# Patient Record
Sex: FEMALE | Race: WHITE | Employment: OTHER | URBAN - METROPOLITAN AREA
[De-identification: names, ages, dates, MRNs, and addresses within clinical notes are randomized per-mention and may not be internally consistent; named-entity substitution may affect disease eponyms.]

---

## 2017-01-03 ENCOUNTER — TELEPHONE (OUTPATIENT)
Dept: INTERNAL MEDICINE CLINIC | Age: 72
End: 2017-01-03

## 2017-01-31 RX ORDER — DIAZEPAM 5 MG/1
5 TABLET ORAL
Qty: 30 TAB | Refills: 0 | OUTPATIENT
Start: 2017-01-31 | End: 2017-05-18 | Stop reason: SDUPTHER

## 2017-02-06 ENCOUNTER — HOSPITAL ENCOUNTER (OUTPATIENT)
Dept: CT IMAGING | Age: 72
Discharge: HOME | End: 2017-02-06
Attending: INTERNAL MEDICINE
Payer: MEDICARE

## 2017-02-06 DIAGNOSIS — C50.812 MALIGNANT NEOPLASM OF OVERLAPPING SITES OF LEFT FEMALE BREAST (HCC): ICD-10-CM

## 2017-02-06 LAB — CREAT UR-MCNC: 0.8 MG/DL (ref 0.6–1.3)

## 2017-02-06 PROCEDURE — 82565 ASSAY OF CREATININE: CPT

## 2017-02-06 PROCEDURE — 74177 CT ABD & PELVIS W/CONTRAST: CPT

## 2017-02-06 PROCEDURE — 74011636320 HC RX REV CODE- 636/320

## 2017-02-06 RX ADMIN — IOPAMIDOL 100 ML: 612 INJECTION, SOLUTION INTRAVENOUS at 10:36

## 2017-02-10 RX ORDER — MELOXICAM 15 MG/1
TABLET ORAL
Qty: 90 TAB | Refills: 0 | Status: SHIPPED | OUTPATIENT
Start: 2017-02-10 | End: 2017-05-17 | Stop reason: SDUPTHER

## 2017-02-10 RX ORDER — LOSARTAN POTASSIUM 25 MG/1
25 TABLET ORAL DAILY
Qty: 90 TAB | Refills: 3 | Status: SHIPPED | OUTPATIENT
Start: 2017-02-10 | End: 2018-01-30 | Stop reason: SDUPTHER

## 2017-04-24 ENCOUNTER — TELEPHONE (OUTPATIENT)
Dept: INTERNAL MEDICINE CLINIC | Age: 72
End: 2017-04-24

## 2017-04-24 RX ORDER — HALOPERIDOL 2 MG/1
2 TABLET ORAL 2 TIMES DAILY
Qty: 180 TAB | Refills: 3 | Status: SHIPPED | OUTPATIENT
Start: 2017-04-24 | End: 2017-08-11 | Stop reason: ALTCHOICE

## 2017-04-24 NOTE — TELEPHONE ENCOUNTER
She gets her labs done every few weeks at her oncologists office. Please request lasts labs from Dr. Nixon Huffman.    Thank you.

## 2017-04-24 NOTE — TELEPHONE ENCOUNTER
Last labs were Oct 2016, and she is coming in Thursday 4/27 for 6 month follow-up. No open orders. Do you need to review?

## 2017-04-26 ENCOUNTER — HOSPITAL ENCOUNTER (OUTPATIENT)
Age: 72
Discharge: HOME | End: 2017-04-26
Attending: PSYCHIATRY & NEUROLOGY
Payer: MEDICARE

## 2017-04-26 DIAGNOSIS — M54.2 CERVICALGIA: ICD-10-CM

## 2017-04-26 LAB — CREAT UR-MCNC: 1.1 MG/DL (ref 0.6–1.3)

## 2017-04-26 PROCEDURE — 82565 ASSAY OF CREATININE: CPT

## 2017-04-26 PROCEDURE — 74011250636 HC RX REV CODE- 250/636: Performed by: PSYCHIATRY & NEUROLOGY

## 2017-04-26 PROCEDURE — 72156 MRI NECK SPINE W/O & W/DYE: CPT

## 2017-04-26 PROCEDURE — A9585 GADOBUTROL INJECTION: HCPCS | Performed by: PSYCHIATRY & NEUROLOGY

## 2017-04-26 RX ADMIN — GADOBUTROL 7.5 ML: 604.72 INJECTION INTRAVENOUS at 16:00

## 2017-04-27 ENCOUNTER — OFFICE VISIT (OUTPATIENT)
Dept: INTERNAL MEDICINE CLINIC | Age: 72
End: 2017-04-27

## 2017-04-27 VITALS
SYSTOLIC BLOOD PRESSURE: 110 MMHG | TEMPERATURE: 98.4 F | BODY MASS INDEX: 26.24 KG/M2 | DIASTOLIC BLOOD PRESSURE: 78 MMHG | HEART RATE: 90 BPM | WEIGHT: 139 LBS | HEIGHT: 61 IN | OXYGEN SATURATION: 99 %

## 2017-04-27 DIAGNOSIS — R53.83 FATIGUE, UNSPECIFIED TYPE: ICD-10-CM

## 2017-04-27 DIAGNOSIS — K57.30 DIVERTICULOSIS OF LARGE INTESTINE WITHOUT HEMORRHAGE: ICD-10-CM

## 2017-04-27 DIAGNOSIS — Z71.89 ACP (ADVANCE CARE PLANNING): ICD-10-CM

## 2017-04-27 DIAGNOSIS — E78.5 HYPERLIPIDEMIA, UNSPECIFIED HYPERLIPIDEMIA TYPE: ICD-10-CM

## 2017-04-27 DIAGNOSIS — I10 ESSENTIAL HYPERTENSION WITH GOAL BLOOD PRESSURE LESS THAN 140/90: ICD-10-CM

## 2017-04-27 DIAGNOSIS — M48.061 LUMBAR SPINAL STENOSIS: ICD-10-CM

## 2017-04-27 DIAGNOSIS — C50.912 MALIGNANT NEOPLASM OF LEFT FEMALE BREAST, UNSPECIFIED SITE OF BREAST: Primary | ICD-10-CM

## 2017-04-27 DIAGNOSIS — E55.9 VITAMIN D INSUFFICIENCY: ICD-10-CM

## 2017-04-27 DIAGNOSIS — G47.00 INSOMNIA, UNSPECIFIED TYPE: ICD-10-CM

## 2017-04-27 DIAGNOSIS — M85.89 OSTEOPENIA OF MULTIPLE SITES: ICD-10-CM

## 2017-04-27 DIAGNOSIS — Z00.00 MEDICARE ANNUAL WELLNESS VISIT, SUBSEQUENT: ICD-10-CM

## 2017-04-27 DIAGNOSIS — I89.0 LYMPHEDEMA OF ARM: ICD-10-CM

## 2017-04-27 DIAGNOSIS — Z12.31 ENCOUNTER FOR SCREENING MAMMOGRAM FOR MALIGNANT NEOPLASM OF BREAST: ICD-10-CM

## 2017-04-27 RX ORDER — CYCLOBENZAPRINE HCL 5 MG
5 TABLET ORAL
COMMUNITY
End: 2017-08-11 | Stop reason: ALTCHOICE

## 2017-04-27 NOTE — PATIENT INSTRUCTIONS

## 2017-04-27 NOTE — MR AVS SNAPSHOT
Visit Information Date & Time Provider Department Dept. Phone Encounter #  
 4/27/2017 11:30 AM Sharri Souza MD Internist of [address] [place] [address] [phone] [id number] Follow-up Instructions Return in about 6 months (around 10/27/2017), or if symptoms worsen or fail to improve. Your Appointments 10/31/2017 11:00 AM  
Office Visit with Sharri Souza MD  
Internist of [address]) Appt Note: 6 months bs  
 [address], [address] [place] [address] [address]  
  
   
 [address], [address] Upcoming Health Maintenance Date Due  
 MEDICARE YEARLY EXAM 10/27/2016 BREAST CANCER SCRN MAMMOGRAM 8/23/2018 GLAUCOMA SCREENING Q2Y 9/15/2018 DTaP/Tdap/Td series (2 - Td) 9/12/2024 COLONOSCOPY 2/22/2026 Allergies as of 4/27/2017  Review Complete On: 4/27/2017 By: Jaylen Keys Severity Noted Reaction Type Reactions Keflex [Cephalexin]  07/27/2010    Rash Metronidazole  05/04/2015    Rash Other Medication   Side Effect Nausea Only Anesthesia Shellfish Containing Products  07/21/2011    Nausea and Vomiting More of just eating the actual shellfish. Sulfa (Sulfonamide Antibiotics)  07/21/2011    Rash Ultram [Tramadol]  07/27/2010    Nausea and Vomiting Patient states she is allergic to most Narcotics Vancomycin  07/21/2011    Rash Current Immunizations  Reviewed on 10/25/2016 Name Date Influenza High Dose Vaccine PF 10/25/2016  1:20 PM, 10/1/2015 10:13 AM  
 Influenza Vaccine 9/1/2014 Influenza Vaccine Split 10/2/2012 12:18 PM, 11/1/2011 Influenza Vaccine Whole 10/8/2010 Pneumococcal Conjugate (PCV-13) 10/27/2015  2:52 PM  
 Pneumococcal Polysaccharide (PPSV-23) 4/15/2013 TD Vaccine 5/5/2008 Tdap 9/12/2014 Zoster 9/20/2011 Not reviewed this visit Vitals BP Pulse Temp Height(growth percentile) Weight(growth percentile) SpO2 110/78 (BP 1 Location: Right arm, BP Patient Position: Sitting) 90 98.4 °F (36.9 °C) (Oral) 5' 1\" (1.549 m) 139 lb (63 kg) 99% BMI OB Status Smoking Status 26.26 kg/m2 Postmenopausal Former Smoker Vitals History BMI and BSA Data Body Mass Index Body Surface Area  
 26.26 kg/m 2 1.65 m 2 Preferred Pharmacy Pharmacy Name Phone [facility] [address] [place] [address] [phone] Your Updated Medication List  
  
   
This list is accurate as of: 4/27/17 12:35 PM.  Always use your most recent med list.  
  
  
  
  
 amoxicillin 500 mg capsule Commonly known as:  AMOXIL  
4 tabs by mouth 1 hour before dental work Biotin 2,500 mcg Cap Take  by mouth. CALCIUM 500+D 500 mg(1,250mg) -200 unit per tablet Generic drug:  calcium-vitamin D Take 1 Tab by mouth two (2) times daily (with meals). CENTRUM SILVER PO Take  by mouth. cyclobenzaprine 5 mg tablet Commonly known as:  FLEXERIL Take 5 mg by mouth. diazePAM 5 mg tablet Commonly known as:  VALIUM Take 1 Tab by mouth every six (6) hours as needed. FEMARA 2.5 mg tablet Generic drug:  letrozole Take 2.5 mg by mouth daily. gabapentin 100 mg capsule Commonly known as:  NEURONTIN Take  by mouth three (3) times daily. haloperidol 2 mg tablet Commonly known as:  HALDOL Take 1 Tab by mouth two (2) times a day. IBRANCE 100 mg Cap Generic drug:  palbociclib Take 75 mg by mouth.  
  
 losartan 25 mg tablet Commonly known as:  COZAAR Take 1 Tab by mouth daily. melatonin 3 mg tablet Take  by mouth nightly. meloxicam 15 mg tablet Commonly known as:  MOBIC  
TAKE 1 TABLET (15 MG) BY ORAL ROUTE ONCE DAILY WITH FOOD  
  
 VITAMIN B-12 PO Take  by mouth. VITAMIN C 500 mg tablet Generic drug:  ascorbic acid (vitamin C) Take  by mouth. VITAMIN D3 1,000 unit tablet Generic drug:  cholecalciferol Take 2,000 Units by mouth daily. Follow-up Instructions Return in about 6 months (around 10/27/2017), or if symptoms worsen or fail to improve. Patient Instructions Back Stretches: Exercises Your Care Instructions Here are some examples of exercises for stretching your back. Start each exercise slowly. Ease off the exercise if you start to have pain. Your doctor or physical therapist will tell you when you can start these exercises and which ones will work best for you. How to do the exercises Overhead stretch 1. Stand comfortably with your feet shoulder-width apart. 2. Looking straight ahead, raise both arms over your head and reach toward the ceiling. Do not allow your head to tilt back. 3. Hold for 15 to 30 seconds, then lower your arms to your sides. 4. Repeat 2 to 4 times. Side stretch 1. Stand comfortably with your feet shoulder-width apart. 2. Raise one arm over your head, and then lean to the other side. 3. Slide your hand down your leg as you let the weight of your arm gently stretch your side muscles. Hold for 15 to 30 seconds. 4. Repeat 2 to 4 times on each side. Press-up 1. Lie on your stomach, supporting your body with your forearms. 2. Press your elbows down into the floor to raise your upper back. As you do this, relax your stomach muscles and allow your back to arch without using your back muscles. As your press up, do not let your hips or pelvis come off the floor. 3. Hold for 15 to 30 seconds, then relax. 4. Repeat 2 to 4 times. Relax and rest 
 
1. Lie on your back with a rolled towel under your neck and a pillow under your knees. Extend your arms comfortably to your sides. 2. Relax and breathe normally. 3. Remain in this position for about 10 minutes. 4. If you can, do this 2 or 3 times each day. Follow-up care is a key part of your treatment and safety.  Be sure to make and go to all appointments, and call your doctor if you are having problems. It's also a good idea to know your test results and keep a list of the medicines you take. Where can you learn more? Go to http://fatou-olive.info/. Enter M367 in the search box to learn more about \"Back Stretches: Exercises. \" Current as of: May 23, 2016 Content Version: 11.2 © 1820-9573 [employer]. Care instructions adapted under license by [employer] (which disclaims liability or warranty for this information). If you have questions about a medical condition or this instruction, always ask your healthcare professional. [address] any warranty or liability for your use of this information. Introducing [facility]! Dear Josh Villafana: 
Thank you for requesting a Disconnect account. Our records indicate that you already have an active Disconnect account. You can access your account anytime at [email or web address]. Standardized [email or web address]/[employer] Did you know that you can access your hospital and ER discharge instructions at any time in Disconnect? You can also review all of your test results from your hospital stay or ER visit. Additional Information If you have questions, please visit the Frequently Asked Questions section of the Disconnect website at [email or web address] Standardized Safety/[employer]/. Remember, Disconnect is NOT to be used for urgent needs. For medical emergencies, dial 911. Now available from your iPhone and Android! Please provide this summary of care documentation to your next provider. Your primary care clinician is listed as Anupam White. If you have any questions after today's visit, please call [phone].

## 2017-04-27 NOTE — PROGRESS NOTES
1. Have you been to the ER, urgent care clinic or hospitalized since your last visit? NO.     2. Have you seen or consulted any other health care providers outside of the [address] [place] [address] since your last visit (Include any pap smears or colon screening)? YES  Dr. Epstein Marrow neurology for pain in back of neck and head April 18, 2017. Do you have an Advanced Directive? YES pt states she will bring a copy and bring it to us. Would you like information on Advanced Directives?  NO

## 2017-04-30 ENCOUNTER — TELEPHONE (OUTPATIENT)
Dept: INTERNAL MEDICINE CLINIC | Age: 72
End: 2017-04-30

## 2017-04-30 NOTE — PROGRESS NOTES
This is a Subsequent Medicare Annual Wellness Visit providing Personalized Prevention Plan Services (PPPS) (Performed 12 months after initial AWV and PPPS )    I have reviewed the patient's medical history in detail and updated the computerized patient record. History     Past Medical History:   Diagnosis Date    Arthritis     Breast cancer metastasized to lung [facility])     Left Breast    Chronic pain     Colon polyps 2/22/16    distal sigmoid, Dr. Wade DDD (degenerative disc disease)     Diverticulitis of colon     Diverticulosis 2/22/16    mild in the ascedning and sigmoid colon, Dr. Belle Olivia (hyperlipidemia)     Hypertension     Osteopenia     Tourette syndrome     Vitamin D deficiency       Past Surgical History:   Procedure Laterality Date    HX APPENDECTOMY      HX BREAST BIOPSY  7-30-10    Left Breast w/Nipple Duct exploration and excisional biopsy-left    HX DILATION AND CURETTAGE      x3    HX MODIFIED RADICAL MASTECTOMY  9-3-10    left    HX ORTHOPAEDIC Right 2012    knee replacement    HX TONSILLECTOMY      HYSTEROSCOPY DIAGNOSTIC       Current Outpatient Prescriptions   Medication Sig Dispense Refill    cyclobenzaprine (FLEXERIL) 5 mg tablet Take 5 mg by mouth.  haloperidol (HALDOL) 2 mg tablet Take 1 Tab by mouth two (2) times a day. 180 Tab 3    meloxicam (MOBIC) 15 mg tablet TAKE 1 TABLET (15 MG) BY ORAL ROUTE ONCE DAILY WITH FOOD 90 Tab 0    losartan (COZAAR) 25 mg tablet Take 1 Tab by mouth daily. 90 Tab 3    diazePAM (VALIUM) 5 mg tablet Take 1 Tab by mouth every six (6) hours as needed. 30 Tab 0    CYANOCOBALAMIN, VITAMIN B-12, (VITAMIN B-12 PO) Take  by mouth.  Biotin 2,500 mcg cap Take  by mouth.  melatonin 3 mg tablet Take  by mouth nightly.       amoxicillin (AMOXIL) 500 mg capsule 4 tabs by mouth 1 hour before dental work 12 Cap 1    calcium-vitamin D (CALCIUM 500+D) 500 mg(1,250mg) -200 unit per tablet Take 1 Tab by mouth two (2) times daily (with meals).  cholecalciferol, vitamin d3, (VITAMIN D) 1,000 unit tablet Take 2,000 Units by mouth daily.  ascorbic acid (VITAMIN C) 500 mg tablet Take  by mouth.  gabapentin (NEURONTIN) 100 mg capsule Take  by mouth three (3) times daily.  palbociclib (IBRANCE) 100 mg cap Take 75 mg by mouth.  letrozole (FEMARA) 2.5 mg tablet Take 2.5 mg by mouth daily.  MULTIVITAMIN W-MINERALS/LUTEIN (CENTRUM SILVER PO) Take  by mouth. Allergies   Allergen Reactions    Keflex [Cephalexin] Rash    Metronidazole Rash    Other Medication Nausea Only     Anesthesia    Shellfish Containing Products Nausea and Vomiting     More of just eating the actual shellfish.     Sulfa (Sulfonamide Antibiotics) Rash    Ultram [Tramadol] Nausea and Vomiting     Patient states she is allergic to most Narcotics    Vancomycin Rash     Family History   Problem Relation Age of Onset    Osteoporosis Sister     Osteoporosis Mother     Stroke Father     Hypertension Father     Cancer Paternal Aunt      breast     Social History   Substance Use Topics    Smoking status: Former Smoker    Smokeless tobacco: Never Used    Alcohol use 3.5 oz/week     7 Glasses of wine per week     Patient Active Problem List   Diagnosis Code    Tourette syndrome F95.2    Osteoarthritis, Status post right total knee replacement M19.90    Lumbar spinal stenosis M48.06    HLD (hyperlipidemia) E78.5    Breast cancer ([facility] [address] metastatic to lungs  C50.919    Essential hypertension with goal blood pressure less than 140/90 I10    Diverticulosis K57.90    Osteopenia M85.80    Lymphedema of arm left I89.0    Insomnia G47.00    Fatigue R53.83    Diverticulitis, recurrent K57.92    T11 Vertebral compression fracture (HCC) s/p kyphoplasty M48.50XA    Personal history of malignant neoplasm of breast Z85.3       Depression Risk Factor Screening:     PHQ 2 / 9, over the last two weeks 4/27/2017   Little interest or pleasure in doing things Not at all   Feeling down, depressed or hopeless Not at all   Total Score PHQ 2 0     Alcohol Risk Factor Screening: On any occasion during the past 3 months, have you had more than 3 drinks containing alcohol? No    Do you average more than 7 drinks per week? No      Functional Ability and Level of Safety:     Hearing Loss   none    Activities of Daily Living   Self-care. Requires assistance with: no ADLs    Fall Risk     Fall Risk Assessment, last 12 mths 4/27/2017   Able to walk? Yes   Fall in past 12 months? Yes   Fall with injury? Yes   Number of falls in past 12 months 1   Fall Risk Score 2     Abuse Screen   Patient is not abused    Review of Systems   A comprehensive review of systems was negative except for that written in the HPI.     Physical Examination     Evaluation of Cognitive Function:  Mood/affect:  neutral, happy  Appearance: age appropriate and within normal Limits  Family member/caregiver input:      Exam: see office note    Patient Care Team:  Mendy Palma MD as PCP - General (Internal Medicine)  Anaya Groves MD (Gastroenterology)  Antwon Rizo MD (Rheumatology)  Shakira Bender MD (Physical Medicine and Rehab)  Mere Calix MD (Pulmonary Disease)  Soha Bass MD (Neurology)  Joanna Evangelista MD (Orthopedic Surgery)  Kamran Trejo MD (Surgical Oncology)  Mary Lou Ayers MD (Hematology and Oncology)  Yaritza Reyes MD (Thoracic Diseases)  Ad Sawant MD (Cardiology)  Lobito Parker MD (Radiology)  Aleshia Leon, RN as Ambulatory Care Navigator (Internal Medicine)  Conchis Carolina MD (Hematology and Oncology)  Zoraida Velez MD (Internal Medicine)  Terry Ware MD (Gynecologic Oncology)  Malcolm Evangelista MD (Dermatology)  Otilia Ponce MD (Ophthalmology)  Yvette Kaur, ALHAJI as Ambulatory Care Navigator (Internal Medicine)  Tamia Ardon DO (Physical Medicine and Rehab)  Kamran Trejo MD as Surgeon (Surgical Oncology)    Advice/Referrals/Counseling   Education and counseling provided:  Are appropriate based on today's review and evaluation  End-of-Life planning (with patient's consent)  Screening Mammography  Bone mass measurement (DEXA)  Screening for glaucoma      Assessment/Plan       ICD-10-CM ICD-9-CM    1. Malignant neoplasm of left female breast, unspecified site of breast ([facility] [address] C50.912 174.9 SHARON MAMMO BI SCREENING INCL CAD   2. Essential hypertension with goal blood pressure less than 140/90 I10 401.9 TSH 3RD GENERATION      URINALYSIS W/MICROSCOPIC   3. Hyperlipidemia, unspecified hyperlipidemia type E78.5 272.4 LIPID PANEL   4. Diverticulosis of large intestine without hemorrhage K57.30 562.10    5. Encounter for screening mammogram for malignant neoplasm of breast Z12.31 V76.12 SHARON MAMMO BI SCREENING INCL CAD   6. Osteopenia of multiple sites M85.89 733.90 VITAMIN D, 25 HYDROXY   7. Vertebral compression fracture, sequela M48.50XS 905.1    8. Lumbar spinal stenosis M48.06 724.02    9. Lymphedema of arm left I89.0 457.1    10. Insomnia, unspecified type G47.00 780.52    11. Fatigue, unspecified type R53.83 780.79    12. Vitamin D insufficiency E55.9 268.9 VITAMIN D, 25 HYDROXY     current treatment plan is effective, no change in therapy  lab results and schedule of future lab studies reviewed with patient  reviewed diet, exercise and weight control  cardiovascular risk and specific lipid/LDL goals reviewed  reviewed medications and side effects in detail  radiology results and schedule of future radiology studies reviewed with patient.

## 2017-04-30 NOTE — PROGRESS NOTES
Efrain Butterfield is a 78y.o. year old female who presents today for evaluation of hypertension, dyslipidemia,  metastatic breast cancer, GERD, diverticulosis with recurrent diverticulitis, osteoarthritis s/p right knee replacement (2012), lumbar degenerative disease, osteopenia, compression fracture, and Tourette's syndrome. She reports that she is doing relatively well. She states that she established care with Dr. Martín Sanchez, but was told that she may have her mammograms and breast exams in our office with referral to her if something arises. She also was released from the care of Dr. Rebeca Haile who was following a right ovarian cyst, which has been concluded to be benign. She reports that she had been given a treatment break from [facility] due to leukopenia, but was just restarted by Dr. Jeferson Briones. She is complaining of persistent fatigue, which makes it difficult to perform daily activities at home. She is otherwise without complaints. She has a history of left breast cancer, which was diagnosed in 7/2010 as invasive ductal adenocarcinoma, s/p left modified radical mastectomy with sentinel node biopsy, performed by Dr. Shanta Garcia. She had 3 positive lymph nodes and was classified as stage 1B, T1c N1 M0, ER and OH positive, Her-2/sarah negative by FISH. She underwent 6 cycles of chemotherapy with Docetaxel and Cytoxan. She was subsequently unable to tolerate anastrozole, exemestane, tamoxifen, and letrozole due to profound fatigue and arthralgias. Her course was complicated by chronic left arm lymphedema, managed with a compression sleeve. A chest CT scan in 3/2014 noted several small pulmonary nodules which were concerning for metastases but were too small to biopsy. They were followed with sequential CT scans , and in 12/2014, repeat chest CT scan showed enlarging bilateral pulmonary nodules compatible with progression of metastatic disease.  A fine needle aspirate was performed on 12/21/2014 which showed benign pulmonary parenchyma with alveolar hemorrhage. Repeat chest CT scan in 3/12/2015 showed mild interval progression of the lung metastases with enlarging nodules and development of new nodules. A CT guided needle biopsy was performed on 3/23/2015, which confirmed metastatic adenocarcinoma consistent with breast primary (stage IV, ER/SD positive, and TTF-1 negative). On 4/13/2015, she was started on therapy with palbociclib and letrozole. Follow-up CT scan (7/15/15) showed partial response with decreasing size of some nodules and resolution of other nodules. Most recent chest, abdomen, and pelvis CT scan was obtained on 10/4/2016, showing stable multiple bilateral small pulmonary nodules (up to 5 mm in size) and no suspicious new pulmonary nodules. She continues on letrozole and intermittent dosing of palbociclib. She is followed by Dr. Vanesa Nuñez.     She has a history of hypertension treated with losartan. She monitors her blood pressure mainly when visiting multiple physicians and reports that it is usually 120-30/80. She has no history of heart disease, and denies any chest pain, shortness of breath at rest or with exertion, palpitations, lightheadedness, or edema. In 4/11/2016, she sustained a fall with subsequent severe pain in her mid to upper lumbar region and wrapping around waist. She was treated with meloxicam, tylenol and flexeril, but because of persistent discomfort, she presented to [facility] on 4/15/2016 and lumbosacral spine films showed marked degenerative changes with disc space obliteration throughout lumbar spine and slight anterior spondylolisthesis of L4. She was evaluated by Dr. Willis Wilson on 4/18/2016 who recommended physical therapy and evaluation by Dr. Olimpia Chavis for her degenerative spine changes. She continued to take meloxicam and tylenol and started physical therapy, but noted worsening of her pain.  She subsequently was evaluated by Dr. Frederic Pickard, who obtained a lumbar MRI on 4/26/2016, which showed a new subacute compression fracture of T11 vertebral body with diffuse marrow edema and mild paravertebral inflammatory stranding, no retropulsion or central stenosis; more severe degenerative disease along the right side at L4-L5, L5-S1, potential mild compression of right exiting L4 and L5 nerve roots. She was referred to Dr. Linden Dobson for kyphoplasty of the T11 compression fracture given failure of pain control with conservative measures. She underwent the procedure on 0/2/9924 without complications, and B04 vertebral body bone core and aspirate biopsies were performed, which showed only reactive bone changes and no evidence of malignancy. She has a history of osteopenia, with a history of fracture of her sacrum. She reports that she was treated with alendronate in 12/2011 but discontinued it in 4/2013 due to intolerence. Her last bone density scan (11/2015) was consistent with osteopenia with femoral neck T-scores: left -1.1/ right -1.2 (lumbar T-score could not be assessed). She continues to take calcium and vitamin D. In 10/2016, she developed left sided low back pain with radiation to her left leg over the last several weeks. She underwent a lumbar MRI (9/2016) showing scoliosis and advanced multilevel degenerative changes with areas of foraminal stenosis at L3-L4 and L5-S1; mild/moderate central canal stenosis at L4-L5 and L5-S1. She was evaluated by Dr. Meg Cortes and treated with pregabalin with some improvement. She subsequently received an epidural steroid injection with improvement. She has a history of GERD and diverticulosis with recurrent diverticulitis,. She was evaluated by Dr. Nel López in 2/23/2016 and underwent an upper endoscopy, which revealed a Schatzki's ring at the gastroesophageal junction (dilated) with mild distal esophagitis and a small hiatal hernia.  A screening colonoscopy was also performed showing moderately severe diverticulosis of the ascending and descending colon and a 2 mm sessile sigmoid polyp (pathology: hyperplastic). Recommendations for follow-up colonoscopy in 10 years. She denies any abdominal pain, nausea, vomiting, melena, hematochezia, or change in bowel movements. She also has a history of Tourette's syndrome controlled with Haldol. She is followed by Dr. Patel Albert. Past Medical History:   Diagnosis Date    Arthritis     Breast cancer metastasized to lung [facility])     Left Breast    Chronic pain     Colon polyps 2/22/16    distal sigmoid, Dr. Muller Speak DDD (degenerative disc disease)     Diverticulitis of colon     Diverticulosis 2/22/16    mild in the ascedning and sigmoid colon, Dr. Nathalie Lyn (hyperlipidemia)     Hypertension     Osteopenia     Tourette syndrome     Vitamin D deficiency      Past Surgical History:   Procedure Laterality Date    HX APPENDECTOMY      HX BREAST BIOPSY  7-30-10    Left Breast w/Nipple Duct exploration and excisional biopsy-left    HX DILATION AND CURETTAGE      x3    HX MODIFIED RADICAL MASTECTOMY  9-3-10    left    HX ORTHOPAEDIC Right 2012    knee replacement    HX TONSILLECTOMY      HYSTEROSCOPY DIAGNOSTIC       Current Outpatient Prescriptions   Medication Sig    cyclobenzaprine (FLEXERIL) 5 mg tablet Take 5 mg by mouth.  haloperidol (HALDOL) 2 mg tablet Take 1 Tab by mouth two (2) times a day.  meloxicam (MOBIC) 15 mg tablet TAKE 1 TABLET (15 MG) BY ORAL ROUTE ONCE DAILY WITH FOOD    losartan (COZAAR) 25 mg tablet Take 1 Tab by mouth daily.  diazePAM (VALIUM) 5 mg tablet Take 1 Tab by mouth every six (6) hours as needed.  CYANOCOBALAMIN, VITAMIN B-12, (VITAMIN B-12 PO) Take  by mouth.  Biotin 2,500 mcg cap Take  by mouth.  melatonin 3 mg tablet Take  by mouth nightly.  amoxicillin (AMOXIL) 500 mg capsule 4 tabs by mouth 1 hour before dental work    calcium-vitamin D (CALCIUM 500+D) 500 mg(1,250mg) -200 unit per tablet Take 1 Tab by mouth two (2) times daily (with meals).     cholecalciferol, vitamin d3, (VITAMIN D) 1,000 unit tablet Take 2,000 Units by mouth daily.  ascorbic acid (VITAMIN C) 500 mg tablet Take  by mouth.  gabapentin (NEURONTIN) 100 mg capsule Take  by mouth three (3) times daily.  palbociclib (IBRANCE) 100 mg cap Take 75 mg by mouth.  letrozole (FEMARA) 2.5 mg tablet Take 2.5 mg by mouth daily.  MULTIVITAMIN W-MINERALS/LUTEIN (CENTRUM SILVER PO) Take  by mouth. No current facility-administered medications for this visit. Allergies and Intolerances: Allergies   Allergen Reactions    Keflex [Cephalexin] Rash    Metronidazole Rash    Other Medication Nausea Only     Anesthesia    Shellfish Containing Products Nausea and Vomiting     More of just eating the actual shellfish.  Sulfa (Sulfonamide Antibiotics) Rash    Ultram [Tramadol] Nausea and Vomiting     Patient states she is allergic to most Narcotics    Vancomycin Rash     Family History:   Family History   Problem Relation Age of Onset    Osteoporosis Sister     Osteoporosis Mother     Stroke Father     Hypertension Father     Cancer Paternal Aunt      breast     Social History:   She  reports that she has quit smoking. She has never used smokeless tobacco. She stopped smoking over 40 years ago. She is  and lives with . They have one daughter and two grandchildren. History   Alcohol Use    3.5 oz/week    7 Glasses of wine per week     Immunization History:  Immunization History   Administered Date(s) Administered    Influenza High Dose Vaccine PF 10/01/2015, 10/25/2016    Influenza Vaccine 09/01/2014    Influenza Vaccine Split 11/01/2011, 10/02/2012    Influenza Vaccine Whole 10/08/2010    Pneumococcal Conjugate (PCV-13) 10/27/2015    Pneumococcal Polysaccharide (PPSV-23) 04/15/2013    TD Vaccine 05/05/2008    Tdap 09/12/2014    Zoster 09/20/2011       Review of Systems:   As above included in HPI.   Otherwise 11 point review of systems negative including constitutional, skin, HENT, eyes, respiratory, cardiovascular, gastrointestinal, genitourinary, musculoskeletal, endo/heme/aller, neurological.    Physical:   Vitals:   BP: 110/78  HR: 90  WT: 139 lb (63 kg)  BMI:  26.26 kg/m2    Exam:   Pt appears well; alert and oriented x 3; appropriate affect. HEENT: PERRLA, anicteric, oropharynx clear, no JVD, adenopathy or thyromegaly. No carotid bruits or radiated murmur. Lungs: clear to auscultation, no wheezes, rhonchi, or rales. Heart: regular rate and rhythm. No murmur, rubs, gallops  Abdomen: soft, nontender, nondistended, normal bowel sounds, no hepatosplenomegaly or masses. Extremities: without edema. Pulses 1-2+ bilaterally. Review of Data:  Labs:  Hospital Outpatient Visit on 04/26/2017   Component Date Value Ref Range Status    Creatinine (POC) 04/26/2017 1.1  0.6 - 1.3 MG/DL Final    GFR-AA (POC) 04/26/2017 60* >60 ml/min/1.73m2 Final    GFR, non-AA (POC) 04/26/2017 49* >60 ml/min/1.73m2 Final       [facility] WBC 5.2       Hb 13.9/ Hct 41.4       Platelets 791       Glucose 87       Na 141/ K 5.1/ CO2 29       Ca 9.8       AST 16/ ALT 15       Alkaline phosphatase 67       Creatinine 0.84/ eGFR >60    Health Maintenance:  Screening:    Mammogram: negative (8/2016)    PAP smear: negative (9/2013) Dr Raghavendra Hardin. Pelvic ultrasound negative (9/2015). Colorectal: colonoscopy (2/2016) hyperplastic polyp; Dr. Daniela De Anda. Follow-up 2026. Depression: none   DM (HbA1c/FPG): FPG 87 (4/2017)   Hepatitis C: declined   Falls: none   DEXA: osteopenia (11/2015)   Smoking:distant past   Glaucoma: regular eye exams with Dr. Ira Myers every 6 months.     Vitamin D: 36.2 (10/2016)   Medicare Wellness: today    Impression:  Patient Active Problem List   Diagnosis Code    Tourette syndrome F95.2    Osteoarthritis, Status post right total knee replacement M19.90    Lumbar spinal stenosis M48.06    HLD (hyperlipidemia) E78.5    Breast cancer [facility]), metastatic to lungs  C50.919    Essential hypertension with goal blood pressure less than 140/90 I10    Diverticulosis K57.90    Osteopenia M85.80    Lymphedema of arm left I89.0    Insomnia G47.00    Fatigue R53.83    Diverticulitis, recurrent K57.92    T11 Vertebral compression fracture (HCC) s/p kyphoplasty M48.50XA    Personal history of malignant neoplasm of breast Z85.3       Plan:  1. Hypertension. Well controlled on losartan. Renal function normal with creatinine 0.81/ eGFR >60. Continue to follow. 2. Breast cancer with pulmonary metastasis. Continue current therapy as per Dr. Rito Julio. Recent CT scans with stable pulmonary metastases on Ibrance and Femara. Continue use of compression sleeve for lymphedema. Follow. 3. Hyperlipidemia. Calculated 10 year ASCVD risk is 9.3 %, which places her in one of the four statin benefit groups (primary prevention with risk > 7.5%). Given metastatic breast cancer, and lack of history of ASCVD, would be understandable if patient wishes to hold off on statin therapy. Will need to discuss at next visit. Continue to emphasized importance of lifestyle modifications, including diet, exercise, and weight loss. 4. T11 compression fracture. S/P kyphoplasty with significant improvement in pain. Follow. 5. Osteopenia. Last bone density scan 11/2015. Using femoral neck T-scores, calculated FRAX score estimates her 10 year risk of a major osteoporetic fracture at 15% and hip fracture at 1.5%, which alone are not an indication for biphosphonate treatment. However, the development of a vertebral compression fracture is an indication for treatment. In addition, treatment with an aromatase inhibitor increases likelihood of progression. Discussed the benefits and risks of therapy with the patient, and preauthorization had been obtained for her to begin receiving denosumab therapy through Dr. Jack Russ office.  However, she has had some difficulty with side effects from treatment with Ibrance, and had decided not to proceed with Prolia therapy. She did not tolerate alendronate in the past. Continue calcium and Vitamin D. Encouraged to continue exercises as instructed in physical therapy. Will recheck bone density scan in 11/2017. Follow. 6. Recurrent diverticulitis. Currently stable without symptoms. Colonoscopy (2/2016) with moderately severe diverticulosis in the ascending and descending colon. Will continue to follow. 7. Low back pain. Improved. Likely secondary to lumbar stenosis and degenerative arthritis. Being managed currently by Dr. Beltran Irvin with pregabalin and epidural injections. Follow. 8. Tourette's syndrome. Followed by neurology. Continue Haldol and prn diazepam for sleep. 8. Health maintenance. Immunizations up to date. Mammogram up to date. Will order for 8/2016 and perform breast exam at next visit. Vitamin D level remains low normal. COntinue maintenance dose supplement. Continue regular eye exams with Dr. Kenia Khan. Will request record. In addition, an annual Medicare wellness visit was done today. Patient understands recommendations and agrees with plan. Follow-up in 6 months.

## 2017-04-30 NOTE — PROGRESS NOTES
Advance Care Planning (ACP) Provider Note - Comprehensive     Date of ACP Conversation: 4/27/2017  Persons included in Conversation:  patient and gio  Length of ACP Conversation in minutes:  16 minutes    Authorized Decision Maker (if patient is incapable of making informed decisions):   This person is:  Healthcare Agent/Medical Power of  under Advance Directive        General ACP for ALL Patients with Decision Making Capacity:   Importance of advance care planning, including choosing a healthcare agent to communicate patient's healthcare decisions if patient lost the ability to make decisions, such as after a sudden illness or accident  Understanding of the healthcare agent role was assessed and information provided  Exploration of values, goals, and preferences if recovery is not expected, even with continued medical treatment in the event of: Imminent death  Severe, permanent brain injury  \"In these circumstances, what matters most to you? \"  Care focused more on comfort or quality of life. Review of Existing Advance Directive:  Reviewed advance directive that is on file. Dated from [facility]. Patient states that this has been replaced by a more current advance directive, which is in her safe deposit box. She states that it she would like to obtain new paperwork to complete as it is difficult to retrieve a copy of it. She states that her  is her medical health care agent.      For Serious or Chronic Illness:  Understanding of medical condition      Interventions Provided:  Recommended completion of Advance Directive form after review of ACP materials and conversation with prospective healthcare agent   Reviewed existing Advance Directive   Recommended communicating the plan and making copies for the healthcare agent, personal physician, and others as appropriate (e.g., health system)  Recommended review of completed ACP document annually or upon change in health status   Recommended to complete new copy of advance directive and return completed form to office to be copied and scanned into chart. Paperwork provided and reviewed.

## 2017-05-16 ENCOUNTER — HOSPITAL ENCOUNTER (OUTPATIENT)
Age: 72
Discharge: HOME | End: 2017-05-16
Attending: PSYCHIATRY & NEUROLOGY
Payer: MEDICARE

## 2017-05-16 DIAGNOSIS — I72.0: ICD-10-CM

## 2017-05-16 DIAGNOSIS — G44.89 HEADACHE SYNDROME: ICD-10-CM

## 2017-05-16 PROCEDURE — 74011250636 HC RX REV CODE- 250/636: Performed by: PSYCHIATRY & NEUROLOGY

## 2017-05-16 PROCEDURE — 70553 MRI BRAIN STEM W/O & W/DYE: CPT

## 2017-05-16 PROCEDURE — A9585 GADOBUTROL INJECTION: HCPCS | Performed by: PSYCHIATRY & NEUROLOGY

## 2017-05-16 RX ADMIN — GADOBUTROL 6 ML: 604.72 INJECTION INTRAVENOUS at 14:00

## 2017-05-17 RX ORDER — MELOXICAM 15 MG/1
TABLET ORAL
Qty: 90 TAB | Refills: 0 | Status: SHIPPED | OUTPATIENT
Start: 2017-05-17 | End: 2017-08-21 | Stop reason: SDUPTHER

## 2017-05-18 RX ORDER — DIAZEPAM 5 MG/1
5 TABLET ORAL
Qty: 30 TAB | Refills: 3 | OUTPATIENT
Start: 2017-05-18 | End: 2018-03-30

## 2017-05-18 NOTE — TELEPHONE ENCOUNTER
There is a valium approval on file from 3/3/17.  I tried to do another prior [facility] and it was returned saying \"available without prior authorization\" Im not sure if they just re sent the same letter from march to her, I told her we can call it in as a refill and see if the pharmacy has an issue filling it and go from there

## 2017-05-22 ENCOUNTER — TELEPHONE (OUTPATIENT)
Dept: INTERNAL MEDICINE CLINIC | Age: 72
End: 2017-05-22

## 2017-05-22 NOTE — TELEPHONE ENCOUNTER
Mri and mra ordered by neurologist- done at [facility]  05/16/17-  She has called them but they have not given her an answer,. Are you able to giver her results? THey are in [address] [place] [address].

## 2017-05-23 NOTE — TELEPHONE ENCOUNTER
Per Dr Andres Bridges, we cannot give patients results from procedures other doctors ordered.  I spoke with the patient and she said the neurologist did call her and give her the results

## 2017-05-23 NOTE — TELEPHONE ENCOUNTER
Pt calling asking if BS reviewed the MRI results? Does BS want to discuss with her? Says she still hasn't gotten return call from neurologist and she is concerned. Says they now ordered an MRV in June but she isn't even sure what that is. Please call her.

## 2017-05-24 ENCOUNTER — TELEPHONE (OUTPATIENT)
Dept: INTERNAL MEDICINE CLINIC | Age: 72
End: 2017-05-24

## 2017-05-24 NOTE — TELEPHONE ENCOUNTER
Spoke with patient, she had more questions about her MRI, I directed her to contact her neurologist to discuss in detail with her.  She verbalized understanding

## 2017-05-24 NOTE — TELEPHONE ENCOUNTER
Patient is asking top speak with you. Stating it is about her back pain and that you are familiar with what is going on with her.

## 2017-05-24 NOTE — TELEPHONE ENCOUNTER
Dr Davida Mcdonald, I have talked to her a few times the past 2 days, she has quite a few questions Im not really able to answer but I did advise her to call her neurologist which she said she was but do you mind speaking with her?  I think it may calm her down some I think =)

## 2017-05-25 ENCOUNTER — OFFICE VISIT (OUTPATIENT)
Dept: INTERNAL MEDICINE CLINIC | Age: 72
End: 2017-05-25

## 2017-05-25 VITALS
BODY MASS INDEX: 26.7 KG/M2 | OXYGEN SATURATION: 97 % | SYSTOLIC BLOOD PRESSURE: 122 MMHG | DIASTOLIC BLOOD PRESSURE: 82 MMHG | RESPIRATION RATE: 14 BRPM | HEART RATE: 85 BPM | HEIGHT: 61 IN | WEIGHT: 141.4 LBS | TEMPERATURE: 98.6 F

## 2017-05-25 DIAGNOSIS — C78.00 MALIGNANT NEOPLASM METASTATIC TO LUNG, UNSPECIFIED LATERALITY (HCC): ICD-10-CM

## 2017-05-25 DIAGNOSIS — M50.30 DDD (DEGENERATIVE DISC DISEASE), CERVICAL: Primary | ICD-10-CM

## 2017-05-25 DIAGNOSIS — C50.919 MALIGNANT NEOPLASM OF FEMALE BREAST, UNSPECIFIED LATERALITY, UNSPECIFIED SITE OF BREAST: ICD-10-CM

## 2017-05-25 DIAGNOSIS — R90.89 MAGNETIC RESONANCE IMAGING OF BRAIN ABNORMAL: ICD-10-CM

## 2017-05-25 DIAGNOSIS — I67.1 CEREBRAL ANEURYSM: ICD-10-CM

## 2017-05-25 DIAGNOSIS — M48.061 LUMBAR SPINAL STENOSIS: ICD-10-CM

## 2017-05-25 DIAGNOSIS — M85.80 OSTEOPENIA, UNSPECIFIED LOCATION: ICD-10-CM

## 2017-05-25 NOTE — PROGRESS NOTES
HPI/History  Shira Elizondo is a 73 y.o.  female accompanied by  who presents mostly for discussion. Pt with hx of osteoarthritis, osteopenia, scoliosis, compression fracture, and degenerative spinal dz including lumbar and cervical regions. She has seen several specialists and most recently followed by Dr. Emily Sol. She was offered epidural injections for these issues but by the time she is to receive them she is doing slightly better and postpones. She was taking gabapentin through Dr. Emily Sol but stopped at some point. She has chronic and acute pains from above issues with cervical and upper back areas being more problematic recently. Her chronic right upper back and right cervical pain is usually treated with mobic and prn flexeril. Although recently a problem, these have not been to excess and are responding to her usual measures. However, she has had some left upper back and neck discomfort recently which is likely multifactorial with the addition of altered mechanics/postural relation acutely. She recently underwent imaging for issues noted below with MRI on 5/16 showing \"stable degenerative changes at the visualized cervical spine including the anterolisthesis of C2 on C3 and disc osteophyte disease at C3-4 causing spinal stenosis and cord deformity. \"  She is seeing neuro who is evaluating her for concurrent issues who has offered epidural injections as well. Neuro also recommended PT with a scheduled eval with them on 5/30. The cervical and upper back pains are better today per report. Pt is followed by neuro, Dr. Lloyd Bowles, and with a hx of cancer with mets followed by Dr. Rebeca Garcia.  She underwent imaging to determine if mets to brain. Aside from the spinal issues, MRI showed \"persistent T2 hyperintensity in the left transverse sinus which is probably due to asymmetric slower flow\" but no signs of metastasis or acute stroke.  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 Dr. Makayla Guerrero has ordered an MRV which is planned for June with f/u at some point thereafter. Patient Active Problem List   Diagnosis Code    Tourette syndrome F95.2    Osteoarthritis, Status post right total knee replacement M19.90    Lumbar spinal stenosis M48.06    HLD (hyperlipidemia) E78.5    Breast cancer ([address] metastatic to lungs  C50.919    Essential hypertension with goal blood pressure less than 140/90 I10    Diverticulosis K57.90    Osteopenia M85.80    Lymphedema of arm left I89.0    Insomnia G47.00    Fatigue R53.83    Diverticulitis, recurrent K57.92    T11 Vertebral compression fracture (HCC) s/p kyphoplasty M48.50XA    Personal history of malignant neoplasm of breast Z85.3     Past Medical History:   Diagnosis Date    Arthritis     Breast cancer metastasized to lung [facility])     Left Breast    Chronic pain     Colon polyps 2/22/16    distal sigmoid, Dr. Berrios Miss DDD (degenerative disc disease)     Diverticulitis of colon     Diverticulosis 2/22/16    mild in the ascedning and sigmoid colon, Dr. Laura Barnes (hyperlipidemia)     Hypertension     Osteopenia     Tourette syndrome     Vitamin D deficiency      Past Surgical History:   Procedure Laterality Date    HX APPENDECTOMY      HX BREAST BIOPSY  7-30-10    Left Breast w/Nipple Duct exploration and excisional biopsy-left    HX DILATION AND CURETTAGE      x3    HX MODIFIED RADICAL MASTECTOMY  9-3-10    left    HX ORTHOPAEDIC Right 2012    knee replacement    HX TONSILLECTOMY      HYSTEROSCOPY DIAGNOSTIC       Social History     Social History    Marital status:      Spouse name: N/A    Number of children: N/A    Years of education: N/A     Occupational History    Not on file.      Social History Main Topics    Smoking status: Former Smoker    Smokeless tobacco: Never Used    Alcohol use 3.5 oz/week     7 Glasses of wine per week    Drug use: No    Sexual activity: Not Currently     Other Topics Concern    Not on file     Social History Narrative     Family History   Problem Relation Age of Onset    Osteoporosis Sister     Osteoporosis Mother     Stroke Father     Hypertension Father     Cancer Paternal Aunt      breast     Current Outpatient Prescriptions   Medication Sig    diazePAM (VALIUM) 5 mg tablet Take 1 Tab by mouth every six (6) hours as needed.  meloxicam (MOBIC) 15 mg tablet TAKE 1 TABLET (15 MG) BY ORAL ROUTE ONCE DAILY WITH FOOD    cyclobenzaprine (FLEXERIL) 5 mg tablet Take 5 mg by mouth.  haloperidol (HALDOL) 2 mg tablet Take 1 Tab by mouth two (2) times a day.  losartan (COZAAR) 25 mg tablet Take 1 Tab by mouth daily.  palbociclib (IBRANCE) 100 mg cap Take 75 mg by mouth.  CYANOCOBALAMIN, VITAMIN B-12, (VITAMIN B-12 PO) Take  by mouth.  Biotin 2,500 mcg cap Take  by mouth.  letrozole (FEMARA) 2.5 mg tablet Take 2.5 mg by mouth daily.  melatonin 3 mg tablet Take 5 mg by mouth nightly.  calcium-vitamin D (CALCIUM 500+D) 500 mg(1,250mg) -200 unit per tablet Take 1 Tab by mouth two (2) times daily (with meals).  cholecalciferol, vitamin d3, (VITAMIN D) 1,000 unit tablet Take 2,000 Units by mouth daily.  ascorbic acid (VITAMIN C) 500 mg tablet Take  by mouth.  amoxicillin (AMOXIL) 500 mg capsule 4 tabs by mouth 1 hour before dental work     No current facility-administered medications for this visit. Allergies   Allergen Reactions    Keflex [Cephalexin] Rash    Metronidazole Rash    Other Medication Nausea Only     Anesthesia    Shellfish Containing Products Nausea and Vomiting     More of just eating the actual shellfish.     Sulfa (Sulfonamide Antibiotics) Rash    Ultram [Tramadol] Nausea and Vomiting     Patient states she is allergic to most Narcotics    Vancomycin Rash       Review of Systems  Aside from those included in HPI, remainder of ROS negative. Physical Examination  Visit Vitals    /82 (BP 1 Location: Right arm, BP Patient Position: Sitting)    Pulse 85    Temp 98.6 °F (37 °C) (Oral)    Resp 14    Ht 5' 1\" (1.549 m)    Wt 141 lb 6.4 oz (64.1 kg)    SpO2 97%    BMI 26.72 kg/m2       General - Alert and in no acute distress. Pt appears well, comfortable, and in good spirits. Pleasant, engaging. Nontoxic. Not anxious, non-diaphoretic. Intermittent Tourette's tics noted. Mental status - Appropriate mood, behavior, speech content, dress, and thought processes. Eyes - Pupils equal and reactive, extraocular movements intact. No erythema or discharge. Pulm - No tachypnea, retractions, or cyanosis. Good respiratory effort. Cardiovascular - Normal rate, regular rhythm. Musculoskeletal - Good neck and shoulder movement without apparent discomfort today. No other gross findings and rest of exam deferred to specialists. Assessment and Plan  1. Acute and chronic neck/back pain with hx of osteoarthritis, osteopenia, scoliosis, compression fracture, and degenerative spinal dz including lumbar and cervical regions. MRI on 5/16 showed \"stable degenerative changes at the visualized cervical spine including the anterolisthesis of C2 on C3 and disc osteophyte disease at C3-4 causing spinal stenosis and cord deformity. \" She sees both Dr. Makayla Guerrero (neuro) and Dr. Kay Roe (spine) and each have offered epidural injections. She is usually managed with mobic and prn flexeril and no longer takes gabapentin. Plans are to have PT eval next week with further planning/tx as warranted. Pt will keep close contact with each specialist. Discussed general considerations of her condition and tx. Further planning as warranted. 2. Pt with hx of cancer with mets. Recent imaging showed no brain mets. She is followed by Dr. Rashaun Franco and Dr. Makayla Guerrero.  Recent MRI showed \"persistent T2 hyperintensity in the left transverse sinus which is probably due to asymmetric slower flow\" but no signs of acute stroke. 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 Dr. Juliana Gomes plans to check MRV in June with f/u thereafter. Further planning at their discretion. More than 40 mins spent during visit with more than 50% discussing above issues, potential causes/contributing factors, eval/tx, results, plan, and questions. PLEASE NOTE:   This document has been produced using voice recognition software. Unrecognized errors in transcription may be present.     [employer] [facility] [address] [place] [address]  [phone]  5/25/2017

## 2017-05-25 NOTE — MR AVS SNAPSHOT
Visit Information Date & Time Provider Department Dept. Phone Encounter #  
 5/25/2017 11:30 AM Lázaro Davis Internist of [facility] [id number] Your Appointments 10/31/2017 11:00 AM  
Office Visit with Albina Villegas MD  
Internist of [facility]) Appt Note: 6 months bs  
 [address], [address] [facility] [address] [place] [address] [place]  
  
   
 [address], [facility] Upcoming Health Maintenance Date Due INFLUENZA AGE 9 TO ADULT 8/1/2017 MEDICARE YEARLY EXAM 4/28/2018 BREAST CANCER SCRN MAMMOGRAM 8/23/2018 GLAUCOMA SCREENING Q2Y 2/8/2019 DTaP/Tdap/Td series (2 - Td) 9/12/2024 COLONOSCOPY 2/22/2026 Allergies as of 5/25/2017  Review Complete On: 4/30/2017 By: Albina Villegas MD  
  
 Severity Noted Reaction Type Reactions Keflex [Cephalexin]  07/27/2010    Rash Metronidazole  05/04/2015    Rash Other Medication   Side Effect Nausea Only Anesthesia Shellfish Containing Products  07/21/2011    Nausea and Vomiting More of just eating the actual shellfish. Sulfa (Sulfonamide Antibiotics)  07/21/2011    Rash Ultram [Tramadol]  07/27/2010    Nausea and Vomiting Patient states she is allergic to most Narcotics Vancomycin  07/21/2011    Rash Current Immunizations  Reviewed on 10/25/2016 Name Date Influenza High Dose Vaccine PF 10/25/2016  1:20 PM, 10/1/2015 10:13 AM  
 Influenza Vaccine 9/1/2014 Influenza Vaccine Split 10/2/2012 12:18 PM, 11/1/2011 Influenza Vaccine Whole 10/8/2010 Pneumococcal Conjugate (PCV-13) 10/27/2015  2:52 PM  
 Pneumococcal Polysaccharide (PPSV-23) 4/15/2013 TD Vaccine 5/5/2008 Tdap 9/12/2014 Zoster 9/20/2011 Not reviewed this visit Vitals BP Pulse Temp Resp Height(growth percentile) Weight(growth percentile)  122/82 (BP 1 Location: Right arm, BP Patient Position: Sitting) 85 98.6 °F (37 °C) (Oral) 14 5' 1\" (1.549 m) 141 lb 6.4 oz (64.1 kg) SpO2 BMI OB Status Smoking Status 97% 26.72 kg/m2 Postmenopausal Former Smoker Vitals History BMI and BSA Data Body Mass Index Body Surface Area  
 26.72 kg/m 2 1.66 m 2 Preferred Pharmacy Pharmacy Name Phone [facility] [address] [place] [address] [facility] [address] [phone] Your Updated Medication List  
  
   
This list is accurate as of: 5/25/17 12:04 PM.  Always use your most recent med list.  
  
  
  
  
 amoxicillin 500 mg capsule Commonly known as:  AMOXIL  
4 tabs by mouth 1 hour before dental work Biotin 2,500 mcg Cap Take  by mouth. CALCIUM 500+D 500 mg(1,250mg) -200 unit per tablet Generic drug:  calcium-vitamin D Take 1 Tab by mouth two (2) times daily (with meals). CENTRUM SILVER PO Take  by mouth. cyclobenzaprine 5 mg tablet Commonly known as:  FLEXERIL Take 5 mg by mouth. diazePAM 5 mg tablet Commonly known as:  VALIUM Take 1 Tab by mouth every six (6) hours as needed. FEMARA 2.5 mg tablet Generic drug:  letrozole Take 2.5 mg by mouth daily. gabapentin 100 mg capsule Commonly known as:  NEURONTIN Take  by mouth three (3) times daily. haloperidol 2 mg tablet Commonly known as:  HALDOL Take 1 Tab by mouth two (2) times a day. IBRANCE 100 mg Cap Generic drug:  palbociclib Take 75 mg by mouth.  
  
 losartan 25 mg tablet Commonly known as:  COZAAR Take 1 Tab by mouth daily. melatonin 3 mg tablet Take 5 mg by mouth nightly. meloxicam 15 mg tablet Commonly known as:  MOBIC  
TAKE 1 TABLET (15 MG) BY ORAL ROUTE ONCE DAILY WITH FOOD  
  
 VITAMIN B-12 PO Take  by mouth. VITAMIN C 500 mg tablet Generic drug:  ascorbic acid (vitamin C) Take  by mouth. VITAMIN D3 1,000 unit tablet Generic drug:  cholecalciferol Take 2,000 Units by mouth daily. To-Do List   
 05/30/2017 10:30 AM  
  Appointment with Aruna Jimenez PT at [address] ([phone])  
  
 08/28/2017 11:15 AM  
  Appointment with LOBO POWER 1 at [facility] ([phone]) OUTSIDE FILMS  - Any outside films related to the study being scheduled should be brought with you on the day of the exam.  If this cannot be done there may be a delay in the reading of the study. MEDICATIONS  - Patient must bring a complete list of all medications currently taking to include prescriptions, over-the-counter meds, herbals, vitamins & any dietary supplements  GENERAL INSTRUCTIONS  - On the day of your exam do not use any bath powder, deodorant or lotions on the armpit area. -Tenderness of breasts may cause an increase of discomfort during procedure. If you are experiencing breast tenderness on the day of your appointment and would like to reschedule, please call [phone]. Introducing \[facility]\"" & [facility] SERVICES! Dear Brown Gamble: 
Thank you for requesting a [employer] account. Our records indicate that you already have an active [employer] account. You can access your account anytime at [email or web address] Did you know that you can access your hospital and ER discharge instructions at any time in [employer]? You can also review all of your test results from your hospital stay or ER visit. Additional Information If you have questions, please visit the Frequently Asked Questions section of the [employer] website at [email or web address] Remember, [employer] is NOT to be used for urgent needs. For medical emergencies, dial 911. Now available from your iPhone and Android! Please provide this summary of care documentation to your next provider. Your primary care clinician is listed as Debbie Owens. If you have any questions after today's visit, please call [phone].

## 2017-05-25 NOTE — PROGRESS NOTES
1. Have you been to the ER, urgent care clinic or hospitalized since your last visit? NO.     2. Have you seen or consulted any other health care providers outside of the [address] [place] [address] since your last visit (Include any pap smears or colon screening)? NO      Do you have an Advanced Directive? YES    Would you like information on Advanced Directives?  NO

## 2017-05-30 ENCOUNTER — HOSPITAL ENCOUNTER (OUTPATIENT)
Dept: PHYSICAL THERAPY | Age: 72
Discharge: HOME | End: 2017-05-30
Payer: MEDICARE

## 2017-05-30 PROCEDURE — 97110 THERAPEUTIC EXERCISES: CPT

## 2017-05-30 PROCEDURE — G8984 CARRY CURRENT STATUS: HCPCS

## 2017-05-30 PROCEDURE — 97162 PT EVAL MOD COMPLEX 30 MIN: CPT

## 2017-05-30 PROCEDURE — G8985 CARRY GOAL STATUS: HCPCS

## 2017-05-30 NOTE — MR AVS SNAPSHOT
Visit Information Date & Time Provider Department Dept. Phone Encounter #  
 5/30/2017 10:30 AM Andres Boggs,  [address] [place] [id number] Your Appointments 10/31/2017 11:00 AM  
Office Visit with Mervat Kulkarni MD  
Internist of [facility]) Appt Note: 6 months bs  
 [address], [address] [address] [address] [place]  
  
   
 [address], [address] Upcoming Health Maintenance Date Due INFLUENZA AGE 9 TO ADULT 8/1/2017 MEDICARE YEARLY EXAM 4/28/2018 BREAST CANCER SCRN MAMMOGRAM 8/23/2018 GLAUCOMA SCREENING Q2Y 2/8/2019 DTaP/Tdap/Td series (2 - Td) 9/12/2024 COLONOSCOPY 2/22/2026 Allergies as of 5/30/2017  Review Complete On: 5/25/2017 By: RAMESH [place] Severity Noted Reaction Type Reactions Keflex [Cephalexin]  07/27/2010    Rash Metronidazole  05/04/2015    Rash Other Medication   Side Effect Nausea Only Anesthesia Shellfish Containing Products  07/21/2011    Nausea and Vomiting More of just eating the actual shellfish. Sulfa (Sulfonamide Antibiotics)  07/21/2011    Rash Ultram [Tramadol]  07/27/2010    Nausea and Vomiting Patient states she is allergic to most Narcotics Vancomycin  07/21/2011    Rash Current Immunizations  Reviewed on 10/25/2016 Name Date Influenza High Dose Vaccine PF 10/25/2016  1:20 PM, 10/1/2015 10:13 AM  
 Influenza Vaccine 9/1/2014 Influenza Vaccine Split 10/2/2012 12:18 PM, 11/1/2011 Influenza Vaccine Whole 10/8/2010 Pneumococcal Conjugate (PCV-13) 10/27/2015  2:52 PM  
 Pneumococcal Polysaccharide (PPSV-23) 4/15/2013 TD Vaccine 5/5/2008 Tdap 9/12/2014 Zoster 9/20/2011 Not reviewed this visit Vitals OB Status Smoking Status Postmenopausal Former Smoker Your Updated Medication List  
  
ASK your doctor about these medications amoxicillin 500 mg capsule Commonly known as:  AMOXIL  
4 tabs by mouth 1 hour before dental work Biotin 2,500 mcg Cap Take  by mouth. CALCIUM 500+D 500 mg(1,250mg) -200 unit per tablet Generic drug:  calcium-vitamin D Take 1 Tab by mouth two (2) times daily (with meals). cyclobenzaprine 5 mg tablet Commonly known as:  FLEXERIL Take 5 mg by mouth. diazePAM 5 mg tablet Commonly known as:  VALIUM Take 1 Tab by mouth every six (6) hours as needed. FEMARA 2.5 mg tablet Generic drug:  letrozole Take 2.5 mg by mouth daily. haloperidol 2 mg tablet Commonly known as:  HALDOL Take 1 Tab by mouth two (2) times a day. IBRANCE 100 mg Cap Generic drug:  palbociclib Take 75 mg by mouth.  
  
 losartan 25 mg tablet Commonly known as:  COZAAR Take 1 Tab by mouth daily. melatonin 3 mg tablet Take 5 mg by mouth nightly. meloxicam 15 mg tablet Commonly known as:  MOBIC  
TAKE 1 TABLET (15 MG) BY ORAL ROUTE ONCE DAILY WITH FOOD  
  
 VITAMIN B-12 PO Take  by mouth. VITAMIN C 500 mg tablet Generic drug:  ascorbic acid (vitamin C) Take  by mouth. VITAMIN D3 1,000 unit tablet Generic drug:  cholecalciferol Take 2,000 Units by mouth daily. To-Do List   
 05/30/2017 10:30 AM  
  Appointment with Andres Boggs PT at [address] [place] [address] ([phone])  
  
 08/28/2017 11:15 AM  
  Appointment with LOBO HERRERA  1 at [address] [place] [address] ([phone]) OUTSIDE FILMS  - Any outside films related to the study being scheduled should be brought with you on the day of the exam.  If this cannot be done there may be a delay in the reading of the study.   MEDICATIONS  - Patient must bring a complete list of all medications currently taking to include prescriptions, over-the-counter meds, herbals, vitamins & any dietary supplements  GENERAL INSTRUCTIONS  - On the day of your exam do not use any bath powder, deodorant or lotions on the armpit area. -Tenderness of breasts may cause an increase of discomfort during procedure. If you are experiencing breast tenderness on the day of your appointment and would like to reschedule, please call [phone]. Introducing [facility] & [facility] SERVICES! Dear Karel Santana: 
Thank you for requesting a [employer] account. Our records indicate that you already have an active [employer] account. You can access your account anytime at [email or web address] [facility]/[employer] Did you know that you can access your hospital and ER discharge instructions at any time in [employer]? You can also review all of your test results from your hospital stay or ER visit. Additional Information If you have questions, please visit the Frequently Asked Questions section of the [employer] website at [email or web address] Remember, [employer] is NOT to be used for urgent needs. For medical emergencies, dial 911. Now available from your iPhone and Android! Please provide this summary of care documentation to your next provider. Your primary care clinician is listed as Delroy Lantigua. If you have any questions after today's visit, please call [phone].

## 2017-05-30 NOTE — PROGRESS NOTES
[facility] [address] [place] [address] [address]  [place], [address]  [phone] fax    Plan of Care/ Statement of Necessity for Physical Therapy Services    Patient name: Divine Ramires Start of Care: 2017   Referral source: Teressa Silver MD : 1945    Medical Diagnosis: Neck pain [M54.2]   Onset Date:Chronic, with most recent episode a few months ago    Treatment Diagnosis: Cervical stenosis   Prior Hospitalization: see medical history Provider#: [id number]   Medications: Verified on Patient summary List    Comorbidities: Breast/lung cancer, osteopenia, arthritis, HTN, Scoliosis, mastectomy, TKR   Prior Level of Function: The patient states she had improved ease of performing shopping and ADLs prior to onset. The Plan of Care and following information is based on the information from the initial evaluation. Assessment/ key information: The patient is a 73year old female with a chief complaint of right sided neck pain with head pain referral beginning a few months back. Since this time, she has noted increased soreness through left shoulder blade beginning about 1.5 weeks ago. She has had recent MRAs, cervical MRIs, and brain MRIs showing some degree of stenosis as well as a small aneurism JAMES terminus at 1-2 mm. She is currently taking oral chemotherapy pertaining to hx breast and lung cx (breast metastasis). She has signs and symptoms consistent with pain, decreased ROM, decreased flexibility, decreased strength, decreased ADL efficiency. The patient will benefit from skilled PT in order to address the above impairments.      Evaluation Complexity History HIGH Complexity :3+ comorbidities / personal factors will impact the outcome/ POC ; Examination MEDIUM Complexity : 3 Standardized tests and measures addressing body structure, function, activity limitation and / or participation in recreation  ;Presentation MEDIUM Complexity : Evolving with changing characteristics  ; Clinical Decision Making MEDIUM Complexity : FOTO score of 26-74  Overall Complexity Rating: MEDIUM  Problem List: pain affecting function, decrease ROM, decrease strength, decrease ADL/ functional abilitiies, decrease activity tolerance and decrease flexibility/ joint mobility   Treatment Plan may include any combination of the following: Therapeutic exercise, Therapeutic activities, Neuromuscular re-education, Physical agent/modality, Manual therapy and Patient education  Patient / Family readiness to learn indicated by: asking questions, trying to perform skills and interest  Persons(s) to be included in education: patient (P)  Barriers to Learning/Limitations: None  Patient Goal (s): pain relief  Patient Self Reported Health Status: fair  Rehabilitation Potential: good    Short Term Goals: To be accomplished in 2 weeks:   1. The patient will be independent and compliant with HEP to maximize therapeutic benefit. 2. The patient will improve cervical rotation to 50 degrees to improve ease of inspecting blind spots. Long Term Goals: To be accomplished in 4 weeks:   1. The patient will improve FOTO score to 58 to maximize quality of life. 2. The patient will improve lateral flexion of cervical spine to 25 degrees to improve ease of dressing. 3. The patient will report no more than 1 cervicogenic headache in 3 days to maximize ease of ADLs. 4. The patient will report 50% improvement in symptoms prior to onset for improved quality of life. Frequency / Duration: Patient to be seen 2 times per week for 4 weeks. Patient/ Caregiver education and instruction: Diagnosis, prognosis, self care, activity modification and exercises   [x]  Plan of care has been reviewed with SOL[facility]    G-Codes (GP)  Carry   Current  CK= 40-59%    Goal  CK= 40-59%    The severity rating is based on clinical judgment and the FOTO score.     Certification Period: 5/30/2017 - 7/30/2017  Adan Kearns Kirk, PT 5/30/2017 11:33 AM    ________________________________________________________________________    I certify that the above Therapy Services are being furnished while the patient is under my care. I agree with the treatment plan and certify that this therapy is necessary.     [de-identified] Signature:____________________  Date:____________Time: _________    Please sign and return to [facility] [address] [place] [address] [address]  [phone] fax

## 2017-05-30 NOTE — PROGRESS NOTES
PT DAILY TREATMENT NOTE - [facility]     Patient Name: Teena Montesinos  Date:2017  : 1945  [x]  Patient  Verified  Payor: [place] MEDICARE / Plan: [place] MEDICARE PART A & B / Product Type: Medicare /    In time:10:37  Out time:11:20  Total Treatment Time (min): 43  Total Timed Codes (min): 15  1:1 Treatment Time ( only): 43   Visit #: 1 of 8    Treatment Area: Neck pain [M54.2]    SUBJECTIVE  Pain Level (0-10 scale): 1/10  Any medication changes, allergies to medications, adverse drug reactions, diagnosis change, or new procedure performed?: [x] No    [] Yes (see summary sheet for update)  Subjective functional status/changes:   [] No changes reported  The patient states that she has a chief complaint of R head aches and left shoulder blade pain, more recently.     OBJECTIVE  Modality rationale:  to improve the patients ability to    Min Type Additional Details    [] Estim:  []Unatt       []IFC  []Premod                        []Other:  []w/ice   []w/heat  Position:  Location:    [] Estim: []Att    []TENS instruct  []NMES                    []Other:  []w/US   []w/ice   []w/heat  Position:  Location:    []  Traction: [] Cervical       []Lumbar                       [] Prone          []Supine                       []Intermittent   []Continuous Lbs:  [] before manual  [] after manual    []  Ultrasound: []Continuous   [] Pulsed                           []1MHz   []3MHz W/cm2:  Location:    []  Iontophoresis with dexamethasone         Location: [] Take home patch   [] In clinic    []  Ice     []  heat  []  Ice massage  []  Laser   []  Anodyne Position:  Location:    []  Laser with stim  []  Other:  Position:  Location:    []  Vasopneumatic Device Pressure:       [] lo [] med [] hi   Temperature: [] lo [] med [] hi   [] Skin assessment post-treatment:  []intact []redness- no adverse reaction    []redness  adverse reaction:     28 min [x]Eval                  []Re-Eval       15 min Therapeutic Exercise:  [] See flow sheet :   Rationale: increase ROM and increase strength to improve the patients ability to improve ADL ease. min Therapeutic Activity:  []  See flow sheet :   Rationale:   to improve the patients ability to       min Neuromuscular Re-education:  []  See flow sheet :   Rationale:   to improve the patients ability to      min Manual Therapy:     Rationale:  to      min Gait Training:  ___ feet with ___ device on level surfaces with ___ level of assist   Rationale: With   [] TE   [] TA   [] neuro   [] other: Patient Education: [x] Review HEP    [] Progressed/Changed HEP based on:   [] positioning   [] body mechanics   [] transfers   [] heat/ice application    [] other:      Other Objective/Functional Measures: See IE     Pain Level (0-10 scale) post treatment: 1/10    ASSESSMENT/Changes in Function: See POC. Patient will continue to benefit from skilled PT services to modify and progress therapeutic interventions, address functional mobility deficits, address ROM deficits, address strength deficits, analyze and address soft tissue restrictions, analyze and cue movement patterns, analyze and modify body mechanics/ergonomics, assess and modify postural abnormalities and instruct in home and community integration to attain remaining goals. [x]  See Plan of Care  []  See progress note/recertification  []  See Discharge Summary         Progress towards goals / Updated goals:  Short Term Goals: To be accomplished in 2 weeks:  1. The patient will be independent and compliant with HEP to maximize therapeutic benefit. 2. The patient will improve cervical rotation to 50 degrees to improve ease of inspecting blind spots. Long Term Goals: To be accomplished in 4 weeks:  1. The patient will improve FOTO score to 58 to maximize quality of life. 2. The patient will improve lateral flexion of cervical spine to 25 degrees to improve ease of dressing.   3. The patient will report no more than 1 cervicogenic headache in 3 days to maximize ease of ADLs. 4. The patient will report 50% improvement in symptoms prior to onset for improved quality of life.     PLAN  []  Upgrade activities as tolerated     [x]  Continue plan of care  []  Update interventions per flow sheet       []  Discharge due to:_  []  Other:_      Prashanth Fus, PT 5/30/2017  1:37 PM    Future Appointments  Date Time Provider Owen Swann   6/6/2017 2:00 PM Prashanth Fus, PT [facility]   6/9/2017 12:00 PM Prashanth Fus, PT [facility]   6/13/2017 11:30 AM Prashanth Fus, PT [facility]   6/16/2017 11:30 AM Prashanth Fus, PT [facility]   6/20/2017 11:30 AM Prashanth Fus, PT [facility]   6/22/2017 11:30 AM Stephany Morrell, PT [facility]   6/27/2017 11:30 AM Prashanth Fus, PT [facility]   8/28/2017 11:15 AM [facility] RM 1 [facility]   10/31/2017 11:00 AM Fadumo Canada MD [facility]

## 2017-06-06 ENCOUNTER — HOSPITAL ENCOUNTER (OUTPATIENT)
Dept: PHYSICAL THERAPY | Age: 72
Discharge: HOME | End: 2017-06-06
Payer: MEDICARE

## 2017-06-06 PROCEDURE — 97140 MANUAL THERAPY 1/> REGIONS: CPT

## 2017-06-06 PROCEDURE — 97110 THERAPEUTIC EXERCISES: CPT

## 2017-06-06 NOTE — PROGRESS NOTES
PT DAILY TREATMENT NOTE - [facility]     Patient Name: Divine Ramires  Date:2017  : 1945  [x]  Patient  Verified  Payor: VA MEDICARE / Plan: VA MEDICARE PART A & B / Product Type: Medicare /    In time:2:00 Out time:2:35  Total Treatment Time (min): 35  Total Timed Codes (min): 35  1:1 Treatment Time ( only): 35   Visit #: 2 of 8    Treatment Area: Neck pain [M54.2]    SUBJECTIVE  Pain Level (0-10 scale): 0/10  Any medication changes, allergies to medications, adverse drug reactions, diagnosis change, or new procedure performed?: [x] No    [] Yes (see summary sheet for update)  Subjective functional status/changes:   [] No changes reported  The patient reports she notices slightly less nerve pain through her head lately. No pain reported today, but does note that    OBJECTIVE  27 min Therapeutic Exercise:  [x] See flow sheet :   Rationale: increase ROM and increase strength to improve the patients ability to improve ADL ease. 8 min Manual Therapy: TPR L UT/LS; SOR, manual traction cervical spine   Rationale: decrease pain, increase ROM and increase tissue extensibility to improve ADL ease. With   [] TE   [] TA   [] neuro   [] other: Patient Education: [x] Review HEP    [] Progressed/Changed HEP based on:   [] positioning   [] body mechanics   [] transfers   [] heat/ice application    [] other:      Other Objective/Functional Measures:   First follow-up. Pain Level (0-10 scale) post treatment: 0/10    ASSESSMENT/Changes in Function: Initially noted increase of pain following sidebending and rotation exercises cervical spine. Good pain control following manual interventions with patient leaving void of pain.     Patient will continue to benefit from skilled PT services to modify and progress therapeutic interventions, address functional mobility deficits, address ROM deficits, address strength deficits, analyze and address soft tissue restrictions, analyze and cue movement patterns, analyze and modify body mechanics/ergonomics, assess and modify postural abnormalities and instruct in home and community integration to attain remaining goals. []  See Plan of Care  []  See progress note/recertification  []  See Discharge Summary          Progress towards goals / Updated goals:  Short Term Goals: To be accomplished in 2 weeks:  1. The patient will be independent and compliant with HEP to maximize therapeutic benefit. Met - pt reports compliance. 6/06/2017  2. The patient will improve cervical rotation to 50 degrees to improve ease of inspecting blind spots. Long Term Goals: To be accomplished in 4 weeks:  1. The patient will improve FOTO score to 58 to maximize quality of life. 2. The patient will improve lateral flexion of cervical spine to 25 degrees to improve ease of dressing. 3. The patient will report no more than 1 cervicogenic headache in 3 days to maximize ease of ADLs. 4. The patient will report 50% improvement in symptoms prior to onset for improved quality of life.     PLAN  []  Upgrade activities as tolerated     [x]  Continue plan of care  []  Update interventions per flow sheet       []  Discharge due to:_  []  Other:_      Liz Bear PT 6/6/2017  2:05 PM    Future Appointments  Date Time Provider Owen Swann   6/9/2017 12:00 PM Liz Bear, PT [facility]   6/13/2017 11:30 AM Liz Bear, PT [facility]   6/16/2017 11:30 AM Liz Bear, PT [facility]   6/20/2017 10:00 AM HBV CT RM 1 [facility]   6/20/2017 11:30 AM Liz Bear, PT [facility]   6/21/2017 11:30 AM Lizdiana Tariqor, PT [facility]   6/27/2017 11:30 AM Liz Razor, PT [facility]   8/28/2017 11:15 AM [facility] RM 1 [facility]   10/31/2017 11:00 AM Shweta Nicolas MD [facility]

## 2017-06-09 ENCOUNTER — HOSPITAL ENCOUNTER (OUTPATIENT)
Dept: PHYSICAL THERAPY | Age: 72
Discharge: HOME | End: 2017-06-09
Payer: MEDICARE

## 2017-06-09 PROCEDURE — 97140 MANUAL THERAPY 1/> REGIONS: CPT

## 2017-06-09 PROCEDURE — 97110 THERAPEUTIC EXERCISES: CPT

## 2017-06-09 NOTE — PROGRESS NOTES
PT DAILY TREATMENT NOTE     Patient Name: Ekaterina Enter  Date:2017  : 1945  [x]  Patient  Verified  Payor: VA MEDICARE / Plan: VA MEDICARE PART A & B / Product Type: Medicare /    In time:12:00  Out time:12:45  Total Treatment Time (min): 45  Total Timed Codes (min): 45  1:1 Treatment Time ([address] only): 35   Visit #: 3 of 8    Treatment Area: Neck pain [M54.2]    SUBJECTIVE  Pain Level (0-10 scale): 0/10  Any medication changes, allergies to medications, adverse drug reactions, diagnosis change, or new procedure performed?: [x] No    [] Yes (see summary sheet for update)  Subjective functional status/changes:   [] No changes reported  The patient states that she felt good benefit following last treatment. Denies experiencing numbness into left side of her head as well. OBJECTIVE   33 min Therapeutic Exercise:  [x] See flow sheet :   Rationale: increase ROM and increase strength to improve the patients ability to improve ADL ease. 12 min Manual Therapy:  L LS/UT TPR, SOR with TPR of R suboccipitals. Gentle cervical tx manually. Rationale: decrease pain, increase ROM, increase tissue extensibility and decrease trigger points to improve ADL ease. With   [] TE   [] TA   [] neuro   [] other: Patient Education: [x] Review HEP    [] Progressed/Changed HEP based on:   [] positioning   [] body mechanics   [] transfers   [] heat/ice application    [] other:      Other Objective/Functional Measures: Added rows with fair tolerance. Pain Level (0-10 scale) post treatment: 0/10    ASSESSMENT/Changes in Function: Good progress with reducing R oriented numbness as well as decreasing pain. Continue progress with additional of scap thoracic strengthening next visit.      Patient will continue to benefit from skilled PT services to modify and progress therapeutic interventions, address functional mobility deficits, address ROM deficits, address strength deficits, analyze and address soft tissue restrictions, analyze and cue movement patterns, analyze and modify body mechanics/ergonomics, assess and modify postural abnormalities and instruct in home and community integration to attain remaining goals. []  See Plan of Care  []  See progress note/recertification  []  See Discharge Summary         Progress towards goals / Updated goals:  Short Term Goals: To be accomplished in 2 weeks:  1. The patient will be independent and compliant with HEP to maximize therapeutic benefit. Met - pt reports compliance. 6/06/2017  2. The patient will improve cervical rotation to 50 degrees to improve ease of inspecting blind spots. Long Term Goals: To be accomplished in 4 weeks:  1. The patient will improve FOTO score to 58 to maximize quality of life. 2. The patient will improve lateral flexion of cervical spine to 25 degrees to improve ease of dressing. 3. The patient will report no more than 1 cervicogenic headache in 3 days to maximize ease of ADLs. None reported 6/09/2017. 4. The patient will report 50% improvement in symptoms prior to onset for improved quality of life.     PLAN  []  Upgrade activities as tolerated     [x]  Continue plan of care  []  Update interventions per flow sheet       []  Discharge due to:_  []  Other:_      Silvina Wu PT 6/9/2017  1:14 PM    Future Appointments  Date Time Provider Owen Swann   6/13/2017 11:30 AM Silvina Wu PT [facility]   6/16/2017 11:30 AM Silvina Wu PT [facility]   6/20/2017 10:00 AM HBV CT RM 1 [facility]   6/20/2017 11:30 AM Silvina Wu PT [facility]   6/21/2017 11:30 AM Silvina Wu PT [facility]   6/27/2017 11:30 AM Silvina Wu PT [facility]   8/28/2017 11:15 AM [facility] RM 1 [facility]   10/31/2017 11:00 AM Dru An MD [facility]

## 2017-06-13 ENCOUNTER — HOSPITAL ENCOUNTER (OUTPATIENT)
Dept: PHYSICAL THERAPY | Age: 72
Discharge: HOME | End: 2017-06-13
Payer: MEDICARE

## 2017-06-13 PROCEDURE — 97140 MANUAL THERAPY 1/> REGIONS: CPT

## 2017-06-13 PROCEDURE — 97110 THERAPEUTIC EXERCISES: CPT

## 2017-06-13 NOTE — PROGRESS NOTES
PT DAILY TREATMENT NOTE - [facility] 3-16    Patient Name: Ekaterina Enter  Date:2017  : 1945  [x]  Patient  Verified  Payor: VA MEDICARE / Plan: VA MEDICARE PART A & B / Product Type: Medicare /    In time:11:30  Out time:12:10  Total Treatment Time (min): 40  Total Timed Codes (min): 40  1:1 Treatment Time ( [address] only): 40   Visit #: 4 of 8     Treatment Area: Neck pain [M54.2]    SUBJECTIVE  Pain Level (0-10 scale): 0/10  Any medication changes, allergies to medications, adverse drug reactions, diagnosis change, or new procedure performed?: [x] No    [] Yes (see summary sheet for update)  Subjective functional status/changes:   [] No changes reported  The patient states that she had a little bit of the right side of her head reoccur last night, but improved it. OBJECTIVE  32 min Therapeutic Exercise:  [x] See flow sheet :   Rationale: increase ROM and increase strength to improve the patients ability to improve ADL ease. 8 min Manual Therapy:  SOR, cervical PROM, gentle cervical tx manually, TPR L UT/LS. Rationale: decrease pain, increase ROM and increase tissue extensibility to improve ADL ease. With   [] TE   [] TA   [] neuro   [] other: Patient Education: [x] Review HEP    [] Progressed/Changed HEP based on:   [] positioning   [] body mechanics   [] transfers   [] heat/ice application    [] other:      Other Objective/Functional Measures:   Cervical rotation: 47 R, 50 L  Cervical sidebendin R, 27 L    Pain Level (0-10 scale) post treatment: 0/10    ASSESSMENT/Changes in Function: Good progress with cervical mobility up to this point. Progressed with strengthening exercises per flow sheet.      Patient will continue to benefit from skilled PT services to modify and progress therapeutic interventions, address functional mobility deficits, address ROM deficits, address strength deficits, analyze and address soft tissue restrictions, analyze and cue movement patterns, analyze and modify body mechanics/ergonomics, assess and modify postural abnormalities and instruct in home and community integration to attain remaining goals. []  See Plan of Care  []  See progress note/recertification  []  See Discharge Summary         Progress towards goals / Updated goals:  Short Term Goals: To be accomplished in 2 weeks:  1. The patient will be independent and compliant with HEP to maximize therapeutic benefit. Met - pt reports compliance. 6/06/2017  2. The patient will improve cervical rotation to 50 degrees to improve ease of inspecting blind spots. Nearly met - 52 R, 50 L 6/13/2017. Long Term Goals: To be accomplished in 4 weeks:  1. The patient will improve FOTO score to 58 to maximize quality of life. 2. The patient will improve lateral flexion of cervical spine to 25 degrees to improve ease of dressing. Met 30 R, 25 L 6/13/2017. 3. The patient will report no more than 1 cervicogenic headache in 3 days to maximize ease of ADLs. None reported 6/09/2017. 4. The patient will report 50% improvement in symptoms prior to onset for improved quality of life.     PLAN  []  Upgrade activities as tolerated     [x]  Continue plan of care  []  Update interventions per flow sheet       []  Discharge due to:_  []  Other:_      Tammy Omalley, PT 6/13/2017  1:29 PM

## 2017-06-16 ENCOUNTER — HOSPITAL ENCOUNTER (OUTPATIENT)
Dept: PHYSICAL THERAPY | Age: 72
Discharge: HOME | End: 2017-06-16
Payer: MEDICARE

## 2017-06-16 PROCEDURE — 97110 THERAPEUTIC EXERCISES: CPT

## 2017-06-16 PROCEDURE — 97140 MANUAL THERAPY 1/> REGIONS: CPT

## 2017-06-16 NOTE — PROGRESS NOTES
PT DAILY TREATMENT NOTE - [facility]     Patient Name: Dallas Xie  Date:2017  : 1945  [x]  Patient  Verified  Payor: Anil Bars / Plan: [place] MEDICARE PART A & B / Product Type: Medicare /    In time:11:30  Out time:12:09  Total Treatment Time (min): 39  Total Timed Codes (min): 39  1:1 Treatment Time ( [address] only): 44   Visit #: 5 of 8    Treatment Area: Neck pain [M54.2]    SUBJECTIVE  Pain Level (0-10 scale): 0/10  Any medication changes, allergies to medications, adverse drug reactions, diagnosis change, or new procedure performed?: [x] No    [] Yes (see summary sheet for update)  Subjective functional status/changes:   [] No changes reported  The patient states she feels her neck is about 80% better since initiating PT. She noted onset of a right sided head ache yesterday, but void of R sided pain through her neck. OBJECTIVE  31 min Therapeutic Exercise:  [x] See flow sheet :   Rationale: increase ROM and increase strength to improve the patients ability to improve ADL ease. 8 min Manual Therapy: cervical SOR, L UT/LS, PROM into rotation and UT/LS stretching   Rationale: decrease pain, increase ROM and increase tissue extensibility to improve ADL ease. With   [] TE   [] TA   [] neuro   [] other: Patient Education: [x] Review HEP    [] Progressed/Changed HEP based on:   [] positioning   [] body mechanics   [] transfers   [] heat/ice application    [] other:      Other Objective/Functional Measures:   FOTO; 55     Pain Level (0-10 scale) post treatment: 0/10    ASSESSMENT/Changes in Function: The patient reports 80% improvement up to this point concerning neck pain and FOTO score indicating improved quality of life.     Patient will continue to benefit from skilled PT services to modify and progress therapeutic interventions, address functional mobility deficits, address ROM deficits, address strength deficits, analyze and address soft tissue restrictions, analyze and cue movement patterns, analyze and modify body mechanics/ergonomics, assess and modify postural abnormalities and instruct in home and community integration to attain remaining goals. []  See Plan of Care  []  See progress note/recertification  []  See Discharge Summary         Progress towards goals / Updated goals:  Short Term Goals: To be accomplished in 2 weeks:  1. The patient will be independent and compliant with HEP to maximize therapeutic benefit. Met - pt reports compliance. 6/06/2017  2. The patient will improve cervical rotation to 50 degrees to improve ease of inspecting blind spots. Nearly met - 52 R, 50 L 6/13/2017. Long Term Goals: To be accomplished in 4 weeks:  1. The patient will improve FOTO score to 58 to maximize quality of life. Progressed to 56 6/16/2017  2. The patient will improve lateral flexion of cervical spine to 25 degrees to improve ease of dressing. Met 30 R, 25 L 6/13/2017. 3. The patient will report no more than 1 cervicogenic headache in 3 days to maximize ease of ADLs. None reported 6/09/2017. Nearly met  4. The patient will report 50% improvement in symptoms prior to onset for improved quality of life.  Met 80% 6/16/2017    PLAN   []  Upgrade activities as tolerated     [x]  Continue plan of care  []  Update interventions per flow sheet       []  Discharge due to:_  []  Other:_      Haley Mathew, PT 6/16/2017  11:42 AM    Future Appointments  Date Time Provider Owen Swann   6/20/2017 10:00 AM HBV CT RM 1 [facility]   6/20/2017 11:30 AM Haley Donate, PT [facility]   6/21/2017 11:30 AM Haley Donate, PT [facility]   6/27/2017 11:30 AM Haley Donate, PT [facility]   8/28/2017 11:15 AM [facility] RM 1 [facility]   10/31/2017 11:00 AM Sarath Lopez MD [facility]

## 2017-06-20 ENCOUNTER — HOSPITAL ENCOUNTER (OUTPATIENT)
Dept: CT IMAGING | Age: 72
Discharge: HOME | End: 2017-06-20
Attending: INTERNAL MEDICINE
Payer: MEDICARE

## 2017-06-20 ENCOUNTER — HOSPITAL ENCOUNTER (OUTPATIENT)
Dept: PHYSICAL THERAPY | Age: 72
Discharge: HOME | End: 2017-06-20
Payer: MEDICARE

## 2017-06-20 DIAGNOSIS — C50.411 MALIGNANT NEOPLASM OF UPPER-OUTER QUADRANT OF RIGHT FEMALE BREAST (HCC): ICD-10-CM

## 2017-06-20 LAB — CREAT UR-MCNC: 0.8 MG/DL (ref 0.6–1.3)

## 2017-06-20 PROCEDURE — 82565 ASSAY OF CREATININE: CPT

## 2017-06-20 PROCEDURE — 74011636320 HC RX REV CODE- 636/320

## 2017-06-20 PROCEDURE — 74178 CT ABD&PLV WO CNTR FLWD CNTR: CPT

## 2017-06-20 PROCEDURE — 97140 MANUAL THERAPY 1/> REGIONS: CPT

## 2017-06-20 PROCEDURE — 97110 THERAPEUTIC EXERCISES: CPT

## 2017-06-20 RX ADMIN — IOPAMIDOL 100 ML: 612 INJECTION, SOLUTION INTRAVENOUS at 10:36

## 2017-06-20 NOTE — PROGRESS NOTES
PT DAILY TREATMENT NOTE - [facility]     Patient Name: Litzy Israel  Date:2017  : 1945  [x]  Patient  Verified  Payor: Tammie Barragan / Plan: [place] & [place] / Product Type: Medicare /    In time:11:30  Out time:12:05  Total Treatment Time (min): 35  Total Timed Codes (min): 35  1:1 Treatment Time ( [address] only): 35   Visit #: 6 of 8    Treatment Area: Neck pain [M54.2]    SUBJECTIVE  Pain Level (0-10 scale): 0/10  Any medication changes, allergies to medications, adverse drug reactions, diagnosis change, or new procedure performed?: [x] No    [] Yes (see summary sheet for update)  Subjective functional status/changes:   [] No changes reported  The patient states that there was an instance when she had slight pain through the upper right aspect of her head, but overall it has improved quite well. She states she had not done much over the weekend while in [facility] head concerning her HEP. OBJECTIVE   25 min Therapeutic Exercise:  [x] See flow sheet :   Rationale: increase ROM and increase strength to improve the patients ability to improve ADL ease. 10 min Manual Therapy:  SOR, gentle tx cervical traction   Rationale: decrease pain, increase ROM and increase tissue extensibility to improve ADL ease. With   [] TE   [] TA   [] neuro   [] other: Patient Education: [x] Review HEP    [] Progressed/Changed HEP based on:   [] positioning   [] body mechanics   [] transfers   [] heat/ice application    [] other:      Other Objective/Functional Measures: The patient arrived to the clinic quite fatigued this morning noting little sleep last night, not eating breakfast this morning and having little energy following some errands. Pain Level (0-10 scale) post treatment: 0/10    ASSESSMENT/Changes in Function: The patient appears to be progressing well noting good pain control as well as progressing functional tolerance to ADLs, though today notes fatigue due to rigors of her morning.      Patient will continue to benefit from skilled PT services to modify and progress therapeutic interventions, address functional mobility deficits, address ROM deficits, address strength deficits, analyze and address soft tissue restrictions, analyze and cue movement patterns, analyze and modify body mechanics/ergonomics, assess and modify postural abnormalities and instruct in home and community integration to attain remaining goals. []  See Plan of Care  []  See progress note/recertification  []  See Discharge Summary         Progress towards goals / Updated goals:  Short Term Goals: To be accomplished in 2 weeks:  1. The patient will be independent and compliant with HEP to maximize therapeutic benefit. Met - pt reports compliance. 6/06/2017  2. The patient will improve cervical rotation to 50 degrees to improve ease of inspecting blind spots. Nearly met - 52 R, 50 L 6/13/2017. Long Term Goals: To be accomplished in 4 weeks:  1. The patient will improve FOTO score to 58 to maximize quality of life. Progressed to 56 6/16/2017  2. The patient will improve lateral flexion of cervical spine to 25 degrees to improve ease of dressing. Met 30 R, 25 L 6/13/2017. 3. The patient will report no more than 1 cervicogenic headache in 3 days to maximize ease of ADLs. None reported 6/09/2017. Nearly met   4. The patient will report 50% improvement in symptoms prior to onset for improved quality of life.  Met 80% 6/16/2017    PLAN  []  Upgrade activities as tolerated     [x]  Continue plan of care  []  Update interventions per flow sheet       []  Discharge due to:_  []  Other:_      Heather Villela PT 6/20/2017  12:18 PM    Future Appointments  Date Time Provider Owen Swann   6/21/2017 11:30 AM Heather Villela PT [facility]   6/27/2017 11:30 AM Heather Villela PT [facility]   8/28/2017 11:15 AM [facility] RM 1 [facility]   10/31/2017 11:00 AM Luke La MD [facility]

## 2017-06-21 ENCOUNTER — HOSPITAL ENCOUNTER (OUTPATIENT)
Dept: PHYSICAL THERAPY | Age: 72
Discharge: HOME | End: 2017-06-21
Payer: MEDICARE

## 2017-06-21 PROCEDURE — 97140 MANUAL THERAPY 1/> REGIONS: CPT

## 2017-06-21 PROCEDURE — 97110 THERAPEUTIC EXERCISES: CPT

## 2017-06-21 NOTE — PROGRESS NOTES
PT DAILY TREATMENT NOTE - [facility]     Patient Name: Anson Cormier  Date:2017  : 1945  [x]  Patient  Verified  Payor: Jimy Delacruz / Plan: [place] MEDICARE PART A & B / Product Type: Medicare /    In time:11:30  Out time:12:09  Total Treatment Time (min): 39  Total Timed Codes (min): 39  1:1 Treatment Time ([facility] only): 35  Visit #: 7 of 8    Treatment Area: Neck pain [M54.2]    SUBJECTIVE  Pain Level (0-10 scale): 1-2/10  Any medication changes, allergies to medications, adverse drug reactions, diagnosis change, or new procedure performed?: [x] No    [] Yes (see summary sheet for update)  Subjective functional status/changes:   [] No changes reported  The patient states that she was sitting for prolonged periods yesterday causing her to have cervical pain. OBJECTIVE  29 min Therapeutic Exercise:  [x] See flow sheet :   Rationale: increase ROM and increase strength to improve the patients ability to improve ADL ease. 10 min Manual Therapy:  SOR, gentle cervical traction, cervical PROM with gentle stretching LS/UT   Rationale: decrease pain, increase ROM and increase tissue extensibility to improve ADL ease. With   [] TE   [] TA   [] neuro   [] other: Patient Education: [x] Review HEP    [] Progressed/Changed HEP based on:   [] positioning   [] body mechanics   [] transfers   [] heat/ice application    [] other:      Other Objective/Functional Measures:      Pain Level (0-10 scale) post treatment: 0/10    ASSESSMENT/Changes in Function: Good pain control attained during session. Anticipated D/C next visit.      Patient will continue to benefit from skilled PT services to modify and progress therapeutic interventions, address functional mobility deficits, address ROM deficits, address strength deficits, analyze and address soft tissue restrictions, analyze and cue movement patterns, analyze and modify body mechanics/ergonomics, assess and modify postural abnormalities and instruct in home and community integration to attain remaining goals. []  See Plan of Care  []  See progress note/recertification  []  See Discharge Summary         Progress towards goals / Updated goals:  Short Term Goals: To be accomplished in 2 weeks:  1. The patient will be independent and compliant with HEP to maximize therapeutic benefit. Met - pt reports compliance. 6/06/2017  2. The patient will improve cervical rotation to 50 degrees to improve ease of inspecting blind spots. Nearly met - 52 R, 50 L 6/13/2017. Long Term Goals: To be accomplished in 4 weeks:  1. The patient will improve FOTO score to 58 to maximize quality of life. Progressed to 56 6/16/2017  2. The patient will improve lateral flexion of cervical spine to 25 degrees to improve ease of dressing. Met 30 R, 25 L 6/13/2017. 3. The patient will report no more than 1 cervicogenic headache in 3 days to maximize ease of ADLs. None reported 6/09/2017. Nearly met   4. The patient will report 50% improvement in symptoms prior to onset for improved quality of life.  Met 80% 6/16/2017    PLAN  []  Upgrade activities as tolerated     [x]  Continue plan of care  []  Update interventions per flow sheet       []  Discharge due to:_  []  Other:_      Glynn West PT 6/21/2017  1:24 PM    Future Appointments  Date Time Provider Owen Swann   6/27/2017 11:30 AM Glynn West PT [facility]   8/28/2017 11:15 AM [facility] 1 [facility]   10/31/2017 11:00 AM Misti Fragoso MD [facility]

## 2017-06-22 ENCOUNTER — APPOINTMENT (OUTPATIENT)
Dept: PHYSICAL THERAPY | Age: 72
End: 2017-06-22
Payer: MEDICARE

## 2017-06-27 ENCOUNTER — HOSPITAL ENCOUNTER (OUTPATIENT)
Dept: PHYSICAL THERAPY | Age: 72
Discharge: HOME | End: 2017-06-27
Payer: MEDICARE

## 2017-06-27 PROCEDURE — G8986 CARRY D/C STATUS: HCPCS

## 2017-06-27 PROCEDURE — G8985 CARRY GOAL STATUS: HCPCS

## 2017-06-27 PROCEDURE — 97140 MANUAL THERAPY 1/> REGIONS: CPT

## 2017-06-27 PROCEDURE — 97110 THERAPEUTIC EXERCISES: CPT

## 2017-06-27 NOTE — PROGRESS NOTES
[facility]  [address]  [place], [address]  [phone] fax    Physical Therapy Discharge Summary    Patient name: Ivan Osorio Start of Care: 2017   Referral source: Blank Sotomayor MD : 1945                         Medical Diagnosis: Neck pain [M54.2] Onset Date:Chronic, with most recent episode a few months ago                         Treatment Diagnosis: Cervical stenosis   Prior Hospitalization: see medical history Provider#: [id number]   Medications: Verified on Patient summary List    Comorbidities: Breast/lung cancer, osteopenia, arthritis, HTN, Scoliosis, mastectomy, TKR   Prior Level of Function: The patient states she had improved ease of performing shopping and ADLs prior to onset. Visits from Start of Care: 8    Missed Visits: 0  Reporting Period : 2017 to 2017    Summary of Care:  Short Term Goals: To be accomplished in 2 weeks:  1. The patient will be independent and compliant with HEP to maximize therapeutic benefit. Met - pt reports compliance. 2017  2. The patient will improve cervical rotation to 50 degrees to improve ease of inspecting blind spots. Nearly met - 52 R, 50 L 2017. Long Term Goals: To be accomplished in 4 weeks:  1. The patient will improve FOTO score to 58 to maximize quality of life. Progressed to 56 2017; Met 58 2017  2. The patient will improve lateral flexion of cervical spine to 25 degrees to improve ease of dressing. Met 30 R, 25 L 2017. 3. The patient will report no more than 1 cervicogenic headache in 3 days to maximize ease of ADLs. None reported 2017. Nearly met   4. The patient will report 50% improvement in symptoms prior to onset for improved quality of life. Met 80% 2017     The patient has attained excellent pain control with PT up to this point. D/C due to good progress attained with updated HEP and questions answered.      G-Codes (GP)  Carry    Goal  CK= 40-59%   D/C  CK= 40-59%    The severity rating is based on clinical judgment and the FOTO score.     ASSESSMENT/RECOMMENDATIONS:  [x]Discontinue therapy: [x]Patient has reached or is progressing toward set goals      []Patient is non-compliant or has abdicated      []Due to lack of appreciable progress towards set [address], PT 6/27/2017 1:29 PM

## 2017-06-27 NOTE — PROGRESS NOTES
PT DAILY TREATMENT NOTE - [facility]     Patient Name: Verna Lara  Date:2017  : 1945  [x]  Patient  Verified  Payor: Abi Hinkle / Plan: [place] MEDICARE PART A & B / Product Type: Medicare /    In time:11:30  Out time:12:12  Total Treatment Time (min): 42  Total Timed Codes (min): 42  1:1 Treatment Time ( [address] only): 42   Visit #: 8 of 8    Treatment Area: Neck pain [M54.2]    SUBJECTIVE  Pain Level (0-10 scale): 0/10  Any medication changes, allergies to medications, adverse drug reactions, diagnosis change, or new procedure performed?: [x] No    [] Yes (see summary sheet for update)  Subjective functional status/changes:   [] No changes reported  The patient states that she has had some left headache symptoms that have been quite intermittent and that she has been able to manage them utilizing elements of her HEP. OBJECTIVE  32 min Therapeutic Exercise:  [x] See flow sheet :   Rationale: increase ROM and increase strength to improve the patients ability to improve ADL ease. 10 min Manual Therapy:  SOR, cervical PROM, manual tx, TPR L and R UT/LS   Rationale: decrease pain, increase ROM and increase tissue extensibility to improve ADL ease. With   [] TE   [] TA   [] neuro   [] other: Patient Education: [x] Review HEP    [] Progressed/Changed HEP based on:   [] positioning   [] body mechanics   [] transfers   [] heat/ice application    [] other:      Other Objective/Functional Measures: FOTO: 58     Pain Level (0-10 scale) post treatment: 0/10    ASSESSMENT/Changes in Function: The patient has attained excellent pain control with PT up to this point. D/C due to good progress attained with updated HEP and questions answered. []  See Plan of Care  []  See progress note/recertificationo attain re  [x]  See Discharge Summary         Progress towards goals / Updated goals:  Short Term Goals: To be accomplished in 2 weeks:  1.  The patient will be independent and compliant with HEP to maximize therapeutic benefit. Met - pt reports compliance. 6/06/2017  2. The patient will improve cervical rotation to 50 degrees to improve ease of inspecting blind spots. Nearly met - 52 R, 50 L 6/13/2017. Long Term Goals: To be accomplished in 4 weeks:  1. The patient will improve FOTO score to 58 to maximize quality of life. Progressed to 56 6/16/2017; Met 58 6/27/2017  2. The patient will improve lateral flexion of cervical spine to 25 degrees to improve ease of dressing. Met 30 R, 25 L 6/13/2017. 3. The patient will report no more than 1 cervicogenic headache in 3 days to maximize ease of ADLs. None reported 6/09/2017. Nearly met   4. The patient will report 50% improvement in symptoms prior to onset for improved quality of life. Met 80% 6/16/2017     PLAN  []  Upgrade activities as tolerated     [x]  Continue plan of care  []  Update interventions per flow sheet       [x]  Discharge due to: good progress towards goals.    []  Other:_      Faheem Floyd, PT 6/27/2017  11:42 AM    Future Appointments  Date Time Provider Owen Swann   8/28/2017 11:15 AM [facility]   10/31/2017 11:00 AM Silver Hu MD [facility]

## 2017-08-10 ENCOUNTER — LAB ONLY (OUTPATIENT)
Dept: INTERNAL MEDICINE CLINIC | Age: 72
End: 2017-08-10

## 2017-08-10 ENCOUNTER — HOSPITAL ENCOUNTER (OUTPATIENT)
Dept: LAB | Age: 72
Discharge: HOME | End: 2017-08-10
Payer: MEDICARE

## 2017-08-10 ENCOUNTER — TELEPHONE (OUTPATIENT)
Dept: INTERNAL MEDICINE CLINIC | Age: 72
End: 2017-08-10

## 2017-08-10 DIAGNOSIS — R10.32 BILATERAL LOWER ABDOMINAL DISCOMFORT: ICD-10-CM

## 2017-08-10 DIAGNOSIS — R10.31 BILATERAL LOWER ABDOMINAL DISCOMFORT: Primary | ICD-10-CM

## 2017-08-10 DIAGNOSIS — R30.0 DYSURIA: ICD-10-CM

## 2017-08-10 DIAGNOSIS — R39.9 UTI SYMPTOMS: Primary | ICD-10-CM

## 2017-08-10 DIAGNOSIS — R10.32 BILATERAL LOWER ABDOMINAL DISCOMFORT: Primary | ICD-10-CM

## 2017-08-10 DIAGNOSIS — R10.31 BILATERAL LOWER ABDOMINAL DISCOMFORT: ICD-10-CM

## 2017-08-10 LAB
BILIRUB UR QL STRIP: NEGATIVE
GLUCOSE UR-MCNC: NEGATIVE MG/DL
KETONES P FAST UR STRIP-MCNC: NEGATIVE MG/DL
PH UR STRIP: 6.5 [PH] (ref 4.6–8)
PROT UR QL STRIP: NEGATIVE MG/DL
SP GR UR STRIP: 1.01 (ref 1–1.03)
UA UROBILINOGEN AMB POC: NORMAL (ref 0.2–1)
URINALYSIS CLARITY POC: CLEAR
URINALYSIS COLOR POC: YELLOW
URINE BLOOD POC: NORMAL
URINE LEUKOCYTES POC: NEGATIVE
URINE NITRITES POC: NEGATIVE

## 2017-08-10 PROCEDURE — 87086 URINE CULTURE/COLONY COUNT: CPT | Performed by: INTERNAL MEDICINE

## 2017-08-10 NOTE — TELEPHONE ENCOUNTER
Called and spoke with patient. She reports that she is having lower abdominal pressure since yesterday. Noted a temperature of 100.8 overnight, which resolved with Tylenol. She states that she has been having normal bowel movements and discomfort did not improve with Gas X. Does not seem similar to prior episodes of diverticulitis. Does report difficulty with urination with normal stream initially, followed by difficulty completing emptying. No burning or hematuria. Otherwise without complaints. Discussed possible evaluation in ED, but patient does not feel symptoms warrant ED visit. Will come to office to give urine specimen for urinalysis and culture. Further recommendations pending result. Orders placed.

## 2017-08-10 NOTE — TELEPHONE ENCOUNTER
She is an oral chemo pt- starts another cycle tomorrow-feels like she has gas trapped in lower abdomen and near rectum- chills and fever-100.8- over night, broke into a sweat over night- please advise what she can take.   She took Gas Ex- no fever now- bowels are moving

## 2017-08-11 ENCOUNTER — OFFICE VISIT (OUTPATIENT)
Dept: INTERNAL MEDICINE CLINIC | Age: 72
End: 2017-08-11

## 2017-08-11 VITALS
OXYGEN SATURATION: 94 % | WEIGHT: 142 LBS | SYSTOLIC BLOOD PRESSURE: 124 MMHG | TEMPERATURE: 98 F | RESPIRATION RATE: 16 BRPM | DIASTOLIC BLOOD PRESSURE: 82 MMHG | BODY MASS INDEX: 26.81 KG/M2 | HEIGHT: 61 IN | HEART RATE: 61 BPM

## 2017-08-11 DIAGNOSIS — R10.32 INTRACTABLE LEFT LOWER QUADRANT ABDOMINAL PAIN: Primary | ICD-10-CM

## 2017-08-11 RX ORDER — CIPROFLOXACIN 500 MG/1
500 TABLET ORAL 2 TIMES DAILY
Qty: 28 TAB | Refills: 0 | Status: SHIPPED | OUTPATIENT
Start: 2017-08-11 | End: 2017-08-25

## 2017-08-11 RX ORDER — CIPROFLOXACIN 500 MG/1
500 TABLET ORAL 2 TIMES DAILY
Qty: 28 TAB | Refills: 0 | Status: SHIPPED | OUTPATIENT
Start: 2017-08-11 | End: 2017-08-11 | Stop reason: SDUPTHER

## 2017-08-11 NOTE — MR AVS SNAPSHOT
Visit Information Date & Time Provider Department Dept. Phone Encounter #  
 8/11/2017 12:00 PM Greg Linares NP [facility] of [address] [id number] Your Appointments 10/31/2017 11:00 AM  
Office Visit with Elvia Delcid MD  
[facility] Appt Note: 6 months bs  
 [address], [address] [place] [address] [address] [place]  
  
   
 [address], [address] Upcoming Health Maintenance Date Due INFLUENZA AGE 9 TO ADULT 8/1/2017 MEDICARE YEARLY EXAM 4/28/2018 BREAST CANCER SCRN MAMMOGRAM 8/23/2018 GLAUCOMA SCREENING Q2Y 2/8/2019 DTaP/Tdap/Td series (2 - Td) 9/12/2024 COLONOSCOPY 2/22/2026 Allergies as of 8/11/2017  Review Complete On: 5/30/2017 By: Melchor Pimentel, PT Severity Noted Reaction Type Reactions Keflex [Cephalexin]  07/27/2010    Rash Metronidazole  05/04/2015    Rash Other Medication   Side Effect Nausea Only Anesthesia Shellfish Containing Products  07/21/2011    Nausea and Vomiting More of just eating the actual shellfish. Sulfa (Sulfonamide Antibiotics)  07/21/2011    Rash Ultram [Tramadol]  07/27/2010    Nausea and Vomiting Patient states she is allergic to most Narcotics Vancomycin  07/21/2011    Rash Current Immunizations  Reviewed on 10/25/2016 Name Date Influenza High Dose Vaccine PF 10/25/2016  1:20 PM, 10/1/2015 10:13 AM  
 Influenza Vaccine 9/1/2014 Influenza Vaccine Split 10/2/2012 12:18 PM, 11/1/2011 Influenza Vaccine Whole 10/8/2010 Pneumococcal Conjugate (PCV-13) 10/27/2015  2:52 PM  
 Pneumococcal Polysaccharide (PPSV-23) 4/15/2013 TD Vaccine 5/5/2008 Tdap 9/12/2014 Zoster 9/20/2011 Not reviewed this visit You Were Diagnosed With   
  
 Codes Comments Intractable left lower quadrant abdominal pain    -  Primary ICD-10-CM: R10.32 
ICD-9-CM: 789.04 Vitals BP Pulse Temp Resp Height(growth percentile) Weight(growth percentile) 124/82 (BP 1 Location: Right arm, BP Patient Position: Sitting) 61 98 °F (36.7 °C) (Oral) 16 5' 1\" (1.549 m) 142 lb (64.4 kg) SpO2 BMI OB Status Smoking Status 94% 26.83 kg/m2 Postmenopausal Former Smoker Vitals History BMI and BSA Data Body Mass Index Body Surface Area  
 26.83 kg/m 2 1.66 m 2 Preferred Pharmacy Pharmacy Name Phone [facility] [address] Your Updated Medication List  
  
   
This list is accurate as of: 8/11/17 12:41 PM.  Always use your most recent med list.  
  
  
  
  
 amoxicillin 500 mg capsule Commonly known as:  AMOXIL  
4 tabs by mouth 1 hour before dental work Biotin 2,500 mcg Cap Take  by mouth. CALCIUM 500+D 500 mg(1,250mg) -200 unit per tablet Generic drug:  calcium-vitamin D Take 1 Tab by mouth two (2) times daily (with meals). ciprofloxacin HCl 500 mg tablet Commonly known as:  CIPRO Take 1 Tab by mouth two (2) times a day for 14 days. Indications: DIVERTICULITIS OF GASTROINTESTINAL TRACT, history of diverticulitis  
  
 cyclobenzaprine 5 mg tablet Commonly known as:  FLEXERIL Take 5 mg by mouth. diazePAM 5 mg tablet Commonly known as:  VALIUM Take 1 Tab by mouth every six (6) hours as needed. FEMARA 2.5 mg tablet Generic drug:  letrozole Take 2.5 mg by mouth daily. haloperidol 2 mg tablet Commonly known as:  HALDOL Take 1 Tab by mouth two (2) times a day. IBRANCE 100 mg Cap Generic drug:  palbociclib Take 75 mg by mouth.  
  
 losartan 25 mg tablet Commonly known as:  COZAAR Take 1 Tab by mouth daily. melatonin 3 mg tablet Take 5 mg by mouth nightly. meloxicam 15 mg tablet Commonly known as:  MOBIC  
TAKE 1 TABLET (15 MG) BY ORAL ROUTE ONCE DAILY WITH FOOD  
  
 VITAMIN B-12 PO Take  by mouth. VITAMIN C 500 mg tablet Generic drug:  ascorbic acid (vitamin C) Take  by mouth. VITAMIN D3 1,000 unit tablet Generic drug:  cholecalciferol Take 2,000 Units by mouth daily. Prescriptions Sent to Pharmacy Refills  
 ciprofloxacin HCl (CIPRO) 500 mg tablet 0 Sig: Take 1 Tab by mouth two (2) times a day for 14 days. Indications: DIVERTICULITIS OF GASTROINTESTINAL TRACT, history of diverticulitis Class: Normal  
 Pharmacy: [address] [place] [address], [address] [place] [address] #: [phone] Route: Oral  
  
To-Do List   
 08/28/2017 11:15 AM  
  Appointment with LOBO HERRERA  1 at [address] [place] [address] ([phone]) OUTSIDE FILMS  - Any outside films related to the study being scheduled should be brought with you on the day of the exam.  If this cannot be done there may be a delay in the reading of the study. MEDICATIONS  - Patient must bring a complete list of all medications currently taking to include prescriptions, over-the-counter meds, herbals, vitamins & any dietary supplements  GENERAL INSTRUCTIONS  - On the day of your exam do not use any bath powder, deodorant or lotions on the armpit area. -Tenderness of breasts may cause an increase of discomfort during procedure. If you are experiencing breast tenderness on the day of your appointment and would like to reschedule, please call [phone]. [facility]! Dear Telma Pino: 
Thank you for requesting a [employer] account. Our records indicate that you already have an active [employer] account. You can access your account anytime at [email or web address]" Did you know that you can access your hospital and ER discharge instructions at any time in [employer]? You can also review all of your test results from your hospital stay or ER visit. Additional Information If you have questions, please visit the Frequently Asked Questions section of the [employer] website at [email or web address] Remember, [employer] is NOT to be used for urgent needs. For medical emergencies, dial 911. Now available from your iPhone and Android! Please provide this summary of care documentation to your next provider. Your primary care clinician is listed as Joan Huerta. If you have any questions after today's visit, please call [phone].

## 2017-08-11 NOTE — PROGRESS NOTES
Pt complains of lower left abdomen pain lasting for 2 days, Patient states she took Meloxicam and it really didn't help at all for the pain. Patient states she thinks it's diverticulitis and that she has had it before.

## 2017-08-11 NOTE — PROGRESS NOTES
Amilcar Shannon is a 73 y.o.  female and presents with    Chief Complaint   Patient presents with    Abdominal Pain     Pt complains of lower left abodmen pain starting last night. Pt states she only took meloxicam and that it really didn't help. Subjective:  HPI   Mrs. Nathalia Zheng presents today for abdominal pain. She contacted Dr. Carson Vázquez yesterday and a UA was performed and a urine culture was sent. UA was unremarkable and the culture is still pending at 13 hours of no growth. She states that 2 days ago she had a fever of 100.8 oral, she took Tylenol that lowered the temperature to 99.0. She was feeling bloated and had some rectal pressure so she took GasEx however it did not provide relief at that time. Today she denies rectal pain and the bloating has subsided. She states she had generalized lower abdominal pain but today she feels like her diverticulitis is flaring as the pain is now specific to the LLQ pain and it feels tender and sore. The pain is worsened with any movement and with manual pressure. She states that she usually has a diverticulitis flare annually and in the past has taken Cipro and Flagyl, however the last time she took Flagyl she got a rash and Dr. Tavo Cochran felt it was related to the Flagyl. Today she denies fever, chills, vomiting, diarrhea, constipation. She states she is always more fatigued but thinks she feels increased fatigue related to the possible diverticulitis flare. Additional Concerns: no     ROS   Review of Systems   Constitutional: Positive for chills, fever and malaise/fatigue. Anorexia   HENT: Negative. Eyes: Negative. Respiratory: Negative. Cardiovascular: Negative. Negative for chest pain and palpitations. Gastrointestinal: Positive for nausea. Genitourinary: Positive for dysuria. Negative for flank pain, frequency, hematuria and urgency. Musculoskeletal: Negative. Neurological: Negative. Endo/Heme/Allergies: Negative. Psychiatric/Behavioral: Negative. Allergies   Allergen Reactions    Keflex [Cephalexin] Rash    Metronidazole Rash    Other Medication Nausea Only     Anesthesia    Shellfish Containing Products Nausea and Vomiting     More of just eating the actual shellfish.  Sulfa (Sulfonamide Antibiotics) Rash    Ultram [Tramadol] Nausea and Vomiting     Patient states she is allergic to most Narcotics    Vancomycin Rash       Current Outpatient Prescriptions   Medication Sig Dispense Refill    ciprofloxacin HCl (CIPRO) 500 mg tablet Take 1 Tab by mouth two (2) times a day for 14 days. Indications: DIVERTICULITIS OF GASTROINTESTINAL TRACT, history of diverticulitis 28 Tab 0    diazePAM (VALIUM) 5 mg tablet Take 1 Tab by mouth every six (6) hours as needed. 30 Tab 3    meloxicam (MOBIC) 15 mg tablet TAKE 1 TABLET (15 MG) BY ORAL ROUTE ONCE DAILY WITH FOOD 90 Tab 0    haloperidol (HALDOL) 2 mg tablet Take 1 Tab by mouth two (2) times a day. 180 Tab 3    losartan (COZAAR) 25 mg tablet Take 1 Tab by mouth daily. 90 Tab 3    palbociclib (IBRANCE) 100 mg cap Take 75 mg by mouth.  CYANOCOBALAMIN, VITAMIN B-12, (VITAMIN B-12 PO) Take  by mouth.  Biotin 2,500 mcg cap Take  by mouth.  letrozole (FEMARA) 2.5 mg tablet Take 2.5 mg by mouth daily.  calcium-vitamin D (CALCIUM 500+D) 500 mg(1,250mg) -200 unit per tablet Take 1 Tab by mouth two (2) times daily (with meals).  cholecalciferol, vitamin d3, (VITAMIN D) 1,000 unit tablet Take 2,000 Units by mouth daily.  ascorbic acid (VITAMIN C) 500 mg tablet Take  by mouth.  cyclobenzaprine (FLEXERIL) 5 mg tablet Take 5 mg by mouth.  melatonin 3 mg tablet Take 5 mg by mouth nightly.       amoxicillin (AMOXIL) 500 mg capsule 4 tabs by mouth 1 hour before dental work 12 Cap 1       Social History     Social History    Marital status:      Spouse name: N/A    Number of children: N/A    Years of education: N/A     Occupational History    Not on file.      Social History Main Topics    Smoking status: Former Smoker    Smokeless tobacco: Never Used    Alcohol use 3.5 oz/week     7 Glasses of wine per week    Drug use: No    Sexual activity: Not Currently     Other Topics Concern    Not on file     Social History Narrative       Past Medical History:   Diagnosis Date    Arthritis     Breast cancer metastasized to lung [facility])     Left Breast    Chronic pain     Colon polyps 2/22/16    distal sigmoid, Dr. Sravan Lieberman DDD (degenerative disc disease)     Diverticulitis of colon     Diverticulosis 2/22/16    mild in the ascedning and sigmoid colon, Dr. Rosaline Rodriguez (hyperlipidemia)     Hypertension     Osteopenia     Tourette syndrome     Vitamin D deficiency        Past Surgical History:   Procedure Laterality Date    HX APPENDECTOMY      HX BREAST BIOPSY  7-30-10    Left Breast w/Nipple Duct exploration and excisional biopsy-left    HX DILATION AND CURETTAGE      x3    HX MODIFIED RADICAL MASTECTOMY  9-3-10    left    HX ORTHOPAEDIC Right 2012    knee replacement    HX TONSILLECTOMY      HYSTEROSCOPY DIAGNOSTIC         Family History   Problem Relation Age of Onset    Osteoporosis Sister     Osteoporosis Mother     Stroke Father     Hypertension Father     Cancer Paternal Aunt      breast       Objective:  Vitals:    08/11/17 1156   BP: 124/82   Pulse: 61   Resp: 16   Temp: 98 °F (36.7 °C)   TempSrc: Oral   SpO2: 94%   Weight: 142 lb (64.4 kg)   Height: 5' 1\" (1.549 m)   PainSc:   6   PainLoc: Abdomen       LABS   Results for orders placed or performed during the hospital encounter of 08/10/17   CULTURE, URINE   Result Value Ref Range    Special Requests: NO SPECIAL REQUESTS      Culture result: NO GROWTH AFTER 13 HOURS         TESTS  CT Results (most recent):    Results from Hospital Encounter encounter on 06/20/17   CT CHEST ABD PELV W WO CONT   Narrative EXAMINATION: CT chest/abdomen/pelvis with IV and oral contrast    INDICATION: Breast cancer    COMPARISON: 2/6/2017    TECHNIQUE: CT of the chest, abdomen, and pelvis performed following 100 cc IV  Isovue-300, and oral contrast, with multiplanar reformations. All CT scans at  this facility are performed using dose optimization technique as appropriate to  a performed exam, to include automated exposure control, adjustment of the mA  and/or kV according to patient size (including appropriate matching first site  specific examinations), or use of iterative reconstruction technique. FINDINGS:    CT CHEST:    Lungs: No consolidation. In the right middle lobe there is a 3 mm nodule along  the minor fissure, similar to prior, and minimal atelectasis or scarring  anteriorly. In the right lower lobe there is an accessory fissure below the  superior segment, dependent atelectatic changes, and a posterior subpleural  nodule measuring 4 mm on image 27, similar to prior. In the left upper lobe,  there is a subpleural nodule measuring 4 mm length on image 18, similar to  prior, a 3 mm nodule on image 27, similar to prior. In the left lower lobe,  there is a 4 mm nodule adjacent to the major fissure on image 25, similar to  prior, and a 3 mm nodule also adjacent to the fissure on image 24, similar to  prior, as well as mild dependent atelectatic changes. Mediastinum: Imaged thyroid unremarkable. No adenopathy by size criteria. Esophagus nondistended. Cardiovascular: Slightly ectatic ascending aorta. Slightly prominent pulmonary  trunk. Heart size within normal limits. Miscellaneous: Status post left mastectomy. Superficial soft tissues  unremarkable. Bones: T11 mild height loss with kyphoplasty cement similar to prior. Multilevel  advanced degenerative disc disease. No suspicious osseous lesions. CT ABDOMEN/PELVIS:    Liver: Unchanged 0.9 cm probable cyst in the medial left hepatic lobe, and  bilobed 1.9 cm cyst in the lateral left hepatic lobe.  Additional severe  scattered subcentimeter probable cysts. Mild low-attenuation of the liver  relative to the spleen. No suspicious hepatic lesions. Spleen: Normal.    Pancreas: Normal.    Biliary tree: Gallbladder unremarkable. No biliary duct dilatation. Adrenal glands: Right and left adrenal glands normal.    Kidneys: Right kidney normal without hydronephrosis. Left kidney normal without  hydronephrosis. Bladder: Unremarkable. Reproductive: Uterus unremarkable. Gastrointestinal: The stomach is unremarkable. Small bowel loops are nondilated. The colon is nondilated. Extensive sigmoid diverticulosis. Possible wall  thickening from the distal transverse colon to the rectum, may be exaggerated by  nondistention. Appendix not clearly visualized. Vessels: Major vessels are normal in course and caliber. Mesentery/nodes: No free air or free fluid. No retroperitoneal or pelvic  adenopathy by size criteria. Miscellaneous: Superficial soft tissues unremarkable. Bones: Multilevel advanced degenerative disc disease and advanced facet  arthropathy. Osteopenia limits evaluation of bony detail. No acute osseous  findings. Probable bone on in the right ischium, unchanged. No suspicious  lesions. Impression IMPRESSION:    CT chest:    1. Multiple small pulmonary nodules as above, similar in size to prior,  decreased in size compared to older CT examinations. Recommend attention on  follow-up imaging.    -Otherwise no evidence of metastases in the chest. Status post left mastectomy. T11 compression deformity with kyphoplasty cement, unchanged. 2. Slightly ectatic ascending aorta and pulmonary trunk. Correlate clinically  for pulmonary hypertension. CT abdomen/pelvis:    1. No evidence of metastatic disease in abdomen or pelvis. 2. Possible wall thickening from transverse colon to rectum, may be exaggerated  by nondistention, may represent a nonspecific colitis. Sigmoid diverticulosis.     3. Hepatic cysts as above. PE  Physical Exam   Constitutional: She is oriented to person, place, and time. She appears well-developed and well-nourished. No distress. Abdominal: Soft. Bowel sounds are normal. She exhibits no distension and no mass. There is tenderness. There is guarding. There is no rebound. LLQ localized pain with history of diverticulitis   Neurological: She is alert and oriented to person, place, and time. Skin: Skin is warm and dry. She is not diaphoretic. No erythema. No pallor. Psychiatric: She has a normal mood and affect. Her behavior is normal. Judgment and thought content normal.   Nursing note and vitals reviewed. Assessment/Plan:    1. LLQ pain with history of diverticulitis- Ciprofloxacin prescribed. I spoke with Dr. Saeid Najera regarding the patient and he recommended to add Clindamycin or Augmentin for increased coverage and refer to Colorectal Surgery. I called the patient on her cell phone and spoke to her about adding an antibiotic and doing a referral. She stated that the pain is not as significant as what she had presented with in the past so she would like to just use the Ciprofloxacin as it has worked in the past and discuss a Colorectal referral when she feels better. She understands to watch for emergent symptoms, increased fever or chills, N/V/D/C, blood in stool, increased pain. She is tolerating foods without increased pain as she was eating chicken soup when I called her. Lab review: no lab studies available for review at time of visit    Today's Visit:   Diagnoses and all orders for this visit:    1. Intractable left lower quadrant abdominal pain  -     ciprofloxacin HCl (CIPRO) 500 mg tablet; Take 1 Tab by mouth two (2) times a day for 14 days.  Indications: DIVERTICULITIS OF GASTROINTESTINAL TRACT, history of diverticulitis      Health Maintenance: not addressed at this    I have discussed the diagnosis with the patient and the intended plan as seen in the above orders. The patient has received an after-visit summary and questions were answered concerning future plans. I have discussed medication side effects and warnings with the patient as well. I have reviewed the plan of care with the patient, accepted their input and they are in agreement with the treatment goals. Follow-up Disposition: Not on File   More than 1/2 of this 30 minute visit was spent in counseling and coordination of care, as described above.     ALAN Peña

## 2017-08-12 LAB
BACTERIA SPEC CULT: ABNORMAL
SERVICE CMNT-IMP: ABNORMAL

## 2017-08-14 ENCOUNTER — TELEPHONE (OUTPATIENT)
Dept: INTERNAL MEDICINE CLINIC | Age: 72
End: 2017-08-14

## 2017-08-14 NOTE — TELEPHONE ENCOUNTER
Per soraida, please send recent labs and urine culture to Dr Kaitlin Worrell ([place]/hematology) pt is due to start chemo soon but he needs to know about her recent UTI.  Thank you

## 2017-08-15 ENCOUNTER — TELEPHONE (OUTPATIENT)
Dept: INTERNAL MEDICINE CLINIC | Age: 72
End: 2017-08-15

## 2017-08-15 NOTE — TELEPHONE ENCOUNTER
Called to check on patient. She reports that she noted significant improvement after taking two doses of Cipro with resolution of pain. Reviewed urine culture results of <10,000 colonies of alpha hemolytic streptococcus. Discussed that this is most likely due to colonization given lack of clinical symptoms of a UTI and negative urinalysis. Explained that given clinical improvement, do not feel it is necessary to alter antibiotic regimen. Discussed with patient that given rapid clinical improvement, would be sufficient to complete 7 days of antibiotics. Will call if develop any worsening symptoms or clinical change.

## 2017-08-15 NOTE — TELEPHONE ENCOUNTER
Pt calling asking Ju Bowden or [facility] to call her to discuss her urine results.

## 2017-08-21 RX ORDER — MELOXICAM 15 MG/1
TABLET ORAL
Qty: 90 TAB | Refills: 1 | Status: SHIPPED | OUTPATIENT
Start: 2017-08-21 | End: 2018-02-16 | Stop reason: SDUPTHER

## 2017-08-21 NOTE — TELEPHONE ENCOUNTER
Last office visit 8/11/2017 with GERALD Hurt                           4/27/2017 wit Dr. Vincenzo Goodell    Last refill - Mob 5/17/2017

## 2017-08-24 ENCOUNTER — TELEPHONE (OUTPATIENT)
Dept: INTERNAL MEDICINE CLINIC | Age: 72
End: 2017-08-24

## 2017-08-24 NOTE — TELEPHONE ENCOUNTER
Called patient and discussed concerns. Reviewed CT scan results from 6/2017. Diverticulitis completely resolved with Cipro. Normal bowel movements. Not constipated. Discussed possible referral to GI. Will readdress at physical. Will call sooner if worsens.

## 2017-08-24 NOTE — TELEPHONE ENCOUNTER
----- Message from Dolores Orozco LPN sent at 0/07/7024  7:39 AM EDT -----  Regarding: FW: Non-Urgent Medical Question  Contact: [phone]      ----- Message -----     From: Prospersunny Chau     Sent: 8/23/2017   9:45 PM       To: [facility] Nurses  Subject: Non-Urgent Medical Question                      It seems to be one thing then the other to bother Dr. Fani Merchant with but I have a very growing concern about my  scoliosis which I believe is what is causing my stomach to protrude and rise up into my diaphragm. I am extremely uncomfortable in the last month with my stomach and by night I must lie flat to relieve the pressure of it. It causes problems with eating as well. I weigh no more than I ever do. I have a physical on Oct. 31 and will wait until then if she thinks this is not urgent. I am to have a DXA bone density in Nov.  I just do not know what is happening to my body! It seems to be worsening daily. I am truly miserable with the enlarged stomach. Please advise. Thank you. .. Florian Randhawa

## 2017-08-28 ENCOUNTER — HOSPITAL ENCOUNTER (OUTPATIENT)
Dept: MAMMOGRAPHY | Age: 72
Discharge: HOME | End: 2017-08-28
Attending: INTERNAL MEDICINE
Payer: MEDICARE

## 2017-08-28 DIAGNOSIS — C50.912 MALIGNANT NEOPLASM OF LEFT FEMALE BREAST, UNSPECIFIED SITE OF BREAST: ICD-10-CM

## 2017-08-28 DIAGNOSIS — Z12.31 ENCOUNTER FOR SCREENING MAMMOGRAM FOR MALIGNANT NEOPLASM OF BREAST: ICD-10-CM

## 2017-08-28 PROCEDURE — 77067 SCR MAMMO BI INCL CAD: CPT

## 2017-08-31 ENCOUNTER — TELEPHONE (OUTPATIENT)
Dept: INTERNAL MEDICINE CLINIC | Age: 72
End: 2017-08-31

## 2017-09-01 NOTE — TELEPHONE ENCOUNTER
----- Message from Jaxon Lopez LPN sent at 6/97/7833  1:50 PM EDT -----  Regarding: FW: Non-Urgent Medical Question  Contact: [phone]      ----- Message -----     From: Garland Gar     Sent: 8/31/2017   8:23 AM       To: [facility] Nurses  Subject: Non-Urgent Medical Question                      Good morning,  Just checking to see if you have gotten the results of the mammogram I had at the [facility]- on Monday morning. After having had breast cancer this is always concerning to me.   Thank you,  Garland Gar

## 2017-10-05 ENCOUNTER — CLINICAL SUPPORT (OUTPATIENT)
Dept: INTERNAL MEDICINE CLINIC | Age: 72
End: 2017-10-05

## 2017-10-05 DIAGNOSIS — Z23 ENCOUNTER FOR IMMUNIZATION: Primary | ICD-10-CM

## 2017-10-09 ENCOUNTER — TELEPHONE (OUTPATIENT)
Dept: INTERNAL MEDICINE CLINIC | Age: 72
End: 2017-10-09

## 2017-10-09 NOTE — TELEPHONE ENCOUNTER
Please let her know that would recommend that she complete a 10 day course. Given that she has had a recurrence so soon after her previous episode (on 8/11/2017), if improvement and resolution of pain does not occur in the next few days, would recommend that she have an abdominal CT scan. Also, if not improving, would consider Augmentin instead of Cipro alone as would provide better coverage since Flagyl can not be used. Please ask her to give us an update later in the week. Thank you.

## 2017-10-09 NOTE — TELEPHONE ENCOUNTER
----- Message from Abel Jacome LPN sent at 49/3/2861 10:06 AM EDT -----  Regarding: FW: Non-Urgent Medical Question  Contact: [phone]      ----- Message -----     From: Camilo Plummer     Sent: 10/9/2017   7:40 AM       To: [facility] Nurses  Subject: Non-Urgent Medical Question                      Good morning. Just to update. .. I started with yet another bout of diverticulitis on Friday just 2 months from the last one. Since it always seems to happen on the weekend, I was greatful for the antibiotics I had on hand (Cipro) from before. [facility] had called the drug in to [facility] as well as my mail service pharmacy so I had plenty. I started them Saturday morning. I have had significant pain but as of this morning there is improvement just still discomfort. My temp only reached low grade during this time. As you recall, we think I am allergic to Flagyl or that probably would have enhanced recovery. Since I am still having some pain, my question is whether to take the med for 7 days as before or the full 2 weeks. Thank you.

## 2017-10-10 RX ORDER — DIAZEPAM 5 MG/1
5 TABLET ORAL
Qty: 30 TAB | Refills: 3 | Status: CANCELLED | OUTPATIENT
Start: 2017-10-10

## 2017-10-10 NOTE — TELEPHONE ENCOUNTER
Patient would like it sent to the [facility] in [facility]

## 2017-10-10 NOTE — TELEPHONE ENCOUNTER
After reviewing the chart and , the patient should have 3 refills remaining. Called and spoke with Livia Medeiros at [address] and advised her to fill the medication for the patient. The medication will be available for  around noon today. Patient contacted.

## 2017-10-10 NOTE — TELEPHONE ENCOUNTER
PT given message- she feels a little better with some twinges- she will do the 10 day course and will send an update through My CHart

## 2017-10-24 ENCOUNTER — HOSPITAL ENCOUNTER (OUTPATIENT)
Dept: LAB | Age: 72
Discharge: HOME | End: 2017-10-24
Payer: MEDICARE

## 2017-10-24 DIAGNOSIS — R30.0 DYSURIA: ICD-10-CM

## 2017-10-24 DIAGNOSIS — R10.31 BILATERAL LOWER ABDOMINAL DISCOMFORT: ICD-10-CM

## 2017-10-24 DIAGNOSIS — M85.89 OSTEOPENIA OF MULTIPLE SITES: ICD-10-CM

## 2017-10-24 DIAGNOSIS — E78.5 HYPERLIPIDEMIA, UNSPECIFIED HYPERLIPIDEMIA TYPE: ICD-10-CM

## 2017-10-24 DIAGNOSIS — I10 ESSENTIAL HYPERTENSION WITH GOAL BLOOD PRESSURE LESS THAN 140/90: ICD-10-CM

## 2017-10-24 DIAGNOSIS — E55.9 VITAMIN D INSUFFICIENCY: ICD-10-CM

## 2017-10-24 DIAGNOSIS — R10.32 BILATERAL LOWER ABDOMINAL DISCOMFORT: ICD-10-CM

## 2017-10-24 LAB
25(OH)D3 SERPL-MCNC: 41 NG/ML (ref 30–100)
APPEARANCE UR: CLEAR
BILIRUB UR QL: NEGATIVE
CHOLEST SERPL-MCNC: 201 MG/DL
COLOR UR: YELLOW
GLUCOSE UR STRIP.AUTO-MCNC: NEGATIVE MG/DL
HDLC SERPL-MCNC: 59 MG/DL (ref 40–60)
HDLC SERPL: 3.4 {RATIO} (ref 0–5)
HGB UR QL STRIP: NEGATIVE
KETONES UR QL STRIP.AUTO: NEGATIVE MG/DL
LDLC SERPL CALC-MCNC: 122.8 MG/DL (ref 0–100)
LEUKOCYTE ESTERASE UR QL STRIP.AUTO: NEGATIVE
LIPID PROFILE,FLP: ABNORMAL
NITRITE UR QL STRIP.AUTO: NEGATIVE
PH UR STRIP: 6.5 [PH] (ref 5–8)
PROT UR STRIP-MCNC: NEGATIVE MG/DL
SP GR UR REFRACTOMETRY: 1.02 (ref 1–1.03)
TRIGL SERPL-MCNC: 96 MG/DL (ref ?–150)
TSH SERPL DL<=0.05 MIU/L-ACNC: 0.86 UIU/ML (ref 0.36–3.74)
UROBILINOGEN UR QL STRIP.AUTO: 0.2 EU/DL (ref 0.2–1)
VLDLC SERPL CALC-MCNC: 19.2 MG/DL

## 2017-10-24 PROCEDURE — 36415 COLL VENOUS BLD VENIPUNCTURE: CPT | Performed by: INTERNAL MEDICINE

## 2017-10-24 PROCEDURE — 82306 VITAMIN D 25 HYDROXY: CPT | Performed by: INTERNAL MEDICINE

## 2017-10-24 PROCEDURE — 84443 ASSAY THYROID STIM HORMONE: CPT | Performed by: INTERNAL MEDICINE

## 2017-10-24 PROCEDURE — 80061 LIPID PANEL: CPT | Performed by: INTERNAL MEDICINE

## 2017-10-24 PROCEDURE — 81003 URINALYSIS AUTO W/O SCOPE: CPT | Performed by: INTERNAL MEDICINE

## 2017-10-31 ENCOUNTER — OFFICE VISIT (OUTPATIENT)
Dept: INTERNAL MEDICINE CLINIC | Age: 72
End: 2017-10-31

## 2017-10-31 VITALS
TEMPERATURE: 98.2 F | OXYGEN SATURATION: 91 % | RESPIRATION RATE: 14 BRPM | SYSTOLIC BLOOD PRESSURE: 112 MMHG | WEIGHT: 138 LBS | HEART RATE: 86 BPM | BODY MASS INDEX: 26.06 KG/M2 | HEIGHT: 61 IN | DIASTOLIC BLOOD PRESSURE: 72 MMHG

## 2017-10-31 DIAGNOSIS — M47.892 OTHER OSTEOARTHRITIS OF SPINE, CERVICAL REGION: ICD-10-CM

## 2017-10-31 DIAGNOSIS — E78.5 HYPERLIPIDEMIA, UNSPECIFIED HYPERLIPIDEMIA TYPE: ICD-10-CM

## 2017-10-31 DIAGNOSIS — F95.2 TOURETTE SYNDROME: ICD-10-CM

## 2017-10-31 DIAGNOSIS — M85.89 OSTEOPENIA OF MULTIPLE SITES: ICD-10-CM

## 2017-10-31 DIAGNOSIS — C50.912 MALIGNANT NEOPLASM OF LEFT BREAST IN FEMALE, ESTROGEN RECEPTOR POSITIVE, UNSPECIFIED SITE OF BREAST (HCC): ICD-10-CM

## 2017-10-31 DIAGNOSIS — Z17.0 MALIGNANT NEOPLASM OF LEFT BREAST IN FEMALE, ESTROGEN RECEPTOR POSITIVE, UNSPECIFIED SITE OF BREAST (HCC): ICD-10-CM

## 2017-10-31 DIAGNOSIS — M48.061 SPINAL STENOSIS OF LUMBAR REGION WITHOUT NEUROGENIC CLAUDICATION: ICD-10-CM

## 2017-10-31 DIAGNOSIS — I10 ESSENTIAL HYPERTENSION WITH GOAL BLOOD PRESSURE LESS THAN 140/90: Primary | ICD-10-CM

## 2017-10-31 DIAGNOSIS — K57.30 DIVERTICULOSIS OF LARGE INTESTINE WITHOUT HEMORRHAGE: ICD-10-CM

## 2017-10-31 DIAGNOSIS — I89.0 LYMPHEDEMA OF ARM: ICD-10-CM

## 2017-10-31 DIAGNOSIS — R53.83 FATIGUE, UNSPECIFIED TYPE: ICD-10-CM

## 2017-10-31 DIAGNOSIS — M25.511 RIGHT SHOULDER PAIN, UNSPECIFIED CHRONICITY: ICD-10-CM

## 2017-10-31 RX ORDER — GABAPENTIN 100 MG/1
CAPSULE ORAL 3 TIMES DAILY
COMMUNITY
End: 2018-11-06 | Stop reason: SDUPTHER

## 2017-10-31 RX ORDER — HALOPERIDOL 2 MG/1
TABLET ORAL 2 TIMES DAILY
COMMUNITY
End: 2018-05-23 | Stop reason: SDUPTHER

## 2017-10-31 RX ORDER — CYCLOBENZAPRINE HCL 5 MG
5 TABLET ORAL
COMMUNITY
End: 2020-05-21 | Stop reason: SDUPTHER

## 2017-10-31 NOTE — ACP (ADVANCE CARE PLANNING)
Patient brought a completed advance directive to her office visit today. Will be scanned into chart.

## 2017-10-31 NOTE — PATIENT INSTRUCTIONS
Insomnia: Care Instructions  Your Care Instructions    Insomnia is the inability to sleep well. It is a common problem for most people at some time. Insomnia may make it hard for you to get to sleep, stay asleep, or sleep as long as you need to. This can make you tired and grouchy during the day. It can also make you forgetful, less effective at work, and unhappy. Insomnia can be caused by conditions such as depression or anxiety. Pain can also affect your ability to sleep. When these problems are solved, the insomnia usually clears up. But sometimes bad sleep habits can cause insomnia. If insomnia is affecting your work or your enjoyment of life, you can take steps to improve your sleep. Follow-up care is a key part of your treatment and safety. Be sure to make and go to all appointments, and call your doctor if you are having problems. It's also a good idea to know your test results and keep a list of the medicines you take. How can you care for yourself at home? What to avoid  · Do not have drinks with caffeine, such as coffee or black tea, for 8 hours before bed. · Do not smoke or use other types of tobacco near bedtime. Nicotine is a stimulant and can keep you awake. · Avoid drinking alcohol late in the evening, because it can cause you to wake in the middle of the night. · Do not eat a big meal close to bedtime. If you are hungry, eat a light snack. · Do not drink a lot of water close to bedtime, because the need to urinate may wake you up during the night. · Do not read or watch TV in bed. Use the bed only for sleeping and sexual activity. What to try  · Go to bed at the same time every night, and wake up at the same time every morning. Do not take naps during the day. · Keep your bedroom quiet, dark, and cool. · Sleep on a comfortable pillow and mattress. · If watching the clock makes you anxious, turn it facing away from you so you cannot see the time.   · If you worry when you lie down, start a worry book. Well before bedtime, write down your worries, and then set the book and your concerns aside. · Try meditation or other relaxation techniques before you go to bed. · If you cannot fall asleep, get up and go to another room until you feel sleepy. Do something relaxing. Repeat your bedtime routine before you go to bed again. · Make your house quiet and calm about an hour before bedtime. Turn down the lights, turn off the TV, log off the computer, and turn down the volume on music. This can help you relax after a busy day. When should you call for help? Watch closely for changes in your health, and be sure to contact your doctor if:  ? · Your efforts to improve your sleep do not work. ? · Your insomnia gets worse. ? · You have been feeling down, depressed, or hopeless or have lost interest in things that you usually enjoy. Where can you learn more? Go to http://fatouChaordixolive.info/. Enter P513 in the search box to learn more about \"Insomnia: Care Instructions. \"  Current as of: July 26, 2016  Content Version: 11.4  © 0896-1246 [employer]. Care instructions adapted under license by [employer] (which disclaims liability or warranty for this information). If you have questions about a medical condition or this instruction, always ask your healthcare professional. [address] any warranty or liability for your use of this information. Learning About Sleeping Well  What does sleeping well mean? Sleeping well means getting enough sleep. How much sleep is enough varies among people. The number of hours you sleep is not as important as how you feel when you wake up. If you do not feel refreshed, you probably need more sleep. Another sign of not getting enough sleep is feeling tired during the day. The average total nightly sleep time is 7½ to 8 hours. Healthy adults may need a little more or a little less than this.   Why is getting enough sleep important? Getting enough quality sleep is a basic part of good health. When your sleep suffers, your mood and your thoughts can suffer too. You may find yourself feeling more grumpy or stressed. Not getting enough sleep also can lead to serious problems, including injury, accidents, anxiety, and depression. What might cause poor sleeping? Many things can cause sleep problems, including:  · Stress. Stress can be caused by fear about a single event, such as giving a speech. Or you may have ongoing stress, such as worry about work or school. · Depression, anxiety, and other mental or emotional conditions. · Changes in your sleep habits or surroundings. This includes changes that happen where you sleep, such as noise, light, or sleeping in a different bed. It also includes changes in your sleep pattern, such as having jet lag or working a late shift. · Health problems, such as pain, breathing problems, and restless legs syndrome. · Lack of regular exercise. How can you help yourself? Here are some tips that may help you sleep more soundly and wake up feeling more refreshed. Your sleeping area  · Use your bedroom only for sleeping and sex. A bit of light reading may help you fall asleep. But if it doesn't, do your reading elsewhere in the house. Don't watch TV in bed. · Be sure your bed is big enough to stretch out comfortably, especially if you have a sleep partner. · Keep your bedroom quiet, dark, and cool. Use curtains, blinds, or a sleep mask to block out light. To block out noise, use earplugs, soothing music, or a \"white noise\" machine. Your evening and bedtime routine  · Create a relaxing bedtime routine. You might want to take a warm shower or bath, listen to soothing music, or drink a cup of noncaffeinated tea. · Go to bed at the same time every night. And get up at the same time every morning, even if you feel tired.   What to avoid  · Limit caffeine (coffee, tea, caffeinated sodas) during the day, and don't have any for at least 4 to 6 hours before bedtime. · Don't drink alcohol before bedtime. Alcohol can cause you to wake up more often during the night. · Don't smoke or use tobacco, especially in the evening. Nicotine can keep you awake. · Don't take naps during the day, especially close to bedtime. · Don't lie in bed awake for too long. If you can't fall asleep, or if you wake up in the middle of the night and can't get back to sleep within 15 minutes or so, get out of bed and go to another room until you feel sleepy. · Don't take medicine right before bed that may keep you awake or make you feel hyper or energized. Your doctor can tell you if your medicine may do this and if you can take it earlier in the day. If you can't sleep  · Imagine yourself in a peaceful, pleasant scene. Focus on the details and feelings of being in a place that is relaxing. · Get up and do a quiet or boring activity until you feel sleepy. · Don't drink any liquids after 6 p.m. if you wake up often because you have to go to the bathroom. Where can you learn more? Go to http://fatou-olive.info/. Enter M238 in the search box to learn more about \"Learning About Sleeping Well. \"  Current as of: May 12, 2017  Content Version: 11.4  © 4596-0295 [employer]. Care instructions adapted under license by [employer] (which disclaims liability or warranty for this information). If you have questions about a medical condition or this instruction, always ask your healthcare professional. [place] [address] any warranty or liability for your use of this information.

## 2017-10-31 NOTE — PROGRESS NOTES
Chief Complaint   Patient presents with    Hypertension     6 month follow up with labs. Patients states she doesn't take her blood pressure readings, but takes her medications regularly. 1. Have you been to the ER, urgent care clinic or hospitalized since your last visit? NO.     2. Have you seen or consulted any other health care providers outside of the [address] [place] [address] since your last visit (Include any pap smears or colon screening)? YES, [facility] with Dr. Gilberto Marte October 27, 2017. Patient states she got a mammogram in August 2017 and her last GYN exam was earlier this year.

## 2017-11-01 ENCOUNTER — HOSPITAL ENCOUNTER (OUTPATIENT)
Dept: CT IMAGING | Age: 72
Discharge: HOME | End: 2017-11-01
Attending: NURSE PRACTITIONER
Payer: MEDICARE

## 2017-11-01 ENCOUNTER — HOSPITAL ENCOUNTER (OUTPATIENT)
Dept: GENERAL RADIOLOGY | Age: 72
Discharge: HOME | End: 2017-11-01
Attending: NURSE PRACTITIONER
Payer: MEDICARE

## 2017-11-01 DIAGNOSIS — Z17.0 MALIGNANT NEOPLASM OF LEFT BREAST IN FEMALE, ESTROGEN RECEPTOR POSITIVE, UNSPECIFIED SITE OF BREAST (HCC): ICD-10-CM

## 2017-11-01 DIAGNOSIS — C50.912 MALIGNANT NEOPLASM OF LEFT BREAST IN FEMALE, ESTROGEN RECEPTOR POSITIVE, UNSPECIFIED SITE OF BREAST (HCC): ICD-10-CM

## 2017-11-01 DIAGNOSIS — C50.411 MALIGNANT NEOPLASM OF UPPER-OUTER QUADRANT OF RIGHT FEMALE BREAST (HCC): ICD-10-CM

## 2017-11-01 DIAGNOSIS — M25.511 RIGHT SHOULDER PAIN, UNSPECIFIED CHRONICITY: ICD-10-CM

## 2017-11-01 PROCEDURE — 82565 ASSAY OF CREATININE: CPT

## 2017-11-01 PROCEDURE — 74011636320 HC RX REV CODE- 636/320: Performed by: NURSE PRACTITIONER

## 2017-11-01 PROCEDURE — 74177 CT ABD & PELVIS W/CONTRAST: CPT

## 2017-11-01 PROCEDURE — 73030 X-RAY EXAM OF SHOULDER: CPT

## 2017-11-01 RX ADMIN — IOPAMIDOL 100 ML: 612 INJECTION, SOLUTION INTRAVENOUS at 15:00

## 2017-11-02 ENCOUNTER — TELEPHONE (OUTPATIENT)
Dept: INTERNAL MEDICINE CLINIC | Age: 72
End: 2017-11-02

## 2017-11-02 LAB — CREAT UR-MCNC: 0.9 MG/DL (ref 0.6–1.3)

## 2017-11-02 NOTE — TELEPHONE ENCOUNTER
Called and notified patient about normal right shoulder x-ray. Wishing to hold off on physical therapy for now unless pain worsens.

## 2017-11-04 PROBLEM — M48.02 SPINAL STENOSIS OF CERVICAL REGION: Status: ACTIVE | Noted: 2017-11-04

## 2017-11-04 PROBLEM — M25.511 RIGHT SHOULDER PAIN: Status: ACTIVE | Noted: 2017-11-04

## 2017-11-04 PROBLEM — M47.812 DEGENERATIVE JOINT DISEASE OF CERVICAL SPINE: Status: ACTIVE | Noted: 2017-11-04

## 2017-11-04 NOTE — PROGRESS NOTES
Ebony Franklin is a 78y.o. year old female who presents today for evaluation of hypertension, dyslipidemia,  metastatic breast cancer, GERD, diverticulosis with recurrent diverticulitis, osteoarthritis s/p right knee replacement (2012), lumbar degenerative disease, osteopenia, compression fracture, and Tourette's syndrome. She reports that she is doing relatively well. She reports that she had been given a treatment break from Ibrance due to leukopenia, but was restarted on 10/27/2017 by Dr. Gilberto Marte. She has an upcoming staging chest, abdomen, and pelvis CT scan on 11/1/2017. She is complaining of persistent fatigue, which makes it difficult to perform her daily activities at home. She states that it did not improve when she was off Ibrance, so she feels it may be related to the letrozole. She denies shortness of breath or chest pain. She reports that she finds she must rest frequently during the day due to overwhelming fatigue with activities. She states that she does have some difficulty falling asleep at night, and will often take a valium to help. She states that she does not feel rested when awakening. She also is complaining of right shoulder pain, particularly with certain movements. She denies known trauma, but experiences some discomfort when raising her right arm. She is otherwise without complaints. She has a history of left breast cancer, which was diagnosed in 7/2010 as invasive ductal adenocarcinoma, s/p left modified radical mastectomy with sentinel node biopsy, performed by Dr. Claudio Ramirez. She had 3 positive lymph nodes and was classified as stage 1B, T1c N1 M0, ER and IA positive, Her-2/sarah negative by FISH. She underwent 6 cycles of chemotherapy with Docetaxel and Cytoxan. She was subsequently unable to tolerate anastrozole, exemestane, tamoxifen, and letrozole due to profound fatigue and arthralgias. Her course was complicated by chronic left arm lymphedema, managed with a compression sleeve. A chest CT scan in 3/2014 noted several small pulmonary nodules which were concerning for metastases but were too small to biopsy. They were followed with sequential CT scans , and in 12/2014, repeat chest CT scan showed enlarging bilateral pulmonary nodules compatible with progression of metastatic disease. A fine needle aspirate was performed on 12/21/2014 which showed benign pulmonary parenchyma with alveolar hemorrhage. Repeat chest CT scan in 3/12/2015 showed mild interval progression of the lung metastases with enlarging nodules and development of new nodules. A CT guided needle biopsy was performed on 3/23/2015, which confirmed metastatic adenocarcinoma consistent with breast primary (stage IV, ER/HI positive, and TTF-1 negative). On 4/13/2015, she was started on therapy with Ibrance and letrozole. Follow-up CT scan (7/15/15) showed partial response with decreasing size of some nodules and resolution of other nodules. Most recent chest, abdomen, and pelvis CT scan was obtained in 6/2017, showing stable or decreased multiple bilateral  pulmonary nodules and no suspicious new pulmonary nodules. She continues on letrozole and intermittent dosing of palbociclib. She is followed by Dr. Geannie Gitelman. She states that she established care with Dr. Grace Mott, but was told that she may have her mammograms and breast exams in our office with referral to her if something arises. She also has a history of a right ovarian cyst, but was released from the care of Dr. Jovany Anthony since it was concluded to be benign. She has a history of hypertension treated with losartan. She monitors her blood pressure mainly when visiting multiple physicians and reports that it is usually 120-30/80. She has no history of heart disease, and denies any chest pain, shortness of breath at rest or with exertion, palpitations, lightheadedness, or edema.     In 4/11/2016, she sustained a fall with subsequent severe pain in her mid to upper lumbar region and wrapping around waist. She was treated with meloxicam, tylenol and flexeril, but because of persistent discomfort, she presented to [facility] on 4/15/2016 and lumbosacral spine films showed marked degenerative changes with disc space obliteration throughout lumbar spine and slight anterior spondylolisthesis of L4. She was evaluated by Dr. Verónica Moore on 4/18/2016 who recommended physical therapy and evaluation by Dr. Lise Agarwal for her degenerative spine changes. She continued to take meloxicam and tylenol and started physical therapy, but noted worsening of her pain. She subsequently was evaluated by Dr. Veena Simons, who obtained a lumbar MRI on 4/26/2016, which showed a new subacute compression fracture of T11 vertebral body with diffuse marrow edema and mild paravertebral inflammatory stranding, no retropulsion or central stenosis; more severe degenerative disease along the right side at L4-L5, L5-S1, potential mild compression of right exiting L4 and L5 nerve roots. She was referred to Dr. Beatriz Meredith for kyphoplasty of the T11 compression fracture given failure of pain control with conservative measures. She underwent the procedure on 6/9/8999 without complications, and H90 vertebral body bone core and aspirate biopsies were performed, which showed only reactive bone changes and no evidence of malignancy. She has a history of osteopenia, with a history of fracture of her sacrum. She reports that she was treated with alendronate in 12/2011 but discontinued it in 4/2013 due to intolerence. Her last bone density scan (11/2015) was consistent with osteopenia with femoral neck T-scores: left -1.1/ right -1.2 (lumbar T-score could not be assessed). She continues to take calcium and vitamin D. In 10/2016, she developed left sided low back pain with radiation to her left leg over the last several weeks.  She underwent a lumbar MRI (9/2016) showing scoliosis and advanced multilevel degenerative changes with areas of foraminal stenosis at L3-L4 and L5-S1; mild/moderate central canal stenosis at L4-L5 and L5-S1. She was evaluated by Dr. Keerthi Cerrato and treated with pregabalin with some improvement. She subsequently received an epidural steroid injection with improvement and discontinued pregabalin. In 4/2017, she noted increasing cervical and upper back discomfort. She has had multiple cervical MRI's,and underwent repeat in 4/2017 which showed multilevel degenerative disc disease and facet arthropathy without change from prior studies with mild central canal stenosis C3-C4 and C5-C6, and moderate central canal stenosis C6-C7. She was treated with Mobic and Flexeril, and takes gabapentin at bedtime. She has a history of GERD and diverticulosis with recurrent diverticulitis,. She was evaluated by Dr. Lauro Germain in 2/23/2016 and underwent an upper endoscopy, which revealed a Schatzki's ring at the gastroesophageal junction (dilated) with mild distal esophagitis and a small hiatal hernia. A screening colonoscopy was also performed showing moderately severe diverticulosis of the ascending and descending colon and a 2 mm sessile sigmoid polyp (pathology: hyperplastic). Recommendations for follow-up colonoscopy in 10 years. She denies any abdominal pain, nausea, vomiting, melena, hematochezia, or change in bowel movements. She was evaluated in 8/2017 by GERALD Michael with complaints of abdominal pain and was treated with Cipro for presumed diverticulitis. She contacted the office again in 10/2017 with a recurrence of symptoms, and was treated for a 10 day course with Cipro. She reports complete resolution of her symptoms today. She also has a history of Tourette's syndrome controlled with Haldol. She is followed by Dr. Deisy Durand.      Past Medical History:   Diagnosis Date    Arthritis     Breast cancer metastasized to lung [facility])     Left Breast    Chronic pain     Colon polyps 2/22/16    distal sigmoid, Dr. Mahendra Johnston DDD (degenerative disc disease)     Diverticulitis of colon     Diverticulosis 2/22/16    mild in the ascedning and sigmoid colon, Dr. Shanelle Yun (hyperlipidemia)     Hypertension     Osteopenia     Tourette syndrome     Vitamin D deficiency      Past Surgical History:   Procedure Laterality Date    HX APPENDECTOMY      HX BREAST BIOPSY  7-30-10    Left Breast w/Nipple Duct exploration and excisional biopsy-left    HX DILATION AND CURETTAGE      x3    HX MODIFIED RADICAL MASTECTOMY  9-3-10    left    HX ORTHOPAEDIC Right 2012    knee replacement    HX TONSILLECTOMY      HYSTEROSCOPY DIAGNOSTIC       Current Outpatient Prescriptions   Medication Sig    haloperidol (HALDOL) 2 mg tablet Take  by mouth two (2) times a day.  cyclobenzaprine (FLEXERIL) 5 mg tablet Take 5 mg by mouth.  gabapentin (NEURONTIN) 100 mg capsule Take  by mouth three (3) times daily.  meloxicam (MOBIC) 15 mg tablet TAKE 1 TABLET (15 MG) BY ORAL ROUTE ONCE DAILY WITH FOOD    diazePAM (VALIUM) 5 mg tablet Take 1 Tab by mouth every six (6) hours as needed.  losartan (COZAAR) 25 mg tablet Take 1 Tab by mouth daily.  palbociclib (IBRANCE) 100 mg cap Take 75 mg by mouth.  CYANOCOBALAMIN, VITAMIN B-12, (VITAMIN B-12 PO) Take  by mouth.  Biotin 2,500 mcg cap Take  by mouth.  letrozole (FEMARA) 2.5 mg tablet Take 2.5 mg by mouth daily.  calcium-vitamin D (CALCIUM 500+D) 500 mg(1,250mg) -200 unit per tablet Take 1 Tab by mouth two (2) times daily (with meals).  cholecalciferol, vitamin d3, (VITAMIN D) 1,000 unit tablet Take 2,000 Units by mouth daily.  ascorbic acid (VITAMIN C) 500 mg tablet Take  by mouth. No current facility-administered medications for this visit. Allergies and Intolerances:    Allergies   Allergen Reactions    Keflex [Cephalexin] Rash    Metronidazole Rash    Other Medication Nausea Only     Anesthesia    Shellfish Containing Products Nausea and Vomiting     More of just eating the actual shellfish.  Sulfa (Sulfonamide Antibiotics) Rash    Ultram [Tramadol] Nausea and Vomiting     Patient states she is allergic to most Narcotics    Vancomycin Rash     Family History:   Family History   Problem Relation Age of Onset    Osteoporosis Sister     Osteoporosis Mother     Stroke Father     Hypertension Father     Cancer Paternal Aunt      breast     Social History:   She  reports that she has quit smoking. She has never used smokeless tobacco. She stopped smoking over 40 years ago. She is  and lives with . They have one daughter and two grandchildren. History   Alcohol Use    3.5 oz/week    7 Glasses of wine per week     Immunization History:  Immunization History   Administered Date(s) Administered    Influenza High Dose Vaccine PF 10/01/2015, 10/25/2016, 10/05/2017    Influenza Vaccine 09/01/2014    Influenza Vaccine Split 11/01/2011, 10/02/2012    Influenza Vaccine Whole 10/08/2010    Pneumococcal Conjugate (PCV-13) 10/27/2015    Pneumococcal Polysaccharide (PPSV-23) 04/15/2013    TD Vaccine 05/05/2008    Tdap 09/12/2014    Zoster 09/20/2011       Review of Systems:   As above included in HPI. Otherwise 11 point review of systems negative including constitutional, skin, HENT, eyes, respiratory, cardiovascular, gastrointestinal, genitourinary, musculoskeletal, endo/heme/aller, neurological.    Physical:   Vitals:   BP: 112/72  HR: 86  WT: 138 lb (62.6 kg)  BMI:  26.07 kg/m2    Exam:   Patient appears in no apparent distress. Affect is appropriate. HEENT --Anicteric sclerae, tympanic membranes normal,  ear canals normal.  PERRL, EOMI, conjunctiva and lids normal.   Sinuses were nontender, turbinates normal, hearing normal.  Oropharynx without  erythema, normal tongue, oral mucosa and tonsils. No cervical lymphadenopathy. No thyromegaly, JVD, or bruits. Carotid pulses 2+ with normal upstroke. Lungs --Clear to auscultation.  No wheezing or rales.  Heart --Regular rate and rhythm, no murmurs, rubs, gallops, or clicks. Breasts -- s/p left mastectomy; no masses, skin dimpling, asymmetry, nipple discharge, nodules on right; no axillary lymphadenopathy bilaterally. Chest wall --Nontender to palpation. PMI normal.  Abdomen -- Soft and nontender, no hepatosplenomegaly or masses. Extremities -- Without cyanosis, clubbing, edema. 2+ pulses equally and bilaterally. Left arm lymphedema. Normal looking digits, ROM intact. No pain on palpation of right shoulder. Range of motion intact.   Derm  no obvious abnormalities noted, no rash    Review of Data:  Labs:  Hospital Outpatient Visit on 10/24/2017   Component Date Value Ref Range Status    LIPID PROFILE 10/24/2017        Final    Cholesterol, total 10/24/2017 201* <200 MG/DL Final    Triglyceride 10/24/2017 96  <150 MG/DL Final    HDL Cholesterol 10/24/2017 59  40 - 60 MG/DL Final    LDL, calculated 10/24/2017 122.8* 0 - 100 MG/DL Final    VLDL, calculated 10/24/2017 19.2  MG/DL Final    CHOL/HDL Ratio 10/24/2017 3.4  0 - 5.0   Final    Vitamin D 25-Hydroxy 10/24/2017 41.0  30 - 100 ng/mL Final    TSH 10/24/2017 0.86  0.36 - 3.74 uIU/mL Final    Color 10/24/2017 YELLOW    Final    Appearance 10/24/2017 CLEAR    Final    Specific gravity 10/24/2017 1.018  1.005 - 1.030   Final    pH (UA) 10/24/2017 6.5  5.0 - 8.0   Final    Protein 10/24/2017 NEGATIVE   NEG mg/dL Final    Glucose 10/24/2017 NEGATIVE   NEG mg/dL Final    Ketone 10/24/2017 NEGATIVE   NEG mg/dL Final    Bilirubin 10/24/2017 NEGATIVE   NEG   Final    Blood 10/24/2017 NEGATIVE   NEG   Final    Urobilinogen 10/24/2017 0.2  0.2 - 1.0 EU/dL Final    Nitrites 10/24/2017 NEGATIVE   NEG   Final    Leukocyte Esterase 10/24/2017 NEGATIVE   NEG   Final     10/27/2017 Dr. Lozoya Late office WBC 5.1       Hb 13.7/ Hct 41.1       Platelets 711       23/48/7333 Dr. Lozoya Late office WBC 5.2       Hb 13.9/ Hct 41.4       Platelets 200       Glucose 87       Na 141/ K 5.1/ CO2 29       Ca 9.8       AST 16/ ALT 15       Alkaline phosphatase 67       Creatinine 0.84/ eGFR >60    Health Maintenance:  Screening:    Mammogram: negative (8/2017)    PAP smear: negative (9/2013) Dr Jovany Gonzalez Pelvic ultrasound negative (9/2015). No further screening needed. Colorectal: colonoscopy (2/2016) hyperplastic polyp; Dr. Annette Smith. Follow-up 2026. Depression: none   DM (HbA1c/FPG): FPG 87 (4/2017)   Hepatitis C: unknown   Falls: none   DEXA: osteopenia (11/2015) s/p T11 compression fracture   Smoking:distant past   Glaucoma: regular eye exams with Dr. Sadi Neri (last 2/2017)   Vitamin D: 41.0 (10/2017)   Medicare Wellness: 4/27/2017    Impression:  Patient Active Problem List   Diagnosis Code    Tourette syndrome F95.2    Osteoarthritis, Status post right total knee replacement M19.90    Lumbar spinal stenosis M48.061    HLD (hyperlipidemia) E78.5    Breast cancer ([facility] [address] metastatic to lungs  C50.919    Essential hypertension with goal blood pressure less than 140/90 I10    Diverticulosis K57.90    Osteopenia M85.80    Lymphedema of arm left I89.0    Insomnia G47.00    Fatigue R53.83    Diverticulitis, recurrent K57.92    T11 Vertebral compression fracture (HCC) s/p kyphoplasty M48.50XA    Personal history of malignant neoplasm of breast Z85.3    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.812    Spinal stenosis of cervical region M48.02    Right shoulder pain M25.511       Plan:  1. Hypertension. Well controlled on losartan. Renal function has been normal with creatinine 0.9/ eGFR >60. Continue to follow. 2. Breast cancer with pulmonary metastasis. Continue current therapy as per Dr. Geannie Gitelman. Recent CT scans with stable pulmonary metastases on Ibrance and Femara. Continue use of compression sleeve for lymphedema. Restaging CT scans on 11/1/2017. Follow. 3. Hyperlipidemia.  Calculated 10 year ASCVD risk is 9.7 %, which places her in one of the four statin benefit groups (primary prevention with risk > 7.5%). Given lack of history of ASCVD, no evidence of atherosclerotic disease on chest and abdominal CT scan, and history of metastatic breast cancer, would be reasonable to hold off on statin therapy for now and attempt lifestyle changes. Will discuss further at next visit. Continue to emphasized importance of lifestyle modifications, including diet, exercise, and weight loss. 4. T11 compression fracture. S/P kyphoplasty with no further difficulties. Follow. 5. Osteopenia. Last bone density scan 11/2015. Using femoral neck T-scores, calculated FRAX score estimates her 10 year risk of a major osteoporetic fracture at 15% and hip fracture at 1.5%, which alone are not an indication for biphosphonate treatment. However, the development of a vertebral compression fracture is an indication for treatment. In addition, treatment with an aromatase inhibitor increases likelihood of progression. Discussed the benefits and risks of therapy with the patient, and preauthorization had been obtained for her to begin receiving denosumab therapy through Dr. Cate Abreu office. However, she has had some difficulty with side effects from treatment with Ibrance, and had decided not to proceed with Prolia therapy. She did not tolerate alendronate in the past. Continue calcium and Vitamin D. Encouraged to continue exercises as instructed in physical therapy. Will reassess bone density scan. If evidence of progression of osteopenia, will readdress treatment options. 6. Recurrent diverticulitis. Colonoscopy (2/2016) with moderately severe diverticulosis in the ascending and descending colon. Two recent episodes which responded to Cipro. Repeat staging CT scan tomorrow. Will allow for assessment of colon and look for signs of lingering colonic inflammation or infection. Will continue to follow. 7. Fatigue.  May be secondary to letrozole as had difficulty in past with aromatase inhibitors. Discussed improved sleep hygiene to see if will help with controlling fatigue. Follow. 8. Right shoulder pain. Will obtain x-ray to evaluate. Discussed physical therapy, but not wishing currently. Will consider if worsens. Follow. 9. Low back pain/ neck pain. Improved. Being managed currently by Dr. Lizzy Klein with Meloxicam, Flexeril, and gabapentin. Not wishing further epidural injections currently. Follow. 10. Tourette's syndrome. Followed by Dr. Marjan Buchanan. Continue Haldol and prn diazepam for sleep. 11. Right ovarian cyst. No further monitoring needed as per Dr. Ulloa Necessary. Patient expressed concern about not following cyst, but surveillance CT scans obtained every 6 months for breast cancer do include imaging of pelvis. Has been stable. Patient reassured. Follow. 12. Health maintenance. Already received influenza vaccine. Other immunizations up to date. Mammogram up to date. Needs breast exam every visit. Vitamin D level normal. Continue maintenance dose supplement. Continue regular eye exams with Dr. Umu Mendoza. Medicare wellness visit up to date. Total time: 40 minutes spent with the patient in face-to-face consultation of which greater than 50% was spent on counseling, answering questions and/or coordination of care. Complex medical review and management performed. Patient understands recommendations and agrees with plan. Follow-up in 6 months.

## 2017-11-13 ENCOUNTER — HOSPITAL ENCOUNTER (OUTPATIENT)
Dept: BONE DENSITY | Age: 72
Discharge: HOME | End: 2017-11-13
Attending: INTERNAL MEDICINE
Payer: MEDICARE

## 2017-11-13 DIAGNOSIS — M85.89 OSTEOPENIA OF MULTIPLE SITES: ICD-10-CM

## 2017-11-13 PROCEDURE — 77080 DXA BONE DENSITY AXIAL: CPT

## 2017-11-15 ENCOUNTER — TELEPHONE (OUTPATIENT)
Dept: INTERNAL MEDICINE CLINIC | Age: 72
End: 2017-11-15

## 2017-11-15 NOTE — TELEPHONE ENCOUNTER
Reviewed bone density study from 11/13/2017 showing T-scores:  femoral neck left -1.4  /right -1.4 and distal radius -2.2. Lumbar scores invalid due to degenerative changes. Calculated FRAX score estimates her 10 year risk of a major osteoporetic fracture at 15 % and hip fracture at 2.0 %, which are not an indication for biphosphonate treatment. Patient with metastatic breast cancer on treatment with Femara, an aromatase inhibitor. Comparison with bone density study from 11/2015 showed some progression, particularly of distal radius. Patient had started Femara 6 months prior to study in 2015. Patient with known T11 compression fracture in 4/2016. However, followed relatively significant trauma with mattress knocking her to the floor so unclear if true fragility fracture. Called and discussed results of bone density study with patient. Discussed that based on FRAX scores, does not meet criteria for treatment of osteopenia. However, discussed that prior compression fracture would suggest need for treatment. However, patient stressed that she was thrown to the floor by the mattress resulting in injury so suggesting that not true fragility fracture. Discussed treatment options and patient expressed that would not be able to take oral biphosphonate due to GI intolerance. Thus, Prolia would be choice for treatment. However, after much discussion, decision made to hold off on treatment for now. Patient expressed understanding of increased risk of further progression of bone density loss given treatment with Femara. Plan to  repeat bone density study in two years to reassess. Emphasized importance of continuing to take calcium and Vitamin D. Encourage to exercise, particularly weight bearing activities.

## 2018-01-16 ENCOUNTER — TELEPHONE (OUTPATIENT)
Dept: INTERNAL MEDICINE CLINIC | Age: 73
End: 2018-01-16

## 2018-01-16 RX ORDER — CIPROFLOXACIN 500 MG/1
500 TABLET ORAL 2 TIMES DAILY
Qty: 20 TAB | Refills: 0 | Status: SHIPPED | OUTPATIENT
Start: 2018-01-16 | End: 2018-01-26

## 2018-01-16 NOTE — TELEPHONE ENCOUNTER
Called and spoke with patient. Reports having some mild left lower abdominal discomfort with some loose stools after eating. Reports decreased appetite and low grade fever 99.1. Similar to usual diverticulitis episodes. Will treat with Cipro 500 mg BID for 10 days. Discussed if she develops any worsening symptoms or fever, she should call or go to ED. Script sent to [facility].

## 2018-01-16 NOTE — TELEPHONE ENCOUNTER
Pt called and stated tht she has been having lower left side discomfort, she said she suffers with diverticulitis from time to time and feels she make be having a breakout, she has been having a few loose stools,  she is asking if she can get a rx for cipro sent to [facility] on file, pls call her to adv, [employer]

## 2018-01-16 NOTE — TELEPHONE ENCOUNTER
Pt calling again, wants Dr. Mikhail Hammond to know she is at the end of her chemo cycle so her levels are down. Not sure if this would matter to Dr. Mikhail Hammond.

## 2018-01-24 ENCOUNTER — HOSPITAL ENCOUNTER (OUTPATIENT)
Dept: LAB | Age: 73
Discharge: HOME | End: 2018-01-24
Payer: MEDICARE

## 2018-01-24 ENCOUNTER — OFFICE VISIT (OUTPATIENT)
Dept: INTERNAL MEDICINE CLINIC | Age: 73
End: 2018-01-24

## 2018-01-24 VITALS
DIASTOLIC BLOOD PRESSURE: 74 MMHG | WEIGHT: 140 LBS | TEMPERATURE: 98 F | RESPIRATION RATE: 14 BRPM | HEIGHT: 61 IN | HEART RATE: 73 BPM | SYSTOLIC BLOOD PRESSURE: 118 MMHG | BODY MASS INDEX: 26.43 KG/M2 | OXYGEN SATURATION: 98 %

## 2018-01-24 DIAGNOSIS — R53.83 FATIGUE, UNSPECIFIED TYPE: ICD-10-CM

## 2018-01-24 DIAGNOSIS — K57.90 DIVERTICULOSIS OF INTESTINE WITHOUT BLEEDING, UNSPECIFIED INTESTINAL TRACT LOCATION: ICD-10-CM

## 2018-01-24 DIAGNOSIS — R53.83 FATIGUE, UNSPECIFIED TYPE: Primary | ICD-10-CM

## 2018-01-24 DIAGNOSIS — Z85.3 PERSONAL HISTORY OF MALIGNANT NEOPLASM OF BREAST: ICD-10-CM

## 2018-01-24 LAB
ALBUMIN SERPL-MCNC: 3.8 G/DL (ref 3.4–5)
ALBUMIN/GLOB SERPL: 1.2 {RATIO} (ref 0.8–1.7)
ALP SERPL-CCNC: 84 U/L (ref 45–117)
ALT SERPL-CCNC: 19 U/L (ref 13–56)
ANION GAP SERPL CALC-SCNC: 7 MMOL/L (ref 3–18)
AST SERPL-CCNC: 12 U/L (ref 15–37)
BASOPHILS # BLD: 0 K/UL (ref 0–0.06)
BASOPHILS NFR BLD: 1 % (ref 0–2)
BILIRUB SERPL-MCNC: 0.3 MG/DL (ref 0.2–1)
BUN SERPL-MCNC: 16 MG/DL (ref 7–18)
BUN/CREAT SERPL: 18 (ref 12–20)
CALCIUM SERPL-MCNC: 9.3 MG/DL (ref 8.5–10.1)
CHLORIDE SERPL-SCNC: 106 MMOL/L (ref 100–108)
CO2 SERPL-SCNC: 30 MMOL/L (ref 21–32)
CREAT SERPL-MCNC: 0.88 MG/DL (ref 0.6–1.3)
DIFFERENTIAL METHOD BLD: ABNORMAL
EOSINOPHIL # BLD: 0 K/UL (ref 0–0.4)
EOSINOPHIL NFR BLD: 1 % (ref 0–5)
ERYTHROCYTE [DISTWIDTH] IN BLOOD BY AUTOMATED COUNT: 13.6 % (ref 11.6–14.5)
GLOBULIN SER CALC-MCNC: 3.1 G/DL (ref 2–4)
GLUCOSE SERPL-MCNC: 85 MG/DL (ref 74–99)
HCT VFR BLD AUTO: 39.3 % (ref 35–45)
HGB BLD-MCNC: 13.1 G/DL (ref 12–16)
LYMPHOCYTES # BLD: 2.2 K/UL (ref 0.9–3.6)
LYMPHOCYTES NFR BLD: 48 % (ref 21–52)
MCH RBC QN AUTO: 33.9 PG (ref 24–34)
MCHC RBC AUTO-ENTMCNC: 33.3 G/DL (ref 31–37)
MCV RBC AUTO: 101.6 FL (ref 74–97)
MONOCYTES # BLD: 0.7 K/UL (ref 0.05–1.2)
MONOCYTES NFR BLD: 17 % (ref 3–10)
NEUTS SEG # BLD: 1.5 K/UL (ref 1.8–8)
NEUTS SEG NFR BLD: 33 % (ref 40–73)
PLATELET # BLD AUTO: 156 K/UL (ref 135–420)
PMV BLD AUTO: 9.1 FL (ref 9.2–11.8)
POTASSIUM SERPL-SCNC: 4.4 MMOL/L (ref 3.5–5.5)
PROT SERPL-MCNC: 6.9 G/DL (ref 6.4–8.2)
RBC # BLD AUTO: 3.87 M/UL (ref 4.2–5.3)
SODIUM SERPL-SCNC: 143 MMOL/L (ref 136–145)
WBC # BLD AUTO: 4.5 K/UL (ref 4.6–13.2)

## 2018-01-24 PROCEDURE — 80053 COMPREHEN METABOLIC PANEL: CPT | Performed by: PHYSICIAN ASSISTANT

## 2018-01-24 PROCEDURE — 85025 COMPLETE CBC W/AUTO DIFF WBC: CPT | Performed by: PHYSICIAN ASSISTANT

## 2018-01-24 PROCEDURE — 36415 COLL VENOUS BLD VENIPUNCTURE: CPT | Performed by: PHYSICIAN ASSISTANT

## 2018-01-24 NOTE — MR AVS SNAPSHOT
[address] [facility] 
 
 
 [address], [address] 
[phone] Patient: Ramesh Azul MRN: [id number] DBD:[id number] Visit Information Date & Time Provider Department Dept. Phone Encounter #  
 1/24/2018  2:30 PM Lázaro Chen [facility] [id number] Your Appointments 5/1/2018 10:30 AM  
Office Visit with Derek Rushing MD  
[facility] [address]) Appt Note: 6 mon. f/u  
 [address], [address] [id number] [address] [place]  
  
   
 [address], [address] Upcoming Health Maintenance Date Due  
 MEDICARE YEARLY EXAM 4/28/2018 GLAUCOMA SCREENING Q2Y 2/8/2019 BREAST CANCER SCRN MAMMOGRAM 8/28/2019 DTaP/Tdap/Td series (2 - Td) 9/12/2024 COLONOSCOPY 2/22/2026 Allergies as of 1/24/2018  Review Complete On: 1/24/2018 By: Marla Pfeiffer LPN Severity Noted Reaction Type Reactions Keflex [Cephalexin]  07/27/2010    Rash Metronidazole  05/04/2015    Rash Other Medication   Side Effect Nausea Only Anesthesia Shellfish Containing Products  07/21/2011    Nausea and Vomiting More of just eating the actual shellfish. Sulfa (Sulfonamide Antibiotics)  07/21/2011    Rash Ultram [Tramadol]  07/27/2010    Nausea and Vomiting Patient states she is allergic to most Narcotics Vancomycin  07/21/2011    Rash Current Immunizations  Reviewed on 10/25/2016 Name Date Influenza High Dose Vaccine PF 10/5/2017 10:51 AM, 10/25/2016  1:20 PM, 10/1/2015 10:13 AM  
 Influenza Vaccine 9/1/2014 Influenza Vaccine Split 10/2/2012 12:18 PM, 11/1/2011 Influenza Vaccine Whole 10/8/2010 Pneumococcal Conjugate (PCV-13) 10/27/2015  2:52 PM  
 Pneumococcal Polysaccharide (PPSV-23) 4/15/2013 TD Vaccine 5/5/2008 Tdap 9/12/2014 Zoster 9/20/2011 Not reviewed this visit Vitals BP Pulse Temp Resp Height(growth percentile) Weight(growth percentile) 118/74 (BP 1 Location: Right arm, BP Patient Position: Sitting) 73 98 °F (36.7 °C) (Oral) 14 5' 1\" (1.549 m) 140 lb (63.5 kg) SpO2 BMI OB Status Smoking Status 98% 26.45 kg/m2 Postmenopausal Former Smoker Vitals History BMI and BSA Data Body Mass Index Body Surface Area  
 26.45 kg/m 2 1.65 m 2 Preferred Pharmacy Pharmacy Name Phone [address] [place], [address] [facility] [phone] Your Updated Medication List  
  
   
This list is accurate as of: 1/24/18  2:53 PM.  Always use your most recent med list.  
  
  
  
  
 Biotin 2,500 mcg Cap Take  by mouth. CALCIUM 500+D 500 mg(1,250mg) -200 unit per tablet Generic drug:  calcium-vitamin D Take 1 Tab by mouth two (2) times daily (with meals). ciprofloxacin HCl 500 mg tablet Commonly known as:  CIPRO Take 1 Tab by mouth two (2) times a day for 10 days. cyclobenzaprine 5 mg tablet Commonly known as:  FLEXERIL Take 5 mg by mouth. diazePAM 5 mg tablet Commonly known as:  VALIUM Take 1 Tab by mouth every six (6) hours as needed. FEMARA 2.5 mg tablet Generic drug:  letrozole Take 2.5 mg by mouth daily. gabapentin 100 mg capsule Commonly known as:  NEURONTIN Take  by mouth three (3) times daily. haloperidol 2 mg tablet Commonly known as:  HALDOL Take  by mouth two (2) times a day. IBRANCE 100 mg Cap Generic drug:  palbociclib Take 75 mg by mouth.  
  
 losartan 25 mg tablet Commonly known as:  COZAAR Take 1 Tab by mouth daily. meloxicam 15 mg tablet Commonly known as:  MOBIC  
TAKE 1 TABLET (15 MG) BY ORAL ROUTE ONCE DAILY WITH FOOD  
  
 VITAMIN B-12 PO Take  by mouth. VITAMIN C 500 mg tablet Generic drug:  ascorbic acid (vitamin C) Take  by mouth. VITAMIN D3 1,000 unit tablet Generic drug:  cholecalciferol Take 2,000 Units by mouth daily. Introducing [facility]! Dear Michael Dillon: 
Thank you for requesting a [employer] account. Our records indicate that you already have an active [employer] account. You can access your account anytime at [email or web address] Did you know that you can access your hospital and ER discharge instructions at any time in [employer]? You can also review all of your test results from your hospital stay or ER visit. Additional Information If you have questions, please visit the Frequently Asked Questions section of the [employer] website at [email or web address] Remember, [employer] is NOT to be used for urgent needs. For medical emergencies, dial 911. Now available from your iPhone and Android! Please provide this summary of care documentation to your next provider. Your primary care clinician is listed as [facility]. If you have any questions after today's visit, please call [phone].

## 2018-01-24 NOTE — PROGRESS NOTES
HPI/History  Mushtaq Huerta is a 81 y.o.  female who presents for evaluation. Pt with hx of breast cancer with mets and undergoing chemotherapy; has been about a week since last round. She is usually fatigued but has felt more fatigued than usual x 2 days but mostly yesterday. She has diverticulosis and previous bouts of diverticulitis. She was presenting with her typical symptoms last week and was prescribed cipro by Dr. Sofia Langston on 1/16 and has about 1.5-2 days left. States her left abdominal discomfort resolved within a couple of days of abx. Her stools are usually on the loose and frequent side but had increased at onset of symptoms last week but have since returned to her baseline with abx. Today, she has no other complaints aside from the fatigue. She contacted [facility] and they referred her here. She has a f/u with them with labs in about a week or so. Denies any evidence of GI bleed. No cardiopulmonary or other sxs/complaints.      Patient Active Problem List   Diagnosis Code    Tourette syndrome F95.2    Osteoarthritis, Status post right total knee replacement M19.90    Lumbar spinal stenosis M48.061    HLD (hyperlipidemia) E78.5    Breast cancer ([facility] [address] metastatic to lungs  C50.919    Essential hypertension with goal blood pressure less than 140/90 I10    Diverticulosis K57.90    Osteopenia M85.80    Lymphedema of arm left I89.0    Insomnia G47.00    Fatigue R53.83    Diverticulitis, recurrent K57.92    T11 Vertebral compression fracture (HCC) s/p kyphoplasty M48.50XA    Personal history of malignant neoplasm of breast Z85.3    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 Oh HERNANDEZ (degenerative disc disease)  Diverticulitis of colon     Diverticulosis 2/22/16    mild in the ascedning and sigmoid colon, Dr. Jennifer Krause (hyperlipidemia)     Hypertension     Osteopenia     Tourette syndrome     Vitamin D deficiency      Past Surgical History:   Procedure Laterality Date    HX APPENDECTOMY      HX BREAST BIOPSY  7-30-10    Left Breast w/Nipple Duct exploration and excisional biopsy-left    HX DILATION AND CURETTAGE      x3    HX MODIFIED RADICAL MASTECTOMY  9-3-10    left    HX ORTHOPAEDIC Right 2012    knee replacement    HX TONSILLECTOMY      HYSTEROSCOPY DIAGNOSTIC       Social History     Social History    Marital status:      Spouse name: N/A    Number of children: N/A    Years of education: N/A     Occupational History    Not on file. Social History Main Topics    Smoking status: Former Smoker    Smokeless tobacco: Never Used    Alcohol use 3.5 oz/week     7 Glasses of wine per week    Drug use: No    Sexual activity: Not Currently     Other Topics Concern    Not on file     Social History Narrative     Family History   Problem Relation Age of Onset    Osteoporosis Sister     Osteoporosis Mother     Stroke Father     Hypertension Father     Cancer Paternal Aunt      breast     Current Outpatient Prescriptions   Medication Sig    ciprofloxacin HCl (CIPRO) 500 mg tablet Take 1 Tab by mouth two (2) times a day for 10 days.  haloperidol (HALDOL) 2 mg tablet Take  by mouth two (2) times a day.  cyclobenzaprine (FLEXERIL) 5 mg tablet Take 5 mg by mouth.  gabapentin (NEURONTIN) 100 mg capsule Take  by mouth three (3) times daily.  meloxicam (MOBIC) 15 mg tablet TAKE 1 TABLET (15 MG) BY ORAL ROUTE ONCE DAILY WITH FOOD    diazePAM (VALIUM) 5 mg tablet Take 1 Tab by mouth every six (6) hours as needed.  losartan (COZAAR) 25 mg tablet Take 1 Tab by mouth daily.  palbociclib (IBRANCE) 100 mg cap Take 75 mg by mouth.     CYANOCOBALAMIN, VITAMIN B-12, (VITAMIN B-12 PO) Take  by mouth.  Biotin 2,500 mcg cap Take  by mouth.  letrozole (FEMARA) 2.5 mg tablet Take 2.5 mg by mouth daily.  calcium-vitamin D (CALCIUM 500+D) 500 mg(1,250mg) -200 unit per tablet Take 1 Tab by mouth two (2) times daily (with meals).  cholecalciferol, vitamin d3, (VITAMIN D) 1,000 unit tablet Take 2,000 Units by mouth daily.  ascorbic acid (VITAMIN C) 500 mg tablet Take  by mouth. No current facility-administered medications for this visit. Allergies   Allergen Reactions    Keflex [Cephalexin] Rash    Metronidazole Rash    Other Medication Nausea Only     Anesthesia    Shellfish Containing Products Nausea and Vomiting     More of just eating the actual shellfish.  Sulfa (Sulfonamide Antibiotics) Rash    Ultram [Tramadol] Nausea and Vomiting     Patient states she is allergic to most Narcotics    Vancomycin Rash       Review of Systems  Aside from those included in HPI, remainder of complete ROS negative. Physical Examination  Visit Vitals    /74 (BP 1 Location: Right arm, BP Patient Position: Sitting)    Pulse 73    Temp 98 °F (36.7 °C) (Oral)    Resp 14    Ht 5' 1\" (1.549 m)    Wt 140 lb (63.5 kg)    SpO2 98%    BMI 26.45 kg/m2       General - Alert and in no acute distress. Pt appears well, comfortable, and in good spirits. Pleasant, engaging. Nontoxic. Not anxious, non-diaphoretic. Mental status - Appropriate mood, behavior, speech content, dress, and thought processes. Pulm - No tachypnea, retractions, or cyanosis. Good respiratory effort. Clear to auscultation bilat. No appreciable wheezes, rales, or rhonchi. Cardiovascular - Normal rate, regular rhythm. No appreciable murmurs or gallops. Abdomen - Active bowel sounds. Soft, nontender. No guarding, rigidity, or rebound. No appreciable masses. Assessment and Plan  1. Fatigued more than usual x 2 days but mostly yesterday.  Appears multifactorial. Pt undergoes chemo with last round about a week ago. Also developed signs of diverticulitis last week and started cipro with a day or so remaining. Left abdominal discomfort resolved and loose frequent stools now back to her baseline. Hydration status likely a component as well. However, vitals and disposition are encouraging and with no other complaints. Will check CBC and CMP. She will increase fluids and monitor closely, including temps. Return/call if needed but agrees to promptly visit ED if ominous developments as discussed. Further planning as warranted. Pt happily agrees with plan. More than 25 mins spent during visit with more than 50% discussing above issues, potential causes/contributing factors, eval/tx, plan, and questions. PLEASE NOTE:   This document has been produced using voice recognition software. Unrecognized errors in transcription may be present.     Irma Son [employer] of [address] [place] [address]  [phone]  1/24/2018

## 2018-01-24 NOTE — PROGRESS NOTES
1. Have you been to the ER, urgent care clinic or hospitalized since your last visit? NO.     2. Have you seen or consulted any other health care providers outside of the [address] [place] [address] since your last visit (Include any pap smears or colon screening)?  NO

## 2018-01-25 ENCOUNTER — TELEPHONE (OUTPATIENT)
Dept: INTERNAL MEDICINE CLINIC | Age: 73
End: 2018-01-25

## 2018-01-30 RX ORDER — LOSARTAN POTASSIUM 25 MG/1
25 TABLET ORAL DAILY
Qty: 90 TAB | Refills: 3 | Status: SHIPPED | OUTPATIENT
Start: 2018-01-30 | End: 2019-01-30 | Stop reason: SDUPTHER

## 2018-01-30 NOTE — TELEPHONE ENCOUNTER
Last Visit: 01/24/2018 with RAMESH Macias    Next Appointment: 05/01/2018 with MD Edin Mcgarry   Previous Refill Encounters: 02/10/2017 per RAMESH Gonzales #[phone] with 3 refills     Requested Prescriptions     Pending Prescriptions Disp Refills    losartan (COZAAR) 25 mg tablet 90 Tab 3     Sig: Take 1 Tab by mouth daily.

## 2018-02-06 ENCOUNTER — HOSPITAL ENCOUNTER (OUTPATIENT)
Dept: CT IMAGING | Age: 73
Discharge: HOME | End: 2018-02-06
Attending: NURSE PRACTITIONER
Payer: MEDICARE

## 2018-02-06 DIAGNOSIS — C50.912 MALIGNANT NEOPLASM OF LEFT BREAST (HCC): ICD-10-CM

## 2018-02-06 PROCEDURE — 74177 CT ABD & PELVIS W/CONTRAST: CPT

## 2018-02-06 PROCEDURE — 74011636320 HC RX REV CODE- 636/320: Performed by: NURSE PRACTITIONER

## 2018-02-06 RX ADMIN — IOPAMIDOL 100 ML: 612 INJECTION, SOLUTION INTRAVENOUS at 19:00

## 2018-02-16 RX ORDER — MELOXICAM 15 MG/1
TABLET ORAL
Qty: 90 TAB | Refills: 1 | Status: SHIPPED | OUTPATIENT
Start: 2018-02-16 | End: 2018-08-20 | Stop reason: SDUPTHER

## 2018-02-27 ENCOUNTER — TELEPHONE (OUTPATIENT)
Dept: INTERNAL MEDICINE CLINIC | Age: 73
End: 2018-02-27

## 2018-02-27 NOTE — TELEPHONE ENCOUNTER
Patient calling wants to know what  pneumatic pump for lymphedema. Do dr feel it is safe. Patient is considering getting one through a company.  Patient has a couple questions concerning this     Pls Advise

## 2018-02-28 NOTE — TELEPHONE ENCOUNTER
Called and spoke with patient. Discussed that she should speak with the lymphedema clinic to determine if the pump would be an appropriate option for her. Gave her the name of the occupational therapist, Zulay Cortez, at [place] [facility] (# [phone]). Patient will contact to discuss.

## 2018-03-30 ENCOUNTER — TELEPHONE (OUTPATIENT)
Dept: INTERNAL MEDICINE CLINIC | Age: 73
End: 2018-03-30

## 2018-03-30 ENCOUNTER — OFFICE VISIT (OUTPATIENT)
Dept: INTERNAL MEDICINE CLINIC | Age: 73
End: 2018-03-30

## 2018-03-30 VITALS
BODY MASS INDEX: 26.43 KG/M2 | TEMPERATURE: 97.9 F | WEIGHT: 140 LBS | HEART RATE: 63 BPM | HEIGHT: 61 IN | SYSTOLIC BLOOD PRESSURE: 110 MMHG | OXYGEN SATURATION: 96 % | RESPIRATION RATE: 14 BRPM | DIASTOLIC BLOOD PRESSURE: 70 MMHG

## 2018-03-30 DIAGNOSIS — G47.00 INSOMNIA, UNSPECIFIED TYPE: ICD-10-CM

## 2018-03-30 DIAGNOSIS — K57.92 DIVERTICULITIS: Primary | ICD-10-CM

## 2018-03-30 RX ORDER — CIPROFLOXACIN 500 MG/1
500 TABLET ORAL 2 TIMES DAILY
Qty: 20 TAB | Refills: 0 | Status: SHIPPED | OUTPATIENT
Start: 2018-03-30 | End: 2018-04-09

## 2018-03-30 RX ORDER — DIAZEPAM 5 MG/1
5 TABLET ORAL
Qty: 30 TAB | Refills: 0 | OUTPATIENT
Start: 2018-03-30 | End: 2018-09-24 | Stop reason: SDUPTHER

## 2018-03-30 NOTE — PROGRESS NOTES
Lorraine Gates is a 81 y.o.  female and presents with    Chief Complaint   Patient presents with    Diverticulitis     Patient here for diverticulitis symptoms have alot of pain in the abdominal area. Patient reports pain from 3-6am this morning.  Medication Refill     Patient would like a script for cepro for flair ups       Subjective:  HPI     Mrs. Wes Walker presents today with complaints that she is having a flare of diverticulitis. The pain started over the suprapubic area and then progresses to the LLQ. She was awakened by the pain and was awake from 3-7 am with sharp pain LLQ. Reports a queeziness to the stomach but able to tolerate soup for lunch. She has now had possible flare ups 8/2017, 10/2017, 1/2018 with response to Cipro x 10 days. She took Tylenol 500 mg at 0530 and reports complete relief of pain. Denies fever, chills, vomiting, diarrhea, hematochezia, melena. Last colonoscopy done 2 years ago. Additional Concerns: none     ROS   ROS    Allergies   Allergen Reactions    Keflex [Cephalexin] Rash    Metronidazole Rash    Other Medication Nausea Only     Anesthesia    Shellfish Containing Products Nausea and Vomiting     More of just eating the actual shellfish.  Sulfa (Sulfonamide Antibiotics) Rash    Ultram [Tramadol] Nausea and Vomiting     Patient states she is allergic to most Narcotics    Vancomycin Rash       Current Outpatient Prescriptions   Medication Sig Dispense Refill    ciprofloxacin HCl (CIPRO) 500 mg tablet Take 1 Tab by mouth two (2) times a day for 10 days. 20 Tab 0    diazePAM (VALIUM) 5 mg tablet Take 1 Tab by mouth every six (6) hours as needed for Anxiety. Max Daily Amount: 20 mg. 30 Tab 0    meloxicam (MOBIC) 15 mg tablet TAKE 1 TABLET EVERY DAY WITH FOOD 90 Tab 1    losartan (COZAAR) 25 mg tablet Take 1 Tab by mouth daily. 90 Tab 3    haloperidol (HALDOL) 2 mg tablet Take  by mouth two (2) times a day.       cyclobenzaprine (FLEXERIL) 5 mg tablet Take 5 mg by mouth.  gabapentin (NEURONTIN) 100 mg capsule Take  by mouth three (3) times daily.  palbociclib (IBRANCE) 100 mg cap Take 75 mg by mouth.  CYANOCOBALAMIN, VITAMIN B-12, (VITAMIN B-12 PO) Take  by mouth.  Biotin 2,500 mcg cap Take  by mouth.  calcium-vitamin D (CALCIUM 500+D) 500 mg(1,250mg) -200 unit per tablet Take 1 Tab by mouth two (2) times daily (with meals).  cholecalciferol, vitamin d3, (VITAMIN D) 1,000 unit tablet Take 2,000 Units by mouth daily.  ascorbic acid (VITAMIN C) 500 mg tablet Take  by mouth.  letrozole (FEMARA) 2.5 mg tablet Take 2.5 mg by mouth daily. Social History     Social History    Marital status:      Spouse name: N/A    Number of children: N/A    Years of education: N/A     Occupational History    Not on file.      Social History Main Topics    Smoking status: Former Smoker    Smokeless tobacco: Never Used    Alcohol use 3.5 oz/week     7 Glasses of wine per week    Drug use: No    Sexual activity: Not Currently     Other Topics Concern    Not on file     Social History Narrative       Past Medical History:   Diagnosis Date    Arthritis     Breast cancer metastasized to lung [facility])     Left Breast    Chronic pain     Colon polyps 2/22/16    distal sigmoid, Dr. Kelli Ulloa DDD (degenerative disc disease)     Diverticulitis of colon     Diverticulosis 2/22/16    mild in the ascedning and sigmoid colon, Dr. Edward Ross (hyperlipidemia)     Hypertension     Osteopenia     Tourette syndrome     Vitamin D deficiency        Past Surgical History:   Procedure Laterality Date    HX APPENDECTOMY      HX BREAST BIOPSY  7-30-10    Left Breast w/Nipple Duct exploration and excisional biopsy-left    HX DILATION AND CURETTAGE      x3    HX MODIFIED RADICAL MASTECTOMY  9-3-10    left    HX ORTHOPAEDIC Right 2012    knee replacement    HX TONSILLECTOMY      HYSTEROSCOPY DIAGNOSTIC         Family History Problem Relation Age of Onset    Osteoporosis Sister     Osteoporosis Mother     Stroke Father     Hypertension Father     Cancer Paternal Aunt      breast       Objective:  Vitals:    03/30/18 1414   BP: 110/70   Pulse: 63   Resp: 14   Temp: 97.9 °F (36.6 °C)   TempSrc: Oral   SpO2: 96%   Weight: 140 lb (63.5 kg)   Height: 5' 1\" (1.549 m)   PainSc:   0 - No pain       LABS   Results for orders placed or performed during the hospital encounter of 01/24/18   CBC WITH AUTOMATED DIFF   Result Value Ref Range    WBC 4.5 (L) 4.6 - 13.2 K/uL    RBC 3.87 (L) 4.20 - 5.30 M/uL    HGB 13.1 12.0 - 16.0 g/dL    HCT 39.3 35.0 - 45.0 %    .6 (H) 74.0 - 97.0 FL    MCH 33.9 24.0 - 34.0 PG    MCHC 33.3 31.0 - 37.0 g/dL    RDW 13.6 11.6 - 14.5 %    PLATELET 236 341 - 317 K/uL    MPV 9.1 (L) 9.2 - 11.8 FL    NEUTROPHILS 33 (L) 40 - 73 %    LYMPHOCYTES 48 21 - 52 %    MONOCYTES 17 (H) 3 - 10 %    EOSINOPHILS 1 0 - 5 %    BASOPHILS 1 0 - 2 %    ABS. NEUTROPHILS 1.5 (L) 1.8 - 8.0 K/UL    ABS. LYMPHOCYTES 2.2 0.9 - 3.6 K/UL    ABS. MONOCYTES 0.7 0.05 - 1.2 K/UL    ABS. EOSINOPHILS 0.0 0.0 - 0.4 K/UL    ABS. BASOPHILS 0.0 0.0 - 0.06 K/UL    DF AUTOMATED     METABOLIC PANEL, COMPREHENSIVE   Result Value Ref Range    Sodium 143 136 - 145 mmol/L    Potassium 4.4 3.5 - 5.5 mmol/L    Chloride 106 100 - 108 mmol/L    CO2 30 21 - 32 mmol/L    Anion gap 7 3.0 - 18 mmol/L    Glucose 85 74 - 99 mg/dL    BUN 16 7.0 - 18 MG/DL    Creatinine 0.88 0.6 - 1.3 MG/DL    BUN/Creatinine ratio 18 12 - 20      GFR est AA >60 >60 ml/min/1.73m2    GFR est non-AA >60 >60 ml/min/1.73m2    Calcium 9.3 8.5 - 10.1 MG/DL    Bilirubin, total 0.3 0.2 - 1.0 MG/DL    ALT (SGPT) 19 13 - 56 U/L    AST (SGOT) 12 (L) 15 - 37 U/L    Alk. phosphatase 84 45 - 117 U/L    Protein, total 6.9 6.4 - 8.2 g/dL    Albumin 3.8 3.4 - 5.0 g/dL    Globulin 3.1 2.0 - 4.0 g/dL    A-G Ratio 1.2 0.8 - 1.7         TESTS  none    PE  Physical Exam          Assessment/Plan:    1. Possible diverticulitis flare- Will prescribe Ciprofloxacin X10 days as has responded well in the past with similar presentation. Referral to Dr. De La Torre Orn ordered as she has had complaints with similar presentation 8/2017, 10/2017, and 1/2018. She is immunosuppressed. 2. Insomnia- Refilled Valium 5 mg PO every 6 hours PRN. Lab review: no lab studies available for review at time of visit    Today's Visit:   Diagnoses and all orders for this visit:    1. Diverticulitis, recurrent  -     ciprofloxacin HCl (CIPRO) 500 mg tablet; Take 1 Tab by mouth two (2) times a day for 10 days.  -     REFERRAL TO GASTROENTEROLOGY    2. Insomnia, unspecified type  -     diazePAM (VALIUM) 5 mg tablet; Take 1 Tab by mouth every six (6) hours as needed for Anxiety. Max Daily Amount: 20 mg. Health Maintenance: Deferred to PCP. I have discussed the diagnosis with the patient and the intended plan as seen in the above orders. The patient has received an after-visit summary and questions were answered concerning future plans. I have discussed medication side effects and warnings with the patient as well. I have reviewed the plan of care with the patient, accepted their input and they are in agreement with the treatment goals. Follow-up Disposition: Not on File   More than 1/2 of this 25 minute visit was spent in counseling and coordination of care, as described above.     ALAN Hook  Internist of [address], [address] [place] [address] Str.  Phone: [phone]  Fax: [phone]

## 2018-03-30 NOTE — PROGRESS NOTES
1. Have you been to the ER, urgent care clinic or hospitalized since your last visit? NO.     2. Have you seen or consulted any other health care providers outside of the [address] [place] [address] since your last visit (Include any pap smears or colon screening)? NO      Do you have an Advanced Directive?  YES

## 2018-03-30 NOTE — MR AVS SNAPSHOT
[address] [facility] 
 
 
 [address], [address] [place] [address] [facility] 
[phone] Patient: Mauro Burch MRN: [id number] Visit Information Date & Time Provider Department Dept. Phone Encounter #  
 3/30/2018  2:15 PM Kym Mcdonald NP [facility] [phone] [id number] Your Appointments 2018 10:30 AM  
Office Visit with Claire Oh MD  
[facility] Appt Note: 6 mon. f/u  
 [address], [address] [place] [address] [address] [place]  
  
   
 [address], [address] Upcoming Health Maintenance Date Due  
 MEDICARE YEARLY EXAM 2018 GLAUCOMA SCREENING Q2Y 2019 BREAST CANCER SCRN MAMMOGRAM 2019 DTaP/Tdap/Td series (2 - Td) 2024 COLONOSCOPY 2026 Allergies as of 3/30/2018  Review Complete On: 3/30/2018 By: Felix Toledo Severity Noted Reaction Type Reactions Keflex [Cephalexin]  2010    Rash Metronidazole  2015    Rash Other Medication   Side Effect Nausea Only Anesthesia Shellfish Containing Products  2011    Nausea and Vomiting More of just eating the actual shellfish. Sulfa (Sulfonamide Antibiotics)  2011    Rash Ultram [Tramadol]  2010    Nausea and Vomiting Patient states she is allergic to most Narcotics Vancomycin  2011    Rash Current Immunizations  Reviewed on 10/25/2016 Name Date Influenza High Dose Vaccine PF 10/5/2017 10:51 AM, 10/25/2016  1:20 PM, 10/1/2015 10:13 AM  
 Influenza Vaccine 2014 Influenza Vaccine Split 10/2/2012 12:18 PM, 2011 Influenza Vaccine Whole 10/8/2010 Pneumococcal Conjugate (PCV-13) 10/27/2015  2:52 PM  
 Pneumococcal Polysaccharide (PPSV-23) 4/15/2013 TD Vaccine 2008 Tdap 2014 Zoster 2011 Not reviewed this visit You Were Diagnosed With   
  
 Codes Comments Diverticulitis    -  Primary ICD-10-CM: K57.92 
ICD-9-CM: 562.11 Vitals BP Pulse Temp Resp Height(growth percentile) Weight(growth percentile) 110/70 (BP 1 Location: Right arm, BP Patient Position: Sitting) 63 97.9 °F (36.6 °C) (Oral) 14 5' 1\" (1.549 m) 140 lb (63.5 kg) SpO2 BMI OB Status Smoking Status 96% 26.45 kg/m2 Postmenopausal Former Smoker Vitals History BMI and BSA Data Body Mass Index Body Surface Area  
 26.45 kg/m 2 1.65 m 2 Preferred Pharmacy Pharmacy Name Phone [facility] [place] [address] [place] [address], [facility] [phone] Your Updated Medication List  
  
   
This list is accurate as of 3/30/18  3:01 PM.  Always use your most recent med list.  
  
  
  
  
 Biotin 2,500 mcg Cap Take  by mouth. CALCIUM 500+D 500 mg(1,250mg) -200 unit per tablet Generic drug:  calcium-vitamin D Take 1 Tab by mouth two (2) times daily (with meals). ciprofloxacin HCl 500 mg tablet Commonly known as:  CIPRO Take 1 Tab by mouth two (2) times a day for 10 days. cyclobenzaprine 5 mg tablet Commonly known as:  FLEXERIL Take 5 mg by mouth. diazePAM 5 mg tablet Commonly known as:  VALIUM Take 1 Tab by mouth every six (6) hours as needed. FASLODEX IM  
by IntraMUSCular route every thirty (30) days. FEMARA 2.5 mg tablet Generic drug:  letrozole Take 2.5 mg by mouth daily. gabapentin 100 mg capsule Commonly known as:  NEURONTIN Take  by mouth three (3) times daily. haloperidol 2 mg tablet Commonly known as:  HALDOL Take  by mouth two (2) times a day. IBRANCE 100 mg Cap Generic drug:  palbociclib Take 75 mg by mouth.  
  
 losartan 25 mg tablet Commonly known as:  COZAAR Take 1 Tab by mouth daily. meloxicam 15 mg tablet Commonly known as:  MOBIC  
TAKE 1 TABLET EVERY DAY WITH FOOD  
  
 VITAMIN B-12 PO  
 Take  by mouth. VITAMIN C 500 mg tablet Generic drug:  ascorbic acid (vitamin C) Take  by mouth. VITAMIN D3 1,000 unit tablet Generic drug:  cholecalciferol Take 2,000 Units by mouth daily. Prescriptions Sent to Pharmacy Refills  
 ciprofloxacin HCl (CIPRO) 500 mg tablet 0 Sig: Take 1 Tab by mouth two (2) times a day for 10 days. Class: Normal  
 Pharmacy: [facility] DR JEET HUTCHISON [address] [place] [address] #: [phone] Route: Oral  
  
We Performed the Following REFERRAL TO GASTROENTEROLOGY [KMV31 Custom] Referral Information Referral ID Referred By Referred To  
  
 [id number] Irena Kelly MD   
   [address] [place] [address] [address] [place], [address] Phone: [phone] Fax: [phone] Visits Status Start Date End Date 1 New Request 3/30/18 3/30/19 If your referral has a status of pending review or denied, additional information will be sent to support the outcome of this decision. Introducing [facility]! Dear Leticia Captain: 
Thank you for requesting a [employer] account. Our records indicate that you already have an active [employer] account. You can access your account anytime at [email or web address]. [employer] Did you know that you can access your hospital and ER discharge instructions at any time in [employer]? You can also review all of your test results from your hospital stay or ER visit. Additional Information If you have questions, please visit the Frequently Asked Questions section of the [employer] website at [email or web address]. [employer] Remember, [employer] is NOT to be used for urgent needs. For medical emergencies, dial 911. Now available from your iPhone and Android! Please provide this summary of care documentation to your next provider. Your primary care clinician is listed as Andria Barksdale  If you have any questions after today's visit, please call [phone].

## 2018-05-01 ENCOUNTER — OFFICE VISIT (OUTPATIENT)
Dept: INTERNAL MEDICINE CLINIC | Age: 73
End: 2018-05-01

## 2018-05-01 VITALS
TEMPERATURE: 98.4 F | HEIGHT: 61 IN | DIASTOLIC BLOOD PRESSURE: 80 MMHG | OXYGEN SATURATION: 98 % | RESPIRATION RATE: 16 BRPM | SYSTOLIC BLOOD PRESSURE: 120 MMHG | HEART RATE: 77 BPM | BODY MASS INDEX: 27 KG/M2 | WEIGHT: 143 LBS

## 2018-05-01 DIAGNOSIS — Z00.00 MEDICARE ANNUAL WELLNESS VISIT, SUBSEQUENT: ICD-10-CM

## 2018-05-01 DIAGNOSIS — I89.0 LYMPHEDEMA OF ARM: ICD-10-CM

## 2018-05-01 DIAGNOSIS — C50.912 MALIGNANT NEOPLASM OF LEFT BREAST IN FEMALE, ESTROGEN RECEPTOR POSITIVE, UNSPECIFIED SITE OF BREAST (HCC): ICD-10-CM

## 2018-05-01 DIAGNOSIS — F95.2 TOURETTE SYNDROME: ICD-10-CM

## 2018-05-01 DIAGNOSIS — M48.061 SPINAL STENOSIS OF LUMBAR REGION WITHOUT NEUROGENIC CLAUDICATION: ICD-10-CM

## 2018-05-01 DIAGNOSIS — K57.30 DIVERTICULOSIS OF LARGE INTESTINE WITHOUT HEMORRHAGE: ICD-10-CM

## 2018-05-01 DIAGNOSIS — I10 ESSENTIAL HYPERTENSION: Primary | ICD-10-CM

## 2018-05-01 DIAGNOSIS — M85.89 OSTEOPENIA OF MULTIPLE SITES: ICD-10-CM

## 2018-05-01 DIAGNOSIS — Z71.89 ACP (ADVANCE CARE PLANNING): ICD-10-CM

## 2018-05-01 DIAGNOSIS — E78.5 HYPERLIPIDEMIA, UNSPECIFIED HYPERLIPIDEMIA TYPE: ICD-10-CM

## 2018-05-01 DIAGNOSIS — Z17.0 MALIGNANT NEOPLASM OF LEFT BREAST IN FEMALE, ESTROGEN RECEPTOR POSITIVE, UNSPECIFIED SITE OF BREAST (HCC): ICD-10-CM

## 2018-05-01 DIAGNOSIS — M48.02 SPINAL STENOSIS OF CERVICAL REGION: ICD-10-CM

## 2018-05-01 DIAGNOSIS — M85.9 DISORDER OF BONE DENSITY AND STRUCTURE, UNSPECIFIED: ICD-10-CM

## 2018-05-01 DIAGNOSIS — M25.511 ACUTE PAIN OF RIGHT SHOULDER: ICD-10-CM

## 2018-05-01 DIAGNOSIS — R53.83 FATIGUE, UNSPECIFIED TYPE: ICD-10-CM

## 2018-05-01 DIAGNOSIS — K57.92 DIVERTICULITIS: ICD-10-CM

## 2018-05-01 NOTE — PROGRESS NOTES
This is the Subsequent Medicare Annual Wellness Exam, performed 12 months or more after the Initial AWV or the last Subsequent AWV    I have reviewed the patient's medical history in detail and updated the computerized patient record. History     Past Medical History:   Diagnosis Date    Arthritis     Breast cancer metastasized to lung [facility])     Left Breast    Chronic pain     Colon polyps 2/22/16    distal sigmoid, Dr. Karli HERNANDEZ (degenerative disc disease)     Diverticulitis of colon     Diverticulosis 2/22/16    mild in the ascedning and sigmoid colon, Dr. Darryl Cervantes (hyperlipidemia)     Hypertension     Osteopenia     Tourette syndrome     Vitamin D deficiency       Past Surgical History:   Procedure Laterality Date    HX APPENDECTOMY      HX BREAST BIOPSY  7-30-10    Left Breast w/Nipple Duct exploration and excisional biopsy-left    HX DILATION AND CURETTAGE      x3    HX MODIFIED RADICAL MASTECTOMY  9-3-10    left    HX ORTHOPAEDIC Right 2012    knee replacement    HX TONSILLECTOMY      HYSTEROSCOPY DIAGNOSTIC       Current Outpatient Prescriptions   Medication Sig Dispense Refill    fulvestrant (FASLODEX IM) by IntraMUSCular route.  diazePAM (VALIUM) 5 mg tablet Take 1 Tab by mouth every six (6) hours as needed for Anxiety. Max Daily Amount: 20 mg. 30 Tab 0    meloxicam (MOBIC) 15 mg tablet TAKE 1 TABLET EVERY DAY WITH FOOD 90 Tab 1    losartan (COZAAR) 25 mg tablet Take 1 Tab by mouth daily. 90 Tab 3    haloperidol (HALDOL) 2 mg tablet Take  by mouth two (2) times a day.  gabapentin (NEURONTIN) 100 mg capsule Take  by mouth three (3) times daily.  CYANOCOBALAMIN, VITAMIN B-12, (VITAMIN B-12 PO) Take  by mouth.  Biotin 2,500 mcg cap Take  by mouth.  calcium-vitamin D (CALCIUM 500+D) 500 mg(1,250mg) -200 unit per tablet Take 1 Tab by mouth two (2) times daily (with meals).       cholecalciferol, vitamin d3, (VITAMIN D) 1,000 unit tablet Take 2,000 Units by mouth daily.  ascorbic acid (VITAMIN C) 500 mg tablet Take  by mouth.  palbociclib 75 mg cap 75 mg.  cyclobenzaprine (FLEXERIL) 5 mg tablet Take 5 mg by mouth. Allergies   Allergen Reactions    Keflex [Cephalexin] Rash    Metronidazole Rash    Other Medication Nausea Only     Anesthesia    Shellfish Containing Products Nausea and Vomiting     More of just eating the actual shellfish.  Sulfa (Sulfonamide Antibiotics) Rash    Ultram [Tramadol] Nausea and Vomiting     Patient states she is allergic to most Narcotics    Vancomycin Rash     Family History   Problem Relation Age of Onset    Osteoporosis Sister     Osteoporosis Mother     Stroke Father     Hypertension Father     Cancer Paternal Aunt      breast     Social History   Substance Use Topics    Smoking status: Former Smoker    Smokeless tobacco: Never Used    Alcohol use 3.5 oz/week     7 Glasses of wine per week     Patient Active Problem List   Diagnosis Code    Tourette syndrome F95.2    Osteoarthritis, Status post right total knee replacement M19.90    Lumbar spinal stenosis M48.061    HLD (hyperlipidemia) E78.5    Breast cancer ([address] metastatic to lungs  C50.919    Essential hypertension I10    Diverticulosis K57.90    Osteopenia M85.80    Lymphedema of arm left I89.0    Insomnia G47.00    Fatigue R53.83    Diverticulitis, recurrent K57.92    T11 Vertebral compression fracture (HCC) s/p kyphoplasty M48.50XA    Personal history of malignant neoplasm of breast Z85.3    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.812    Spinal stenosis of cervical region M48.02    Right shoulder pain M25.511       Depression Risk Factor Screening:     PHQ over the last two weeks 5/1/2018   Little interest or pleasure in doing things Not at all   Feeling down, depressed or hopeless Not at all   Total Score PHQ 2 0     Alcohol Risk Factor Screening:    You do not drink alcohol or very rarely. Functional Ability and Level of Safety:   Hearing Loss  Hearing is good. Activities of Daily Living  The home contains: no safety equipment. Patient does total self care    Fall Risk  Fall Risk Assessment, last 12 mths 5/1/2018   Able to walk? Yes   Fall in past 12 months? No   Fall with injury? -   Number of falls in past 12 months -   Fall Risk Score -       Abuse Screen  Patient is not abused    Cognitive Screening   Evaluation of Cognitive Function:  Has your family/caregiver stated any concerns about your memory: no  Normal    Patient Care Team   Patient Care Team:  Elvia Delcid MD as PCP - General (Internal Medicine)  Guillermo Barrera MD (Rheumatology)  Ebony Cr MD (Physical Medicine and Rehab)  Williams Conley MD (Neurology)  Carlos Rojas MD (Orthopedic Surgery)  Stevie Guallpa RN as Ambulatory Care Navigator (Internal Medicine)  Viraj Andrea MD (Hematology and Oncology)  Cliff Mercer MD (Endocrinology)  Carmen Dorsey MD (Gynecologic Oncology)  Dimitris Cortes MD (Dermatology)  Jesica Manley MD (Ophthalmology)  Larita Dakins, RN as Ambulatory Care Navigator (Internal Medicine)  Amparo Lomeli DO (Physical Medicine and Rehab)  Anita Paulino MD (General Surgery)  Carolyn Cool MD (Gastroenterology)    Assessment/Plan   Education and counseling provided:  Are appropriate based on today's review and evaluation  End-of-Life planning (with patient's consent)  Influenza Vaccine  Colorectal cancer screening tests  Cardiovascular screening blood test  Bone mass measurement (DEXA)  Screening for glaucoma  Diabetes screening test    Diagnoses and all orders for this visit:    1. Essential hypertension  -     URINALYSIS W/MICROSCOPIC; Future  -     TSH 3RD GENERATION; Future    2. Hyperlipidemia, unspecified hyperlipidemia type  -     LIPID PANEL; Future    3.  Malignant neoplasm of left breast in female, estrogen receptor positive, unspecified site of breast ([facility] [address]    4. Compression fracture of vertebra, sequela  -     VITAMIN D, 25 HYDROXY; Future    5. Osteopenia of multiple sites  -     VITAMIN D, 25 HYDROXY; Future    6. Tourette syndrome    7. Acute pain of right shoulder  -     MRI SHOULDER RT WO CONT; Future    8. Lymphedema of arm left    9. Diverticulosis of large intestine without hemorrhage    10. Spinal stenosis of lumbar region without neurogenic claudication    11. Fatigue, unspecified type    12. Spinal stenosis of cervical region    13. Diverticulitis, recurrent    14. Disorder of bone density and structure, unspecified   -     VITAMIN D, 25 HYDROXY; Future    15. Medicare annual wellness visit, subsequent    16. ACP (advance care planning)        There are no preventive care reminders to display for this patient.

## 2018-05-01 NOTE — PROGRESS NOTES
Chief Complaint   Patient presents with    Hypertension     6 month follow up. Health Maintenance Due   Topic Date Due    MEDICARE YEARLY EXAM  04/28/2018     1. Have you been to the ER, urgent care clinic or hospitalized since your last visit? NO.     2. Have you seen or consulted any other health care providers outside of the [address] [place] [address] since your last visit (Include any pap smears or colon screening)? YES, [facility], last seen 1 month ago.

## 2018-05-01 NOTE — ACP (ADVANCE CARE PLANNING)
Advance Care Planning (ACP) Provider Note - Comprehensive     Date of ACP Conversation: 05/01/18  Persons included in Conversation:  patient  Length of ACP Conversation in minutes:  16 minutes    Authorized Decision Maker (if patient is incapable of making informed decisions):  and daughter Ravi Abrams)  This person is:  Healthcare Agent/Medical Power of  under Advance Directive          General ACP for ALL Patients with Decision Making Capacity:   Importance of advance care planning, including choosing a healthcare agent to communicate patient's healthcare decisions if patient lost the ability to make decisions, such as after a sudden illness or accident  Understanding of the healthcare agent role was assessed and information provided  Exploration of values, goals, and preferences if recovery is not expected, even with continued medical treatment in the event of: Imminent death  Severe, permanent brain injury  \"In these circumstances, what matters most to you? \"  Care focused more on comfort or quality of life. Review of Existing Advance Directive:  Patient has an existing advance directive on file, signed 10/13/2017 . It designates her  and daughter  as her healthcare agents and expresses that she does not wish life prolonging procedures for end of life care. It was reveiwed with her and still reflects her wishes.        For Serious or Chronic Illness:  Understanding of medical condition      Interventions Provided:  Reviewed existing Advance Directive   Recommended review of completed ACP document annually or upon change in health status

## 2018-05-01 NOTE — PATIENT INSTRUCTIONS
Medicare Wellness Visit, Female    The best way to improve and maintain good health is to have a healthy lifestyle by eating a well-balanced diet, exercising regularly, limiting alcohol and stopping smoking. Regular visits with your physician or non-physician health care provider also support your good health. Preventive screening tests can find health problems before they become diseases or illnesses. Preventive services such as immunizations prevent serious infections. All people over age 72 should have a Pneumovax and a Prevnar-13 shot to prevent potentially life threatening infections with the pneumococcus bacteria, a common cause of pneumonia. These are once in a lifetime unless you and your provider decide differently. All people over 65 should have a yearly influenza vaccine or \"flu\" shot. This does not prevent infection with cold viruses but has been proven to prevent hospitalization and death from influenza. Although Medicare part B \"regular Medicare\" currently only covers tetanus vaccination in the context of an injury, a tetanus vaccine (Tdap or Td) is recommended every 10 years. A shingles vaccine is recommended once in a lifetime after age 61. The Shingles vaccine is also not covered by Medicare part B. Note, however, that both the Shingles vaccine and Tdap/Td are generally covered by secondary carriers. Please check your coverage and out of pocket expenses. Consider contacting your local health department because it may stock these vaccines for a reasonable charge.     We currently have documentation of the following immunization history for you:  Immunization History   Administered Date(s) Administered    Influenza High Dose Vaccine PF 10/01/2015, 10/25/2016, 10/05/2017    Influenza Vaccine 09/01/2014    Influenza Vaccine Split 11/01/2011, 10/02/2012    Influenza Vaccine Whole 10/08/2010    Pneumococcal Conjugate (PCV-13) 10/27/2015    Pneumococcal Polysaccharide (PPSV-23) 04/15/2013    TD Vaccine 05/05/2008    Tdap 09/12/2014    Zoster 09/20/2011       Screening for infection with Hepatitis C is recommended for anyone born between [birth year] through 1963. The table at the bottom of this document indicates the status of this and other preventive services. A bone mass density test (DEXA) to screen for osteoporosis or thinning of the bones should be done at least once after age 72 and may be done up to every 2 years as determined by you and your health care provider. The most recent DEXA we have on file for you is:  DEXA Results (most recent):    Results from Hospital Encounter encounter on 11/13/17   DEXA BONE DENSITY STUDY AXIAL   Narrative DEXA BONE DENSITOMETRY, CENTRAL    CPT CODE: 79201    INDICATION: Postmenopausal. History of osteopenia. Metastatic breast cancer with  chemotherapy. T12 compression fracture. Taking calcium and vitamin D.    TECHNIQUE: Using GE LUNAR Prodigy densitometer, bone density measurement was  performed in the lumbar spine the proximal left and right femora and the left  forearm. T Score refers to standard deviations above or below average compared  to a young adult of the same sex. Z Score refers to standard deviations above or  below average compared to a patient of the same sex, age, race and weight. COMPARISON: April 18, 2005. April 15, 2010. October 19, 2011. October 22, 2013. November 10, 2015. FINDINGS:     On PA image of the lumbar spine obtained for localization of vertebral levels  (not a diagnostic quality radiograph), there is apparent increased density at  multiple levels. The density is not well characterized on the basis of this  image, but likely degenerative. These changes render the lumbar spine invalid as  a site for densitometry in this patient.      Distal1/3 Radius BMD:  0.689 g/cm2   T Score: -2.2       Z Score: -0.4   BMD decreased 10.6%, which is statistically significant within a 95 percent  confidence interval compared to preceding study. BMD decreased 17.0%, which is statistically significant compared to baseline  study which at the forearm is the study of 2010. Left Total Proximal Femur BMD: 0.835 g/cm2    T Score:  -1.4       Z Score:  0.1       BMD decreased 6.7%, which is statistically significant within a 95 percent  confidence interval compared to preceding study. BMD decreased 7.7%, which is statistically significant compared to baseline  study. Right Total Proximal Femur BMD: 0.833 g/cm2  T Score:  -1.4      Z Score:  0.1       BMD decreased 5.3%, which is statistically significant within a 95 percent  confidence interval compared to preceding study. BMD decreased 6.3%, which is statistically significant compared to baseline  study. Left Femoral Neck BMD:  0.837 g/cm2   T Score:  -1.4  Z Score:  0.2    Right Femoral Neck BMD:  0.849 g/cm2   T Score:  -1.4  Z Score:  0.3           Impression IMPRESSION:    1. BMD measures consistent with osteopenia. 2.  Compared to the preceding study, BMD has decreased. 3.  Compared to the baseline study, BMD has decreased. Based upon current ISCD guidelines, the patient's overall diagnostic category,  selected using WHO criteria in postmenopausal women and males aged 48 and above,  is selected based upon the lowest T Score from among the lumbar spine, total  femur, femoral neck, (or distal third radius if measured). WHO Definition of Osteoporosis and Osteopenia on DXA (specified for  post-menopausal  females):     Normal:                     T Score at or above -1 SD   Osteopenia:              T Score between -1 and -2.5 SD   Osteoporosis:           T Score at or below -2.5 SD    The risk of fracture approximately doubles for each 1 SD decrease in T Score.    It is important to consider other factors in assessing a patient's risk of  fracture, including age, risk of falling/injury, history of fragility fracture,  family history of osteoporosis, smoking, low weight. Various fracture risk tools have been developed for adult patients and are  available online. For example, the FRAX tool developed by [facility] is widely used. Reference www.iscd.org. It is also important to note that DXA measures bone density but does not  distinguish among causes of decreased bone density, which include primary versus  secondary osteoporosis (such as metabolic bone disorders or possible effects of  medications) and also other conditions (such as osteomalacia). Clinical  considerations should determine what additional evaluation may be warranted to  exclude secondary conditions in a patient with low bone density. Please note that reliable, valid comparisons can not be made between studies  which have been performed on different densitometers. If clinically warranted,  follow up study performed at this site would best permit assessment of trend for  possible change in bone mineral density over time in comparison to this study. Thank you for this referral.        Screening for diabetes mellitus with a blood sugar test (glucose) should be done at least every 3 years until age 79. You and your health care provider may decide whether to continue screening after age 79. The most recent blood glucose we have on file for you is:   Lab Results   Component Value Date/Time    Glucose 85 01/24/2018 02:53 PM         Glaucoma is a disease of the eye due to increased ocular pressure that can lead to blindness. People with risk factors for glaucoma ( race, diabetes, family history) should be screened at least every 2 years by an eye professional.     Cardiovascular screening tests that check for elevated lipids or cholesterol (fatty part of blood) which can lead to heart disease and strokes should be done every 4-6 years through age 79. You and your health care provider may decide whether to continue screening after age 79.  The most recent lipid panel we have on file for you is:   Lab Results   Component Value Date/Time    Cholesterol, total 201 (H) 10/24/2017 09:50 AM    HDL Cholesterol 59 10/24/2017 09:50 AM    LDL, calculated 122.8 (H) 10/24/2017 09:50 AM    VLDL, calculated 19.2 10/24/2017 09:50 AM    Triglyceride 96 10/24/2017 09:50 AM    CHOL/HDL Ratio 3.4 10/24/2017 09:50 AM       Colorectal cancer screening that evaluates for blood or polyps in your colon for people with average risk should be done yearly as a stool test, every five years as a flexible sigmoidoscope or every 10 years as a colonoscopy up to age 76. You and your health care provider may decide whether to continue screening after age 76. Breast cancer screening with a mammogram is recommended at least once every 2 years  for women age 54-69. You and your health care provider may decide whether to continue screening after age 76. The most recent mammogram we have on file for you is:   [facility] Results (most recent):    Results from [facility] encounter on 08/28/17   SHARON 3D PEACE W MAMMO RT SCREENING INCL CAD   Narrative RIGHT SCREENING DIGITAL MAMMOGRAM WITH CAD AND WITH DIGITAL BREAST TOMOSYNTHESIS  IMAGING/COMBINATION 2-D 3-D EXAM    History:  asymptomatic , previous left breast carcinoma at age 63 in 2010  treated with mastectomy and chemotherapy    Location: [facility] at [address] [place] [address]: mammogram 16, 15, 14    Findings: 2-D digital mammograms were performed with CC and MLO views obtained  of the right breast. Digital tomosynthesis 3-D images were also obtained of the  right breast in the CC and MLO projections. CAD analysis was performed with the  computer-aided detection system. Any areas marked correlated with the mammogram.      There are no masses, regions of distortion, nor suspicious microcalcifications. The digital breast tomosynthesis images demonstrate no suspicious findings. Impression Impression:    No suspicious finding for malignancy.   Recommend clinical followup and annual high risk mammography as per the Society  of Breast Imaging and the [facility] of Radiology guidelines. BI RADS : BI-RADS one-negative mammogram    Breast Composition: The breasts are heterogenously dense, which may obscure  small masses. Screening for cervical cancer with a pap smear is recommended for all women with a cervix until age 72. The frequency of this test is based on the details of her prior pap smear testing. You and your health care provider may decide whether to continue screening after age 72. People who have smoked the equivalent of 1 pack per day for 30 years or more may benefit from screening for lung cancer with a yearly low dose CT scan until they have been non smokers for 15 years or competing health conditions render this unlikely to be beneficial. Our records show: N/A    Your Medicare Wellness Exam is recommended annually.     Here is a list of your current Health Maintenance items with a due date:  Health Maintenance   Topic Date Due    Influenza Age 5 to Adult  08/01/2018    GLAUCOMA SCREENING Q2Y  02/08/2019    MEDICARE YEARLY EXAM  05/02/2019    BREAST CANCER SCRN MAMMOGRAM  08/28/2019    DTaP/Tdap/Td series (2 - Td) 09/12/2024    COLONOSCOPY  02/22/2026    Hepatitis C Screening  Addressed    Bone Densitometry (Dexa) Screening  Completed    ZOSTER VACCINE AGE 60>  Completed    Pneumococcal 65+ High/Highest Risk  Completed

## 2018-05-01 NOTE — MR AVS SNAPSHOT
Ernesto Eugene 
 
 
 [address], [address] [facility] 
[phone] Patient: Rito Stein MRN: [id number] Visit Information Date & Time Provider Department Dept. Phone Encounter #  
 5/1/2018 10:30 AM Jayshree Stone MD [facility] [phone] [id number] Follow-up Instructions Return in about 6 months (around 11/1/2018), or if symptoms worsen or fail to improve. Your Appointments 11/6/2018 10:30 AM  
Office Visit with Jayshree Stone MD  
[facility] Appt Note: 6 month f/u  
 [address] [id number] [address] [place]  
  
   
 [address], [address] Upcoming Health Maintenance Date Due Influenza Age 5 to Adult 8/1/2018 GLAUCOMA SCREENING Q2Y 2/8/2019 MEDICARE YEARLY EXAM 5/2/2019 BREAST CANCER SCRN MAMMOGRAM 8/28/2019 DTaP/Tdap/Td series (2 - Td) 9/12/2024 COLONOSCOPY 2/22/2026 Allergies as of 5/1/2018  Review Complete On: 5/1/2018 By: Ra Owusu Severity Noted Reaction Type Reactions Keflex [Cephalexin]  07/27/2010    Rash Metronidazole  05/04/2015    Rash Other Medication   Side Effect Nausea Only Anesthesia Shellfish Containing Products  07/21/2011    Nausea and Vomiting More of just eating the actual shellfish. Sulfa (Sulfonamide Antibiotics)  07/21/2011    Rash Ultram [Tramadol]  07/27/2010    Nausea and Vomiting Patient states she is allergic to most Narcotics Vancomycin  07/21/2011    Rash Current Immunizations  Reviewed on 10/25/2016 Name Date Influenza High Dose Vaccine PF 10/5/2017 10:51 AM, 10/25/2016  1:20 PM, 10/1/2015 10:13 AM  
 Influenza Vaccine 9/1/2014 Influenza Vaccine Split 10/2/2012 12:18 PM, 11/1/2011 Influenza Vaccine Whole 10/8/2010  Pneumococcal Conjugate (PCV-13) 10/27/2015  2:52 PM  
 Pneumococcal Polysaccharide (PPSV-23) 4/15/2013 TD Vaccine 5/5/2008 Tdap 9/12/2014 Zoster 9/20/2011 Not reviewed this visit Vitals BP Pulse Temp Resp Height(growth percentile) Weight(growth percentile) 120/80 (BP 1 Location: Right arm, BP Patient Position: Sitting) 77 98.4 °F (36.9 °C) (Oral) 16 5' 1\" (1.549 m) 143 lb (64.9 kg) SpO2 BMI OB Status Smoking Status 98% 27.02 kg/m2 Postmenopausal Former Smoker Vitals History BMI and BSA Data Body Mass Index Body Surface Area  
 27.02 kg/m 2 1.67 m 2 Preferred Pharmacy Pharmacy Name Phone [facility] [address] [facility], [address] [place] [address] [phone] Your Updated Medication List  
  
   
This list is accurate as of 5/1/18 11:35 AM.  Always use your most recent med list.  
  
  
  
  
 Biotin 2,500 mcg Cap Take  by mouth. CALCIUM 500+D 500 mg(1,250mg) -200 unit per tablet Generic drug:  calcium-vitamin D Take 1 Tab by mouth two (2) times daily (with meals). cyclobenzaprine 5 mg tablet Commonly known as:  FLEXERIL Take 5 mg by mouth. diazePAM 5 mg tablet Commonly known as:  VALIUM Take 1 Tab by mouth every six (6) hours as needed for Anxiety. Max Daily Amount: 20 mg. FASLODEX IM  
by IntraMUSCular route.  
  
 gabapentin 100 mg capsule Commonly known as:  NEURONTIN Take  by mouth three (3) times daily. haloperidol 2 mg tablet Commonly known as:  HALDOL Take  by mouth two (2) times a day. IBRANCE 100 mg Cap Generic drug:  palbociclib Take 75 mg by mouth.  
  
 losartan 25 mg tablet Commonly known as:  COZAAR Take 1 Tab by mouth daily. meloxicam 15 mg tablet Commonly known as:  MOBIC  
TAKE 1 TABLET EVERY DAY WITH FOOD  
  
 VITAMIN B-12 PO Take  by mouth. VITAMIN C 500 mg tablet Generic drug:  ascorbic acid (vitamin C) Take  by mouth. VITAMIN D3 1,000 unit tablet Generic drug:  cholecalciferol Take 2,000 Units by mouth daily. Follow-up Instructions Return in about 6 months (around 11/1/2018), or if symptoms worsen or fail to improve. Patient Instructions Medicare Wellness Visit, Female The best way to improve and maintain good health is to have a healthy lifestyle by eating a well-balanced diet, exercising regularly, limiting alcohol and stopping smoking. Regular visits with your physician or non-physician health care provider also support your good health. Preventive screening tests can find health problems before they become diseases or illnesses. Preventive services such as immunizations prevent serious infections. All people over age 72 should have a Pneumovax and a Prevnar-13 shot to prevent potentially life threatening infections with the pneumococcus bacteria, a common cause of pneumonia. These are once in a lifetime unless you and your provider decide differently. All people over 65 should have a yearly influenza vaccine or \"flu\" shot. This does not prevent infection with cold viruses but has been proven to prevent hospitalization and death from influenza. Although Medicare part B \"regular Medicare\" currently only covers tetanus vaccination in the context of an injury, a tetanus vaccine (Tdap or Td) is recommended every 10 years. A shingles vaccine is recommended once in a lifetime after age 61. The Shingles vaccine is also not covered by Medicare part B. Note, however, that both the Shingles vaccine and Tdap/Td are generally covered by secondary carriers. Please check your coverage and out of pocket expenses. Consider contacting your local health department because it may stock these vaccines for a reasonable charge. We currently have documentation of the following immunization history for you: 
Immunization History Administered Date(s) Administered  Influenza High Dose Vaccine PF 10/01/2015, 10/25/2016, 10/05/2017  Influenza Vaccine 09/01/2014  Influenza Vaccine Split 11/01/2011, 10/02/2012  Influenza Vaccine Whole 10/08/2010  Pneumococcal Conjugate (PCV-13) 10/27/2015  Pneumococcal Polysaccharide (PPSV-23) 04/15/2013  TD Vaccine 05/05/2008  Tdap 09/12/2014  Zoster 09/20/2011 Screening for infection with Hepatitis C is recommended for anyone born between [birth year] through [address]. The table at the bottom of this document indicates the status of this and other preventive services. A bone mass density test (DEXA) to screen for osteoporosis or thinning of the bones should be done at least once after age 72 and may be done up to every 2 years as determined by you and your health care provider. The most recent DEXA we have on file for you is: DEXA Results (most recent): 
 
Results from Hospital Encounter encounter on 11/13/17 DEXA BONE DENSITY STUDY AXIAL Narrative DEXA BONE DENSITOMETRY, CENTRAL 
 
CPT CODE: 70117 INDICATION: Postmenopausal. History of osteopenia. Metastatic breast cancer with 
chemotherapy. T12 compression fracture. Taking calcium and vitamin D. TECHNIQUE: Using GE LUNAR Prodigy densitometer, bone density measurement was 
performed in the lumbar spine the proximal left and right femora and the left 
forearm. T Score refers to standard deviations above or below average compared 
to a young adult of the same sex. Z Score refers to standard deviations above or 
below average compared to a patient of the same sex, age, race and weight. COMPARISON: April 18, 2005. April 15, 2010. October 19, 2011. October 22, 2013. November 10, 2015. FINDINGS:  
 
On PA image of the lumbar spine obtained for localization of vertebral levels (not a diagnostic quality radiograph), there is apparent increased density at 
multiple levels. The density is not well characterized on the basis of this image, but likely degenerative. These changes render the lumbar spine invalid as 
a site for densitometry in this patient. Distal1/3 Radius BMD:  0.689 g/cm2 T Score: -2.2 Z Score: -0.4 BMD decreased 10.6%, which is statistically significant within a 95 percent 
confidence interval compared to preceding study. BMD decreased 17.0%, which is statistically significant compared to baseline 
study which at the forearm is the study of 2010. Left Total Proximal Femur BMD: 0.835 g/cm2 T Score:  -1.4 Z Score:  0.1 BMD decreased 6.7%, which is statistically significant within a 95 percent 
confidence interval compared to preceding study. BMD decreased 7.7%, which is statistically significant compared to baseline 
study. Right Total Proximal Femur BMD: 0.833 g/cm2 T Score:  -1.4 Z Score:  0.1 BMD decreased 5.3%, which is statistically significant within a 95 percent 
confidence interval compared to preceding study. BMD decreased 6.3%, which is statistically significant compared to baseline 
study. Left Femoral Neck BMD:  0.837 g/cm2 T Score:  -1.4 Z Score:  0.2 Right Femoral Neck BMD:  0.849 g/cm2 T Score:  -1.4 Z Score:  0.3 Impression IMPRESSION: 
 
1. BMD measures consistent with osteopenia. 2.  Compared to the preceding study, BMD has decreased. 3.  Compared to the baseline study, BMD has decreased. Based upon current ISCD guidelines, the patient's overall diagnostic category, 
selected using WHO criteria in postmenopausal women and males aged 48 and above, 
is selected based upon the lowest T Score from among the lumbar spine, total 
femur, femoral neck, (or distal third radius if measured). WHO Definition of Osteoporosis and Osteopenia on DXA (specified for 
post-menopausal  females): 
 
 Normal:                     T Score at or above -1 SD  Osteopenia:              T Score between -1 and -2.5 SD 
 Osteoporosis:           T Score at or below -2.5 SD The risk of fracture approximately doubles for each 1 SD decrease in T Score. It is important to consider other factors in assessing a patient's risk of 
fracture, including age, risk of falling/injury, history of fragility fracture, 
family history of osteoporosis, smoking, low weight. Various fracture risk tools have been developed for adult patients and are 
available online. For example, the FRAX tool developed by [facility] is widely used. Reference www.iscd.org. It is also important to note that DXA measures bone density but does not 
distinguish among causes of decreased bone density, which include primary versus 
secondary osteoporosis (such as metabolic bone disorders or possible effects of 
medications) and also other conditions (such as osteomalacia). Clinical 
considerations should determine what additional evaluation may be warranted to 
exclude secondary conditions in a patient with low bone density. Please note that reliable, valid comparisons can not be made between studies 
which have been performed on different densitometers. If clinically warranted, 
follow up study performed at this site would best permit assessment of trend for 
possible change in bone mineral density over time in comparison to this study. Thank you for this referral.   
 
 
Screening for diabetes mellitus with a blood sugar test (glucose) should be done at least every 3 years until age 79. You and your health care provider may decide whether to continue screening after age 79. The most recent blood glucose we have on file for you is: Lab Results Component Value Date/Time Glucose 85 01/24/2018 02:53 PM  
 
 
 
Glaucoma is a disease of the eye due to increased ocular pressure that can lead to blindness.  People with risk factors for glaucoma ( race, diabetes, family history) should be screened at least every 2 years by an eye professional.  
 Cardiovascular screening tests that check for elevated lipids or cholesterol (fatty part of blood) which can lead to heart disease and strokes should be done every 4-6 years through age 79. You and your health care provider may decide whether to continue screening after age 79. The most recent lipid panel we have on file for you is:  
Lab Results Component Value Date/Time Cholesterol, total 201 (H) 10/24/2017 09:50 AM  
 HDL Cholesterol 59 10/24/2017 09:50 AM  
 LDL, calculated 122.8 (H) 10/24/2017 09:50 AM  
 VLDL, calculated 19.2 10/24/2017 09:50 AM  
 Triglyceride 96 10/24/2017 09:50 AM  
 CHOL/HDL Ratio 3.4 10/24/2017 09:50 AM  
 
 
Colorectal cancer screening that evaluates for blood or polyps in your colon for people with average risk should be done yearly as a stool test, every five years as a flexible sigmoidoscope or every 10 years as a colonoscopy up to age 76. You and your health care provider may decide whether to continue screening after age 76. Breast cancer screening with a mammogram is recommended at least once every 2 years  for women age 54-69. You and your health care provider may decide whether to continue screening after age 76. The most recent mammogram we have on file for you is: [facility] Results (most recent): 
 
Results from Hospital Encounter encounter on 08/28/17 SHARON 3D PEACE W MAMMO RT SCREENING INCL CAD Narrative RIGHT SCREENING DIGITAL MAMMOGRAM WITH CAD AND WITH DIGITAL BREAST TOMOSYNTHESIS IMAGING/COMBINATION 2-D 3-D EXAM 
 
History:  asymptomatic , previous left breast carcinoma at age 63 in 2010 
treated with mastectomy and chemotherapy Location: [facility] Comparison: mammogram 16, 15, 14 Findings: 2-D digital mammograms were performed with CC and MLO views obtained 
of the right breast. Digital tomosynthesis 3-D images were also obtained of the 
right breast in the CC and MLO projections.  CAD analysis was performed with the 
 computer-aided detection system. Any areas marked correlated with the mammogram. 
 
 
There are no masses, regions of distortion, nor suspicious microcalcifications. The digital breast tomosynthesis images demonstrate no suspicious findings. Impression Impression: No suspicious finding for malignancy. Recommend clinical followup and annual high risk mammography as per the Society 
of Breast Imaging and the [employer] of Radiology guidelines. BI RADS : BI-RADS one-negative mammogram 
 
Breast Composition: The breasts are heterogenously dense, which may obscure 
small masses. Screening for cervical cancer with a pap smear is recommended for all women with a cervix until age 72. The frequency of this test is based on the details of her prior pap smear testing. You and your health care provider may decide whether to continue screening after age 72. People who have smoked the equivalent of 1 pack per day for 30 years or more may benefit from screening for lung cancer with a yearly low dose CT scan until they have been non smokers for 15 years or competing health conditions render this unlikely to be beneficial. Our records show: N/A Your Medicare Wellness Exam is recommended annually. Here is a list of your current Health Maintenance items with a due date: 
Health Maintenance Topic Date Due  Influenza Age 5 to Adult  08/01/2018  GLAUCOMA SCREENING Q2Y  02/08/2019  MEDICARE YEARLY EXAM  05/02/2019  BREAST CANCER SCRN MAMMOGRAM  08/28/2019  
 DTaP/Tdap/Td series (2 - Td) 09/12/2024  COLONOSCOPY  02/22/2026  Hepatitis C Screening  Addressed  Bone Densitometry (Dexa) Screening  Completed  ZOSTER VACCINE AGE 60>  Completed  Pneumococcal 65+ High/Highest Risk  Completed Introducing [facility]! Dear Tuyet Every: 
Thank you for requesting a [employer] account.   Our records indicate that you already have an active [employer] account. You can access your account anytime at [email or web address] Did you know that you can access your hospital and ER discharge instructions at any time in [employer]? You can also review all of your test results from your hospital stay or ER visit. Additional Information If you have questions, please visit the Frequently Asked Questions section of the [employer] website at [email or web address] Remember, [employer] is NOT to be used for urgent needs. For medical emergencies, dial 911. Now available from your iPhone and Android! Please provide this summary of care documentation to your next provider. Your primary care clinician is listed as Kevin Schilling. If you have any questions after today's visit, please call [phone].

## 2018-05-05 NOTE — PROGRESS NOTES
Jacinda Oleary is a 78y.o. year old female who presents today for evaluation of hypertension, dyslipidemia,  metastatic breast cancer, GERD, diverticulosis with recurrent diverticulitis, osteoarthritis s/p right knee replacement (2012), lumbar degenerative disease, osteopenia, compression fracture, and Tourette's syndrome. She reports that she is doing relatively well. She continues on palbociclib Amado Otoniel) for her metastatic breast cancer, and underwent restaging chest, abdomen, and pelvis CT scan on 11/1/2017 and 2/6/2018 which were unchanged. Due to her persistent fatigue, she reports that she was changed from letrozole to monthly fulvestrant (Faslodex). She reports some mild improvement in her fatigue since doing so, but continues to have difficulty with daytime activities such as cooking dinner. She also continues to have some difficulty falling asleep at night and maintaining sleep, and finds valium and melatonin helpful. She also is complaining of persistent right shoulder pain, particularly with certain movements, and reports that it has worsened so that she has difficulty raising her arm. She had a right shoulder x-ray (11/1/2017) which was normal. She denies known trauma. She is otherwise without complaints. She has a history of left breast cancer, which was diagnosed in 7/2010 as invasive ductal adenocarcinoma, s/p left modified radical mastectomy with sentinel node biopsy, performed by Dr. Mansi Sauceda. She had 3 positive lymph nodes and was classified as stage 1B, T1c N1 M0, ER and CO positive, Her-2/sarah negative by FISH. She underwent 6 cycles of chemotherapy with Docetaxel and Cytoxan. She was subsequently unable to tolerate anastrozole, exemestane, tamoxifen, and letrozole due to profound fatigue and arthralgias. Her course was complicated by chronic left arm lymphedema, managed with a compression sleeve.  A chest CT scan in 3/2014 noted several small pulmonary nodules which were concerning for metastases but were too small to biopsy. They were followed with sequential CT scans , and in 12/2014, repeat chest CT scan showed enlarging bilateral pulmonary nodules compatible with progression of metastatic disease. A fine needle aspirate was performed on 12/21/2014 which showed benign pulmonary parenchyma with alveolar hemorrhage. Repeat chest CT scan in 3/12/2015 showed mild interval progression of the lung metastases with enlarging nodules and development of new nodules. A CT guided needle biopsy was performed on 3/23/2015, which confirmed metastatic adenocarcinoma consistent with breast primary (stage IV, ER/PA positive, and TTF-1 negative). On 4/13/2015, she was started on therapy with Ibrance and letrozole. Follow-up CT scan (7/15/15) showed partial response with decreasing size of some nodules and resolution of other nodules. Most recent chest, abdomen, and pelvis CT scan was obtained in 6/2017, showing stable or decreased multiple bilateral  pulmonary nodules and no suspicious new pulmonary nodules. She continues on palbociclib, but was changed from letrozole to fulvestrant. She is followed by Dr. Elizabeth Morrell. She states that she established care with Dr. Sophia Linares, but was told that she may have her mammograms and breast exams in our office with referral to her if something arises. She also has a history of a right ovarian cyst, but was released from the care of Dr. Sherril Bosworth since it was concluded to be benign. She has a history of hypertension treated with losartan. She monitors her blood pressure mainly when visiting multiple physicians and reports that it is usually 120-30/80. She has no history of heart disease, and denies any chest pain, shortness of breath at rest or with exertion, palpitations, lightheadedness, or edema.     In 4/11/2016, she sustained a fall with subsequent severe pain in her mid to upper lumbar region and wrapping around waist. She was treated with meloxicam, tylenol and flexeril, but because of persistent discomfort, she presented to [facility] on 4/15/2016 and lumbosacral spine films showed marked degenerative changes with disc space obliteration throughout lumbar spine and slight anterior spondylolisthesis of L4. She was evaluated by Dr. Coco Welsh on 4/18/2016 who recommended physical therapy and evaluation by Dr. Lauro Mclaughlin for her degenerative spine changes. She continued to take meloxicam and tylenol and started physical therapy, but noted worsening of her pain. She subsequently was evaluated by Dr. Nichole Alvarado, who obtained a lumbar MRI on 4/26/2016, which showed a new subacute compression fracture of T11 vertebral body with diffuse marrow edema and mild paravertebral inflammatory stranding, no retropulsion or central stenosis; more severe degenerative disease along the right side at L4-L5, L5-S1, potential mild compression of right exiting L4 and L5 nerve roots. She was referred to Dr. Janet Curtis for kyphoplasty of the T11 compression fracture given failure of pain control with conservative measures. She underwent the procedure on 8/8/7981 without complications, and R53 vertebral body bone core and aspirate biopsies were performed, which showed only reactive bone changes and no evidence of malignancy. She has a history of osteopenia, with a history of fracture of her sacrum. She reports that she was treated with alendronate in 12/2011 but discontinued it in 4/2013 due to intolerence. Her last bone density scan (11/2017) was consistent with osteopenia with femoral neck T-scores: left -1.4/ right -1.4 and distal radius -2.2 (lumbar T-score could not be assessed). She continues to take calcium and vitamin D. In 10/2016, she developed left sided low back pain with radiation to her left leg over the last several weeks.  She underwent a lumbar MRI (9/2016) showing scoliosis and advanced multilevel degenerative changes with areas of foraminal stenosis at L3-L4 and L5-S1; mild/moderate central canal stenosis at L4-L5 and L5-S1. She was evaluated by Dr. Anjel Hayes and treated with pregabalin with some improvement. She subsequently received an epidural steroid injection with improvement and discontinued pregabalin. In 4/2017, she noted increasing cervical and upper back discomfort. She has had multiple cervical MRI's,and underwent repeat in 4/2017 which showed multilevel degenerative disc disease and facet arthropathy without change from prior studies with mild central canal stenosis C3-C4 and C5-C6, and moderate central canal stenosis C6-C7. She was treated with Mobic and Flexeril, and takes gabapentin at bedtime. She has a history of GERD and diverticulosis with recurrent diverticulitis,. She was evaluated by Dr. Niya Farias in 2/23/2016 and underwent an upper endoscopy, which revealed a Schatzki's ring at the gastroesophageal junction (dilated) with mild distal esophagitis and a small hiatal hernia. A screening colonoscopy was also performed showing moderately severe diverticulosis of the ascending and descending colon and a 2 mm sessile sigmoid polyp (pathology: hyperplastic). Recommendations for follow-up colonoscopy in 10 years. She denies any abdominal pain, nausea, vomiting, melena, hematochezia, or change in bowel movements. She was evaluated in 8/2017 by GERALD Padron with complaints of abdominal pain and was treated with Cipro for presumed diverticulitis. She contacted the office again in 10/2017 and 1/2018 with a recurrence of symptoms, and was treated for a 10 day course with Cipro with improvement. Abdominal CT scan in 2/6/2018 for staging for her breast cancer did show evidence of moderate to severe diverticulosis, but no evidence of diverticulitis. She did present with similar symptoms in 3/2018 and was evaluated by GERALD Vigil and prescribed Cipro. However, her symptoms resolved prior to taking the antibiotics. She reports complete resolution of her symptoms today.       She also has a history of Tourette's syndrome controlled with Haldol. She is followed by Dr. Linda Bowden. Past Medical History:   Diagnosis Date    Arthritis     Breast cancer metastasized to lung [facility])     Left Breast    Chronic pain     Colon polyps 2/22/16    distal sigmoid, Dr. Yris Snow DDD (degenerative disc disease)     Diverticulitis of colon     Diverticulosis 2/22/16    mild in the ascedning and sigmoid colon, Dr. Silva Arms (hyperlipidemia)     Hypertension     Osteopenia     Tourette syndrome     Vitamin D deficiency      Past Surgical History:   Procedure Laterality Date    HX APPENDECTOMY      HX BREAST BIOPSY  7-30-10    Left Breast w/Nipple Duct exploration and excisional biopsy-left    HX DILATION AND CURETTAGE      x3    HX MODIFIED RADICAL MASTECTOMY  9-3-10    left    HX ORTHOPAEDIC Right 2012    knee replacement    HX TONSILLECTOMY      HYSTEROSCOPY DIAGNOSTIC       Current Outpatient Prescriptions   Medication Sig    fulvestrant (FASLODEX IM) by IntraMUSCular route.  diazePAM (VALIUM) 5 mg tablet Take 1 Tab by mouth every six (6) hours as needed for Anxiety. Max Daily Amount: 20 mg.    meloxicam (MOBIC) 15 mg tablet TAKE 1 TABLET EVERY DAY WITH FOOD    losartan (COZAAR) 25 mg tablet Take 1 Tab by mouth daily.  haloperidol (HALDOL) 2 mg tablet Take  by mouth two (2) times a day.  gabapentin (NEURONTIN) 100 mg capsule Take  by mouth three (3) times daily.  CYANOCOBALAMIN, VITAMIN B-12, (VITAMIN B-12 PO) Take  by mouth.  Biotin 2,500 mcg cap Take  by mouth.  calcium-vitamin D (CALCIUM 500+D) 500 mg(1,250mg) -200 unit per tablet Take 1 Tab by mouth two (2) times daily (with meals).  cholecalciferol, vitamin d3, (VITAMIN D) 1,000 unit tablet Take 2,000 Units by mouth daily.  ascorbic acid (VITAMIN C) 500 mg tablet Take  by mouth.  palbociclib 75 mg cap 75 mg.  cyclobenzaprine (FLEXERIL) 5 mg tablet Take 5 mg by mouth.      No current facility-administered medications for this visit. Allergies and Intolerances: Allergies   Allergen Reactions    Keflex [Cephalexin] Rash    Metronidazole Rash    Other Medication Nausea Only     Anesthesia    Shellfish Containing Products Nausea and Vomiting     More of just eating the actual shellfish.  Sulfa (Sulfonamide Antibiotics) Rash    Ultram [Tramadol] Nausea and Vomiting     Patient states she is allergic to most Narcotics    Vancomycin Rash     Family History:   Family History   Problem Relation Age of Onset    Osteoporosis Sister     Osteoporosis Mother     Stroke Father     Hypertension Father     Cancer Paternal Aunt      breast     Social History:   She  reports that she has quit smoking. She has never used smokeless tobacco. She stopped smoking over 40 years ago. She is  and lives with . They have one daughter and two grandchildren. History   Alcohol Use    3.5 oz/week    7 Glasses of wine per week     Immunization History:  Immunization History   Administered Date(s) Administered    Influenza High Dose Vaccine PF 10/01/2015, 10/25/2016, 10/05/2017    Influenza Vaccine 09/01/2014    Influenza Vaccine Split 11/01/2011, 10/02/2012    Influenza Vaccine Whole 10/08/2010    Pneumococcal Conjugate (PCV-13) 10/27/2015    Pneumococcal Polysaccharide (PPSV-23) 04/15/2013    TD Vaccine 05/05/2008    Tdap 09/12/2014    Zoster 09/20/2011       Review of Systems:   As above included in HPI. Otherwise 11 point review of systems negative including constitutional, skin, HENT, eyes, respiratory, cardiovascular, gastrointestinal, genitourinary, musculoskeletal, endo/heme/aller, neurological.    Physical:   Vitals:   BP: 120/80  HR: 77  WT: 143 lb (64.9 kg)  BMI:  27.02 kg/m2    Exam:   Pt appears well; alert and oriented x 3; appropriate affect. HEENT: PERRLA, anicteric, oropharynx clear, no JVD, adenopathy or thyromegaly. No carotid bruits or radiated murmur.   Lungs: clear to auscultation, no wheezes, rhonchi, or rales. Heart: regular rate and rhythm. No murmur, rubs, gallops  Abdomen: soft, nontender, nondistended, normal bowel sounds, no hepatosplenomegaly or masses. Extremities: without edema. Pulses 1-2+ bilaterally. Right shoulder with active ROM above 90 degrees limited by pain; no joint swelling or inflammation    Review of Data:  Labs:  No visits with results within 1 Month(s) from this visit. Latest known visit with results is:    Hospital Outpatient Visit on 01/24/2018   Component Date Value Ref Range Status    WBC 01/24/2018 4.5* 4.6 - 13.2 K/uL Final    RBC 01/24/2018 3.87* 4.20 - 5.30 M/uL Final    HGB 01/24/2018 13.1  12.0 - 16.0 g/dL Final    HCT 01/24/2018 39.3  35.0 - 45.0 % Final    MCV 01/24/2018 101.6* 74.0 - 97.0 FL Final    MCH 01/24/2018 33.9  24.0 - 34.0 PG Final    MCHC 01/24/2018 33.3  31.0 - 37.0 g/dL Final    RDW 01/24/2018 13.6  11.6 - 14.5 % Final    PLATELET 62/08/9642 506  135 - 420 K/uL Final    MPV 01/24/2018 9.1* 9.2 - 11.8 FL Final    NEUTROPHILS 01/24/2018 33* 40 - 73 % Final    LYMPHOCYTES 01/24/2018 48  21 - 52 % Final    MONOCYTES 01/24/2018 17* 3 - 10 % Final    EOSINOPHILS 01/24/2018 1  0 - 5 % Final    BASOPHILS 01/24/2018 1  0 - 2 % Final    ABS. NEUTROPHILS 01/24/2018 1.5* 1.8 - 8.0 K/UL Final    ABS. LYMPHOCYTES 01/24/2018 2.2  0.9 - 3.6 K/UL Final    ABS. MONOCYTES 01/24/2018 0.7  0.05 - 1.2 K/UL Final    ABS. EOSINOPHILS 01/24/2018 0.0  0.0 - 0.4 K/UL Final    ABS.  BASOPHILS 01/24/2018 0.0  0.0 - 0.06 K/UL Final    DF 01/24/2018 AUTOMATED    Final    Sodium 01/24/2018 143  136 - 145 mmol/L Final    Potassium 01/24/2018 4.4  3.5 - 5.5 mmol/L Final    Chloride 01/24/2018 106  100 - 108 mmol/L Final    CO2 01/24/2018 30  21 - 32 mmol/L Final    Anion gap 01/24/2018 7  3.0 - 18 mmol/L Final    Glucose 01/24/2018 85  74 - 99 mg/dL Final    BUN 01/24/2018 16  7.0 - 18 MG/DL Final    Creatinine 01/24/2018 0.88 0.6 - 1.3 MG/DL Final    BUN/Creatinine ratio 01/24/2018 18  12 - 20   Final    GFR est AA 01/24/2018 >60  >60 ml/min/1.73m2 Final    GFR est non-AA 01/24/2018 >60  >60 ml/min/1.73m2 Final    Calcium 01/24/2018 9.3  8.5 - 10.1 MG/DL Final    Bilirubin, total 01/24/2018 0.3  0.2 - 1.0 MG/DL Final    ALT (SGPT) 01/24/2018 19  13 - 56 U/L Final    AST (SGOT) 01/24/2018 12* 15 - 37 U/L Final    Alk. phosphatase 01/24/2018 84  45 - 117 U/L Final    Protein, total 01/24/2018 6.9  6.4 - 8.2 g/dL Final    Albumin 01/24/2018 3.8  3.4 - 5.0 g/dL Final    Globulin 01/24/2018 3.1  2.0 - 4.0 g/dL Final    A-G Ratio 01/24/2018 1.2  0.8 - 1.7   Final     10/27/2017 Dr. James Backbone office WBC 5.1       Hb 13.7/ Hct 41.1       Platelets 270       47/51/5877 Dr. James Backbone office WBC 5.2       Hb 13.9/ Hct 41.4       Platelets 178       Glucose 87       Na 141/ K 5.1/ CO2 29       Ca 9.8       AST 16/ ALT 15       Alkaline phosphatase 67       Creatinine 0.84/ eGFR >60    Health Maintenance:  Screening:    Mammogram: negative (8/2017)    PAP smear: negative (9/2013) Dr Domingo Beyer. Pelvic ultrasound negative (9/2015). No further screening needed. Colorectal: colonoscopy (2/2016) hyperplastic polyp; Dr. Beverley López. Follow-up 2026. Depression: none   DM (HbA1c/FPG): FPG 87 (4/2017)   Hepatitis C: unknown   Falls: none   DEXA: osteopenia (11/2017) s/p T11 compression fracture in 4/2016.    Smoking:distant past   Glaucoma: regular eye exams with Dr. Shala Zhang (last 2/2018)   Vitamin D: 41.0 (10/2017)   Medicare Wellness: today    Impression:  Patient Active Problem List   Diagnosis Code    Tourette syndrome F95.2    Osteoarthritis, Status post right total knee replacement M19.90    Lumbar spinal stenosis M48.061    HLD (hyperlipidemia) E78.5    Breast cancer ([facility] [address] metastatic to lungs  C50.919    Essential hypertension I10    Diverticulosis K57.90    Osteopenia M85.80    Lymphedema of arm left I89.0    Insomnia G47.00    Fatigue R53.83    Diverticulitis, recurrent K57.92    T11 Vertebral compression fracture (HCC) s/p kyphoplasty M48.50XA    Personal history of malignant neoplasm of breast Z85.3    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.812    Spinal stenosis of cervical region M48.02    Right shoulder pain M25.511       Plan:  1. Hypertension. Well controlled on losartan. Renal function has been normal with creatinine 0.88/ eGFR >60. Continue to follow. 2. Breast cancer with pulmonary metastasis. Continue current therapy as per Dr. Bruna Ruiz. Recent CT scans (last 2/2018) with stable pulmonary nodules. On Ibrance and Faslodex. Continue use of compression sleeve for lymphedema. Follow. 3. Hyperlipidemia. Calculated 10 year ASCVD risk is 9.7 %, which places her in one of the four statin benefit groups (primary prevention with risk > 7.5%). Given lack of history of ASCVD, no evidence of atherosclerotic disease on chest and abdominal CT scan, and history of metastatic breast cancer, would be reasonable to hold off on statin therapy for now and attempt lifestyle changes. Will reassess and discuss further at next visit. Continue to emphasized importance of lifestyle modifications, including diet, exercise, and weight loss. 4. T11 compression fracture. S/P kyphoplasty with no further difficulties. Follow. 5. Osteopenia. Last bone density scan 11/2017. Using femoral neck T-scores, calculated FRAX score estimates her 10 year risk of a major osteoporetic fracture at 15% and hip fracture at 2.0%, which alone are not an indication for biphosphonate treatment. However, the development of a vertebral compression fracture in 4/2016 would be an indication for treatment. In addition, treatment with an estrogen receptor blocker increases likelihood of progression.  She did not tolerate alendronate in the past. Had discussed the benefits and risks of therapy with the patient previously, and preauthorization had been obtained for her to begin receiving denosumab therapy through Dr. Geiger Said office. However, she has had some difficulty with side effects from treatment with Ibrance, and had decided not to proceed with Prolia therapy. It was readdressed after her repeat bone density in 11/2017 and she decided to defer treatment for now as not wishing to proceed. Continue calcium and Vitamin D. Encouraged to continue exercises as instructed in physical therapy. Will reassess bone density scan in 2019. If evidence of progression of osteopenia, will readdress treatment options. 6. Recurrent diverticulitis. Colonoscopy (2/2016) with moderately severe diverticulosis in the ascending and descending colon. Abdominal CT scan in 2/2018 confirmed this finding. Multiple recent episodes of abdominal pain which were treated empirically with Cipro. However, CT scan was not obtained to confirm diagnosis of diverticulitis. Last episode of symptoms resolved prior to initiating antibiotics, bringing into question whether her abdominal symptoms are due to diverticulitis or irritable bowel syndrome. Repeat staging CT scan in 2/2018 did not show evidence of chronic inflammation or changes. Discussed that next time abdominal complaints develop, would recommend that she undergo abdominal CT scan to confirm the diagnosis prior to initiating treatment. If true diverticulitis, discussed that due to allergy to Flagyl, optimal coverage would be Augmentin and not Cipro alone. Patient agreeable. Will continue to follow. 7. Fatigue. Improved slightly after changed from letrozole to fulvestrant. Discussed improved sleep hygiene to see if will help with controlling fatigue. Follow. 8. Right shoulder pain. X-ray normal. Wishing to proceed with a right shoulder MRI to evaluate. Follow. 9. Low back pain/ neck pain. Improved. Being managed currently by Dr. Nichole Alvarado with Meloxicam, Flexeril, and gabapentin. Not wishing further epidural injections currently. Follow. 10. Tourette's syndrome. Followed by Dr. Jeyson Gonzalez. Continue Haldol and prn diazepam for sleep. 11. Right ovarian cyst. No further monitoring needed as per Dr. Lisbet Armenta. Patient expressed concern about not following cyst, but surveillance CT scans obtained every 6 months for breast cancer do include imaging of pelvis. Has been stable. Patient reassured. Follow. 12. Health maintenance. Already received influenza vaccine. Discussed possibility of Shingrix vaccine, but given immunosuppressed will wait for recommendations to be finalized. Other immunizations up to date. Mammogram up to date. Will perform breast exam every visit. Vitamin D level normal. Continue maintenance dose supplement. Continue regular eye exams with Dr. Niharika Slaughter. In addition, an annual Medicare wellness visit was done today. Total time: 40 minutes spent with the patient in face-to-face consultation of which greater than 50% was spent on counseling, answering questions and/or coordination of care. Complex medical review and management performed. Patient understands recommendations and agrees with plan. Follow-up in 6 months.

## 2018-05-14 ENCOUNTER — HOSPITAL ENCOUNTER (OUTPATIENT)
Age: 73
Discharge: HOME | End: 2018-05-14
Attending: INTERNAL MEDICINE
Payer: MEDICARE

## 2018-05-14 DIAGNOSIS — M25.511 ACUTE PAIN OF RIGHT SHOULDER: ICD-10-CM

## 2018-05-14 PROCEDURE — 73221 MRI JOINT UPR EXTREM W/O DYE: CPT

## 2018-05-15 ENCOUNTER — TELEPHONE (OUTPATIENT)
Dept: INTERNAL MEDICINE CLINIC | Age: 73
End: 2018-05-15

## 2018-05-15 DIAGNOSIS — M75.111 INCOMPLETE ROTATOR CUFF TEAR OR RUPTURE OF RIGHT SHOULDER, NOT SPECIFIED AS TRAUMATIC: ICD-10-CM

## 2018-05-15 DIAGNOSIS — G89.29 CHRONIC RIGHT SHOULDER PAIN: Primary | ICD-10-CM

## 2018-05-15 DIAGNOSIS — R93.6 ABNORMAL MRI, SHOULDER: ICD-10-CM

## 2018-05-15 DIAGNOSIS — M25.511 CHRONIC RIGHT SHOULDER PAIN: Primary | ICD-10-CM

## 2018-05-23 RX ORDER — HALOPERIDOL 2 MG/1
2 TABLET ORAL 2 TIMES DAILY
Qty: 180 TAB | Refills: 2 | Status: SHIPPED | OUTPATIENT
Start: 2018-05-23 | End: 2019-03-21 | Stop reason: SDUPTHER

## 2018-06-06 ENCOUNTER — TELEPHONE (OUTPATIENT)
Dept: INTERNAL MEDICINE CLINIC | Age: 73
End: 2018-06-06

## 2018-06-06 DIAGNOSIS — M54.5 CHRONIC BILATERAL LOW BACK PAIN, WITH SCIATICA PRESENCE UNSPECIFIED: ICD-10-CM

## 2018-06-06 DIAGNOSIS — M25.562 CHRONIC PAIN OF LEFT KNEE: ICD-10-CM

## 2018-06-06 DIAGNOSIS — G89.29 CHRONIC PAIN OF LEFT KNEE: ICD-10-CM

## 2018-06-06 DIAGNOSIS — R29.898 WEAKNESS OF BOTH LEGS: ICD-10-CM

## 2018-06-06 DIAGNOSIS — G89.29 CHRONIC BILATERAL LOW BACK PAIN, WITH SCIATICA PRESENCE UNSPECIFIED: ICD-10-CM

## 2018-06-06 DIAGNOSIS — M48.061 SPINAL STENOSIS OF LUMBAR REGION WITHOUT NEUROGENIC CLAUDICATION: Primary | ICD-10-CM

## 2018-06-06 NOTE — TELEPHONE ENCOUNTER
PT would like to speak with you.  Pt has questions about her back- does she need physical therapy and some other back and knee issues

## 2018-06-08 NOTE — TELEPHONE ENCOUNTER
Spoke with patient earlier this evening. Discussed that noticing some increasing lower back pain, particularly with ambulation. Feels that lower legs are weak and would benefit from physical therapy. States that pain is not new, just worse and feels that physical therapy will help. Will place referral.    Also, discussed pain in left knee particularly when walking up stairs. Will order left knee xray.

## 2018-06-11 ENCOUNTER — HOSPITAL ENCOUNTER (OUTPATIENT)
Dept: GENERAL RADIOLOGY | Age: 73
Discharge: HOME | End: 2018-06-11
Payer: MEDICARE

## 2018-06-11 DIAGNOSIS — G89.29 CHRONIC PAIN OF LEFT KNEE: ICD-10-CM

## 2018-06-11 DIAGNOSIS — M25.562 CHRONIC PAIN OF LEFT KNEE: ICD-10-CM

## 2018-06-11 PROCEDURE — 73564 X-RAY EXAM KNEE 4 OR MORE: CPT

## 2018-06-12 ENCOUNTER — TELEPHONE (OUTPATIENT)
Dept: INTERNAL MEDICINE CLINIC | Age: 73
End: 2018-06-12

## 2018-06-19 ENCOUNTER — HOSPITAL ENCOUNTER (OUTPATIENT)
Dept: PHYSICAL THERAPY | Age: 73
Discharge: HOME | End: 2018-06-19
Payer: MEDICARE

## 2018-06-19 PROCEDURE — G8982 BODY POS GOAL STATUS: HCPCS

## 2018-06-19 PROCEDURE — 97162 PT EVAL MOD COMPLEX 30 MIN: CPT

## 2018-06-19 PROCEDURE — G8981 BODY POS CURRENT STATUS: HCPCS

## 2018-06-19 PROCEDURE — 97110 THERAPEUTIC EXERCISES: CPT

## 2018-06-19 NOTE — PROGRESS NOTES
[facility]  [address]  [place], [address]  [phone] fax    Plan of Care/ Statement of Necessity for Physical Therapy Services    Patient name: Malissa Mendez Start of Care: 2018   Referral source: Kerry Simpson MD : 1945    Medical Diagnosis: Other symptoms and signs involving the musculoskeletal system [R29.898]  Pain in thoracic spine [M54.6]   Onset Date:Chronic (LBP), 3-4 weeks concerning left quad pain    Treatment Diagnosis: LBP with LE weakness   Prior Hospitalization: see medical history Provider#: [id number]   Medications: Verified on Patient summary List    Comorbidities: Tourette's, right TKR, Vertebroplasy of T11, Osteopenia, Breast cx with lung metastasis, Scoliosis, HTN, known right rotator cuff tear. Prior Level of Function: Able to perform laundry, ambulate > than 1 block, and perform dishes with greater ease without having to take breaks. The Plan of Care and following information is based on the information from the initial evaluation. Assessment/ key information: The patient is a 81year old female with a chief complaint of back pain and left LE weakness and overall fatigue during chores. She states that she has had chronic low back pain, but development of left quad pain initiating about 3-4 weeks ago which is bothersome when going up and down stairs as well as performing transfers. She states that she is continuing to take her chemo treatment as well as Faslodex injections. The patient presents with signs and symptoms that do appear consistent with mechanical back pain. She has impairments consisting of pain, decreased ROM, decreased flexibility, decreased strength especially associated with her left hip. The patient will benefit from skilled PT in order to address the aforementioned impairments.     Evaluation Complexity History HIGH Complexity :3+ comorbidities / personal factors will impact the outcome/ POC ; Examination MEDIUM Complexity : 3 Standardized tests and measures addressing body structure, function, activity limitation and / or participation in recreation  ;Presentation MEDIUM Complexity : Evolving with changing characteristics  ; Clinical Decision Making MEDIUM Complexity : FOTO score of 26-74  Overall Complexity Rating: MEDIUM  Problem List: pain affecting function, decrease ROM, decrease strength, decrease ADL/ functional abilitiies, decrease activity tolerance and decrease flexibility/ joint mobility   Treatment Plan may include any combination of the following: Therapeutic exercise, Therapeutic activities, Neuromuscular re-education, Physical agent/modality, Manual therapy, Patient education and Functional mobility training  Patient / Family readiness to learn indicated by: asking questions, trying to perform skills and interest  Persons(s) to be included in education: patient (P)  Barriers to Learning/Limitations: None  Patient Goal (s): back + leg strengthening  Patient Self Reported Health Status: fair  Rehabilitation Potential: fair    Short Term Goals: To be accomplished in 2 weeks:   1. The patient will be independent and compliant with HEP to maximize therapeutic benefit. 2. The patient will attain 5 sit to stands void of UEs without provocation of left quad pain in order to improve ADL ease. Long Term Goals: To be accomplished in 4 weeks:   1. The patient will improve FOTO score to predicted value in order to improve ease of ADLs. 2. The patient will improve hip ABD of left hip to 4/5 MMT to maximize stability during stance. 3. The patient will improve quad strength of left hip flexor to 4/5 MMT to maximize ease of stair negotiation. 4. The patient will complete 30\" sit to stand test in order to improve ease of sit to stands. Frequency / Duration: Patient to be seen 2 times per week for 4 weeks.     Patient/ Caregiver education and instruction: Diagnosis, prognosis, self care, activity modification and exercises   [x]  Plan of care has been reviewed with [facility]    G-Codes (GP)  Position   Current  CL= 60-79%   Goal  CK= 40-59%    The severity rating is based on clinical judgment and the FOTO score. Certification Period: 6/19/2018 - 8/19/2018  Shelby Worrell, PT 6/19/2018 1:48 PM    ________________________________________________________________________    I certify that the above Therapy Services are being furnished while the patient is under my care. I agree with the treatment plan and certify that this therapy is necessary.     [de-identified] Signature:____________________  Date:____________Time: _________    Please sign and return to [facility] [address] [address] Mehran Keith Ramos [address] [place] [address], [facility] [address]  [phone] fax

## 2018-06-19 NOTE — PROGRESS NOTES
PT DAILY TREATMENT NOTE - [facility]     Patient Name: Verna Lara  Date:2018  : 1945  [x]  Patient  Verified  Payor: Abi Hinkle / Plan: [place] / Product Type: Medicare /    In time:10:40  Out time:11:25  Total Treatment Time (min): 45  Total Timed Codes (min): 45  1:1 Treatment Time ( [address] only): 45   Visit #: 1 of 8    Treatment Area: Other symptoms and signs involving the musculoskeletal system [R29.898]  Pain in thoracic spine [M54.6]    SUBJECTIVE  Pain Level (0-10 scale): 0/10  Any medication changes, allergies to medications, adverse drug reactions, diagnosis change, or new procedure performed?: [x] No    [] Yes (see summary sheet for update)  Subjective functional status/changes:   [] No changes reported  See IE    OBJECTIVE  35 min [x]Eval                  []Re-Eval       10 min Therapeutic Exercise:  [x] See flow sheet :   Rationale: increase ROM and increase strength to improve the patients ability to improve ADL ease. With   [] TE   [] TA   [] neuro   [] other: Patient Education: [x] Review HEP    [] Progressed/Changed HEP based on:   [] positioning   [] body mechanics   [] transfers   [] heat/ice application    [] other:      Other Objective/Functional Measures: See IE     Pain Level (0-10 scale) post treatment: 0/10    ASSESSMENT/Changes in Function: See POC. Patient will continue to benefit from skilled PT services to modify and progress therapeutic interventions, address functional mobility deficits, address ROM deficits, address strength deficits, analyze and address soft tissue restrictions, analyze and cue movement patterns, analyze and modify body mechanics/ergonomics, assess and modify postural abnormalities and instruct in home and community integration to attain remaining goals. [x]  See Plan of Care  []  See progress note/recertification  []  See Discharge Summary         Progress towards goals / Updated goals:  Short Term Goals:  To be accomplished in 2 weeks:                        1. The patient will be independent and compliant with HEP to maximize therapeutic benefit. 2. The patient will attain 5 sit to stands void of UEs without provocation of left quad pain in order to improve ADL ease. Long Term Goals: To be accomplished in 4 weeks:                        1. The patient will improve FOTO score to predicted value in order to improve ease of ADLs. 2. The patient will improve hip ABD of left hip to 4/5 MMT to maximize stability during stance. 3. The patient will improve quad strength of left hip flexor to 4/5 MMT to maximize ease of stair negotiation. 4. The patient will complete 30\" sit to stand test in order to improve ease of sit to stands.     PLAN  []  Upgrade activities as tolerated     [x]  Continue plan of care  []  Update interventions per flow sheet       []  Discharge due to:_  []  Other:_      Sleepy Eye [place], PT 6/19/2018  5:54 PM    Future Appointments  Date Time Provider [facility]   7/2/2018 3:00 PM Sleepy Eye [place], PT [facility]   7/5/2018 4:30 PM Sleepy Eye [place], PT [facility]   7/10/2018 11:30 AM Sleepy Eye [place], PT [facility]   7/12/2018 3:00 PM Sleepy Eye [place], PT [facility]   7/17/2018 11:30 AM Sleepy Eye [place], PT [facility]   7/19/2018 3:00 PM Sleepy Eye [place], PT [facility]   7/24/2018 11:30 AM Sleepy Eye [place], PT [facility]   7/26/2018 3:00 PM Sleepy Eye [place], PT [facility]   11/6/2018 10:30 AM Jenel Mohs, MD [address]

## 2018-07-02 ENCOUNTER — HOSPITAL ENCOUNTER (OUTPATIENT)
Dept: PHYSICAL THERAPY | Age: 73
Discharge: HOME | End: 2018-07-02
Payer: MEDICARE

## 2018-07-02 PROCEDURE — 97112 NEUROMUSCULAR REEDUCATION: CPT

## 2018-07-02 PROCEDURE — 97110 THERAPEUTIC EXERCISES: CPT

## 2018-07-02 NOTE — PROGRESS NOTES
PT DAILY TREATMENT NOTE - [facility]     Patient Name: Randall Spears  Date:2018  : 1945  [x]  Patient  Verified  Payor: [place] MEDICARE / Plan: [place] MEDICARE PART A & B / Product Type: Medicare /    In time:3:00  Out time:3:41  Total Treatment Time (min): 41  Total Timed Codes (min): 41  1:1 Treatment Time ( [address] only): 41   Visit #: 2 of 8      Treatment Area: Muscle weakness (generalized) [M62.81]  Low back pain [M54.5]    SUBJECTIVE  Pain Level (0-10 scale): 0/10  Any medication changes, allergies to medications, adverse drug reactions, diagnosis change, or new procedure performed?: [x] No    [] Yes (see summary sheet for update)  Subjective functional status/changes:   [] No changes reported  The patient indicates that she had to take the first week off due to having a lot of activity. She did report that while doing a [address] she has had pain increase in her right shoulder. She did get in to see Ortho but they denied giving an injection due to having one recently with no change. She states that she has had more pain through her left anterior thigh into her knee. She did see her Oncologist and he indicated that he does believe it is coming from her back and that PT can be helpful for this. OBJECTIVE  31 min Therapeutic Exercise:  [x] See flow sheet :   Rationale: increase ROM and increase strength to improve the patients ability to improve ADL ease. 10 min Neuromuscular Re-education:  [x]  See flow sheet :   Rationale: increase ROM and increase strength  to improve the patients ability to improve ADL ease. With   [] TE   [] TA   [] neuro   [] other: Patient Education: [x] Review HEP    [] Progressed/Changed HEP based on:   [] positioning   [] body mechanics   [] transfers   [] heat/ice application    [] other:      Other Objective/Functional Measures:   Initiated exercises to include a diaphragmatic emphasis.    No shoulder pain experienced during Alternating UE core stability exercises (performed in pain free range ~125 degrees of flexion). Pain Level (0-10 scale) post treatment: 0/10    ASSESSMENT/Changes in Function: The patient has been updated with hinging forward HS stretch seated. Instructed to continue HEP, and though she was issued a script for her shoulder, the patient does not wish to pursue this at this time. Performed exercises with fairly good tolerance, no reports of increased pain with exception of slight provocation during sit to stand last repetition into left quad. Continue to progress as tolerated. Patient will continue to benefit from skilled PT services to modify and progress therapeutic interventions, address functional mobility deficits, address ROM deficits, address strength deficits, analyze and address soft tissue restrictions, analyze and cue movement patterns, analyze and modify body mechanics/ergonomics, assess and modify postural abnormalities and instruct in home and community integration to attain remaining goals. []  See Plan of Care  []  See progress note/recertification  []  See Discharge Summary         Progress towards goals / Updated goals:  Short Term Goals: To be accomplished in 2 weeks:                        1. The patient will be independent and compliant with HEP to maximize therapeutic benefit. Reports partial compliance 7/02/2018                        2. The patient will attain 5 sit to stands void of UEs without provocation of left quad pain in order to improve ADL ease. Long Term Goals: To be accomplished in 4 weeks:                        1. The patient will improve FOTO score to predicted value in order to improve ease of ADLs. 2. The patient will improve hip ABD of left hip to 4/5 MMT to maximize stability during stance. 3. The patient will improve quad strength of left hip flexor to 4/5 MMT to maximize ease of stair negotiation.                         4. The patient will complete 30\" sit to stand test in order to improve ease of sit to stands.     PLAN  []  Upgrade activities as tolerated     [x]  Continue plan of care  []  Update interventions per flow sheet       []  Discharge due to:_  []  Other:_      Karol Hancock, PT 7/2/2018  3:06 PM    Future Appointments  Date Time Provider Owen Swann   7/5/2018 4:30 PM Karol Hancock, PT [facility]   7/10/2018 11:30 AM Karol Hancock, PT [facility]   7/12/2018 3:00 PM Karol Romeros, PT [facility]   7/17/2018 11:30 AM Karol Bulls, PT [facility]   7/19/2018 3:00 PM Karol Bulls, PT [facility]   7/24/2018 11:30 AM Karol Bulls, PT [facility]   7/26/2018 3:00 PM David Rojas, PTA [facility]   11/6/2018 10:30 AM Oziel Deshpande MD [address]

## 2018-07-05 ENCOUNTER — HOSPITAL ENCOUNTER (OUTPATIENT)
Dept: PHYSICAL THERAPY | Age: 73
Discharge: HOME | End: 2018-07-05
Payer: MEDICARE

## 2018-07-05 PROCEDURE — 97110 THERAPEUTIC EXERCISES: CPT

## 2018-07-05 PROCEDURE — 97112 NEUROMUSCULAR REEDUCATION: CPT

## 2018-07-05 NOTE — PROGRESS NOTES
PT DAILY TREATMENT NOTE - [facility]     Patient Name: Betsy Hancock  Date:2018  : 1945  [x]  Patient  Verified  Payor: [place] MEDICARE / Plan: [place] MEDICARE PART A & B / Product Type: Medicare /    In time:4:30  Out time: 5:03  Total Treatment Time (min): 33  Total Timed Codes (min): 33  1:1 Treatment Time ( only): 33   Visit #: 3 of 8    Treatment Area: Muscle weakness (generalized) [M62.81]  Low back pain [M54.5]    SUBJECTIVE  Pain Level (0-10 scale): 0/10  Any medication changes, allergies to medications, adverse drug reactions, diagnosis change, or new procedure performed?: [x] No    [] Yes (see summary sheet for update)  Subjective functional status/changes:   [] No changes reported  The patient states that she took her 1year old grandson to a museum for about 4 hours this morning and finds herself fairly fatigued upon arrival. She does indicate that she did not sleep great last night either. OBJECTIVE  23 min Therapeutic Exercise:  [x] See flow sheet :   Rationale: increase ROM and increase strength to improve the patients ability to improve ADL ease. 10 min Neuromuscular Re-education:  [x]  See flow sheet :   Rationale: increase ROM and increase strength  to improve the patients ability to improve ADL ease    With   [] TE   [] TA   [] neuro   [] other: Patient Education: [x] Review HEP    [] Progressed/Changed HEP based on:   [] positioning   [] body mechanics   [] transfers   [] heat/ice application    [] other:      Other Objective/Functional Measures:      Pain Level (0-10 scale) post treatment: 0/10    ASSESSMENT/Changes in Function:  The patient performed 6 sit to stand reps without discomfort, but did begin to get discomfort into the second set. Progressing fairly well with regards to activity tolerance.     Patient will continue to benefit from skilled PT services to modify and progress therapeutic interventions, address functional mobility deficits, address ROM deficits, address strength deficits, analyze and address soft tissue restrictions, analyze and cue movement patterns, analyze and modify body mechanics/ergonomics, assess and modify postural abnormalities and instruct in home and community integration to attain remaining goals. []  See Plan of Care  []  See progress note/recertification  []  See Discharge Summary         Progress towards goals / Updated goals:  Short Term Goals: To be accomplished in 2 weeks:                        1. The patient will be independent and compliant with HEP to maximize therapeutic benefit. Met 7/05/2018                        2. The patient will attain 5 sit to stands void of UEs without provocation of left quad pain in order to improve ADL ease. Long Term Goals: To be accomplished in 4 weeks:                        1. The patient will improve FOTO score to predicted value in order to improve ease of ADLs. 2. The patient will improve hip ABD of left hip to 4/5 MMT to maximize stability during stance. 3. The patient will improve quad strength of left hip flexor to 4/5 MMT to maximize ease of stair negotiation. 4. The patient will complete 30\" sit to stand test in order to improve ease of sit to stands.     PLAN  []  Upgrade activities as tolerated     [x]  Continue plan of care  []  Update interventions per flow sheet       []  Discharge due to:_  []  Other:_      Oziel Kramer, PT 7/5/2018  4:34 PM    Future Appointments  Date Time Provider [facility] Sw[facility]   7/10/2018 11:30 AM Oziel Kramer, PT [facility]   7/12/2018 3:00 PM Oziel Kramer, PT [facility]   7/17/2018 11:30 AM Oziel Kramer, PT [facility]   7/19/2018 3:00 PM Oziel Kramer, PT [facility]   7/24/2018 11:30 AM Oziel Kramer, PT [facility]   7/26/2018 3:00 PM Za Trejo, PTA [facility]   11/6/2018 10:30 AM Keila Andujar MD [facility]

## 2018-07-10 ENCOUNTER — TELEPHONE (OUTPATIENT)
Dept: INTERNAL MEDICINE CLINIC | Age: 73
End: 2018-07-10

## 2018-07-10 ENCOUNTER — HOSPITAL ENCOUNTER (OUTPATIENT)
Dept: PHYSICAL THERAPY | Age: 73
Discharge: HOME | End: 2018-07-10
Payer: MEDICARE

## 2018-07-10 PROCEDURE — 97112 NEUROMUSCULAR REEDUCATION: CPT

## 2018-07-10 PROCEDURE — 97110 THERAPEUTIC EXERCISES: CPT

## 2018-07-10 NOTE — TELEPHONE ENCOUNTER
Pt called again- she needs to speak with you before you order CT- she needs to coordinate with oncologist

## 2018-07-10 NOTE — TELEPHONE ENCOUNTER
Pt stated tht she has been having issues with diverticulitis over the weekend, she is oncology pt and due to have a cat scan in late August 20 of the abdomen, pelvis and the chest, she has tenderness on her left side of her abdomen, do you want her to have cat scan sooner or just wait until August, she wants  to discuss with you. Pt is taking chemo pills ibrance.

## 2018-07-10 NOTE — TELEPHONE ENCOUNTER
Spoke with patient. Reports onset of left lower quadrant pain on Sunday (7/8) morning. States that it is exacerbated by walking or leaning over. Feeling twinges of pain with movement. Reports that pain is intermittent and feels it may be improving. Denies any fever, chills, dysuria, hematuria, diarrhea, or constipation. Eating normally. Discussed plan to obtain a CT scan with next episode of LLQ pain. However, has CT staging with abdom CT scan next month with Dr. Rosanna Prajapati. Decision made to hold off for now unless pain or symptoms worsen. Discussed that pain may not be due to diverticulitis given past episodes resolving without treatment. Does have low back pain and prior kyphoplasty. States that pain does seem to radiate around to her back in the area of kyphoplasty. Plan to continue to observe, and will move up CT scan if pain worsens. Ihsan Means

## 2018-07-10 NOTE — PROGRESS NOTES
PT DAILY TREATMENT NOTE - [facility]     Patient Name: Berto Zacarias  Date:7/10/2018  : 1945  [x]  Patient  Verified  Payor: Zuly Hu / Plan: [place] MEDICARE PART A & B / Product Type: Medicare /    In time:11:30  Out time:12:08  Total Treatment Time (min): 38  Total Timed Codes (min): 38  1:1 Treatment Time ( [address] only): 38   Visit #: 4 of 8    Treatment Area: Muscle weakness (generalized) [M62.81]  Low back pain [M54.5]    SUBJECTIVE  Pain Level (0-10 scale): 0/10  Any medication changes, allergies to medications, adverse drug reactions, diagnosis change, or new procedure performed?: [x] No    [] Yes (see summary sheet for update)  Subjective functional status/changes:   [] No changes reported  The patient indicates that she has been having some pain associated with diverticulosis which she has had before and she has already been contacting her Oncologist. She indicates she has scans set up for August, but they are trying to determine if scans should be set up sooner. The patient indicates she is being very proactive in getting to the bottom of this. OBJECTIVE  28 min Therapeutic Exercise:  [x] See flow sheet :   Rationale: increase ROM and increase strength to improve the patients ability to improve ADL ease. 10 min Neuromuscular Re-education:  [x]  See flow sheet :   Rationale: increase ROM and increase strength  to improve the patients ability to improve ADL ease. With   [] TE   [] TA   [] neuro   [] other: Patient Education: [x] Review HEP    [] Progressed/Changed HEP based on:   [] positioning   [] body mechanics   [] transfers   [] heat/ice application    [] other:      Other Objective/Functional Measures:   5x sit to stand chair rise test performed in 30\" void of quad or knee pain. Added 1.5# to LAQs with good progression    Able to perform partial bridge today with good ROM appreciated.      Pain Level (0-10 scale) post treatment: 0/10    ASSESSMENT/Changes in Function: The patient is progressing well with notable 2 sets of 6 sit to stands prior to onset of quad pain. She indicates per subjective that she is able to negotiate stairs with reciprical pattern with pain, but was unable to do so at all prior. Patient will continue to benefit from skilled PT services to modify and progress therapeutic interventions, address functional mobility deficits, address ROM deficits, address strength deficits, analyze and address soft tissue restrictions, analyze and cue movement patterns, analyze and modify body mechanics/ergonomics, assess and modify postural abnormalities and instruct in home and community integration to attain remaining goals. []  See Plan of Care  []  See progress note/recertification  []  See Discharge Summary         Progress towards goals / Updated goals:  Short Term Goals: To be accomplished in 2 weeks:                        0. The patient will be independent and compliant with HEP to maximize therapeutic benefit. Met 7/05/2018                        2. The patient will attain 5 sit to stands void of UEs without provocation of left quad pain in order to improve ADL ease. Met - able to perform 6 reps 7/10/2018  Long Term Goals: To be accomplished in 4 weeks:                        1. The patient will improve FOTO score to predicted value in order to improve ease of ADLs.                       3. The patient will improve hip ABD of left hip to 4/5 MMT to maximize stability during stance.                        3. The patient will improve quad strength of left hip flexor to 4/5 MMT to maximize ease of stair negotiation.                        4. The patient will complete 30\" sit to stand test in order to improve ease of sit to stands.     PLAN  []  Upgrade activities as tolerated     [x]  Continue plan of care  []  Update interventions per flow sheet       []  Discharge due to:_  []  Other:_      Yaya Quispe, PT 7/10/2018  11:52 AM    Future Appointments  Date Time Provider [facility]   7/12/2018 3:00 PM Mitul Gil, PT [facility]   7/17/2018 11:30 AM Mitul Gil, PT [facility]   7/19/2018 3:00 PM Mitul Gil, PT [facility]   7/24/2018 11:30 AM Mitul Gil, PT [facility]   7/26/2018 3:00 PM Geoffrey Espinal, PTA [facility]   11/6/2018 10:30 AM Lorrie Barfield MD [facility]

## 2018-07-12 ENCOUNTER — HOSPITAL ENCOUNTER (OUTPATIENT)
Dept: PHYSICAL THERAPY | Age: 73
Discharge: HOME | End: 2018-07-12
Payer: MEDICARE

## 2018-07-12 PROCEDURE — 97110 THERAPEUTIC EXERCISES: CPT

## 2018-07-12 PROCEDURE — 97112 NEUROMUSCULAR REEDUCATION: CPT

## 2018-07-12 NOTE — TELEPHONE ENCOUNTER
Patient stating she just left her therapist and he advised her to get in touch with you. She is asking for a call back.

## 2018-07-12 NOTE — TELEPHONE ENCOUNTER
Spoke with patient. Continuing to have left lower quadrant pain. Normal bowel movements. No fever or chills and eating normally. Concern for recurrent diverticulitis, but would not want to treat empirically with antibiotics given clinical stability and lack of definitive diagnosis. Patient scheduled for staging chest, abdomen, and pelvis CT scan for 8/20/2018 ordered by Dr. Sharyle Griffins. Please contact his office and ask if it would be ok to obtain scans now given her abdominal complaints. Would not wish to obtain two CT scans in such a short time frame. If ok, please contact scheduling and move scanning date up for early next week. Thank you! Ivana Ortiz

## 2018-07-12 NOTE — PROGRESS NOTES
PT DAILY TREATMENT NOTE - [facility]     Patient Name: Erick Slade  Date:2018  : 1945  [x]  Patient  Verified  Payor: Matt Jones / Plan: [place] / Product Type: Medicare /    In time:2:53  Out time:3:32  Total Treatment Time (min): 39  Total Timed Codes (min): 39  1:1 Treatment Time ( [address] only): 39   Visit #: 5 of 8    Treatment Area: Muscle weakness (generalized) [M62.81]  Low back pain [M54.5]    SUBJECTIVE  Pain Level (0-10 scale): 0/10  Any medication changes, allergies to medications, adverse drug reactions, diagnosis change, or new procedure performed?: [x] No    [] Yes (see summary sheet for update)  Subjective functional status/changes:   [] No changes reported  The patient reports that her diverticulosis pain in her the left side of her abdomen is still evident. She indicates she had talked to her Physician (PCP) over the phone and, again, she is due for scans in August, but they are trying to determine if they should move them up. She does have known vertebroplasty of T11 and per PCP is wondering if she is having referral pain from this per patient. The patient did indicate that she had a quite active day yesterday and was exhausted, awoke this morning with back pain, but doing fairly well at the moment. OBJECTIVE  29 min Therapeutic Exercise:  [x] See flow sheet :   Rationale: increase ROM and increase strength to improve the patients ability to improve ADL ease. 10 min Neuromuscular Re-education:  [x]  See flow sheet :    Rationale: increase ROM and increase strength  to improve the patients ability to improve ADL ease. With   [] TE   [] TA   [] neuro   [] other: Patient Education: [x] Review HEP    [] Progressed/Changed HEP based on:   [] positioning   [] body mechanics   [] transfers   [] heat/ice application    [] other:      Other Objective/Functional Measures:    The patient does have increased discomfort upon palpation through left lower quadrant of abdomen. Performed 10 sit to stands with ER isometric hold of right shoulder for cuff strengthening and core stability. Opted not to fill out FOTO secondary to patient wanting to call MD prior to close. Pain Level (0-10 scale) post treatment: 0/10    ASSESSMENT/Changes in Function: Recommended the patient contact her PCP with known tenderness of this region. PT recommended to patient that she make Oncologist aware of new symptoms. Point tenderness through lower left quadrant. Patient will continue to benefit from skilled PT services to modify and progress therapeutic interventions, address functional mobility deficits, address ROM deficits, address strength deficits, analyze and address soft tissue restrictions, analyze and cue movement patterns, analyze and modify body mechanics/ergonomics, assess and modify postural abnormalities and instruct in home and community integration to attain remaining goals. []  See Plan of Care  []  See progress note/recertification  []  See Discharge Summary         Progress towards goals / Updated goals:  Short Term Goals: To be accomplished in 2 weeks:                        9. The patient will be independent and compliant with HEP to maximize therapeutic benefit. Met 7/05/2018                        2. The patient will attain 5 sit to stands void of UEs without provocation of left quad pain in order to improve ADL ease. Met - able to perform 6 reps 7/10/2018  Long Term Goals: To be accomplished in 4 weeks:                        2. The patient will improve FOTO score to predicted value in order to improve ease of ADLs.                       3. The patient will improve hip ABD of left hip to 4/5 MMT to maximize stability during stance.                        3. The patient will improve quad strength of left hip flexor to 4/5 MMT to maximize ease of stair negotiation.                        4.  The patient will complete 30\" sit to stand test in order to improve ease of sit to stands.       PLAN  []  Upgrade activities as tolerated     [x]  Continue plan of care  []  Update interventions per flow sheet       []  Discharge due to:_  []  Other:_      Samm Richter PT 7/12/2018  2:47 PM    Future Appointments  Date Time Provider Owen Swann   7/12/2018 3:00 PM Samm Richter, PT [facility]   7/17/2018 11:30 AM Samm Richter, PT [facility]   7/19/2018 3:00 PM Samm Richter PT [facility]   7/24/2018 11:30 AM Samm Richter PT [facility]   7/26/2018 3:00 PM Margarita Hopper, PTA [facility]   8/20/2018 1:30 PM [facility] CT RM 1 [facility]   11/6/2018 10:30 AM Mainor Pires MD [facility]

## 2018-07-13 ENCOUNTER — HOSPITAL ENCOUNTER (EMERGENCY)
Age: 73
Discharge: HOME | End: 2018-07-13
Attending: EMERGENCY MEDICINE | Admitting: EMERGENCY MEDICINE
Payer: MEDICARE

## 2018-07-13 ENCOUNTER — TELEPHONE (OUTPATIENT)
Dept: INTERNAL MEDICINE CLINIC | Age: 73
End: 2018-07-13

## 2018-07-13 ENCOUNTER — APPOINTMENT (OUTPATIENT)
Dept: CT IMAGING | Age: 73
End: 2018-07-13
Attending: PHYSICIAN ASSISTANT
Payer: MEDICARE

## 2018-07-13 VITALS
HEART RATE: 96 BPM | DIASTOLIC BLOOD PRESSURE: 85 MMHG | WEIGHT: 140 LBS | RESPIRATION RATE: 18 BRPM | OXYGEN SATURATION: 100 % | TEMPERATURE: 99.5 F | BODY MASS INDEX: 26.45 KG/M2 | SYSTOLIC BLOOD PRESSURE: 131 MMHG

## 2018-07-13 DIAGNOSIS — K57.92 DIVERTICULITIS: Primary | ICD-10-CM

## 2018-07-13 LAB
ALBUMIN SERPL-MCNC: 3.7 G/DL (ref 3.4–5)
ALBUMIN/GLOB SERPL: 0.9 {RATIO} (ref 0.8–1.7)
ALP SERPL-CCNC: 84 U/L (ref 45–117)
ALT SERPL-CCNC: 21 U/L (ref 13–56)
ANION GAP SERPL CALC-SCNC: 8 MMOL/L (ref 3–18)
APPEARANCE UR: CLEAR
AST SERPL-CCNC: 13 U/L (ref 15–37)
BASOPHILS # BLD: 0 K/UL (ref 0–0.1)
BASOPHILS NFR BLD: 1 % (ref 0–2)
BILIRUB SERPL-MCNC: 0.6 MG/DL (ref 0.2–1)
BILIRUB UR QL: NEGATIVE
BUN SERPL-MCNC: 16 MG/DL (ref 7–18)
BUN/CREAT SERPL: 17 (ref 12–20)
CALCIUM SERPL-MCNC: 8.9 MG/DL (ref 8.5–10.1)
CHLORIDE SERPL-SCNC: 102 MMOL/L (ref 100–108)
CO2 SERPL-SCNC: 28 MMOL/L (ref 21–32)
COLOR UR: YELLOW
CREAT SERPL-MCNC: 0.93 MG/DL (ref 0.6–1.3)
DIFFERENTIAL METHOD BLD: ABNORMAL
EOSINOPHIL # BLD: 0.2 K/UL (ref 0–0.4)
EOSINOPHIL NFR BLD: 2 % (ref 0–5)
ERYTHROCYTE [DISTWIDTH] IN BLOOD BY AUTOMATED COUNT: 13.1 % (ref 11.6–14.5)
GLOBULIN SER CALC-MCNC: 4 G/DL (ref 2–4)
GLUCOSE SERPL-MCNC: 115 MG/DL (ref 74–99)
GLUCOSE UR STRIP.AUTO-MCNC: NEGATIVE MG/DL
HCT VFR BLD AUTO: 38.8 % (ref 35–45)
HGB BLD-MCNC: 13.4 G/DL (ref 12–16)
HGB UR QL STRIP: NEGATIVE
KETONES UR QL STRIP.AUTO: NEGATIVE MG/DL
LEUKOCYTE ESTERASE UR QL STRIP.AUTO: NEGATIVE
LIPASE SERPL-CCNC: 215 U/L (ref 73–393)
LYMPHOCYTES # BLD: 1.5 K/UL (ref 0.9–3.6)
LYMPHOCYTES NFR BLD: 24 % (ref 21–52)
MCH RBC QN AUTO: 35.3 PG (ref 24–34)
MCHC RBC AUTO-ENTMCNC: 34.5 G/DL (ref 31–37)
MCV RBC AUTO: 102.1 FL (ref 74–97)
MONOCYTES # BLD: 0.4 K/UL (ref 0.05–1.2)
MONOCYTES NFR BLD: 7 % (ref 3–10)
NEUTS SEG # BLD: 4.2 K/UL (ref 1.8–8)
NEUTS SEG NFR BLD: 66 % (ref 40–73)
NITRITE UR QL STRIP.AUTO: NEGATIVE
PH UR STRIP: 7 [PH] (ref 5–8)
PLATELET # BLD AUTO: 158 K/UL (ref 135–420)
PMV BLD AUTO: 9.2 FL (ref 9.2–11.8)
POTASSIUM SERPL-SCNC: 4.1 MMOL/L (ref 3.5–5.5)
PROT SERPL-MCNC: 7.7 G/DL (ref 6.4–8.2)
PROT UR STRIP-MCNC: NEGATIVE MG/DL
RBC # BLD AUTO: 3.8 M/UL (ref 4.2–5.3)
SODIUM SERPL-SCNC: 138 MMOL/L (ref 136–145)
SP GR UR REFRACTOMETRY: 1.02 (ref 1–1.03)
UROBILINOGEN UR QL STRIP.AUTO: 0.2 EU/DL (ref 0.2–1)
WBC # BLD AUTO: 6.3 K/UL (ref 4.6–13.2)

## 2018-07-13 PROCEDURE — 83690 ASSAY OF LIPASE: CPT

## 2018-07-13 PROCEDURE — 74011636320 HC RX REV CODE- 636/320: Performed by: EMERGENCY MEDICINE

## 2018-07-13 PROCEDURE — 80053 COMPREHEN METABOLIC PANEL: CPT

## 2018-07-13 PROCEDURE — 71260 CT THORAX DX C+: CPT

## 2018-07-13 PROCEDURE — 85025 COMPLETE CBC W/AUTO DIFF WBC: CPT

## 2018-07-13 PROCEDURE — 81003 URINALYSIS AUTO W/O SCOPE: CPT

## 2018-07-13 PROCEDURE — 99283 EMERGENCY DEPT VISIT LOW MDM: CPT

## 2018-07-13 RX ORDER — AMOXICILLIN AND CLAVULANATE POTASSIUM 875; 125 MG/1; MG/1
1 TABLET, FILM COATED ORAL 2 TIMES DAILY
Qty: 30 TAB | Refills: 0 | Status: SHIPPED | OUTPATIENT
Start: 2018-07-13 | End: 2018-07-13 | Stop reason: ALTCHOICE

## 2018-07-13 RX ORDER — CIPROFLOXACIN 500 MG/1
500 TABLET ORAL 2 TIMES DAILY
Qty: 20 TAB | Refills: 0 | Status: SHIPPED | OUTPATIENT
Start: 2018-07-13 | End: 2018-07-14 | Stop reason: ALTCHOICE

## 2018-07-13 RX ORDER — METRONIDAZOLE 500 MG/1
500 TABLET ORAL 3 TIMES DAILY
Qty: 30 TAB | Refills: 0 | Status: SHIPPED | OUTPATIENT
Start: 2018-07-13 | End: 2018-07-14 | Stop reason: SINTOL

## 2018-07-13 RX ADMIN — IOPAMIDOL 100 ML: 612 INJECTION, SOLUTION INTRAVENOUS at 16:12

## 2018-07-13 NOTE — TELEPHONE ENCOUNTER
Spoke with patient. She states that she contacted Dr. Price Been office and they instructed her to go to [facility] ED for evaluation. Dr. Grace Jiménez is rounding in the hospital and will meet her in the ED to assess. She states that her pain is somewhat better this AM but has not resolved.

## 2018-07-13 NOTE — ED PROVIDER NOTES
EMERGENCY DEPARTMENT HISTORY AND PHYSICAL EXAM    3:13 PM      Date: 7/13/2018  Patient Name: Pat Hopper    History of Presenting Illness     Chief Complaint   Patient presents with    Abdominal Pain       History Provided By: Patient    Chief Complaint: Abdominal Plain, LLQ  Duration:  Days  Timing:  Gradual and Worsening  Location: Left lower quadrant  Quality: Aching and Dull  Severity: Moderate  Modifying Factors: Movement, pressure, bowel movements  Associated Symptoms: denies any other associated signs or symptoms      Additional History (Context): Pat Hopper is a 81 y.o. female with hypertension, malignancy and tourettes who presents with left lower quadrant pain that \"feels like my diverticulitis flairs\" in the setting of recently starting chemotherapy for her metastatic breast cancer. Per the patient she has completed two weeks of her newest chemo regimen and about one week ago began to experience left sided abdominal pain that feels like the last time she got diverticulitis. Denies fevers, chills, nausea, vomiting, or other systemic symptoms at this time. PCP: Juan Centeno MD    Current Outpatient Prescriptions   Medication Sig Dispense Refill    amoxicillin-clavulanate (AUGMENTIN) 875-125 mg per tablet Take 1 Tab by mouth two (2) times a day. Indications: Diverticulitis of Gastrointestinal Tract 30 Tab 0    haloperidol (HALDOL) 2 mg tablet Take 1 Tab by mouth two (2) times a day. 180 Tab 2    palbociclib 75 mg cap 75 mg.      fulvestrant (FASLODEX IM) by IntraMUSCular route.  diazePAM (VALIUM) 5 mg tablet Take 1 Tab by mouth every six (6) hours as needed for Anxiety. Max Daily Amount: 20 mg. 30 Tab 0    meloxicam (MOBIC) 15 mg tablet TAKE 1 TABLET EVERY DAY WITH FOOD 90 Tab 1    losartan (COZAAR) 25 mg tablet Take 1 Tab by mouth daily. 90 Tab 3    cyclobenzaprine (FLEXERIL) 5 mg tablet Take 5 mg by mouth.       gabapentin (NEURONTIN) 100 mg capsule Take  by mouth three (3) times daily.  CYANOCOBALAMIN, VITAMIN B-12, (VITAMIN B-12 PO) Take  by mouth.  Biotin 2,500 mcg cap Take  by mouth.  calcium-vitamin D (CALCIUM 500+D) 500 mg(1,250mg) -200 unit per tablet Take 1 Tab by mouth two (2) times daily (with meals).  cholecalciferol, vitamin d3, (VITAMIN D) 1,000 unit tablet Take 2,000 Units by mouth daily.  ascorbic acid (VITAMIN C) 500 mg tablet Take  by mouth. Past History     Past Medical History:  Past Medical History:   Diagnosis Date    Arthritis     Breast cancer metastasized to lung [facility])     Left Breast    Chronic pain     Colon polyps 2/22/16    distal sigmoid, Dr. Ovi HERNANDEZ (degenerative disc disease)     Diverticulitis of colon     Diverticulosis 2/22/16    mild in the ascedning and sigmoid colon, Dr. Damian Burk (hyperlipidemia)     Hypertension     Osteopenia     Tourette syndrome     Vitamin D deficiency        Past Surgical History:  Past Surgical History:   Procedure Laterality Date    HX APPENDECTOMY      HX BREAST BIOPSY  7-30-10    Left Breast w/Nipple Duct exploration and excisional biopsy-left    HX DILATION AND CURETTAGE      x3    HX MODIFIED RADICAL MASTECTOMY  9-3-10    left    HX ORTHOPAEDIC Right 2012    knee replacement    HX TONSILLECTOMY      HYSTEROSCOPY DIAGNOSTIC         Family History:  Family History   Problem Relation Age of Onset    Osteoporosis Sister     Osteoporosis Mother     Stroke Father     Hypertension Father     Cancer Paternal Aunt      breast       Social History:  Social History   Substance Use Topics    Smoking status: Former Smoker    Smokeless tobacco: Never Used    Alcohol use 3.5 oz/week     7 Glasses of wine per week       Allergies:   Allergies   Allergen Reactions    Keflex [Cephalexin] Rash    Metronidazole Rash    Other Medication Nausea Only     Anesthesia    Shellfish Containing Products Nausea and Vomiting     More of just eating the actual shellfish.  Sulfa (Sulfonamide Antibiotics) Rash    Ultram [Tramadol] Nausea and Vomiting     Patient states she is allergic to most Narcotics    Vancomycin Rash         Review of Systems       Review of Systems   Constitutional: Negative for diaphoresis and fever. HENT: Negative for dental problem, sore throat, tinnitus, trouble swallowing and voice change. Eyes: Negative for photophobia. Respiratory: Negative for cough and chest tightness. Cardiovascular: Negative for chest pain, palpitations and leg swelling. Gastrointestinal: Positive for abdominal pain. Negative for abdominal distention, constipation, diarrhea, nausea, rectal pain and vomiting. Endocrine: Positive for cold intolerance. Negative for heat intolerance. Genitourinary: Negative for difficulty urinating, dysuria, flank pain, frequency and pelvic pain. Musculoskeletal: Negative for back pain and gait problem. Skin: Negative for color change and rash. Allergic/Immunologic: Negative for immunocompromised state. Neurological: Negative for dizziness, seizures, light-headedness, numbness and headaches. Hematological: Negative for adenopathy. Psychiatric/Behavioral: Negative for agitation. Physical Exam     Visit Vitals    /85    Pulse 96    Temp 99.5 °F (37.5 °C)    Resp 18    Wt 63.5 kg (140 lb)    SpO2 100%    BMI 26.45 kg/m2         Physical Exam   Constitutional: She is oriented to person, place, and time. She appears well-developed and well-nourished. No distress. HENT:   Head: Normocephalic and atraumatic. Mouth/Throat: Oropharynx is clear and moist. No oropharyngeal exudate. Eyes: Pupils are equal, round, and reactive to light. Right eye exhibits no discharge. Left eye exhibits no discharge. Neck: Normal range of motion. No JVD present. No tracheal deviation present. Cardiovascular: Regular rhythm, normal heart sounds and intact distal pulses. Tachycardia present.   Exam reveals no gallop. No murmur heard. Pulmonary/Chest: Effort normal and breath sounds normal. No stridor. No respiratory distress. She has no wheezes. She has no rales. She exhibits no tenderness. Abdominal: Soft. Bowel sounds are normal. She exhibits no distension. There is tenderness. There is no guarding. Tenderness to LLQ   Musculoskeletal: Normal range of motion. L arm with compression stocking in place   Neurological: She is alert and oriented to person, place, and time. Skin: Skin is warm and dry. She is not diaphoretic. Psychiatric: She has a normal mood and affect. Nursing note and vitals reviewed. Diagnostic Study Results     Labs -  Recent Results (from the past 12 hour(s))   CBC WITH AUTOMATED DIFF    Collection Time: 07/13/18  2:30 PM   Result Value Ref Range    WBC 6.3 4.6 - 13.2 K/uL    RBC 3.80 (L) 4.20 - 5.30 M/uL    HGB 13.4 12.0 - 16.0 g/dL    HCT 38.8 35.0 - 45.0 %    .1 (H) 74.0 - 97.0 FL    MCH 35.3 (H) 24.0 - 34.0 PG    MCHC 34.5 31.0 - 37.0 g/dL    RDW 13.1 11.6 - 14.5 %    PLATELET 391 652 - 776 K/uL    MPV 9.2 9.2 - 11.8 FL    NEUTROPHILS 66 40 - 73 %    LYMPHOCYTES 24 21 - 52 %    MONOCYTES 7 3 - 10 %    EOSINOPHILS 2 0 - 5 %    BASOPHILS 1 0 - 2 %    ABS. NEUTROPHILS 4.2 1.8 - 8.0 K/UL    ABS. LYMPHOCYTES 1.5 0.9 - 3.6 K/UL    ABS. MONOCYTES 0.4 0.05 - 1.2 K/UL    ABS. EOSINOPHILS 0.2 0.0 - 0.4 K/UL    ABS.  BASOPHILS 0.0 0.0 - 0.1 K/UL    DF AUTOMATED     METABOLIC PANEL, COMPREHENSIVE    Collection Time: 07/13/18  2:30 PM   Result Value Ref Range    Sodium 138 136 - 145 mmol/L    Potassium 4.1 3.5 - 5.5 mmol/L    Chloride 102 100 - 108 mmol/L    CO2 28 21 - 32 mmol/L    Anion gap 8 3.0 - 18 mmol/L    Glucose 115 (H) 74 - 99 mg/dL    BUN 16 7.0 - 18 MG/DL    Creatinine 0.93 0.6 - 1.3 MG/DL    BUN/Creatinine ratio 17 12 - 20      GFR est AA >60 >60 ml/min/1.73m2    GFR est non-AA 60 (L) >60 ml/min/1.73m2    Calcium 8.9 8.5 - 10.1 MG/DL    Bilirubin, total 0.6 0.2 - 1.0 MG/DL    ALT (SGPT) 21 13 - 56 U/L    AST (SGOT) 13 (L) 15 - 37 U/L    Alk. phosphatase 84 45 - 117 U/L    Protein, total 7.7 6.4 - 8.2 g/dL    Albumin 3.7 3.4 - 5.0 g/dL    Globulin 4.0 2.0 - 4.0 g/dL    A-G Ratio 0.9 0.8 - 1.7     LIPASE    Collection Time: 07/13/18  2:30 PM   Result Value Ref Range    Lipase 215 73 - 393 U/L   URINALYSIS W/ RFLX MICROSCOPIC    Collection Time: 07/13/18  4:36 PM   Result Value Ref Range    Color YELLOW      Appearance CLEAR      Specific gravity 1.021 1.005 - 1.030      pH (UA) 7.0 5.0 - 8.0      Protein NEGATIVE  NEG mg/dL    Glucose NEGATIVE  NEG mg/dL    Ketone NEGATIVE  NEG mg/dL    Bilirubin NEGATIVE  NEG      Blood NEGATIVE  NEG      Urobilinogen 0.2 0.2 - 1.0 EU/dL    Nitrites NEGATIVE  NEG      Leukocyte Esterase NEGATIVE  NEG         Radiologic Studies -   CT CHEST ABD PELV W CONT    (Results Pending)         Medical Decision Making   I am the first provider for this patient. I reviewed the vital signs, available nursing notes, past medical history, past surgical history, family history and social history. Vital Signs-Reviewed the patient's vital signs. Pulse Oximetry Analysis -  100 on room air (Interpretation) Normal     Cardiac Monitor:  Rate: 90  Rhythm:  Normal Sinus Rhythm     Records Reviewed: Nursing Notes, Old Medical Records, Previous Radiology Studies and Previous Laboratory Studies (Time of Review: 3:13 PM)    ED Course: Progress Notes, Reevaluation, and Consults:    Provider Notes (Medical Decision Making): 71yo F with metastatic breast cancer presenting with concern for diverticular flair in the setting of starting a new chemotherapeautic regimen. Patient stable on exam with mild L sided abdominal pain. Concern for spread of metastasis per patient. Considering broad differential though do suspect diverticular flair is most likely. Also considering UTI, as well as other etiologies. Will CT and eval with labs, UA. U/A unremarkable.  Labs unremarkable though patient currently undergoing chemo. CT pending. Patient resting comfortably in the room, mild pain on exam.     7:15 PM  Patient asking to be discharged. Obvious abnormality seen by ED physician on CT, appears to be consistent with inflammation however no radiology read despite phone call to request for read. Patient no longer willing to wait for results. Patient counseled on importance of staying and she says she understands the risks and instead will leave now and follow up with her PCP. Patient has capacity. Given presumptive diagnosis of diverticulitis and existing ABX allergies, Augmentin chosen as treatment course. Patient states she call text or call her PCP in the AM to discuss the results of her scan. Made aware she can return to ED for results at any time. Patient stable for discharge. Diagnosis     Clinical Impression:   1. Diverticulitis        Disposition: Discharge Home     Follow-up Information     Follow up With Details Comments Tomasz Romero MD Call today To discuss this ED visit  [address] [place]      Janelle Fountain MD Call today To discuss this ED visit  [facility]  [phone]             Patient's Medications   Start Taking    AMOXICILLIN-CLAVULANATE (AUGMENTIN) 875-125 MG PER TABLET    Take 1 Tab by mouth two (2) times a day. Indications: Diverticulitis of Gastrointestinal Tract   Continue Taking    ASCORBIC ACID (VITAMIN C) 500 MG TABLET    Take  by mouth. BIOTIN 2,500 MCG CAP    Take  by mouth. CALCIUM-VITAMIN D (CALCIUM 500+D) 500 MG(1,250MG) -200 UNIT PER TABLET    Take 1 Tab by mouth two (2) times daily (with meals). CHOLECALCIFEROL, VITAMIN D3, (VITAMIN D) 1,000 UNIT TABLET    Take 2,000 Units by mouth daily. CYANOCOBALAMIN, VITAMIN B-12, (VITAMIN B-12 PO)    Take  by mouth.     CYCLOBENZAPRINE (FLEXERIL) 5 MG TABLET    Take 5 mg by mouth.    DIAZEPAM (VALIUM) 5 MG TABLET    Take 1 Tab by mouth every six (6) hours as needed for Anxiety. Max Daily Amount: 20 mg.    FULVESTRANT (FASLODEX IM)    by IntraMUSCular route. GABAPENTIN (NEURONTIN) 100 MG CAPSULE    Take  by mouth three (3) times daily. HALOPERIDOL (HALDOL) 2 MG TABLET    Take 1 Tab by mouth two (2) times a day. LOSARTAN (COZAAR) 25 MG TABLET    Take 1 Tab by mouth daily. MELOXICAM (MOBIC) 15 MG TABLET    TAKE 1 TABLET EVERY DAY WITH FOOD    PALBOCICLIB 75 MG CAP    75 mg. These Medications have changed    No medications on file   Stop Taking    No medications on file     _______________________________    I personally saw and examined the patient. I have reviewed and agree with the residents findings, including all diagnostic interpretations, and plans as written. I was present during the key portions of separately billed procedures.   Sergei Patricio MD

## 2018-07-13 NOTE — DISCHARGE INSTRUCTIONS
Learning About Diverticulosis and Diverticulitis  What are diverticulosis and diverticulitis? In diverticulosis and diverticulitis, pouches called diverticula form in the wall of the large intestine, or colon. · In diverticulosis, the pouches do not cause any pain or other symptoms. · In diverticulitis, the pouches get inflamed or infected and cause symptoms. Doctors aren't sure what causes these pouches in the colon. But they think that a low-fiber diet may play a role. Without fiber to add bulk to the stool, the colon has to work harder than normal to push the stool forward. The pressure from this may cause pouches to form in weak spots along the colon. Some people with diverticulosis get diverticulitis. But experts don't know why this happens. What are the symptoms? · In diverticulosis, most people don't have symptoms. But pouches sometimes bleed. · In diverticulitis, symptoms may last from a few hours to a week or more. They include:  ¨ Belly pain. This is usually in the lower left side. It is sometimes worse when you move. This is the most common symptom. ¨ Fever and chills. ¨ Bloating and gas. ¨ Diarrhea or constipation. ¨ Nausea and sometimes vomiting. ¨ Not feeling like eating. How can you prevent these problems? You may be able to lower your chance of getting diverticulitis. You can do this by taking steps to prevent constipation. · Eat fruits, vegetables, beans, and whole grains every day. These foods are high in fiber. · Drink plenty of fluids (enough so that your urine is light yellow or clear like water). If you have kidney, heart, or liver disease and have to limit fluids, talk with your doctor before you increase the amount of fluids you drink. · Get at least 30 minutes of exercise on most days of the week. Walking is a good choice. You also may want to do other activities, such as running, swimming, cycling, or playing tennis or team sports.   · Take a fiber supplement, such as Citrucel or Metamucil, every day if needed. Read and follow all instructions on the label. · Schedule time each day for a bowel movement. Having a daily routine may help. Take your time and do not strain when having a bowel movement. Some people avoid nuts, seeds, berries, and popcorn. They believe that these foods might get trapped in the diverticula and cause pain. But there is no proof that these foods cause diverticulitis or make it worse. How are these problems treated? · The best way to treat diverticulosis is to avoid constipation. (See the tips above.)  · Treatment for diverticulitis includes antibiotics and often a change in your diet. You may need only liquids at first. Your doctor may suggest pain medicines for pain or belly cramps. In some cases, surgery may be needed. Follow-up care is a key part of your treatment and safety. Be sure to make and go to all appointments, and call your doctor if you are having problems. It's also a good idea to know your test results and keep a list of the medicines you take. Where can you learn more? Go to http://fatou-olive.info/. Enter K467 in the search box to learn more about \"Learning About Diverticulosis and Diverticulitis. \"  Current as of: May 12, 2017  Content Version: 11.7  © 3270-2834 [employer]. Care instructions adapted under license by [employer] (which disclaims liability or warranty for this information). If you have questions about a medical condition or this instruction, always ask your healthcare professional. [place] [address] any warranty or liability for your use of this information.

## 2018-07-13 NOTE — ED NOTES
I performed a brief evaluation, including history and physical, of the patient here in triage and I have determined that pt will need further treatment and evaluation from the main side ER physician. I have placed initial orders to help in expediting patients care.      July 13, 2018 at 1:46 PM - Brooke Farah        Visit Vitals    /85    Pulse 96    Temp 99.5 °F (37.5 °C)    Resp 18    Wt 63.5 kg (140 lb)    SpO2 100%    BMI 26.45 kg/m2

## 2018-07-13 NOTE — TELEPHONE ENCOUNTER
Spoke with Tiffany Ahumada, she stated it was ok to move CT scan up. I tried to call the  but something is wrong with their phone? ? It is a loud \"buzz\" noise in the phone, almost like white noise. I sent a message via [employer].  I can also try to fax but I doubt it will go through if their phone lines are down   [facility] number [phone]

## 2018-07-13 NOTE — ED TRIAGE NOTES
Pt sent to ER by PCP for LLQ abdominal pain. Pt is on oral chemo for metastasized breast cancer in the lungs.

## 2018-07-13 NOTE — TELEPHONE ENCOUNTER
Tried to call Dr Meghan Miller office, [phone], they sent me to the [place] to his medical assistant, [place].  Left message to call us back

## 2018-07-13 NOTE — ED NOTES
MARCIA notified that patient continues to have a long wait for her results.  Staff states they will flag the chart for the radiologist

## 2018-07-13 NOTE — TELEPHONE ENCOUNTER
Pt called this morning- the pain is much worse and she was up all night- not sure what to do- please advise- she said you would read messages today if she called

## 2018-07-14 ENCOUNTER — TELEPHONE (OUTPATIENT)
Dept: INTERNAL MEDICINE CLINIC | Age: 73
End: 2018-07-14

## 2018-07-14 RX ORDER — AMOXICILLIN AND CLAVULANATE POTASSIUM 875; 125 MG/1; MG/1
1 TABLET, FILM COATED ORAL EVERY 12 HOURS
Qty: 20 TAB | Refills: 0 | Status: SHIPPED | OUTPATIENT
Start: 2018-07-14 | End: 2018-07-24

## 2018-07-14 NOTE — TELEPHONE ENCOUNTER
Received call from patient. Reports not feeling well. No fever, but has been taking Tylenol every six hours since helps with abdominal pain. Eating little, but drinking fluids. She has taken three doses of Flagyl and noted a mild rash on her leg and abdomen after taking a shower. Reports a quarter sized red spot on her leg and fine red rash on her abdomen. Concerning for allergic reaction. Discussed need to stop Flagyl and change antibiotic regimen. Instructed to take benadryl to help prevent progression of rash. Instructed to discontinue Cipro as well and begin Augmentin with first dose this evening. Instructed to call for any worsening symptoms. Script sent to [facility].

## 2018-07-14 NOTE — TELEPHONE ENCOUNTER
Received call from patient. Evaluated in the ED and had abdom/pelvic CT scan which showed evidence of uncomplicated diverticulitis involving the lower left colon and upper sigmoid. Prescribed Augmentin, but did not yet  prescription. Not wishing to take given concern regarding possible diarrhea as side effect. Allergy listed to keflex (severe rash) but patient states that she takes amoxicillin prior to dental procedures without difficulty. Allergy listed to metronidazole, but patient states that she does not believe this is a true allergy. She states that she developed a mild rash while on Cipro and Flagyl in the past and it was assumed to be from the Flagyl. She is requesting to take Cipro and Flagyl rather than Augmentin. She states that she is aware of risk and will call with any difficulty. She reports onset of low grade fever tonight. Urged to  antibiotics and take first dose this evening. Holding [employer] falls given current active infection. Scripts for Cipro and Flagyl sent to [facility].

## 2018-07-16 ENCOUNTER — PATIENT OUTREACH (OUTPATIENT)
Dept: INTERNAL MEDICINE CLINIC | Age: 73
End: 2018-07-16

## 2018-07-16 NOTE — PROGRESS NOTES
ED Discharge Follow-Up    Date/Time:  2018 10:56 AM    Patient was [facility] on 2018 and discharged on 2018 for diverticulitis. Presenting symptoms: LLQ pain    Nurse Navigator(NN) contacted the patient  by telephone to perform post ED discharge assessment. Verified name and  with patient as identifiers. Provided introduction to self, and explanation of the Nurse Navigator role. Patient contacted within 1 business day(s) of discharge. Patient denied the following:  Fever  SOB  Numbness  Tingling  Dizziness  Lightheadedness  Chills   Bleeding  Itching     Patient reported the following:  Been in communication with PCP, Oncology, and GI  Left a message for GI  Was prescribed Cipro and Flagyl by PCP on 2018  Developed rash to chest and quarter size pink area to right leg above the knee  Informed PCP and was instructed to discontinue ABX and start Augmentin and Benadryl   Augmentin started on 2018  Rash to chest has improved and area to right leg is the same   Instructed to monitor and report and changes to PCP, patient verbalized understanding  Starting to have loose stools, discussion about possibly taking a probiotic with ABX   Improvement to LLQ pain, reporting it as tenderness 2/10 on a scale of 0 to 10 as tolerable     Medication:   New medications at discharge include:  Augmentin  Taking medication(s) prescribed at discharge: Yes    Reviewed discharge instructions and red flags with  patient who provided teach back. Patient given an opportunity to ask questions and does not have any further questions or concerns at this time. The patient agrees to contact the PCP office for questions related to their healthcare. Patient reminded that there are physicians on call 24 hours a day / 7 days a week (M-F 5 pm to 8 am and from Friday 5 pm until Monday 8 am for the weekend) should the patient have questions or concerns.  NN provided contact information for future reference. Future Appointments  Date Time Provider Owen Swann   7/17/2018 11:30 AM Martin Nguyễn, PT [facility]   7/19/2018 3:00 PM Martin Nguyễn, PT [facility]   7/24/2018 11:30 AM Martin Nguyễn, PT [facility]   7/26/2018 3:00 PM Allison Maradiaga, PTA [facility]   8/20/2018 1:30 PM HBV CT RM 1 [facility]   11/6/2018 10:30 AM Sameer Heck MD [address] [place] [address] ED     Knowledge and adherence to medication plan. Complete therapy  7/16/2018: Augmentin picked up and started on 7/14/2018       Reduce ED Utilization            No admissions within 30 days post discharge, 7/13/2018         Post Hospitalization     Attends follow-up appointments as directed.             Follow up with Dr. Shivam Fisher  7/16/2018: patient left a message for the provider

## 2018-07-17 ENCOUNTER — APPOINTMENT (OUTPATIENT)
Dept: PHYSICAL THERAPY | Age: 73
End: 2018-07-17
Payer: MEDICARE

## 2018-07-19 ENCOUNTER — APPOINTMENT (OUTPATIENT)
Dept: PHYSICAL THERAPY | Age: 73
End: 2018-07-19
Payer: MEDICARE

## 2018-07-24 ENCOUNTER — HOSPITAL ENCOUNTER (OUTPATIENT)
Dept: PHYSICAL THERAPY | Age: 73
Discharge: HOME | End: 2018-07-24
Payer: MEDICARE

## 2018-07-24 PROCEDURE — G8982 BODY POS GOAL STATUS: HCPCS

## 2018-07-24 PROCEDURE — G8981 BODY POS CURRENT STATUS: HCPCS

## 2018-07-24 PROCEDURE — 97112 NEUROMUSCULAR REEDUCATION: CPT

## 2018-07-24 PROCEDURE — 97110 THERAPEUTIC EXERCISES: CPT

## 2018-07-24 NOTE — PROGRESS NOTES
[facility]  [address]  [place], [address]  [phone] fax    Medicare Progress Report    Patient name: Keara Crockett Start of Care: 2018   Referral source: Kris Cordoba MD : 1945                         Medical Diagnosis: Other symptoms and signs involving the musculoskeletal system [R29.898]  Pain in thoracic spine [M54.6] Onset Date:Chronic (LBP), 3-4 weeks concerning left quad pain                         Treatment Diagnosis: LBP with LE weakness   Prior Hospitalization: see medical history Provider#: [id number]   Medications: Verified on Patient summary List    Comorbidities: Tourette's, right TKR, Vertebroplasy of T11, Osteopenia, Breast cx with lung metastasis, Scoliosis, HTN, known right rotator cuff tear. Prior Level of Function: Able to perform laundry, ambulate > than 1 block, and perform dishes with greater ease without having to take breaks. Visits from Start of Care: 6    Missed Visits: 0  Reporting Period: 2018 to 2018    Subjective Reports: The patient states that she had a confirmed diagnosis of diverticulitis and had to take antibiotics which has messed her stomach up. She indicates that this has caused her to feel week and she missed last week of therapy because of this as well.        Current Status/ treatment goals Objective measures   Short Term Goals: To be accomplished in 2 weeks:                        9. The patient will be independent and compliant with HEP to maximize therapeutic benefit. Met 2018                        2. The patient will attain 5 sit to stands void of UEs without provocation of left quad pain in order to improve ADL ease. Met - able to perform 6 reps 7/10/2018  Long Term Goals: To be accomplished in 4 weeks:                        7. The patient will improve FOTO score to predicted value in order to improve ease of ADLs. Progressed to                         3. The patient will improve hip ABD of left hip to 4/5 MMT to maximize stability during stance. Nearly met - 4-5 MMT right 4/5 MMT left                        3. The patient will improve quad strength of left to 4/5 MMT to maximize ease of stair negotiation. Met - 4/5 MMT 7/24/2018                        4. The patient will complete 30\" sit to stand test in order to improve ease of sit to stands. Completed at 6.5 7/24/2018      Problems/ barriers to goal attainment: Limited follow up last week due to diverticulitis     Assessment / Recommendations: The patient has progressed with regards to FOTO score, sit to stand 30\" test, and improvement hip ABD as well as quad strength improvements. She will continue to benefit from PT, which was interrupted due to the patient suffering from diverticulitis which she is currently taking antibiotics. Problem List: pain affecting function, decrease ROM, decrease strength, impaired gait/ balance, decrease ADL/ functional abilitiies, decrease activity tolerance, decrease flexibility/ joint mobility and decrease transfer abilities   Treatment Plan: Therapeutic exercise, Therapeutic activities, Neuromuscular re-education, Physical agent/modality, Manual therapy, Patient education and Functional mobility training    Patient Goal (s) has been updated and includes: Be able to do stairs without pain. Updated Goals to be accomplished in 4 weeks:  Long Term Goals: To be accomplished in 4 weeks:                        5. The patient will improve FOTO score to predicted value in order to improve ease of ADLs. Progressed to                         4. The patient will improve hip ABD of left hip to 4/5 MMT to maximize stability during stance. Nearly met - 4-5 MMT right 4/5 MMT left                        3. The patient will improve quad strength of left to 4/5 MMT to maximize ease of stair negotiation. Met - 4/5 MMT 7/24/2018                        4.  The patient will complete 8 sit to stands in 30 seconds in order to improve ease of sit to stands. Completed at 6.5 7/24/2018      Frequency / Duration: Patient to be seen 2 times per week for 4 weeks:    TOM-Ousmane (GP)  Position  [id number] Current  CK= 60-79%   Goal  CK= 40-59%    The severity rating is based on clinical judgment and the FOTO score.       August Anderson, PT 7/24/2018 12:33 PM

## 2018-07-24 NOTE — PROGRESS NOTES
PT DAILY TREATMENT NOTE - [facility]     Patient Name: Camilo Plummer  Date:2018  : 1945  [x]  Patient  Verified  Payor: Linder Cheadle / Plan: [place] / Product Type: Medicare /    In time:11:30  Out time: 12:10  Total Treatment Time (min): 40  Total Timed Codes (min): 40  1:1 Treatment Time ( only): 40   Visit #: 6 of 8    Treatment Area: Muscle weakness (generalized) [M62.81]  Low back pain [M54.5]    SUBJECTIVE  Pain Level (0-10 scale): 0/10  Any medication changes, allergies to medications, adverse drug reactions, diagnosis change, or new procedure performed?: [x] No    [] Yes (see summary sheet for update)  Subjective functional status/changes:   [] No changes reported  The patient states that she had a confirmed diagnosis of diverticulitis and had to take antibiotics which has messed her stomach up. She indicates that this has caused her to feel week and she missed last week of therapy because of this as well. OBJECTIVE  30 min Therapeutic Exercise:  [x] See flow sheet :   Rationale: increase ROM and increase strength to improve the patients ability to improve ADL ease. 10 min Neuromuscular Re-education:  [x]  See flow sheet :   Rationale: increase ROM and increase strength  to improve the patients ability to improve ADL ease. With   [] TE   [] TA   [] neuro   [] other: Patient Education: [x] Review HEP    [] Progressed/Changed HEP based on:   [] positioning   [] body mechanics   [] transfers   [] heat/ice application    [] other:      Other Objective/Functional Measures: FOTO: 47  Sit to stand test 30\": 6.5 times  Hip ABD:  4-/5 MMT right; 4/5 MMT left  Quad strength: 4+/5 MMT B    Pain Level (0-10 scale) post treatment: 0/10    ASSESSMENT/Changes in Function: The patient has progressed with regards to FOTO score, sit to stand 30\" test, and improvement hip ABD as well as quad strength improvements.  She will continue to benefit from PT, which was interrupted due to the patient suffering from diverticulitis which she is currently taking antibiotics. Patient will continue to benefit from skilled PT services to modify and progress therapeutic interventions, address functional mobility deficits, address ROM deficits, address strength deficits, analyze and address soft tissue restrictions, analyze and cue movement patterns, analyze and modify body mechanics/ergonomics, assess and modify postural abnormalities and instruct in home and community integration to attain remaining goals. []  See Plan of Care  []  See progress note/recertification  []  See Discharge Summary         Progress towards goals / Updated goals:  Short Term Goals: To be accomplished in 2 weeks:                        1. The patient will be independent and compliant with HEP to maximize therapeutic benefit. Met 7/05/2018                        2. The patient will attain 5 sit to stands void of UEs without provocation of left quad pain in order to improve ADL ease. Met - able to perform 6 reps 7/10/2018  Long Term Goals: To be accomplished in 4 weeks:                        7. The patient will improve FOTO score to predicted value in order to improve ease of ADLs. Progressed to                         4. The patient will improve hip ABD of left hip to 4/5 MMT to maximize stability during stance. Nearly met - 4-5 MMT right 4/5 MMT left                        3. The patient will improve quad strength of left to 4/5 MMT to maximize ease of stair negotiation. Met - 4/5 MMT 7/24/2018                        4. The patient will complete 30\" sit to stand test in order to improve ease of sit to stands.  Completed at 6.5 7/24/2018       PLAN  []  Upgrade activities as tolerated     [x]  Continue plan of care  []  Update interventions per flow sheet       []  Discharge due to:_  []  Other:_      Solitario Cabrera PT 7/24/2018  11:43 AM    Future Appointments  Date Time Provider Owen Swann   7/26/2018 3:00 PM Bruce Overton, PTA [facility]   11/6/2018 10:30 AM Cristian Nails MD [facility]

## 2018-07-26 ENCOUNTER — HOSPITAL ENCOUNTER (OUTPATIENT)
Dept: PHYSICAL THERAPY | Age: 73
Discharge: HOME | End: 2018-07-26
Payer: MEDICARE

## 2018-07-26 PROCEDURE — 97110 THERAPEUTIC EXERCISES: CPT

## 2018-07-26 PROCEDURE — 97112 NEUROMUSCULAR REEDUCATION: CPT

## 2018-07-26 NOTE — PROGRESS NOTES
PT DAILY TREATMENT NOTE - [facility]     Patient Name: Khadar Pena  Date:2018  : 1945  [x]  Patient  Verified  Payor: Kirstie Valenzuela / Plan: [place] MED[place]ARE PART A & B / Product Type: Medicare /    In time:3:00  Out time:3:33  Total Treatment Time (min): 33  Total Timed Codes (min): 33  1:1 Treatment Time ( [address] only): 33   Visit #: 1 of 8    Treatment Area: Muscle weakness (generalized) [M62.81]  Low back pain [M54.5]    SUBJECTIVE  Pain Level (0-10 scale): 0/10  Any medication changes, allergies to medications, adverse drug reactions, diagnosis change, or new procedure performed?: [x] No    [] Yes (see summary sheet for update)  Subjective functional status/changes:   [] No changes reported  Pt reports increased back pain yesterday after standing for 3 hours. Pt reports current c/o pain. OBJECTIVE    23 min Therapeutic Exercise:  [x] See flow sheet :   Rationale: increase ROM and increase strength to improve the patients ability to tolerate ADLs. 10 min Neuromuscular Re-education:  [x]  See flow sheet :   Rationale: increase strength, improve coordination and increase proprioception  to improve the patients ability to perform functional activities. With   [] TE   [] TA   [] neuro   [] other: Patient Education: [x] Review HEP    [] Progressed/Changed HEP based on:   [] positioning   [] body mechanics   [] transfers   [] heat/ice application    [] other:      Other Objective/Functional Measures: Progressed step ups and 1/2 prone exercises. Pain Level (0-10 scale) post treatment: 0/10    ASSESSMENT/Changes in Function: Pt demonstrates good control with advanced step ups and 1/2 prone hip extensions.        Patient will continue to benefit from skilled PT services to modify and progress therapeutic interventions, address functional mobility deficits, address ROM deficits, address strength deficits, analyze and address soft tissue restrictions, analyze and cue movement patterns and analyze and modify body mechanics/ergonomics to attain remaining goals. []  See Plan of Care  []  See progress note/recertification  []  See Discharge Summary         Progress towards goals / Updated goals:  Updated Goals to be accomplished in 4 weeks:  Long Term Goals: To be accomplished in 4 weeks:                        0. The patient will improve FOTO score to predicted value in order to improve ease of ADLs. Progressed to                         3. The patient will improve hip ABD of left hip to 4/5 MMT to maximize stability during stance. Nearly met - 4-5 MMT right 4/5 MMT left                        3. The patient will improve quad strength of left to 4/5 MMT to maximize ease of stair negotiation. Met - 4/5 MMT 7/24/2018                        4. The patient will complete 8 sit to stands in 30 seconds in order to improve ease of sit to stands.  Completed at 6.5 7/24/2018         PLAN  []  Upgrade activities as tolerated     [x]  Continue plan of care  []  Update interventions per flow sheet       []  Discharge due to:_  []  Other:_      Gavin Garces, PTA 7/26/2018  3:06 PM    Future Appointments  Date Time Provider Owen Swann   8/2/2018 4:00 PM Talat Alonzo, PT [facility]   8/6/2018 3:00 PM Talat Alonzo, PT [facility]   8/10/2018 11:30 AM Talat Alonzo, PT [facility]   8/14/2018 11:30 AM Talat Alonzo, PT [facility]   8/16/2018 3:30 PM Talat Alonzo, PT [facility]   11/6/2018 10:30 AM Gio Randall MD [facility]

## 2018-08-02 ENCOUNTER — HOSPITAL ENCOUNTER (OUTPATIENT)
Dept: PHYSICAL THERAPY | Age: 73
Discharge: HOME | End: 2018-08-02
Payer: MEDICARE

## 2018-08-02 PROCEDURE — 97112 NEUROMUSCULAR REEDUCATION: CPT

## 2018-08-02 PROCEDURE — 97110 THERAPEUTIC EXERCISES: CPT

## 2018-08-02 NOTE — PROGRESS NOTES
PT DAILY TREATMENT NOTE - [facility]  Patient Name: Klaus Salinas 
Date:2018 : 1945 [x]  Patient  Verified Payor: [place] MEDICARE / Plan: Acacia Licona / Product Type: Medicare / In time:4:00  Out time:4:40 Total Treatment Time (min): 40 Total Timed Codes (min): 40 
1:1 Treatment Time ([facility] only): 40 Visit #: 2 of 8 Treatment Area: Muscle weakness (generalized) [M62.81] Low back pain [M54.5] SUBJECTIVE Pain Level (0-10 scale): 0/10 Any medication changes, allergies to medications, adverse drug reactions, diagnosis change, or new procedure performed?: [x] No    [] Yes (see summary sheet for update) Subjective functional status/changes:   [] No changes reported The patient indicates that she had pain doing the stairs at [address], but was doing quite a few of them and used her right leg a lot which caused her right knee to get sore. OBJECTIVE 30 min Therapeutic Exercise:  [x] See flow sheet :  
Rationale: increase ROM and increase strength to improve the patients ability to improve ADL ease. 10 min Neuromuscular Re-education:  []  See flow sheet :  
Rationale: increase ROM and increase strength  to improve the patients ability to improve ADL ease. With 
 [] TE 
 [] TA 
 [] neuro 
 [] other: Patient Education: [x] Review HEP [] Progressed/Changed HEP based on:  
[] positioning   [] body mechanics   [] transfers   [] heat/ice application   
[] other:   
 
Other Objective/Functional Measures:  
Quad strength: 5/5 MMT Able to perform 6\" step ups with decreased pain with subsequent repetitions. Palpation: point tender through left VMO and vastus lateralis. Pain Level (0-10 scale) post treatment: 0/10 ASSESSMENT/Changes in Function: Pt reports no increase in pain since start of care, just slight regression over past week since development of diverticulitis as well as OBX with significant stair negotiation.  Recommend continue PT with ascertainment of symptoms following a bout of 4 weeks of strengthening with probable progression to HEP at that time. Patient will continue to benefit from skilled PT services to modify and progress therapeutic interventions, address functional mobility deficits, address ROM deficits, address strength deficits, analyze and address soft tissue restrictions, analyze and cue movement patterns, analyze and modify body mechanics/ergonomics, assess and modify postural abnormalities and instruct in home and community integration to attain remaining goals. []  See Plan of Care 
[]  See progress note/recertification 
[]  See Discharge Summary Progress towards goals / Updated goals: 
Updated Goals to be accomplished in 4 weeks: 
Long Term Goals: To be accomplished in 4 weeks: 
                      0. The patient will improve FOTO score to predicted value in order to improve ease of ADLs. Progressed to                       9. The patient will improve hip ABD of left hip to 4/5 MMT to maximize stability during stance. Nearly met - 4-5 MMT right 4/5 MMT left                       0. The patient will improve quad strength of left to 4/5 MMT to maximize ease of stair negotiation. Met - 4/5 MMT 7/24/2018 
                      4. The patient will complete 8 sit to stands in 30 seconds in order to improve ease of sit to stands. Completed at 6.5 7/24/2018   
 
PLAN 
[]  Upgrade activities as tolerated     [x]  Continue plan of care 
[]  Update interventions per flow sheet      
[]  Discharge due to:_ 
[]  Other:_   
 
Yesika Landa PT 8/2/2018  4:44 PM 
 
Future Appointments Date Time Provider Owen Swann 8/6/2018 3:00 PM Yesika Landa PT [facility]  
8/10/2018 11:30 AM [facility]  
8/14/2018 11:30 AM [facility]anda [facility]  
8/16/2018 3:30 PM Yesika Landa PT [facility]  
11/6/2018 10:30 AM Darwin Linn MD [address]

## 2018-08-06 ENCOUNTER — HOSPITAL ENCOUNTER (OUTPATIENT)
Dept: PHYSICAL THERAPY | Age: 73
Discharge: HOME | End: 2018-08-06
Payer: MEDICARE

## 2018-08-06 PROCEDURE — 97110 THERAPEUTIC EXERCISES: CPT

## 2018-08-06 PROCEDURE — 97112 NEUROMUSCULAR REEDUCATION: CPT

## 2018-08-06 NOTE — PROGRESS NOTES
PT DAILY TREATMENT NOTE - [facility]     Patient Name: Melissa Brown  Date:2018  : 1945  [x]  Patient  Verified  Payor: [place] MEDICARE / Plan: [place] MEDICARE PART A & B / Product Type: Medicare /    In time:3:00  Out time:3:46  Total Treatment Time (min): 46  Total Timed Codes (min): 46  1:1 Treatment Time ( only): 23   Visit #: 3 of 8    Treatment Area: Muscle weakness (generalized) [M62.81]  Low back pain [M54.5]    SUBJECTIVE  Pain Level (0-10 scale): 0/10  Any medication changes, allergies to medications, adverse drug reactions, diagnosis change, or new procedure performed?: [x] No    [] Yes (see summary sheet for update)  Subjective functional status/changes:   [] No changes reported  Pt denies pain today but states she walked 1/4 mile on Saturday and was in agony due to pain that day and when she went to bed. OBJECTIVE    36 min Therapeutic Exercise:  [x] See flow sheet :   Rationale: increase ROM and increase strength to improve the patients ability to tolerate ADLs. 10 min Neuromuscular Re-education:  [x]  See flow sheet :   Rationale: increase strength, improve coordination and increase proprioception  to improve the patients ability to tolerate ADLs. With   [] TE   [] TA   [] neuro   [] other: Patient Education: [x] Review HEP    [] Progressed/Changed HEP based on:   [] positioning   [] body mechanics   [] transfers   [] heat/ice application    [] other:      Other Objective/Functional Measures: performed 10 step ups with 8 inch step on left without UE support. Pain Level (0-10 scale) post treatment: 0/10    ASSESSMENT/Changes in Function: Pt demonstrates good form with progressed exercises but continues to c/o back pain and left leg weakness.      Patient will continue to benefit from skilled PT services to modify and progress therapeutic interventions, address functional mobility deficits, address ROM deficits, address strength deficits, analyze and address soft tissue restrictions and analyze and cue movement patterns to attain remaining goals. []  See Plan of Care  []  See progress note/recertification  []  See Discharge Summary         Progress towards goals / Updated goals:  Updated Goals to be accomplished in 4 weeks:  Long Term Goals: To be accomplished in 4 weeks:                        0. The patient will improve FOTO score to predicted value in order to improve ease of ADLs. Progressed to                         1. The patient will improve hip ABD of left hip to 4/5 MMT to maximize stability during stance. Nearly met - 4-5 MMT right 4/5 MMT left                        3. The patient will improve quad strength of left to 4/5 MMT to maximize ease of stair negotiation. Met - 4/5 MMT 7/24/2018                        4. The patient will complete 8 sit to stands in 30 seconds in order to improve ease of sit to stands.  Completed at 6.5 7/24/2018     PLAN  []  Upgrade activities as tolerated     [x]  Continue plan of care  []  Update interventions per flow sheet       []  Discharge due to:_  []  Other:_      David Rojas, PTA 8/6/2018  3:04 PM    Future Appointments  Date Time Provider Owen Swann   8/10/2018 11:30 AM Karol Hancock, PT [facility]   8/14/2018 11:30 AM Karol Hancock PT [facility]   8/16/2018 3:30 PM Karol Hancock PT [facility]   9/4/2018 11:45 AM [facility]   11/6/2018 10:30 AM Oziel Deshpande MD [facility]

## 2018-08-10 ENCOUNTER — HOSPITAL ENCOUNTER (OUTPATIENT)
Dept: PHYSICAL THERAPY | Age: 73
Discharge: HOME | End: 2018-08-10
Payer: MEDICARE

## 2018-08-10 PROCEDURE — 97112 NEUROMUSCULAR REEDUCATION: CPT

## 2018-08-10 PROCEDURE — 97110 THERAPEUTIC EXERCISES: CPT

## 2018-08-10 NOTE — PROGRESS NOTES
PT DAILY TREATMENT NOTE - [facility]     Patient Name: Ebony Franklin  Date:8/10/2018  : 1945  [x]  Patient  Verified  Payor: [place] MEDICARE / Plan: [place] MEDICARE PART A & B / Product Type: Medicare /    In time:11:30  Out time:12:12  Total Treatment Time (min): 42  Total Timed Codes (min): 42  1:1 Treatment Time ( [address] only): 42   Visit #: 4 of 8    Treatment Area: Muscle weakness (generalized) [M62.81]  Low back pain [M54.5]    SUBJECTIVE  Pain Level (0-10 scale): 0/10  Any medication changes, allergies to medications, adverse drug reactions, diagnosis change, or new procedure performed?: [x] No    [] Yes (see summary sheet for update)  Subjective functional status/changes:   [] No changes reported  The patient states that she had a fairly good few days with some pain in the inside of her leg with stairs, and continues to get intermittent pain with stair negotiation. She states her back has not been as bothersome over the past few days, but also had decreased activity levels associated with them. OBJECTIVE  32 min Therapeutic Exercise:  [x] See flow sheet :   Rationale: increase ROM and increase strength to improve the patients ability to improve ADL ease. 10 min Neuromuscular Re-education:  [x]  See flow sheet :   Rationale: increase ROM and increase strength  to improve the patients ability to improve ADL ease. With   [] TE   [] TA   [] neuro   [] other: Patient Education: [x] Review HEP    [] Progressed/Changed HEP based on:   [] positioning   [] body mechanics   [] transfers   [] heat/ice application    [] other:      Other Objective/Functional Measures:   Stair negotiation 5x with reciprical pattern. Progressed hip extension to 2# weights as well as LAQ. Notable improvement in sit to stands, progressing to 12x.     Pain Level (0-10 scale) post treatment: 0/10    ASSESSMENT/Changes in Function: The patient has continued to make fairly good progress regarding stair negotiation and apparent quad and hip strength notable during session. Patient will continue to benefit from skilled PT services to modify and progress therapeutic interventions, address functional mobility deficits, address ROM deficits, address strength deficits, analyze and address soft tissue restrictions, analyze and cue movement patterns, analyze and modify body mechanics/ergonomics, assess and modify postural abnormalities and instruct in home and community integration to attain remaining goals. []  See Plan of Care  []  See progress note/recertification  []  See Discharge Summary         Progress towards goals / Updated goals:  Long Term Goals: To be accomplished in 4 weeks:                        7. The patient will improve FOTO score to predicted value in order to improve ease of ADLs. Progressed to                         7. The patient will improve hip ABD of left hip to 4/5 MMT to maximize stability during stance. Nearly met - 4-5 MMT right 4/5 MMT left                        3. The patient will improve quad strength of left to 4/5 MMT to maximize ease of stair negotiation. Met - 4/5 MMT 7/24/2018                        4. The patient will complete 8 sit to stands in 30 seconds in order to improve ease of sit to stands.  Completed at 6.5 7/24/2018     PLAN  []  Upgrade activities as tolerated     [x]  Continue plan of care  []  Update interventions per flow sheet       []  Discharge due to:_  []  Other:_      Gregoria Whitley PT 8/10/2018  1:41 PM    Future Appointments  Date Time Provider Owen Swann   8/14/2018 11:30 [facility]   8/16/2018 3:30 [facility]   9/4/2018 11:45 AM [facility] SHARON HAND [facility]   11/6/2018 10:30 AM Claude Sark, MD [facility]

## 2018-08-14 ENCOUNTER — HOSPITAL ENCOUNTER (OUTPATIENT)
Dept: PHYSICAL THERAPY | Age: 73
Discharge: HOME | End: 2018-08-14
Payer: MEDICARE

## 2018-08-14 PROCEDURE — 97112 NEUROMUSCULAR REEDUCATION: CPT

## 2018-08-14 PROCEDURE — 97110 THERAPEUTIC EXERCISES: CPT

## 2018-08-14 NOTE — PROGRESS NOTES
PT DAILY TREATMENT NOTE - [facility]     Patient Name: Morgan Mejía  Date:2018  : 1945  [x]  Patient  Verified  Payor: [place] MEDICARE / Plan: [place] MEDICARE PART A & B / Product Type: Medicare /    In time: 11:30  Out time:12:20  Total Treatment Time (min): 50  Total Timed Codes (min): 40  1:1 Treatment Time ( [address] only): 40   Visit #: 5 of 8    Treatment Area: Muscle weakness (generalized) [M62.81]  Low back pain [M54.5]    SUBJECTIVE  Pain Level (0-10 scale): 0/10  Any medication changes, allergies to medications, adverse drug reactions, diagnosis change, or new procedure performed?: [x] No    [] Yes (see summary sheet for update)  Subjective functional status/changes:   [] No changes reported  The patient indicates that she feels about 50% improvement since initiating PT. She does report that her stairs are 8 inch steps that she must go up and down quite a few times a day despite there only being a few. She reports that her back and right shoulder have continued to be painful. OBJECTIVE  Modality rationale: decrease pain and increase tissue extensibility to improve the patients ability to improve ADL ease. Min Type Additional Details   10 []  Ice     [x]  heat  []  Ice massage  []  Laser   []  Anodyne Position: Seated  Location: lumbar spine   [] Skin assessment post-treatment:  []intact []redness- no adverse reaction    []redness  adverse reaction:     28 min Therapeutic Exercise:  [x] See flow sheet :   Rationale: increase ROM and increase strength to improve the patients ability to improve ADL ease. 12 min Neuromuscular Re-education:  [x]  See flow sheet : diaphragmatic breathing, step taps   Rationale: improve coordination, improve balance and increase proprioception  to improve the patients ability to improve ADL ease.            With   [] TE   [] TA   [] neuro   [] other: Patient Education: [x] Review HEP    [] Progressed/Changed HEP based on:   [] positioning   [] body mechanics [] transfers   [] heat/ice application    [] other:      Other Objective/Functional Measures:   Attained 15 sit to stands void of UE use and void of break. FOTO: 47     Pain Level (0-10 scale) post treatment: 0/10    ASSESSMENT/Changes in Function: Considerable improvement regarding sit to stands, attaining over double what patient could perform on initial evaluation. Continues to be limited regarding pain progression regarding stair negotiation secondary to largely pain. Her FOTO score has not changed since last assessment. Recommend D/C to HEP next visit. Patient will continue to benefit from skilled PT services to modify and progress therapeutic interventions, address functional mobility deficits, address ROM deficits, address strength deficits, analyze and address soft tissue restrictions, analyze and cue movement patterns, analyze and modify body mechanics/ergonomics, assess and modify postural abnormalities and instruct in home and community integration to attain remaining goals. []  See Plan of Care  []  See progress note/recertification  []  See Discharge Summary         Progress towards goals / Updated goals:  Long Term Goals: To be accomplished in 4 weeks:                        9. The patient will improve FOTO score to predicted value in order to improve ease of ADLs. Progressed to 49 8.14.2018                        2. The patient will improve hip ABD of left hip to 4/5 MMT to maximize stability during stance. Nearly met - 4-5 MMT right 4/5 MMT left                        3. The patient will improve quad strength of left to 4/5 MMT to maximize ease of stair negotiation. Met - 4/5 MMT 7/24/2018                         4. The patient will complete 8 sit to stands in 30 seconds in order to improve ease of sit to stands.  Completed at 6.5 7/24/2018     PLAN  []  Upgrade activities as tolerated     [x]  Continue plan of care  []  Update interventions per flow sheet       []  Discharge due to:_  [] Other:_      Germain Cerna, PT 8/14/2018  11:36 AM    Future Appointments  Date Time Provider Owen Hue   8/16/2018 3:30 PM Germain Cerna, PT [facility]   9/4/2018 11:45 AM [facility] SHARON PEACE  [facility]   11/6/2018 10:30 AM Yanira Javed MD [facility]

## 2018-08-15 ENCOUNTER — PATIENT OUTREACH (OUTPATIENT)
Dept: INTERNAL MEDICINE CLINIC | Age: 73
End: 2018-08-15

## 2018-08-16 ENCOUNTER — HOSPITAL ENCOUNTER (OUTPATIENT)
Dept: PHYSICAL THERAPY | Age: 73
Discharge: HOME | End: 2018-08-16
Payer: MEDICARE

## 2018-08-16 PROCEDURE — G8979 MOBILITY GOAL STATUS: HCPCS

## 2018-08-16 PROCEDURE — 97110 THERAPEUTIC EXERCISES: CPT

## 2018-08-16 PROCEDURE — 97112 NEUROMUSCULAR REEDUCATION: CPT

## 2018-08-16 PROCEDURE — G8980 MOBILITY D/C STATUS: HCPCS

## 2018-08-16 NOTE — PROGRESS NOTES
[facility]  [address]  [place], [address]  [phone] fax    Physical Therapy Discharge Summary    Patient name: Surinder Knutson Start of Care: 2018   Referral source: Olga Otero MD : 1945                         Medical Diagnosis: Other symptoms and signs involving the musculoskeletal system [R29.898]  Pain in thoracic spine [M54.6] Onset Date:Chronic (LBP), 3-4 weeks concerning left quad pain                         Treatment Diagnosis: LBP with LE weakness   Prior Hospitalization: see medical history Provider#: [id number]   Medications: Verified on Patient summary List    Comorbidities: Tourette's, right TKR, Vertebroplasy of T11, Osteopenia, Breast cx with lung metastasis, Scoliosis, HTN, known right rotator cuff tear.    Prior Level of Function: Able to perform laundry, ambulate > than 1 block, and perform dishes with greater ease without having to take breaks. Visits from Start of Care: 12    Missed Visits: 0  Reporting Period : 2018 to 2018    Summary of Care:  [address] [facility] be accomplished in 4 weeks:                        2. The patient will improve FOTO score to predicted value in order to improve ease of ADLs. Progressed to 47 8.14.                        2. The patient will improve hip ABD of left hip to 4/5 MMT to maximize stability during stance. Nearly met - 4-5 MMT right 4/5 MMT left; M                        2. The patient will improve quad strength of left to 4/5 MMT to maximize ease of stair negotiation. Met - 4/5 MMT 2018; Met 5/5 MMT 2018                        4. The patient will complete 8 sit to stands in 30 seconds in order to improve ease of sit to stands. Completed at 6.5 2018;  Met 2018       G-Codes (GP)  Mobility    Goal  CK= 40-59%  D/C  CK= 40-59%    The severity rating is based on clinical judgment and the FOTO score.    ASSESSMENT/RECOMMENDATIONS: The patient has received an HEP update and has been D/C'd making fairly good progress towards goals though not abolishment of symptomatology. She has been instructed to follow-up with ortho if her symptoms persist or regress.   [x]Discontinue therapy: [x]Patient has reached or is progressing toward set goals      []Patient is non-compliant or has abdicated      []Due to lack of appreciable progress towards set [address], PT 8/16/2018 6:52 PM

## 2018-08-16 NOTE — PROGRESS NOTES
PT DAILY TREATMENT NOTE - [facility]     Patient Name: Magali Pittman  Date:2018  : 1945  [x]  Patient  Verified  Payor: [place] MEDICARE / Plan: [place] MEDICARE PART A & B / Product Type: Medicare /    In time: 3:30  Out time:4:07  Total Treatment Time (min): 37  Total Timed Codes (min): 37  1:1 Treatment Time ( [address] only): 37   Visit #: 6 of 8    Treatment Area: Muscle weakness (generalized) [M62.81]  Low back pain [M54.5]    SUBJECTIVE  Pain Level (0-10 scale): 0/10  Any medication changes, allergies to medications, adverse drug reactions, diagnosis change, or new procedure performed?: [x] No    [] Yes (see summary sheet for update)  Subjective functional status/changes:   [] No changes reported  The patient states she continues to have back pain and left quad discomfort when going up the stairs. No significant change over the past week, but does note improvement since IE. She states her right shoulder and her low back pain increase and decrease in severity depending on the day. OBJECTIVE  27 min Therapeutic Exercise:  [x] See flow sheet :   Rationale: increase ROM and increase strength to improve the patients ability to improve ADL ease. 10 min Neuromuscular Re-education:  [x]  See flow sheet :    Rationale: increase ROM and increase strength  to improve the patients ability to improve ADL ease. With   [] TE   [] TA   [] neuro   [] other: Patient Education: [x] Review HEP    [] Progressed/Changed HEP based on:   [] positioning   [] body mechanics   [] transfers   [] heat/ice application    [] other:      Other Objective/Functional Measures:   Hip ABD: 4/5 MMT left; 4+/5 MMT right  Quad strength: 5/5 MMT    Sit to  30\": 8x    Pain Level (0-10 scale) post treatment: 0/10    ASSESSMENT/Changes in Function: The patient has received an HEP update and has been D/C'd making fairly good progress towards goals though not abolishment of symptomatology.  She has been instructed to follow-up with ortho if her symptoms persist or regress. []  See Plan of Care  []  See progress note/recertification  []  See Discharge Summary         Progress towards goals / Updated goals:  Long Term Goals: To be accomplished in 4 weeks:                        2. The patient will improve FOTO score to predicted value in order to improve ease of ADLs. Progressed to 49 8.14.2018                        2. The patient will improve hip ABD of left hip to 4/5 MMT to maximize stability during stance. Nearly met - 4-5 MMT right 4/5 MMT left; M                        8. The patient will improve quad strength of left to 4/5 MMT to maximize ease of stair negotiation. Met - 4/5 MMT 7/24/2018; Met 5/5 MMT 8/16/2018                        4. The patient will complete 8 sit to stands in 30 seconds in order to improve ease of sit to stands. Completed at 6.5 7/24/2018;  Met 8/16/2018    PLAN  []  Upgrade activities as tolerated     [x]  Continue plan of care  []  Update interventions per flow sheet       []  Discharge due to:_  []  Other:_      Sean Mora, PT 8/16/2018  6:42 PM    Future Appointments  Date Time Provider Owen Swann   9/4/2018 11:45 AM [facility] SHARON HAND  [facility]   11/6/2018 10:30 AM Vince Goldberg, MD [facility]

## 2018-08-20 RX ORDER — MELOXICAM 15 MG/1
TABLET ORAL
Qty: 90 TAB | Refills: 1 | Status: SHIPPED | OUTPATIENT
Start: 2018-08-20 | End: 2019-01-30 | Stop reason: SDUPTHER

## 2018-09-04 ENCOUNTER — HOSPITAL ENCOUNTER (OUTPATIENT)
Dept: MAMMOGRAPHY | Age: 73
Discharge: HOME | End: 2018-09-04
Attending: INTERNAL MEDICINE
Payer: MEDICARE

## 2018-09-04 DIAGNOSIS — Z12.31 VISIT FOR SCREENING MAMMOGRAM: ICD-10-CM

## 2018-09-04 PROCEDURE — 77063 BREAST TOMOSYNTHESIS BI: CPT

## 2018-09-24 ENCOUNTER — TELEPHONE (OUTPATIENT)
Dept: INTERNAL MEDICINE CLINIC | Age: 73
End: 2018-09-24

## 2018-09-24 DIAGNOSIS — G47.00 INSOMNIA, UNSPECIFIED TYPE: ICD-10-CM

## 2018-09-24 RX ORDER — DIAZEPAM 5 MG/1
5 TABLET ORAL
Qty: 30 TAB | Refills: 0 | OUTPATIENT
Start: 2018-09-24 | End: 2019-03-11 | Stop reason: SDUPTHER

## 2018-09-24 NOTE — TELEPHONE ENCOUNTER
PHONE IN [facility] reports the last fill date for Valium as 03/30/2018. There appears to be no inconsistencies in regards to the prescribing of this medication. Last Visit: 05/01/2018 with MD Ninoska Marc    Next Appointment: 11/06/2018 with MD Ninoska Marc   Previous Refill Encounters: 03/30/2018 per NP Hitesh Rosado #[id number]    Requested Prescriptions     Pending Prescriptions Disp Refills    diazePAM (VALIUM) 5 mg tablet 30 Tab 0     Sig: Take 1 Tab by mouth every six (6) hours as needed for Anxiety. Max Daily Amount: 20 mg.

## 2018-09-24 NOTE — TELEPHONE ENCOUNTER
Patient is calling stating she is on an oral chemo type of drug and injections. She wants to know if it is ok for her to get a flu shot.

## 2018-09-24 NOTE — TELEPHONE ENCOUNTER
Yes, she should definitely get the influenza vaccine. It is safe in patients who are immunosuppressed. She should get the high dose which is for patients over 72years old. Thanks.

## 2018-09-25 NOTE — TELEPHONE ENCOUNTER
Called patient and verified full name and date of birth. Informed her of Dr. Leigh Wright message. Patient verbalized understanding. Patient stated that she has already set up an appointment with Ms. Samy Coleman for her flu vaccine the beginning of October 2018.

## 2018-10-05 ENCOUNTER — CLINICAL SUPPORT (OUTPATIENT)
Dept: INTERNAL MEDICINE CLINIC | Age: 73
End: 2018-10-05

## 2018-10-05 DIAGNOSIS — Z23 ENCOUNTER FOR IMMUNIZATION: Primary | ICD-10-CM

## 2018-10-05 NOTE — PROGRESS NOTES
Latosha Garcia is a 72 y.o. female who presents for routine immunizations- High Dose Influenza- Right Deltoid  She denies any symptoms , reactions or allergies that would exclude them from being immunized today. Risks and adverse reactions were discussed and the VIS was given to them. All questions were addressed. She was observed for 5 min post injection. There were no reactions observed.     Avila Munoz LPN

## 2018-10-30 ENCOUNTER — HOSPITAL ENCOUNTER (OUTPATIENT)
Dept: LAB | Age: 73
Discharge: HOME | End: 2018-10-30
Payer: MEDICARE

## 2018-10-30 DIAGNOSIS — M85.89 OSTEOPENIA OF MULTIPLE SITES: ICD-10-CM

## 2018-10-30 DIAGNOSIS — E78.5 HYPERLIPIDEMIA, UNSPECIFIED HYPERLIPIDEMIA TYPE: ICD-10-CM

## 2018-10-30 DIAGNOSIS — M85.9 DISORDER OF BONE DENSITY AND STRUCTURE, UNSPECIFIED: ICD-10-CM

## 2018-10-30 DIAGNOSIS — I10 ESSENTIAL HYPERTENSION: ICD-10-CM

## 2018-10-30 LAB
25(OH)D3 SERPL-MCNC: 36.8 NG/ML (ref 30–100)
APPEARANCE UR: CLEAR
BACTERIA URNS QL MICRO: ABNORMAL /HPF
BILIRUB UR QL: NEGATIVE
CHOLEST SERPL-MCNC: 185 MG/DL
COLOR UR: YELLOW
EPITH CASTS URNS QL MICRO: ABNORMAL /LPF (ref 0–5)
GLUCOSE UR STRIP.AUTO-MCNC: NEGATIVE MG/DL
HDLC SERPL-MCNC: 63 MG/DL (ref 40–60)
HDLC SERPL: 2.9 {RATIO} (ref 0–5)
HGB UR QL STRIP: NEGATIVE
KETONES UR QL STRIP.AUTO: NEGATIVE MG/DL
LDLC SERPL CALC-MCNC: 109.6 MG/DL (ref 0–100)
LEUKOCYTE ESTERASE UR QL STRIP.AUTO: NEGATIVE
LIPID PROFILE,FLP: ABNORMAL
NITRITE UR QL STRIP.AUTO: NEGATIVE
PH UR STRIP: 7.5 [PH] (ref 5–8)
PROT UR STRIP-MCNC: NEGATIVE MG/DL
SP GR UR REFRACTOMETRY: 1.02 (ref 1–1.03)
TRIGL SERPL-MCNC: 62 MG/DL (ref ?–150)
TSH SERPL DL<=0.05 MIU/L-ACNC: 0.65 UIU/ML (ref 0.36–3.74)
UROBILINOGEN UR QL STRIP.AUTO: 0.2 EU/DL (ref 0.2–1)
VLDLC SERPL CALC-MCNC: 12.4 MG/DL
WBC URNS QL MICRO: ABNORMAL /HPF (ref 0–4)

## 2018-10-30 PROCEDURE — 84443 ASSAY THYROID STIM HORMONE: CPT | Performed by: INTERNAL MEDICINE

## 2018-10-30 PROCEDURE — 36415 COLL VENOUS BLD VENIPUNCTURE: CPT | Performed by: INTERNAL MEDICINE

## 2018-10-30 PROCEDURE — 81001 URINALYSIS AUTO W/SCOPE: CPT | Performed by: INTERNAL MEDICINE

## 2018-10-30 PROCEDURE — 82306 VITAMIN D 25 HYDROXY: CPT | Performed by: INTERNAL MEDICINE

## 2018-10-30 PROCEDURE — 80061 LIPID PANEL: CPT | Performed by: INTERNAL MEDICINE

## 2018-11-06 ENCOUNTER — OFFICE VISIT (OUTPATIENT)
Dept: INTERNAL MEDICINE CLINIC | Age: 73
End: 2018-11-06

## 2018-11-06 VITALS
DIASTOLIC BLOOD PRESSURE: 62 MMHG | RESPIRATION RATE: 12 BRPM | WEIGHT: 142 LBS | HEART RATE: 76 BPM | SYSTOLIC BLOOD PRESSURE: 112 MMHG | TEMPERATURE: 97.9 F | HEIGHT: 61 IN | OXYGEN SATURATION: 97 % | BODY MASS INDEX: 26.81 KG/M2

## 2018-11-06 DIAGNOSIS — K57.30 DIVERTICULOSIS OF LARGE INTESTINE WITHOUT HEMORRHAGE: ICD-10-CM

## 2018-11-06 DIAGNOSIS — M25.511 CHRONIC RIGHT SHOULDER PAIN: ICD-10-CM

## 2018-11-06 DIAGNOSIS — M85.89 OSTEOPENIA OF MULTIPLE SITES: ICD-10-CM

## 2018-11-06 DIAGNOSIS — G47.00 INSOMNIA, UNSPECIFIED TYPE: ICD-10-CM

## 2018-11-06 DIAGNOSIS — K57.92 DIVERTICULITIS: ICD-10-CM

## 2018-11-06 DIAGNOSIS — M75.111 NONTRAUMATIC INCOMPLETE TEAR OF RIGHT ROTATOR CUFF: ICD-10-CM

## 2018-11-06 DIAGNOSIS — E78.5 HYPERLIPIDEMIA, UNSPECIFIED HYPERLIPIDEMIA TYPE: ICD-10-CM

## 2018-11-06 DIAGNOSIS — C78.00 MALIGNANT NEOPLASM METASTATIC TO LUNG, UNSPECIFIED LATERALITY (HCC): ICD-10-CM

## 2018-11-06 DIAGNOSIS — C50.912 MALIGNANT NEOPLASM OF LEFT BREAST IN FEMALE, ESTROGEN RECEPTOR POSITIVE, UNSPECIFIED SITE OF BREAST (HCC): ICD-10-CM

## 2018-11-06 DIAGNOSIS — M48.50XA VERTEBRAL COMPRESSION FRACTURE (HCC): ICD-10-CM

## 2018-11-06 DIAGNOSIS — R53.82 CHRONIC FATIGUE: ICD-10-CM

## 2018-11-06 DIAGNOSIS — M15.9 PRIMARY OSTEOARTHRITIS INVOLVING MULTIPLE JOINTS: ICD-10-CM

## 2018-11-06 DIAGNOSIS — I89.0 LYMPHEDEMA OF ARM: ICD-10-CM

## 2018-11-06 DIAGNOSIS — M48.061 SPINAL STENOSIS OF LUMBAR REGION WITHOUT NEUROGENIC CLAUDICATION: ICD-10-CM

## 2018-11-06 DIAGNOSIS — G89.29 CHRONIC RIGHT SHOULDER PAIN: ICD-10-CM

## 2018-11-06 DIAGNOSIS — I10 ESSENTIAL HYPERTENSION: Primary | ICD-10-CM

## 2018-11-06 DIAGNOSIS — Z17.0 MALIGNANT NEOPLASM OF LEFT BREAST IN FEMALE, ESTROGEN RECEPTOR POSITIVE, UNSPECIFIED SITE OF BREAST (HCC): ICD-10-CM

## 2018-11-06 RX ORDER — GABAPENTIN 100 MG/1
100 CAPSULE ORAL
Qty: 90 CAP | Refills: 0 | Status: SHIPPED | OUTPATIENT
Start: 2018-11-06 | End: 2019-01-30

## 2018-11-06 NOTE — PROGRESS NOTES
Chief Complaint Patient presents with  Hypertension 6 month follow up with lab results. Health Maintenance Due Topic Date Due  Shingrix Vaccine Age 50> (1 of 2) 09/28/1997 1. Have you been to the ER, urgent care clinic or hospitalized since your last visit? NO.  
 
2. Have you seen or consulted any other health care providers outside of the [address] [place] [address] since your last visit (Include any pap smears or colon screening)? YES, [facility], Patient states last seen by them in October 2018.

## 2018-11-06 NOTE — PATIENT INSTRUCTIONS
Preventing Outdoor Falls: Care Instructions Your Care Instructions Worries about falls don't need to keep you indoors. Outdoor activities like walking have big benefits for your health. You will need to watch your step and learn a few safety measures. If you are worried about having a fall outdoors, ask your doctor about exercises, classes, or physical therapy that may help. You can learn ways to gain strength, flexibility, and balance. Ask if it might help to use a cane or walker. You can make your time outdoors safer with a few simple measures. Follow-up care is a key part of your treatment and safety. Be sure to make and go to all appointments, and call your doctor if you are having problems. It's also a good idea to know your test results and keep a list of the medicines you take. How can you prevent falls outdoors? · Wear shoes with firm soles and low heels. If you have to walk on an icy surface, use grippers that can be worn over your shoes in bad weather. · Be extra careful if weather is bad. Walk on the grass when the sidewalks are slick. If you live in a place that gets snow and ice in the winter, sprinkle salt on slippery stairs and sidewalks. · Be careful getting on or off buses and trains or getting in and out of cars. If handrails are available, use them. · Be careful when you cross the street. Look for crosswalks or places where curb cuts or ramps are present. · Try not to hurry, especially if you are carrying something. · Be cautious in parking lots or garages. There may be curbs or changes in pavement, or the height of the pavement may vary. · Make sure to wear the correct eyeglasses, if you need them. Reading glasses or bifocals can make it harder to see hazards that might be in your way. · If you are walking outdoors for exercise, try to: 
? Walk in well-lighted, well-maintained areas. These include high school or college tracks, shopping malls, and public spaces. ? Walk with a partner. ? Watch out for cracked sidewalks, curbs, changes in the height of the pavement, exposed tree roots, and debris such as fallen leaves or branches. Where can you learn more? Go to http://fatou-olive.info/. Enter M581 in the search box to learn more about \"Preventing Outdoor Falls: Care Instructions. \" Current as of: March 16, 2018 Content Version: 11.8 © 3641-8983 [employer]. Care instructions adapted under license by [employer] (which disclaims liability or warranty for this information). If you have questions about a medical condition or this instruction, always ask your healthcare professional. [address] any warranty or liability for your use of this information.

## 2018-11-10 PROBLEM — M75.111 NONTRAUMATIC INCOMPLETE TEAR OF RIGHT ROTATOR CUFF: Status: ACTIVE | Noted: 2018-11-10

## 2018-11-10 PROBLEM — C78.00 MALIGNANT NEOPLASM METASTATIC TO LUNG (HCC): Status: ACTIVE | Noted: 2018-11-10

## 2018-11-10 NOTE — PROGRESS NOTES
Preethi Adam is a 78y.o. year old female who presents today for evaluation of hypertension, dyslipidemia,  metastatic breast cancer, GERD, diverticulosis with recurrent diverticulitis, osteoarthritis s/p right knee replacement (2012), lumbar degenerative disease, osteopenia, compression fracture, and Tourette's syndrome. She reports that she is doing relatively well, but describes persistent severe fatigue which she attributes to her breast cancer therapy. At her last visit, she reported that her aromatase inhibitor therapy had been changed from Femara to monthly fulvestrant (Faslodex) due to her worsening fatigue, and although initially noting some mild improvement, she reports that her fatigue is severe and is making it difficult to perform her daytime activities such as cooking dinner. She relates that she and her  took her two small grandchildren to the zoo last week, and spent much of the day with them. She states that she had trouble enjoying herself due to her fatigue, and she was forced to remain in bed the following day since she felt so tired. She states that she feels it is impacting her quality of life, but also states that she realizes that she needs to continue treatment. She also continues on palbociclib Wendi Dori) for her metastatic breast cancer. She underwent restaging chest, abdomen, and pelvis CT scan on 7/13/2018 which showed that her pulmonary nodules were unchanged. She also continues to have right shoulder pain. X-ray of her shoulder (11/2017) was negative.  She had a right shoulder MRI (5/14/2018) which showed deep partial thickness undersurface tear of the distal supraspinatus, likely with full thickness slitlike perforations; no gross tendon retraction, but there is moderate muscle atrophy; partial thickness tearing with longitudinal components involving the biceps tendon at its sulcal turn, some associated tenosynovitis; accompanying short length longitudinal split tear in the distal subscapularis; moderate infraspinatus tendinosis, and mild to moderate subacromial subdeltoid bursitis. She underwent evaluation by Dr. Brenda Sandoval on 5/18/2018, and received a glenohumeral cortisone injection. She states that it helped initially for about two weeks, but relates that her pain has persisted and is inhibiting her ability to raise her right arm. She was seen again by Dr. Brenda Sandoval in 6/2018 and it was recommended that she proceed with physical therapy. However, she has not yet scheduled this. She completed physical therapy for her low back pain, and has noted some improvement. She is otherwise without complaints. She has a history of left breast cancer, which was diagnosed in 7/2010 as invasive ductal adenocarcinoma, s/p left modified radical mastectomy with sentinel node biopsy, performed by Dr. Marina Jones. She had 3 positive lymph nodes and was classified as stage 1B, T1c N1 M0, ER and W[place] positive, Her-2/sarah negative by FISH. She underwent 6 cycles of chemotherapy with Docetaxel and Cytoxan. She was subsequently unable to tolerate anastrozole, exemestane, tamoxifen, and letrozole due to profound fatigue and arthralgias. Her course was complicated by chronic left arm lymphedema, managed with a compression sleeve. A chest CT scan in 3/2014 noted several small pulmonary nodules which were concerning for metastases but were too small to biopsy. They were followed with sequential CT scans , and in 12/2014, repeat chest CT scan showed enlarging bilateral pulmonary nodules compatible with progression of metastatic disease. A fine needle aspirate was performed on 12/21/2014 which showed benign pulmonary parenchyma with alveolar hemorrhage. Repeat chest CT scan in 3/12/2015 showed mild interval progression of the lung metastases with enlarging nodules and development of new nodules.  A CT guided needle biopsy was performed on 3/23/2015, which confirmed metastatic adenocarcinoma consistent with breast primary (stage IV, ER/MD positive, and TTF-1 negative). On 4/13/2015, she was started on therapy with Ibrance and letrozole. Follow-up CT scan (7/15/15) showed partial response with decreasing size of some nodules and resolution of other nodules. Most recent chest, abdomen, and pelvis CT scan was obtained in 6/2017, showing stable or decreased multiple bilateral  pulmonary nodules and no suspicious new pulmonary nodules. She continues on palbociclib, but was changed from letrozole to fulvestrant. She is followed by Dr. Mike Box. She states that she established care with Dr. Leonarda Pham, but was told that she may have her mammograms and breast exams in our office with referral to her if something arises. She also has a history of a right ovarian cyst, but was released from the care of Dr. Mago Cochran since it was concluded to be benign. She has a history of hypertension treated with losartan. She monitors her blood pressure mainly when visiting multiple physicians and reports that it is well controlled. She has no history of heart disease, and denies any chest pain, shortness of breath at rest or with exertion, palpitations, lightheadedness, or edema. In 4/11/2016, she sustained a fall with subsequent severe pain in her mid to upper lumbar region and wrapping around waist. She was treated with meloxicam, tylenol and flexeril, but because of persistent discomfort, she presented to [facility] on 4/15/2016 and lumbosacral spine films showed marked degenerative changes with disc space obliteration throughout lumbar spine and slight anterior spondylolisthesis of L4. She was evaluated by Dr. Rajat Colorado on 4/18/2016 who recommended physical therapy and evaluation by Dr. Yuly Vieira for her degenerative spine changes. She continued to take meloxicam and tylenol and started physical therapy, but noted worsening of her pain.  She subsequently was evaluated by Dr. Fabricio Wright, who obtained a lumbar MRI on 4/26/2016, which showed a new subacute compression fracture of T11 vertebral body with diffuse marrow edema and mild paravertebral inflammatory stranding, no retropulsion or central stenosis; more severe degenerative disease along the right side at L4-L5, L5-S1, potential mild compression of right exiting L4 and L5 nerve roots. She was referred to Dr. Cassi Phillips for kyphoplasty of the T11 compression fracture given failure of pain control with conservative measures. She underwent the procedure on 5/8/8816 without complications, and D54 vertebral body bone core and aspirate biopsies were performed, which showed only reactive bone changes and no evidence of malignancy. She has a history of osteopenia, with a history of fracture of her sacrum. She reports that she was treated with alendronate in 12/2011 but discontinued it in 4/2013 due to intolerence. Her last bone density scan (11/2017) was consistent with osteopenia with femoral neck T-scores: left -1.4/ right -1.4 and distal radius -2.2 (lumbar T-score could not be assessed). She continues to take calcium and vitamin D. In 10/2016, she developed left sided low back pain with radiation to her left leg over the last several weeks. She underwent a lumbar MRI (9/2016) showing scoliosis and advanced multilevel degenerative changes with areas of foraminal stenosis at L3-L4 and L5-S1; mild/moderate central canal stenosis at L4-L5 and L5-S1. She was evaluated by Dr. Pardeep Gandara and treated with pregabalin with some improvement. She subsequently received an epidural steroid injection with improvement and discontinued pregabalin. In 4/2017, she noted increasing cervical and upper back discomfort.  She has had multiple cervical MRI's,and underwent repeat in 4/2017 which showed multilevel degenerative disc disease and facet arthropathy without change from prior studies with mild central canal stenosis C3-C4 and C5-C6, and moderate central canal stenosis C6-C7. She was treated with Mobic and Flexeril, and takes gabapentin at bedtime. She has a history of GERD and diverticulosis with recurrent diverticulitis,. She was evaluated by Dr. Dajuan Mendez in 2/23/2016 and underwent an upper endoscopy, which revealed a Schatzki's ring at the gastroesophageal junction (dilated) with mild distal esophagitis and a small hiatal hernia. A screening colonoscopy was also performed showing moderately severe diverticulosis of the ascending and descending colon and a 2 mm sessile sigmoid polyp (pathology: hyperplastic). Recommendations for follow-up colonoscopy in 10 years. She denies any abdominal pain, nausea, vomiting, melena, hematochezia, or change in bowel movements. She was evaluated in 8/2017 by GERALD Caro with complaints of abdominal pain and was treated with Cipro for presumed diverticulitis. She contacted the office again in 10/2017 and 1/2018 with a recurrence of symptoms, and was treated for a 10 day course with Cipro with improvement. Abdominal CT scan in 2/6/2018 for staging for her breast cancer did show evidence of moderate to severe diverticulosis, but no evidence of diverticulitis. She did present with similar symptoms in 3/2018 and was evaluated by GERALD Vigil and prescribed Cipro. However, her symptoms resolved prior to taking the antibiotics. In 7/2018, she again presented with left lower quadrant pain, and an abdominal CT scan (7/13/2018) which showed evidence of uncomplicated diverticulitis involving the lower left colon and upper sigmoid. She was treated with Cipro and Flagyl initially, but developed a rash due to Flagyl, and was subsequently changed to Augmentin. She had a follow-up visit with Dr. Desmond Collier and it was his opinion that she also had IBS which mimicked her episodes of diverticulitis. He recommended continuing on a probiotic and prescribed hyoscyamine to use as needed.  She reports complete resolution of her symptoms today. She also has a history of Tourette's syndrome controlled with Haldol. She is followed by Dr. Justin Sifuentes. Past Medical History:  
Diagnosis Date  Arthritis  Breast cancer metastasized to lung ([address] Left Breast  
 Chronic pain  Colon polyps 2/22/16  
 distal sigmoid, Dr. Millan Late  DDD (degenerative disc disease)  Diverticulitis of colon  Diverticulosis 2/22/16  
 mild in the ascedning and sigmoid colon, Dr. Juan Mcdonald  HLD (hyperlipidemia)  Hypertension  Osteopenia  Tourette syndrome  Vitamin D deficiency Past Surgical History:  
Procedure Laterality Date  HX APPENDECTOMY  HX BREAST BIOPSY  7-30-10 Left Breast w/Nipple Duct exploration and excisional biopsy-left  HX DILATION AND CURETTAGE    
 x3  
 HX MODIFIED RADICAL MASTECTOMY  9-3-10  
 left  HX ORTHOPAEDIC Right 2012  
 knee replacement  HX TONSILLECTOMY  HYSTEROSCOPY DIAGNOSTIC Current Outpatient Medications Medication Sig  
 gabapentin (NEURONTIN) 100 mg capsule Take 1 Cap by mouth three (3) times daily as needed.  diazePAM (VALIUM) 5 mg tablet Take 1 Tab by mouth every six (6) hours as needed for Anxiety. Max Daily Amount: 20 mg.  
 meloxicam (MOBIC) 15 mg tablet TAKE 1 TABLET EVERY DAY WITH FOOD  
 haloperidol (HALDOL) 2 mg tablet Take 1 Tab by mouth two (2) times a day.  palbociclib 75 mg cap 75 mg.  
 fulvestrant (FASLODEX IM) by IntraMUSCular route.  losartan (COZAAR) 25 mg tablet Take 1 Tab by mouth daily.  CYANOCOBALAMIN, VITAMIN B-12, (VITAMIN B-12 PO) Take  by mouth.  calcium-vitamin D (CALCIUM 500+D) 500 mg(1,250mg) -200 unit per tablet Take 1 Tab by mouth two (2) times daily (with meals).  cholecalciferol, vitamin d3, (VITAMIN D) 1,000 unit tablet Take 2,000 Units by mouth daily.  ascorbic acid (VITAMIN C) 500 mg tablet Take  by mouth.  cyclobenzaprine (FLEXERIL) 5 mg tablet Take 5 mg by mouth.  Biotin 2,500 mcg cap Take  by mouth. No current facility-administered medications for this visit. Allergies and Intolerances: Allergies Allergen Reactions  Keflex [Cephalexin] Rash  Metronidazole Rash  Other Medication Nausea Only Anesthesia  Shellfish Containing Products Nausea and Vomiting More of just eating the actual shellfish.  Sulfa (Sulfonamide Antibiotics) Rash  Ultram [Tramadol] Nausea and Vomiting Patient states she is allergic to most Narcotics  Vancomycin Rash Family History:  
Family History Problem Relation Age of Onset  Osteoporosis Sister  Osteoporosis Mother  Stroke Father  Hypertension Father  Cancer Paternal Aunt   
     breast  
 
Social History: She  reports that she has quit smoking. she has never used smokeless tobacco. She stopped smoking over 40 years ago. She is  and lives with . They have one daughter and two grandchildren. Social History Substance and Sexual Activity Alcohol Use Yes  Alcohol/week: 3.5 oz  Types: 7 Glasses of wine per week Immunization History: 
Immunization History Administered Date(s) Administered  Influenza High Dose Vaccine PF 10/01/2015, 10/25/2016, 10/05/2017  Influenza Vaccine 09/01/2014  Influenza Vaccine (Tri) Adjuvanted 10/05/2018  Influenza Vaccine Split 11/01/2011, 10/02/2012  Influenza Vaccine Whole 10/08/2010  Pneumococcal Conjugate (PCV-13) 10/27/2015  Pneumococcal Polysaccharide (PPSV-23) 04/15/2013  TD Vaccine 05/05/2008  Tdap 09/12/2014  Zoster 09/20/2011 Review of Systems: As above included in HPI. Otherwise 11 point review of systems negative including constitutional, skin, HENT, eyes, respiratory, cardiovascular, gastrointestinal, genitourinary, musculoskeletal, endo/heme/aller, neurological. 
 
Physical:  
Vitals:  
BP: 112/62 HR: 76 
 WT: 142 lb (64.4 kg) BMI:  26.83 kg/m2 Exam:  
Patient appears in no apparent distress. Affect is appropriate. HEENT --Anicteric sclerae, tympanic membranes normal,  ear canals normal. 
PERRL, EOMI, conjunctiva and lids normal.  
Sinuses were nontender, turbinates normal, hearing normal.  Oropharynx without 
erythema, normal tongue, oral mucosa and tonsils. No cervical lymphadenopathy. No thyromegaly, JVD, or bruits. Carotid pulses 2+ with normal upstroke. Lungs --Clear to auscultation. No wheezing or rales. Heart --Regular rate and rhythm, no murmurs, rubs, gallops, or clicks. Breasts -- right breast with no masses, skin dimpling, asymmetry, nipple discharge, nodules, axillary lymphadenopathy; s/p left mastectomy. Chest wall --Nontender to palpation. PMI normal. 
Abdomen -- Soft and nontender, no hepatosplenomegaly or masses. Extremities -- Without cyanosis, clubbing, edema. 2+ pulses equally and bilaterally. Right shoulder with active ROM above 90 degrees limited by pain; no joint swelling or inflammation Neuro -- CN 2-12 intact, strength 5/5 with intact soft touch in all extremities Derm  no obvious abnormalities noted, no rash Review of Data: 
Labs: Hospital Outpatient Visit on 10/30/2018 Component Date Value Ref Range Status  LIPID PROFILE 10/30/2018        Final  
 Cholesterol, total 10/30/2018 185  <200 MG/DL Final  
 Triglyceride 10/30/2018 62  <150 MG/DL Final  
 HDL Cholesterol 10/30/2018 63* 40 - 60 MG/DL Final  
 LDL, calculated 10/30/2018 109.6* 0 - 100 MG/DL Final  
 VLDL, calculated 10/30/2018 12.4  MG/DL Final  
 CHOL/HDL Ratio 10/30/2018 2.9  0 - 5.0   Final  
 Color 10/30/2018 YELLOW    Final  
 Appearance 10/30/2018 CLEAR    Final  
 Specific gravity 10/30/2018 1.016  1.005 - 1.030   Final  
 pH (UA) 10/30/2018 7.5  5.0 - 8.0   Final  
 Protein 10/30/2018 NEGATIVE   NEG mg/dL Final  
 Glucose 10/30/2018 NEGATIVE   NEG mg/dL Final  
  Ketone 10/30/2018 NEGATIVE   NEG mg/dL Final  
 Bilirubin 10/30/2018 NEGATIVE   NEG   Final  
 Blood 10/30/2018 NEGATIVE   NEG   Final  
 Urobilinogen 10/30/2018 0.2  0.2 - 1.0 EU/dL Final  
 Nitrites 10/30/2018 NEGATIVE   NEG   Final  
 Leukocyte Esterase 10/30/2018 NEGATIVE   NEG   Final  
 WBC 10/30/2018 0 to 3  0 - 4 /hpf Final  
 Epithelial cells 10/30/2018 FEW  0 - 5 /lpf Final  
 Bacteria 10/30/2018 FEW* NEG /hpf Final  
 TSH 10/30/2018 0.65  0.36 - 3.74 uIU/mL Final  
 Vitamin D 25-Hydroxy 10/30/2018 36.8  30 - 100 ng/mL Final  
 
10/19/2018 Dr. Jose David Willis office WBC 2.8 ANC 1310 Hb 12.7/ Hct 38 Platelets 218 Glucose 123 
 
  
04/21/2017 Dr. Jose David Willis office WBC 5.2 Hb 13.9/ Hct 41.4 Platelets 401 Glucose 87 Na 141/ K 5.1/ CO2 29 Ca 9.8 AST 16/ ALT 15 Alkaline phosphatase 67 Creatinine 0.84/ eGFR >60 Health Maintenance: 
Screening:  
 Mammogram: negative (9/2018). Breast exam negative today (perform every 6 months) PAP smear: negative (9/2013) Dr Marylou Goodwin. Pelvic ultrasound negative (9/2015). No further screening needed. Colorectal: colonoscopy (2/2016) hyperplastic polyp; Dr. Zofia Merritt. Follow-up 2026. Depression: none DM (HbA1c/FPG): FPG 87 (4/2017) Hepatitis C: unknown Falls: none DEXA: osteopenia (11/2017) s/p T11 compression fracture in 4/2016. Smoking:distant past 
 Glaucoma: regular eye exams with Dr. Yris Mcmillan (last 2/2018). Appointment with Dr. Gregor Sinha for possible \"3 snip\" procedure to manage dry eyes and excessive tearing. Vitamin D: 36.8 (10/2018) Medicare Wellness: 5/1/2018 Impression: 
Patient Active Problem List  
Diagnosis Code  Tourette syndrome F95.2  
 Osteoarthritis, Status post right total knee replacement M19.90  Lumbar spinal stenosis M48.061  
 HLD (hyperlipidemia) E78.5  Breast cancer ([facility] [address] metastatic to lungs  C50.919  
 Essential hypertension I10  
  Diverticulosis K57.90  
 Osteopenia M85.80  Lymphedema of arm left I89.0  Insomnia G47.00  Fatigue R53.83  Diverticulitis, recurrent K57.92  
 T11 Vertebral compression fracture (HCC) s/p kyphoplasty M48.50XA  Personal history of malignant neoplasm of breast Z85.3  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.812  Spinal stenosis of cervical region M48.02  
 Right shoulder pain M25.511  
 Malignant neoplasm metastatic to lung ([address] C78.00 Plan: 1. Hypertension. Well controlled on losartan 25 mg daily. Renal function has been normal with creatinine 0.88/ eGFR >60, and reportedly normal as performed by oncology. Continue to follow. 2. Breast cancer with pulmonary metastasis. Continue current therapy as per Dr. Jerome Alexis. Recent CT scans (last 7/2018) with stable pulmonary nodules. On Ibrance and Faslodex. Wishing to continue with treatment despite excessive fatigue. Thrombocytopenia and leukopenia related to [facility] EJ therapy. Continue use of compression sleeve for lymphedema. Follow. 3. Hyperlipidemia. Calculated 10 year ASCVD risk is 9.7 %, which places her in one of the four statin benefit groups (primary prevention with risk > 7.5%).  and HDL 63 on labs today. Given lack of history of ASCVD, no evidence of atherosclerotic disease on chest and abdominal CT scan, and history of metastatic breast cancer, will not recommend treatment with statin at this time. Continue to emphasized importance of lifestyle modifications, including diet and exercise. 4. T11 compression fracture. S/P kyphoplasty with no further difficulties. Follow. 5. Osteopenia. Last bone density scan 11/2017. Using femoral neck T-scores, calculated FRAX score estimates her 10 year risk of a major osteoporetic fracture at 15% and hip fracture at 2.0%, which alone are not an indication for biphosphonate treatment.  However, the development of a vertebral compression fracture in 4/2016 would be an indication for treatment. In addition, treatment with an estrogen receptor blocker increases likelihood of progression. She did not tolerate alendronate in the past. Had discussed the benefits and risks of therapy with the patient previously, and preauthorization had been obtained for her to begin receiving denosumab therapy through Dr. Governor Paula office. However, she has had some difficulty with side effects from treatment with Ibrance, and had decided not to proceed with Prolia therapy. It was readdressed after her repeat bone density in 11/2017 and she decided to defer treatment for now as not wishing to proceed. Continue calcium and Vitamin D. Encouraged to continue exercises as instructed in physical therapy. Will reassess bone density scan in 2019. If evidence of progression of osteopenia, will readdress treatment options. 6. Recurrent diverticulitis. Colonoscopy (2/2016) with moderately severe diverticulosis in the ascending and descending colon. Has had multiple recent episodes of abdominal pain which were treated empirically with Cipro. Episode of symptoms in 3/2018 resolved prior to initiating antibiotics, bringing into question whether her abdominal symptoms were due to diverticulitis or irritable bowel syndrome. Discussed at last visit that she should have abdominal CT scan to confirm the diagnosis next time she had abdominal complaints. Had recurrent episode in 7/2018, and abdominal CT scan confirmed diagnosis of acute diverticulitis. Treated with Augmentin as developed rash on Flagyl. Subsequently evaluated by Dr. Star Loya, and instructed to continue Probiotics and use hyoscyamine as needed when develop abdominal complaints. It was his opinion that her complaints may also be related to IBS, so wanting to prevent frequent antibiotics if not needed. Reports no recent symptoms since initiating. Will continue to follow. 7. Fatigue. Severe and persistent despite changing from letrozole to fulvestrant. Wishing to continue therapy. Discussed that may be exacerbated by treatment with Haldol for Tourette's syndrome and gabapentin. Not wishing to change therapy currently. Follow. 8. Right shoulder pain. MRI with partial rotator cuff tear. Evaluated by Dr. Darvin De Leon and received a glenohumeral cortisone injection without significant improvement. Recommended physical therapy, but patient has not yet initiated. Will follow-up with Dr. Issa Hi. 9. Low back pain/ neck pain. Improved with physical therapy. Using meloxicam, cyclobenzaprine, and gabapentin. Not wishing further epidural injections currently. Follow. 10. Tourette's syndrome. Followed by Dr. Jr Baldwin. Continue Haldol and prn diazepam for sleep. 11. Right ovarian cyst. No further monitoring needed as per Dr. Mellisa Vogel. Patient expressed concern about not following cyst, but surveillance CT scans obtained every 6 months for breast cancer do include imaging of pelvis. Has been stable. Patient reassured. Follow. 12. Health maintenance. Already received influenza vaccine. Discussed possibility of Shingrix vaccine, but given immunosuppression not sure advisable to proceed. Discussed that should address with Dr. Oswaldo Amin. Other immunizations up to date. Mammogram up to date. Will perform breast exam every visit. Vitamin D level normal. Continue maintenance dose supplement. Continue regular eye exams with Dr. Rahul Boyd. Medicare wellness visit up to date. Total time: 40 minutes spent with the patient in face-to-face consultation of which greater than 50% was spent on counseling, answering questions and/or coordination of care. Complex medical review and management performed. Patient understands recommendations and agrees with plan. Follow-up in 6 months.

## 2018-11-28 ENCOUNTER — TELEPHONE (OUTPATIENT)
Dept: INTERNAL MEDICINE CLINIC | Age: 73
End: 2018-11-28

## 2018-11-28 NOTE — TELEPHONE ENCOUNTER
Pt saw Dr Connie Camacho 2 weeks ago , she has a torn rotator cuff, she is not able to be seen  Again until 12/28- she is in a lot of pain and can not lift it, she would like to have a steroid injection she does not want pain meds

## 2018-11-29 NOTE — TELEPHONE ENCOUNTER
Called and spoke with patient. Reports pain in right shoulder feeling much better today with heat application. Has appointment with Dr. Debra Powers on 12/18 for possible injection. Informed her that we do not administer injections in our office.

## 2019-01-07 ENCOUNTER — HOSPITAL ENCOUNTER (OUTPATIENT)
Dept: CT IMAGING | Age: 74
Discharge: HOME | End: 2019-01-07
Attending: NURSE PRACTITIONER
Payer: MEDICARE

## 2019-01-07 DIAGNOSIS — C50.912 MALIGNANT NEOPLASM OF LEFT BREAST (HCC): ICD-10-CM

## 2019-01-07 LAB — CREAT UR-MCNC: 0.7 MG/DL (ref 0.6–1.3)

## 2019-01-07 PROCEDURE — 74011636320 HC RX REV CODE- 636/320

## 2019-01-07 PROCEDURE — 82565 ASSAY OF CREATININE: CPT

## 2019-01-07 PROCEDURE — 74177 CT ABD & PELVIS W/CONTRAST: CPT

## 2019-01-07 RX ADMIN — IOPAMIDOL 100 ML: 612 INJECTION, SOLUTION INTRAVENOUS at 13:43

## 2019-01-25 ENCOUNTER — HOSPITAL ENCOUNTER (OUTPATIENT)
Dept: GENERAL RADIOLOGY | Age: 74
Discharge: HOME | End: 2019-01-25
Attending: INTERNAL MEDICINE
Payer: MEDICARE

## 2019-01-25 DIAGNOSIS — K21.9 GASTROESOPHAGEAL REFLUX DISEASE: ICD-10-CM

## 2019-01-25 DIAGNOSIS — K76.89 HEPATIC CYST: ICD-10-CM

## 2019-01-25 PROCEDURE — 74011000250 HC RX REV CODE- 250: Performed by: INTERNAL MEDICINE

## 2019-01-25 PROCEDURE — 74011000255 HC RX REV CODE- 255: Performed by: INTERNAL MEDICINE

## 2019-01-25 PROCEDURE — 74247 XR UPPER GI W KUB AIR CONT: CPT

## 2019-01-25 RX ADMIN — BARIUM SULFATE 700 MG: 700 TABLET ORAL at 09:30

## 2019-01-25 RX ADMIN — BARIUM SULFATE 30 G: 960 POWDER, FOR SUSPENSION ORAL at 09:30

## 2019-01-25 RX ADMIN — BARIUM SULFATE 135 ML: 980 POWDER, FOR SUSPENSION ORAL at 09:30

## 2019-01-25 RX ADMIN — ANTACID/ANTIFLATULENT 4 G: 380; 550; 10; 10 GRANULE, EFFERVESCENT ORAL at 09:30

## 2019-01-30 ENCOUNTER — TELEPHONE (OUTPATIENT)
Dept: INTERNAL MEDICINE CLINIC | Age: 74
End: 2019-01-30

## 2019-01-30 RX ORDER — GABAPENTIN 100 MG/1
300 CAPSULE ORAL
Qty: 90 CAP | Refills: 1 | Status: SHIPPED | OUTPATIENT
Start: 2019-01-30 | End: 2019-04-16 | Stop reason: SDUPTHER

## 2019-01-30 RX ORDER — MELOXICAM 15 MG/1
TABLET ORAL
Qty: 90 TAB | Refills: 1 | Status: SHIPPED | OUTPATIENT
Start: 2019-01-30 | End: 2019-08-12 | Stop reason: SDUPTHER

## 2019-01-30 RX ORDER — LOSARTAN POTASSIUM 25 MG/1
25 TABLET ORAL DAILY
Qty: 90 TAB | Refills: 3 | Status: SHIPPED | OUTPATIENT
Start: 2019-01-30 | End: 2020-01-21 | Stop reason: SDUPTHER

## 2019-01-30 NOTE — TELEPHONE ENCOUNTER
Called and spoke with patient. Having increased lower back discomfort at bedtime. Will increase gabapentin to 300 mg qhs, and advised to take with two (2) Tylenol ES. Refill sent to [facility].

## 2019-01-30 NOTE — TELEPHONE ENCOUNTER
Pt calling, says the Gabapentin is not touching her nerve pain. Wants to know if she can get the med increased. She is taking 2 100 mg tablets daily. Would need new rx to go to [facility].     Says rx says 3 a day just so she could get more pills but pt does only take 2 a day

## 2019-01-30 NOTE — TELEPHONE ENCOUNTER
Last Visit: 11/6  Next Appt: 5/14  Previous Refill Encounter: 8/20-90+1    Requested Prescriptions     Pending Prescriptions Disp Refills    meloxicam (MOBIC) 15 mg tablet 90 Tab 1     Sig: TAKE 1 TABLET EVERY DAY WITH FOOD    losartan (COZAAR) 25 mg tablet 90 Tab 3     Sig: Take 1 Tab by mouth daily.

## 2019-02-21 ENCOUNTER — TELEPHONE (OUTPATIENT)
Dept: INTERNAL MEDICINE CLINIC | Age: 74
End: 2019-02-21

## 2019-02-21 RX ORDER — OSELTAMIVIR PHOSPHATE 75 MG/1
75 CAPSULE ORAL DAILY
Qty: 10 CAP | Refills: 0 | Status: SHIPPED | OUTPATIENT
Start: 2019-02-21 | End: 2019-03-03

## 2019-02-21 NOTE — TELEPHONE ENCOUNTER
seen in office today. Diagnosed with influenza A. Patient on chemotherapy for breast cancer. Will give prophylaxis treatment with Tamiflu 75 mg daily for 10 days.

## 2019-03-11 DIAGNOSIS — G47.00 INSOMNIA, UNSPECIFIED TYPE: ICD-10-CM

## 2019-03-11 RX ORDER — DIAZEPAM 5 MG/1
5 TABLET ORAL
Qty: 30 TAB | Refills: 0 | OUTPATIENT
Start: 2019-03-11 | End: 2019-11-14 | Stop reason: SDUPTHER

## 2019-03-11 NOTE — TELEPHONE ENCOUNTER
PHONE IN [facility] reports the last fill date for Valium as 09/25/2018 for an 8 d/s. There appears to be no inconsistencies in regards to the prescribing of this medication. Last Visit: 11/06/2018 with MD John Lovelace  Next Appointment: 05/14/2019 with MD John Lovelace  Previous Refill Encounter(s): 09/24/2018 per MD John Lovelace #30    Requested Prescriptions     Pending Prescriptions Disp Refills    diazePAM (VALIUM) 5 mg tablet 30 Tab 0     Sig: Take 1 Tab by mouth every six (6) hours as needed for Anxiety or Sleep. Max Daily Amount: 20 mg.

## 2019-03-11 NOTE — TELEPHONE ENCOUNTER
Pt asking if the reason she takes the valium can be changed. Says she actually takes it as a sleep aid.

## 2019-03-21 RX ORDER — HALOPERIDOL 2 MG/1
2 TABLET ORAL 2 TIMES DAILY
Qty: 180 TAB | Refills: 3 | Status: SHIPPED | OUTPATIENT
Start: 2019-03-21 | End: 2019-03-29 | Stop reason: SDUPTHER

## 2019-03-21 RX ORDER — HALOPERIDOL 2 MG/1
2 TABLET ORAL 2 TIMES DAILY
Qty: 180 TAB | Refills: 2 | Status: CANCELLED | OUTPATIENT
Start: 2019-03-21

## 2019-03-21 NOTE — TELEPHONE ENCOUNTER
Last Visit: 11-6-18 with MD Ana Carney  Next Appointment: 5-14-19 with MD Melgar  Previous Refill Encounter(s): 5-23-18 #180 with 2 refills    Requested Prescriptions     Pending Prescriptions Disp Refills    haloperidol (HALDOL) 2 mg tablet 180 Tab 3     Sig: Take 1 Tab by mouth two (2) times a day.

## 2019-03-29 ENCOUNTER — TELEPHONE (OUTPATIENT)
Dept: INTERNAL MEDICINE CLINIC | Age: 74
End: 2019-03-29

## 2019-03-29 RX ORDER — HALOPERIDOL 2 MG/1
2 TABLET ORAL 2 TIMES DAILY
Qty: 100 TAB | Refills: 0 | Status: SHIPPED | OUTPATIENT
Start: 2019-03-29 | End: 2020-04-16 | Stop reason: SDUPTHER

## 2019-03-29 NOTE — TELEPHONE ENCOUNTER
Pt calling very upset. Mail order is out of the Haldol medication. Says it is on back order. She has called all over and the only location that has any is [facility].    She needs the rx to go there asap since they only have 100 pills. She only has 4 days left of her supply and has been on the med for 45 years. Says this is urgent. Wants call when sent.

## 2019-03-29 NOTE — TELEPHONE ENCOUNTER
Haldol #100 sent to [facility]. Spoke with patient and notified script sent. Advised to make a follow-up appointment with Dr. Matthew Ennis to address alternative treatment for Tourette's syndrome if Haldol remains unavailable.

## 2019-04-16 RX ORDER — GABAPENTIN 300 MG/1
300 CAPSULE ORAL
Qty: 90 CAP | Refills: 1 | Status: SHIPPED | OUTPATIENT
Start: 2019-04-16 | End: 2020-03-24 | Stop reason: SDUPTHER

## 2019-04-16 NOTE — TELEPHONE ENCOUNTER
Last Visit: 11/6/18 with MD Leilani Salazar  Next Appointment: 5/14/19 with MD Melgar  Previous Refill Encounter(s): 1/30/19 #90 with 1 refill    Requested Prescriptions     Pending Prescriptions Disp Refills    gabapentin (NEURONTIN) 100 mg capsule 90 Cap 1     Sig: Take 3 Caps by mouth nightly.

## 2019-04-16 NOTE — TELEPHONE ENCOUNTER
Called patient and verified full name and date of birth. Informed patient of Dr. Rodolfo Lutz' question and patient stated that she would take the 300 mg tablet.

## 2019-04-16 NOTE — TELEPHONE ENCOUNTER
Please inquire if the patient wishes to receive the 300 mg tablet rather than taking three (3) of the 100 mg tablets.

## 2019-04-29 ENCOUNTER — HOSPITAL ENCOUNTER (OUTPATIENT)
Age: 74
Discharge: HOME | End: 2019-04-29
Attending: PSYCHIATRY & NEUROLOGY
Payer: MEDICARE

## 2019-04-29 DIAGNOSIS — I72.0 ANEURYSM OF ARTERY OF NECK (HCC): ICD-10-CM

## 2019-04-29 PROCEDURE — 70544 MR ANGIOGRAPHY HEAD W/O DYE: CPT

## 2019-05-14 ENCOUNTER — OFFICE VISIT (OUTPATIENT)
Dept: INTERNAL MEDICINE CLINIC | Age: 74
End: 2019-05-14

## 2019-05-14 ENCOUNTER — TELEPHONE (OUTPATIENT)
Dept: INTERNAL MEDICINE CLINIC | Age: 74
End: 2019-05-14

## 2019-05-14 VITALS
OXYGEN SATURATION: 100 % | RESPIRATION RATE: 14 BRPM | TEMPERATURE: 97.9 F | BODY MASS INDEX: 28.22 KG/M2 | DIASTOLIC BLOOD PRESSURE: 68 MMHG | WEIGHT: 140 LBS | SYSTOLIC BLOOD PRESSURE: 108 MMHG | HEART RATE: 77 BPM | HEIGHT: 59 IN

## 2019-05-14 DIAGNOSIS — M85.9 DISORDER OF BONE DENSITY AND STRUCTURE, UNSPECIFIED: ICD-10-CM

## 2019-05-14 DIAGNOSIS — Z00.00 MEDICARE ANNUAL WELLNESS VISIT, SUBSEQUENT: ICD-10-CM

## 2019-05-14 DIAGNOSIS — Z17.0 MALIGNANT NEOPLASM OF LEFT BREAST IN FEMALE, ESTROGEN RECEPTOR POSITIVE, UNSPECIFIED SITE OF BREAST (HCC): Primary | ICD-10-CM

## 2019-05-14 DIAGNOSIS — E78.5 HYPERLIPIDEMIA, UNSPECIFIED HYPERLIPIDEMIA TYPE: ICD-10-CM

## 2019-05-14 DIAGNOSIS — K57.92 DIVERTICULITIS: ICD-10-CM

## 2019-05-14 DIAGNOSIS — G47.00 INSOMNIA, UNSPECIFIED TYPE: ICD-10-CM

## 2019-05-14 DIAGNOSIS — M48.062 SPINAL STENOSIS OF LUMBAR REGION WITH NEUROGENIC CLAUDICATION: ICD-10-CM

## 2019-05-14 DIAGNOSIS — M85.89 OSTEOPENIA OF MULTIPLE SITES: ICD-10-CM

## 2019-05-14 DIAGNOSIS — I10 ESSENTIAL HYPERTENSION: ICD-10-CM

## 2019-05-14 DIAGNOSIS — I89.0 LYMPHEDEMA OF ARM: ICD-10-CM

## 2019-05-14 DIAGNOSIS — Z71.89 ACP (ADVANCE CARE PLANNING): ICD-10-CM

## 2019-05-14 DIAGNOSIS — F95.2 TOURETTE SYNDROME: ICD-10-CM

## 2019-05-14 DIAGNOSIS — M48.50XA VERTEBRAL COMPRESSION FRACTURE (HCC): ICD-10-CM

## 2019-05-14 DIAGNOSIS — R53.82 CHRONIC FATIGUE: ICD-10-CM

## 2019-05-14 DIAGNOSIS — C78.00 MALIGNANT NEOPLASM METASTATIC TO LUNG, UNSPECIFIED LATERALITY (HCC): ICD-10-CM

## 2019-05-14 DIAGNOSIS — C50.912 MALIGNANT NEOPLASM OF LEFT BREAST IN FEMALE, ESTROGEN RECEPTOR POSITIVE, UNSPECIFIED SITE OF BREAST (HCC): Primary | ICD-10-CM

## 2019-05-14 RX ORDER — RANITIDINE 300 MG/1
300 TABLET ORAL DAILY
COMMUNITY
End: 2019-11-16

## 2019-05-14 NOTE — PROGRESS NOTES
Chief Complaint Patient presents with  Hypertension 6 month follow up. Camila Annual Wellness Visit Yearly Medicare wellness exam.  
 
Health Maintenance Due Topic Date Due  GLAUCOMA SCREENING Q2Y  02/08/2019  MEDICARE YEARLY EXAM  05/02/2019 1. Have you been to the ER, urgent care clinic or hospitalized since your last visit? NO.  
 
2. Have you seen or consulted any other health care providers outside of the [address] [place] [address] since your last visit (Include any pap smears or colon screening)? YES, Dentist for oral surgery 3 weeks ago. Eye puncotoplasty 6 weeks ago. Learning Assessment 5/14/2019 PRIMARY LEARNER Patient HIGHEST LEVEL OF EDUCATION - PRIMARY LEARNER  4 YEARS OF COLLEGE  
BARRIERS PRIMARY LEARNER NONE  
CO-LEARNER CAREGIVER No  
PRIMARY LANGUAGE ENGLISH  NEED No  
LEARNER PREFERENCE PRIMARY READING  
  DEMONSTRATION  
  LISTENING  
  PICTURES  
  VIDEOS  
ANSWERED BY patient RELATIONSHIP SELF

## 2019-05-14 NOTE — PATIENT INSTRUCTIONS
Medicare Wellness Visit, Female The best way to improve and maintain good health is to have a healthy lifestyle by eating a well-balanced diet, exercising regularly, limiting alcohol and stopping smoking. Regular visits with your physician or non-physician health care provider also support your good health. Preventive screening tests can find health problems before they become diseases or illnesses. Preventive services such as immunizations prevent serious infections. All people over age 72 should have a Pneumovax and a Prevnar-13 shot to prevent potentially life threatening infections with the pneumococcus bacteria, a common cause of pneumonia. These are once in a lifetime unless you and your provider decide differently. All people over 65 should have a yearly influenza vaccine or \"flu\" shot. This does not prevent infection with cold viruses but has been proven to prevent hospitalization and death from influenza. Although Medicare part B \"regular Medicare\" currently only covers tetanus vaccination in the context of an injury, a tetanus vaccine (Tdap or Td) is recommended every 10 years. A shingles vaccine is recommended once in a lifetime after age 61. The Shingles vaccine is also not covered by Medicare part B. Note, however, that both the Shingles vaccine and Tdap/Td are generally covered by secondary carriers. Please check your coverage and out of pocket expenses. Consider contacting your local health department because it may stock these vaccines for a reasonable charge. We currently have documentation of the following immunization history for you: 
Immunization History Administered Date(s) Administered  Influenza High Dose Vaccine PF 10/01/2015, 10/25/2016, 10/05/2017  Influenza Vaccine 09/01/2014  Influenza Vaccine (Tri) Adjuvanted 10/05/2018  Influenza Vaccine Split 11/01/2011, 10/02/2012  Influenza Vaccine Whole 10/08/2010  Pneumococcal Conjugate (PCV-13) 10/27/2015  Pneumococcal Polysaccharide (PPSV-23) 04/15/2013  TD Vaccine 05/05/2008  Tdap 09/12/2014  Zoster 09/20/2011 Screening for infection with Hepatitis C is recommended for anyone born between [birth year] through [address]. The table at the bottom of this document indicates the status of this and other preventive services. A bone mass density test (DEXA) to screen for osteoporosis or thinning of the bones should be done at least once after age 72 and may be done up to every 2 years as determined by you and your health care provider. The most recent DEXA we have on file for you is: DEXA Results (most recent): 
Results from Hospital Encounter encounter on 11/13/17 DEXA BONE DENSITY STUDY AXIAL Narrative DEXA BONE DENSITOMETRY, CENTRAL 
 
CPT CODE: 67088 INDICATION: Postmenopausal. History of osteopenia. Metastatic breast cancer with 
chemotherapy. T12 compression fracture. Taking calcium and vitamin D. TECHNIQUE: Using GE LUNAR Prodigy densitometer, bone density measurement was 
performed in the lumbar spine the proximal left and right femora and the left 
forearm. T Score refers to standard deviations above or below average compared 
to a young adult of the same sex. Z Score refers to standard deviations above or 
below average compared to a patient of the same sex, age, race and weight. COMPARISON: April 18, 2005. April 15, 2010. October 19, 2011. October 22, 2013. November 10, 2015. FINDINGS:  
 
On PA image of the lumbar spine obtained for localization of vertebral levels (not a diagnostic quality radiograph), there is apparent increased density at 
multiple levels. The density is not well characterized on the basis of this 
image, but likely degenerative. These changes render the lumbar spine invalid as 
a site for densitometry in this patient. Distal1/3 Radius BMD:  0.689 g/cm2 T Score: -2.2 Z Score: -0.4 BMD decreased 10.6%, which is statistically significant within a 95 percent 
confidence interval compared to preceding study. BMD decreased 17.0%, which is statistically significant compared to baseline 
study which at the forearm is the study of 2010. Left Total Proximal Femur BMD: 0.835 g/cm2 T Score:  -1.4 Z Score:  0.1 BMD decreased 6.7%, which is statistically significant within a 95 percent 
confidence interval compared to preceding study. BMD decreased 7.7%, which is statistically significant compared to baseline 
study. Right Total Proximal Femur BMD: 0.833 g/cm2 T Score:  -1.4 Z Score:  0.1 BMD decreased 5.3%, which is statistically significant within a 95 percent 
confidence interval compared to preceding study. BMD decreased 6.3%, which is statistically significant compared to baseline 
study. Left Femoral Neck BMD:  0.837 g/cm2 T Score:  -1.4 Z Score:  0.2 Right Femoral Neck BMD:  0.849 g/cm2 T Score:  -1.4 Z Score:  0.3 Impression IMPRESSION: 
 
1. BMD measures consistent with osteopenia. 2.  Compared to the preceding study, BMD has decreased. 3.  Compared to the baseline study, BMD has decreased. Based upon current ISCD guidelines, the patient's overall diagnostic category, 
selected using WHO criteria in postmenopausal women and males aged 48 and above, 
is selected based upon the lowest T Score from among the lumbar spine, total 
femur, femoral neck, (or distal third radius if measured). WHO Definition of Osteoporosis and Osteopenia on DXA (specified for 
post-menopausal  females): 
 
  Normal:                     T Score at or above -1 SD Osteopenia:              T Score between -1 and -2.5 SD Osteoporosis:           T Score at or below -2.5 SD The risk of fracture approximately doubles for each 1 SD decrease in T Score.   
It is important to consider other factors in assessing a patient's risk of 
 fracture, including age, risk of falling/injury, history of fragility fracture, 
family history of osteoporosis, smoking, low weight. Various fracture risk tools have been developed for adult patients and are 
available online. For example, the FRAX tool developed by [facility] is widely used. Reference www.iscd.org. It is also important to note that DXA measures bone density but does not 
distinguish among causes of decreased bone density, which include primary versus 
secondary osteoporosis (such as metabolic bone disorders or possible effects of 
medications) and also other conditions (such as osteomalacia). Clinical 
considerations should determine what additional evaluation may be warranted to 
exclude secondary conditions in a patient with low bone density. Please note that reliable, valid comparisons can not be made between studies 
which have been performed on different densitometers. If clinically warranted, 
follow up study performed at this site would best permit assessment of trend for 
possible change in bone mineral density over time in comparison to this study. Thank you for this referral.  
 
 
Screening for diabetes mellitus with a blood sugar test (glucose) should be done at least every 3 years until age 79. You and your health care provider may decide whether to continue screening after age 79. The most recent blood glucose we have on file for you is:  
Lab Results Component Value Date/Time Glucose 115 (H) 07/13/2018 02:30 PM  
 
 
 
Glaucoma is a disease of the eye due to increased ocular pressure that can lead to blindness.  People with risk factors for glaucoma ( race, diabetes, family history) should be screened at least every 2 years by an eye professional.  
 
Cardiovascular screening tests that check for elevated lipids or cholesterol (fatty part of blood) which can lead to heart disease and strokes should be done every 4-6 years through age 79. You and your health care provider may decide whether to continue screening after age 79. The most recent lipid panel we have on file for you is:  
Lab Results Component Value Date/Time Cholesterol, total 185 10/30/2018 10:02 AM  
 HDL Cholesterol 63 (H) 10/30/2018 10:02 AM  
 LDL, calculated 109.6 (H) 10/30/2018 10:02 AM  
 VLDL, calculated 12.4 10/30/2018 10:02 AM  
 Triglyceride 62 10/30/2018 10:02 AM  
 CHOL/HDL Ratio 2.9 10/30/2018 10:02 AM  
 
 
Colorectal cancer screening that evaluates for blood or polyps in your colon for people with average risk should be done yearly as a stool test, every five years as a flexible sigmoidoscope or every 10 years as a colonoscopy up to age 76. You and your health care provider may decide whether to continue screening after age 76. Breast cancer screening with a mammogram is recommended at least once every 2 years  for women age 54-69. You and your health care provider may decide whether to continue screening after age 76. The most recent mammogram we have on file for you is: [facility] Results (most recent): 
Results from Hospital Encounter encounter on 09/04/18 SHARON 3D PEACE W MAMMO RT SCREENING INCL CAD Narrative UNILATERAL RIGHT Digital Screening Mammogram with Tomosynthesis History: Screening. History of left mastectomy for carcinoma. Comparison: [id number] Technique: Digital mammography (2D and 3D Tomosynthesis) with CAD was performed. Routine views obtained. Findings:  
No developing masses or suspicious calcifications are seen. Impression Impression: No evidence for malignancy. Suggest routine follow-up with annual mammographic screening. BIRADS 1 : Negative The breasts are heterogenously dense, which may obscure small masses.  
 
Thank you for this referral.  
  
 
 
Screening for cervical cancer with a pap smear is recommended for all women with a cervix until age 72. The frequency of this test is based on the details of her prior pap smear testing. You and your health care provider may decide whether to continue screening after age 72. People who have smoked the equivalent of 1 pack per day for 30 years or more may benefit from screening for lung cancer with a yearly low dose CT scan until they have been non smokers for 15 years or competing health conditions render this unlikely to be beneficial. Our records show: n/a Your Medicare Wellness Exam is recommended annually. Here is a list of your current Health Maintenance items with a due date: 
Health Maintenance Topic Date Due  GLAUCOMA SCREENING Q2Y  02/08/2019  Shingrix Vaccine Age 50> (1 of 2) 09/19/2019 (Originally 9/28/1997)  Influenza Age 5 to Adult  08/01/2019  MEDICARE YEARLY EXAM  05/14/2020  BREAST CANCER SCRN MAMMOGRAM  09/04/2020  
 DTaP/Tdap/Td series (2 - Td) 09/12/2024  COLONOSCOPY  02/22/2026  Bone Densitometry (Dexa) Screening  Completed  Pneumococcal 65+ years  Completed  Hepatitis C Screening  Addressed

## 2019-05-14 NOTE — TELEPHONE ENCOUNTER
Please request recent eye exam from Dr. David Ma and Dr. Katt Easton . Patient reports being seen in the last few months . Thank you.

## 2019-05-14 NOTE — ACP (ADVANCE CARE PLANNING)
Advance Care Planning (ACP) Provider Note - Comprehensive     Date of ACP Conversation: 05/14/19  Persons included in Conversation:  patient and family  Length of ACP Conversation in minutes:  16 minutes    Authorized Decision Maker (if patient is incapable of making informed decisions):  and daughter  This person is:  Healthcare Agent/Medical Power of  under Advance Directive          General ACP for ALL Patients with Decision Making Capacity:   Importance of advance care planning, including choosing a healthcare agent to communicate patient's healthcare decisions if patient lost the ability to make decisions, such as after a sudden illness or accident  Understanding of the healthcare agent role was assessed and information provided  Exploration of values, goals, and preferences if recovery is not expected, even with continued medical treatment in the event of: Imminent death  Severe, permanent brain injury  \"In these circumstances, what matters most to you? \"  Care focused more on comfort or quality of life. Review of Existing Advance Directive:    Patient has an existing advance directive on file, signed 10/31/2017 . It designates her  and daughter  as her healthcare agents and expresses that she does not wish life prolonging procedures for end of life care. She also notes that she has designated to have her brain donated to [facility] for the study of Tourette's syndrome. It was reviewed with her and still reflects her wishes.        For Serious or Chronic Illness:  Understanding of medical condition      Interventions Provided:  Reviewed existing Advance Directive   Recommended review of completed ACP document annually or upon change in health status

## 2019-05-14 NOTE — PROGRESS NOTES
This is the Subsequent Medicare Annual Wellness Exam, performed 12 months or more after the Initial AWV or the last Subsequent AWV I have reviewed the patient's medical history in detail and updated the computerized patient record. History Past Medical History:  
Diagnosis Date  Arthritis  Breast cancer metastasized to lung ([address] Left Breast  
 Chronic pain  Colon polyps 2/22/16  
 distal sigmoid, Dr. Mary Platt  DDD (degenerative disc disease)  Diverticulitis of colon  Diverticulosis 2/22/16  
 mild in the ascedning and sigmoid colon, Dr. Bria Paige  HLD (hyperlipidemia)  Hypertension  Osteopenia  Tourette syndrome  Vitamin D deficiency Past Surgical History:  
Procedure Laterality Date  HX APPENDECTOMY  HX BREAST BIOPSY  7-30-10 Left Breast w/Nipple Duct exploration and excisional biopsy-left  HX DILATION AND CURETTAGE    
 x3  
 HX MODIFIED RADICAL MASTECTOMY  9-3-10  
 left  HX ORTHOPAEDIC Right 2012  
 knee replacement  HX TONSILLECTOMY  HYSTEROSCOPY DIAGNOSTIC Current Outpatient Medications Medication Sig Dispense Refill  raNITIdine (ZANTAC) 300 mg tab Take 300 mg by mouth daily.  gabapentin (NEURONTIN) 300 mg capsule Take 1 Cap by mouth nightly. 90 Cap 1  
 haloperidol (HALDOL) 2 mg tablet Take 1 Tab by mouth two (2) times a day. 100 Tab 0  
 diazePAM (VALIUM) 5 mg tablet Take 1 Tab by mouth every six (6) hours as needed for Anxiety or Sleep. Max Daily Amount: 20 mg. 30 Tab 0  
 meloxicam (MOBIC) 15 mg tablet TAKE 1 TABLET EVERY DAY WITH FOOD 90 Tab 1  
 losartan (COZAAR) 25 mg tablet Take 1 Tab by mouth daily. 90 Tab 3  palbociclib 75 mg cap 75 mg.    
 fulvestrant (FASLODEX IM) by IntraMUSCular route.  CYANOCOBALAMIN, VITAMIN B-12, (VITAMIN B-12 PO) Take  by mouth.  Biotin 2,500 mcg cap Take  by mouth.     
 calcium-vitamin D (CALCIUM 500+D) 500 mg(1,250mg) -200 unit per tablet Take 1 Tab by mouth two (2) times daily (with meals).  cholecalciferol, vitamin d3, (VITAMIN D) 1,000 unit tablet Take 2,000 Units by mouth daily.  ascorbic acid (VITAMIN C) 500 mg tablet Take  by mouth.  cyclobenzaprine (FLEXERIL) 5 mg tablet Take 5 mg by mouth. Allergies Allergen Reactions  Keflex [Cephalexin] Rash  Metronidazole Rash  Other Medication Nausea Only Anesthesia  Shellfish Containing Products Nausea and Vomiting More of just eating the actual shellfish.  Sulfa (Sulfonamide Antibiotics) Rash  Ultram [Tramadol] Nausea and Vomiting Patient states she is allergic to most Narcotics  Vancomycin Rash Family History Problem Relation Age of Onset  Osteoporosis Sister  Osteoporosis Mother  Stroke Father  Hypertension Father  Cancer Paternal Aunt   
     breast  
 
Social History Tobacco Use  Smoking status: Former Smoker Years: 3.00  Smokeless tobacco: Never Used Substance Use Topics  Alcohol use: Yes Alcohol/week: 3.5 oz Types: 7 Glasses of wine per week Patient Active Problem List  
Diagnosis Code  Tourette syndrome F95.2  
 Osteoarthritis, Status post right total knee replacement M19.90  Lumbar spinal stenosis M48.061  
 HLD (hyperlipidemia) E78.5  Breast cancer ([address] metastatic to lungs  C50.919  
 Essential hypertension I10  Diverticulosis K57.90  
 Osteopenia M85.80  Lymphedema of arm left I89.0  Insomnia G47.00  Fatigue R53.83  Diverticulitis, recurrent K57.92  
 T11 Vertebral compression fracture (HCC) s/p kyphoplasty M48.50XA  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.812  Spinal stenosis of cervical region M48.02  
 Right shoulder pain M25.511  
 Malignant neoplasm metastatic to lung ([address] C78.00  
 Nontraumatic incomplete tear of right rotator cuff M75.111 Depression Risk Factor Screening:  
 
3 most recent PHQ Screens 5/14/2019 Little interest or pleasure in doing things Not at all Feeling down, depressed, irritable, or hopeless Not at all Total Score PHQ 2 0 Alcohol Risk Factor Screening: You do not drink alcohol or very rarely. She drinks one glass of wine per night. Functional Ability and Level of Safety:  
Hearing Loss Hearing is good. Activities of Daily Living The home contains: no safety equipment. Patient does total self care Fall Risk Fall Risk Assessment, last 12 mths 5/14/2019 Able to walk? Yes Fall in past 12 months? No  
Fall with injury? -  
Number of falls in past 12 months - Fall Risk Score -  
 
 
Abuse Screen Patient is not abused Cognitive Screening Evaluation of Cognitive Function: 
Has your family/caregiver stated any concerns about your memory: no 
Normal 
 
Patient Care Team  
Patient Care Team: 
Nilsa Rosas MD as PCP - General (Internal Medicine) Joe Seth MD (Rheumatology) Philip Brewer MD (Neurology) Darian Degroot MD (Hematology and Oncology) Magalis Bradshaw MD (Endocrinology) Lulu Miller MD (Gynecologic Oncology) Cholo Waldrop MD (Dermatology) Shira Stout MD (Ophthalmology) Michael Dillon RN as Ambulatory Care Navigator (Internal Medicine) Asiya Okeefe DO (Physical Medicine and Rehab) Jerod Rock MD (General Surgery) Celina Chandler MD (Gastroenterology) Coco Sanchez DO (Orthopedic Surgery) Assessment/Plan Education and counseling provided: 
Are appropriate based on today's review and evaluation End-of-Life planning (with patient's consent) Influenza Vaccine Screening Mammography Colorectal cancer screening tests Cardiovascular screening blood test 
Bone mass measurement (DEXA) Screening for glaucoma Diabetes screening test 
Shingrix vaccine Diagnoses and all orders for this visit: 
 
1.  Malignant neoplasm of left breast in female, estrogen receptor positive, unspecified site of breast ([facility] -     DEXA BONE DENSITY STUDY AXIAL; Future -     SHARON MAMMO RT DX INCL CAD; Future -     LIPID PANEL; Future -     URINALYSIS W/MICROSCOPIC; Future -     VITAMIN D, 25 HYDROXY; Future 
-     TSH 3RD GENERATION; Future 2. Malignant neoplasm metastatic to lung, unspecified laterality ([facility] -     DEXA BONE DENSITY STUDY AXIAL; Future -     SHARON MAMMO RT DX INCL CAD; Future -     LIPID PANEL; Future -     URINALYSIS W/MICROSCOPIC; Future -     VITAMIN D, 25 HYDROXY; Future 
-     TSH 3RD GENERATION; Future 3. T11 Vertebral compression fracture (HCC) s/p kyphoplasty -     DEXA BONE DENSITY STUDY AXIAL; Future -     SHARON MAMMO RT DX INCL CAD; Future -     LIPID PANEL; Future -     URINALYSIS W/MICROSCOPIC; Future -     VITAMIN D, 25 HYDROXY; Future 
-     TSH 3RD GENERATION; Future 4. Osteopenia of multiple sites -     DEXA BONE DENSITY STUDY AXIAL; Future -     SHARON MAMMO RT DX INCL CAD; Future -     LIPID PANEL; Future -     URINALYSIS W/MICROSCOPIC; Future -     VITAMIN D, 25 HYDROXY; Future 
-     TSH 3RD GENERATION; Future 5. Essential hypertension -     LIPID PANEL; Future -     URINALYSIS W/MICROSCOPIC; Future -     VITAMIN D, 25 HYDROXY; Future 
-     TSH 3RD GENERATION; Future 6. Hyperlipidemia, unspecified hyperlipidemia type -     LIPID PANEL; Future -     URINALYSIS W/MICROSCOPIC; Future -     VITAMIN D, 25 HYDROXY; Future 
-     TSH 3RD GENERATION; Future 7. Diverticulitis, recurrent -     LIPID PANEL; Future -     URINALYSIS W/MICROSCOPIC; Future -     VITAMIN D, 25 HYDROXY; Future 
-     TSH 3RD GENERATION; Future 8. Chronic fatigue -     LIPID PANEL; Future -     URINALYSIS W/MICROSCOPIC; Future -     VITAMIN D, 25 HYDROXY; Future 
-     TSH 3RD GENERATION; Future 9. Insomnia, unspecified type -     LIPID PANEL; Future -     URINALYSIS W/MICROSCOPIC; Future -     VITAMIN D, 25 HYDROXY; Future -     TSH 3RD GENERATION; Future 10. Lymphedema of arm left -     LIPID PANEL; Future -     URINALYSIS W/MICROSCOPIC; Future -     VITAMIN D, 25 HYDROXY; Future 
-     TSH 3RD GENERATION; Future 11. Spinal stenosis of lumbar region with neurogenic claudication -     LIPID PANEL; Future -     URINALYSIS W/MICROSCOPIC; Future -     VITAMIN D, 25 HYDROXY; Future 
-     TSH 3RD GENERATION; Future 12. Tourette syndrome -     LIPID PANEL; Future -     URINALYSIS W/MICROSCOPIC; Future -     VITAMIN D, 25 HYDROXY; Future 
-     TSH 3RD GENERATION; Future 13. Disorder of bone density and structure, unspecified  
-     VITAMIN D, 25 HYDROXY; Future 14. Medicare annual wellness visit, subsequent 15. ACP (advance care planning) There are no preventive care reminders to display for this patient.

## 2019-05-18 NOTE — PROGRESS NOTES
Melissa Brown is a 78y.o. year old female who presents today for a routine visit and for evaluation of hypertension, dyslipidemia,  metastatic breast cancer, GERD, diverticulosis with recurrent diverticulitis, osteoarthritis s/p right knee replacement (2012), lumbar degenerative disease, osteopenia, compression fracture, and Tourette's syndrome. She reports that she is doing relatively well, but describes persistent fatigue which she attributes to monthly treatment with fulvestrant (Faslodex). She also continues on palbociclib Negron Fortunato) for her metastatic breast cancer. She underwent restaging chest, abdomen, and pelvis CT scan on 1/7/2019 which showed that her pulmonary nodules were unchanged. She states that she is due for restaging in 7/2019. She reports that she continued to have problems with low back pain and right sciatica, and underwent an epidural steroid injection at right L4-5 transforaminal approach by Dr. Lai Schroeder on 3/18/2019. She has noted improvement in her right hip and leg pain. She continues to take gabapentin 300 mg at night. She also was having more difficulty with burning epigastric pain, and was advised by Dr. Phoebe Portillo to begin Zantac five days per week (off 2 days). She has noted improvement since doing so. She did see Dr. Adalberto Morales regarding the shortage of Haldol, and she stated that if needed she would change her therapy to Risperdal. She is otherwise without complaints. She has a history of left breast cancer, which was diagnosed in 7/2010 as invasive ductal adenocarcinoma, s/p left modified radical mastectomy with sentinel node biopsy, performed by Dr. Estela Garcia. She had 3 positive lymph nodes and was classified as stage 1B, T1c N1 M0, ER and NM positive, Her-2/sarah negative by FISH. She underwent 6 cycles of chemotherapy with Docetaxel and Cytoxan.  She was subsequently unable to tolerate anastrozole, exemestane, tamoxifen, and letrozole due to profound fatigue and arthralgias. Her course was complicated by chronic left arm lymphedema, managed with a compression sleeve. A chest CT scan in 3/2014 noted several small pulmonary nodules which were concerning for metastases but were too small to biopsy. They were followed with sequential CT scans , and in 12/2014, repeat chest CT scan showed enlarging bilateral pulmonary nodules compatible with progression of metastatic disease. A fine needle aspirate was performed on 12/21/2014 which showed benign pulmonary parenchyma with alveolar hemorrhage. Repeat chest CT scan in 3/12/2015 showed mild interval progression of the lung metastases with enlarging nodules and development of new nodules. A CT guided needle biopsy was performed on 3/23/2015, which confirmed metastatic adenocarcinoma consistent with breast primary (stage IV, ER/WI positive, and TTF-1 negative). On 4/13/2015, she was started on therapy with Ibrance and letrozole. Follow-up CT scan (7/15/15) showed partial response with decreasing size of some nodules and resolution of other nodules. Most recent chest, abdomen, and pelvis CT scan was obtained in 6/2017, showing stable or decreased multiple bilateral  pulmonary nodules and no suspicious new pulmonary nodules. She continues on palbociclib, but was changed from letrozole to fulvestrant. She is followed by Dr. Tonda Gottron. She states that she established care with Dr. Miki Messina, but was told that she may have her mammograms and breast exams in our office with referral to her if something arises. She also has a history of a right ovarian cyst, but was released from the care of Dr. Reggie Bertrand since it was concluded to be benign. She has a history of hypertension treated with losartan. She monitors her blood pressure mainly when visiting multiple physicians and reports that it is well controlled.  She has no history of heart disease, and denies any chest pain, shortness of breath at rest or with exertion, palpitations, lightheadedness, or edema. In 4/11/2016, she sustained a fall with subsequent severe pain in her mid to upper lumbar region and wrapping around waist. She was treated with meloxicam, tylenol and flexeril, but because of persistent discomfort, she presented to [facility] on 4/15/2016 and lumbosacral spine films showed marked degenerative changes with disc space obliteration throughout lumbar spine and slight anterior spondylolisthesis of L4. She was evaluated by Dr. Flip Bazzi on 4/18/2016 who recommended physical therapy and evaluation by Dr. Carolyn Marin for her degenerative spine changes. She continued to take meloxicam and tylenol and started physical therapy, but noted worsening of her pain. She subsequently was evaluated by Dr. Lai Schroeder, who obtained a lumbar MRI on 4/26/2016, which showed a new subacute compression fracture of T11 vertebral body with diffuse marrow edema and mild paravertebral inflammatory stranding, no retropulsion or central stenosis; more severe degenerative disease along the right side at L4-L5, L5-S1, potential mild compression of right exiting L4 and L5 nerve roots. She was referred to Dr. Raya Navarro for kyphoplasty of the T11 compression fracture given failure of pain control with conservative measures. She underwent the procedure on 4/7/2739 without complications, and U03 vertebral body bone core and aspirate biopsies were performed, which showed only reactive bone changes and no evidence of malignancy. She has a history of osteopenia, with a history of fracture of her sacrum. She reports that she was treated with alendronate in 12/2011 but discontinued it in 4/2013 due to intolerence. Her last bone density scan (11/2017) was consistent with osteopenia with femoral neck T-scores: left -1.4/ right -1.4 and distal radius -2.2 (lumbar T-score could not be assessed). She continues to take calcium and vitamin D.  In 10/2016, she developed left sided low back pain with radiation to her left leg over the last several weeks. She underwent a lumbar MRI (9/2016) showing scoliosis and advanced multilevel degenerative changes with areas of foraminal stenosis at L3-L4 and L5-S1; mild/moderate central canal stenosis at L4-L5 and L5-S1. She was evaluated by Dr. Suzan Corbett and treated with pregabalin with some improvement. She subsequently received an epidural steroid injection with improvement and discontinued pregabalin. In 4/2017, she noted increasing cervical and upper back discomfort. She has had multiple cervical MRI's,and underwent repeat in 4/2017 which showed multilevel degenerative disc disease and facet arthropathy without change from prior studies with mild central canal stenosis C3-C4 and C5-C6, and moderate central canal stenosis C6-C7. She was treated with Mobic and Flexeril, and takes gabapentin at bedtime. She complained of right shoulder pain. X-ray of her shoulder (11/2017) was negative, and she had a right shoulder MRI (5/14/2018) which showed deep partial thickness undersurface tear of the distal supraspinatus, likely with full thickness slitlike perforations; no gross tendon retraction, but there is moderate muscle atrophy; partial thickness tearing with longitudinal components involving the biceps tendon at its sulcal turn, some associated tenosynovitis; accompanying short length longitudinal split tear in the distal subscapularis; moderate infraspinatus tendinosis, and mild to moderate subacromial subdeltoid bursitis. She underwent evaluation by Dr. Abdirashid Gray on 5/18/2018, and received a glenohumeral cortisone injection. She has a history of GERD and diverticulosis with recurrent diverticulitis,. She was evaluated by Dr. Vicente Cooper in 2/23/2016 and underwent an upper endoscopy, which revealed a Schatzki's ring at the gastroesophageal junction (dilated) with mild distal esophagitis and a small hiatal hernia.  A screening colonoscopy was also performed showing moderately severe diverticulosis of the ascending and descending colon and a 2 mm sessile sigmoid polyp (pathology: hyperplastic). Recommendations for follow-up colonoscopy in 10 years. She denies any abdominal pain, nausea, vomiting, melena, hematochezia, or change in bowel movements. She was evaluated in 8/2017 by GERALD Wild with complaints of abdominal pain and was treated with Cipro for presumed diverticulitis. She contacted the office again in 10/2017 and 1/2018 with a recurrence of symptoms, and was treated for a 10 day course with Cipro with improvement. Abdominal CT scan in 2/6/2018 for staging for her breast cancer did show evidence of moderate to severe diverticulosis, but no evidence of diverticulitis. She did present with similar symptoms in 3/2018 and was evaluated by GERALD Vigil and prescribed Cipro. However, her symptoms resolved prior to taking the antibiotics. In 7/2018, she again presented with left lower quadrant pain, and an abdominal CT scan (7/13/2018) which showed evidence of uncomplicated diverticulitis involving the lower left colon and upper sigmoid. She was treated with Cipro and Flagyl initially, but developed a rash due to Flagyl, and was subsequently changed to Augmentin. She had a follow-up visit with Dr. Vivian Owen and it was his opinion that she also had IBS which mimicked her episodes of diverticulitis. He recommended continuing on a probiotic and prescribed hyoscyamine to use as needed. She reports complete resolution of her symptoms today. She also has a history of Tourette's syndrome controlled with Haldol. She is followed by Dr. Nathanael Smith. Past Medical History:  
Diagnosis Date  Arthritis  Breast cancer metastasized to lung ([address] Left Breast  
 Chronic pain  Colon polyps 2/22/16  
 distal sigmoid, Dr. Salgado Pulse  DDD (degenerative disc disease)  Diverticulitis of colon  Diverticulosis 2/22/16  
 mild in the ascedning and sigmoid colon, Dr. Mitchell Ceja  HLD (hyperlipidemia)  Hypertension  Osteopenia  Tourette syndrome  Vitamin D deficiency Past Surgical History:  
Procedure Laterality Date  HX APPENDECTOMY  HX BREAST BIOPSY  7-30-10 Left Breast w/Nipple Duct exploration and excisional biopsy-left  HX DILATION AND CURETTAGE    
 x3  
 HX MODIFIED RADICAL MASTECTOMY  9-3-10  
 left  HX ORTHOPAEDIC Right 2012  
 knee replacement  HX TONSILLECTOMY  HYSTEROSCOPY DIAGNOSTIC Current Outpatient Medications Medication Sig  
 raNITIdine (ZANTAC) 300 mg tab Take 300 mg by mouth daily.  gabapentin (NEURONTIN) 300 mg capsule Take 1 Cap by mouth nightly.  haloperidol (HALDOL) 2 mg tablet Take 1 Tab by mouth two (2) times a day.  diazePAM (VALIUM) 5 mg tablet Take 1 Tab by mouth every six (6) hours as needed for Anxiety or Sleep. Max Daily Amount: 20 mg.  
 meloxicam (MOBIC) 15 mg tablet TAKE 1 TABLET EVERY DAY WITH FOOD  losartan (COZAAR) 25 mg tablet Take 1 Tab by mouth daily.  palbociclib 75 mg cap 75 mg.  
 fulvestrant (FASLODEX IM) by IntraMUSCular route.  CYANOCOBALAMIN, VITAMIN B-12, (VITAMIN B-12 PO) Take  by mouth.  Biotin 2,500 mcg cap Take  by mouth.  calcium-vitamin D (CALCIUM 500+D) 500 mg(1,250mg) -200 unit per tablet Take 1 Tab by mouth two (2) times daily (with meals).  cholecalciferol, vitamin d3, (VITAMIN D) 1,000 unit tablet Take 2,000 Units by mouth daily.  ascorbic acid (VITAMIN C) 500 mg tablet Take  by mouth.  cyclobenzaprine (FLEXERIL) 5 mg tablet Take 5 mg by mouth. No current facility-administered medications for this visit. Allergies and Intolerances: Allergies Allergen Reactions  Keflex [Cephalexin] Rash  Metronidazole Rash  Other Medication Nausea Only Anesthesia  Shellfish Containing Products Nausea and Vomiting More of just eating the actual shellfish.  Sulfa (Sulfonamide Antibiotics) Rash  Ultram [Tramadol] Nausea and Vomiting Patient states she is allergic to most Narcotics  Vancomycin Rash Family History:  
Family History Problem Relation Age of Onset  Osteoporosis Sister  Osteoporosis Mother  Stroke Father  Hypertension Father  Cancer Paternal Aunt   
     breast  
 
Social History: She  reports that she has quit smoking. She quit after 3.00 years of use. She has never used smokeless tobacco. She stopped smoking over 40 years ago. She is  and lives with . They have one daughter and two grandchildren. Social History Substance and Sexual Activity Alcohol Use Yes  Alcohol/week: 3.5 oz  Types: 7 Glasses of wine per week Immunization History: 
Immunization History Administered Date(s) Administered  Influenza High Dose Vaccine PF 10/01/2015, 10/25/2016, 10/05/2017  Influenza Vaccine 09/01/2014  Influenza Vaccine (Tri) Adjuvanted 10/05/2018  Influenza Vaccine Split 11/01/2011, 10/02/2012  Influenza Vaccine Whole 10/08/2010  Pneumococcal Conjugate (PCV-13) 10/27/2015  Pneumococcal Polysaccharide (PPSV-23) 04/15/2013  TD Vaccine 05/05/2008  Tdap 09/12/2014  Zoster 09/20/2011 Review of Systems: As above included in HPI. Otherwise 11 point review of systems negative including constitutional, skin, HENT, eyes, respiratory, cardiovascular, gastrointestinal, genitourinary, musculoskeletal, endo/heme/aller, neurological. 
 
Physical:  
Vitals:  
BP: 108/68 HR: 77 WT: 140 lb (63.5 kg) BMI:  28.22 kg/m2 Exam:  
Patient appears in no apparent distress. Affect is appropriate. HEENT --Anicteric sclerae, tympanic membranes normal,  ear canals normal. 
PERRL, EOMI, conjunctiva and lids normal.  
Sinuses were nontender, turbinates normal, hearing normal.  Oropharynx without 
erythema, normal tongue, oral mucosa and tonsils. No cervical lymphadenopathy. No thyromegaly, JVD, or bruits. Carotid pulses 2+ with normal upstroke. Lungs --Clear to auscultation. No wheezing or rales. Heart --Regular rate and rhythm, no murmurs, rubs, gallops, or clicks. Breasts -- right breast with no masses, skin dimpling, asymmetry, nipple discharge, nodules, axillary lymphadenopathy; s/p left mastectomy. Chest wall --Nontender to palpation. PMI normal. 
Abdomen -- Soft and nontender, no hepatosplenomegaly or masses. Extremities -- Without cyanosis, clubbing, edema. 2+ pulses equally and bilaterally. Derm  no obvious abnormalities noted, no rash Review of Data: 
Labs: 
No visits with results within 1 Month(s) from this visit. Latest known visit with results is:  
Hospital Outpatient Visit on 01/07/2019 Component Date Value Ref Range Status  Creatinine, POC 01/07/2019 0.7  0.6 - 1.3 MG/DL Final  
 GFRAA, POC 01/07/2019 >60  >60 ml/min/1.73m2 Final  
 GFRNA, POC 01/07/2019 >60  >60 ml/min/1.73m2 Final  
 
5/3/2019 Dr. Elizabeth Deras office WBC 6.3 ANC 4700 Hb 13.5/ Hct 39.0 Platelets 692 Glucose 90 Na 142/ K 4.3/ CO2 28 Ca 9.2 AST 12/ ALT 11 Alkaline phosphatase 56 Creatinine 1.04/ eGFR 63 Health Maintenance: 
Screening:  
 Mammogram: negative (9/2018). Breast exam negative today (perform every 6 months) PAP smear: negative (9/2013) Dr Anupam Yuen. Pelvic ultrasound negative (9/2015). No further screening needed. Colorectal: colonoscopy (2/2016) hyperplastic polyp; Dr. Vicente Cooper. Follow-up 2026. Depression: none DM (HbA1c/FPG): FPG 90 (5/2019) Hepatitis C: unknown Falls: none DEXA: osteopenia (11/2017) s/p T11 compression fracture in 4/2016. Smoking:distant past 
 Glaucoma: regular eye exams with Dr. Adal Craig (last 4/2019). Had \"3 snip\" procedure to manage dry eyes, but not effective. Vitamin D: 36.8 (10/2018) Medicare Wellness: today Impression: 
Patient Active Problem List  
Diagnosis Code  Tourette syndrome F95.2  
 Osteoarthritis, Status post right total knee replacement M19.90  Lumbar spinal stenosis M48.061  
 HLD (hyperlipidemia) E78.5  Breast cancer ([facility] [address] metastatic to lungs  C50.919  
 Essential hypertension I10  Diverticulosis K57.90  
 Osteopenia M85.80  Lymphedema of arm left I89.0  Insomnia G47.00  Fatigue R53.83  Diverticulitis, recurrent K57.92  
 T11 Vertebral compression fracture (HCC) s/p kyphoplasty M48.50XA  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.812  Spinal stenosis of cervical region M48.02  
 Right shoulder pain M25.511  
 Malignant neoplasm metastatic to lung ([address] C78.00  
 Nontraumatic incomplete tear of right rotator cuff M75.111 Plan: 1. Hypertension. Well controlled on losartan 25 mg daily. Renal function has been normal with creatinine 1.04/ eGFR 63. Continue to follow. 2. Breast cancer with pulmonary metastasis. Continue current therapy as per Dr. Kelly Flowers. Recent CT scans (last 1/2019) with stable small pulmonary nodules. On Ibrance and Faslodex. Wishing to continue with treatment despite excessive fatigue. Not using compression sleeve for lymphedema, but receiving massage therapy every three weeks with good results. Follow. 3. Hyperlipidemia. Calculated 10 year ASCVD risk is 9.7 %, which places her in one of the four statin benefit groups (primary prevention with risk > 7.5%).  and HDL 63 in 10/2018. Given lack of history of ASCVD, no evidence of atherosclerotic disease on chest and abdominal CT scan, and history of metastatic breast cancer, will not recommend treatment with statin at this time. Continue to emphasized importance of lifestyle modifications, including diet and exercise. 4. T11 compression fracture. S/P kyphoplasty with no further difficulties. Follow. 5. Osteopenia. Last bone density scan 11/2017.  Using femoral neck T-scores, calculated FRAX score estimates her 10 year risk of a major osteoporetic fracture at 15% and hip fracture at 2.0%, which alone are not an indication for biphosphonate treatment. However, the development of a vertebral compression fracture in 4/2016 would be an indication for treatment. In addition, treatment with an estrogen receptor blocker increases likelihood of progression. She did not tolerate alendronate in the past. Had discussed the benefits and risks of therapy with the patient previously, and preauthorization had been obtained for her to begin receiving denosumab therapy through Dr. Jerod Sheriff office. However, she has had some difficulty with side effects from treatment with Ibrance, and had decided not to proceed with Prolia therapy. It was readdressed after her repeat bone density in 11/2017 and she decided to defer treatment as not wishing to proceed. Continue calcium and Vitamin D. Encouraged to continue exercises as instructed in physical therapy. Will reassess bone density scan in 11/2019. If evidence of progression of osteopenia, will readdress treatment options. 6. Recurrent diverticulitis. Colonoscopy (2/2016) with moderately severe diverticulosis in the ascending and descending colon. Has had multiple recent episodes of abdominal pain which were treated empirically with Cipro. Episode of symptoms in 3/2018 resolved prior to initiating antibiotics, bringing into question whether her abdominal symptoms were due to diverticulitis or irritable bowel syndrome. Discussed at last visit that she should have abdominal CT scan to confirm the diagnosis next time she had abdominal complaints. Had recurrent episode in 7/2018, and abdominal CT scan confirmed diagnosis of acute diverticulitis. Treated with Augmentin as developed rash on Flagyl. Subsequently evaluated by Dr. Beatrice Cordova, and instructed to continue Probiotics and use hyoscyamine as needed when develop abdominal complaints.  It was his opinion that her complaints may also be related to IBS, so wanting to prevent frequent antibiotics if not needed. Reports no recent symptoms since initiating. No on Zantac for GERD symptoms and doing well. Will continue to follow. 7. Fatigue. Severe and persistent despite changing from letrozole to fulvestrant. Wishing to continue therapy. Discussed that may be exacerbated by treatment with Haldol for Tourette's syndrome and gabapentin. Follow. 8. Right shoulder pain. MRI with partial rotator cuff tear. Evaluated by Dr. Gilberto Schaumann and received a glenohumeral cortisone injection. Recommended physical therapy, but patient did not proceed. Not a significant complaint today. Followed by Dr. Cory Braxton. 9. Low back pain/ neck pain. Improved with physical therapy. Using meloxicam, cyclobenzaprine, and gabapentin. Developed worsening right hip and leg pain and did proceed with L4-L5 epidural injection on the right by Dr. Malena Nichols. Has noted iprovement. Follow. 10. Tourette's syndrome. Followed by Dr. Shirley Verde. On Haldol with plans to switch to Risperdal if Haldol becomes unavailable. Using diazepam occasionally for sleep. 11. Right ovarian cyst. No further monitoring needed as per Dr. Néstor Steele. Patient expressed concern about not following cyst, but surveillance CT scans obtained every 6 months for breast cancer do include imaging of pelvis. Has been stable. Patient reassured. Follow. 12. Health maintenance. Discussed possibility of Shingrix vaccine, but given immunosuppression not wishing to proceed. Discussed that should address with Dr. Prince Boo. Other immunizations up to date. Mammogram up to date. Will perform breast exam every visit. Vitamin D level normal. Continue maintenance dose supplement. Continue regular eye exams with Dr. Tata Dinh. In addition, an annual Medicare wellness visit was done today.   
 
Total time: 40 minutes spent with the patient in face-to-face consultation of which greater than 50% was spent on counseling, answering questions and/or coordination of care. Complex medical review and management performed. Patient understands recommendations and agrees with plan. Follow-up in 6 months.

## 2019-06-05 ENCOUNTER — TELEPHONE (OUTPATIENT)
Dept: INTERNAL MEDICINE CLINIC | Age: 74
End: 2019-06-05

## 2019-06-05 NOTE — TELEPHONE ENCOUNTER
Pt is asking to speak with a nurse about her medication and some changes being made by her neurologist

## 2019-06-06 NOTE — TELEPHONE ENCOUNTER
Contacted patient she stated she wanted to speak with  about medication changes. But she decided to go ahead and let us know what was going on. Patient stated haldol is no longer available in pill form. Was placed on Risperdal, stopped because she could not tolerate. She spoke pharmacist and was offered liquid form. She is currently on the liquid haldol and is unsure how long it will be available. Patients wants to know how her Tourette symptoms will do with CBD oil. How CBD oil will be with her combination of medications ( how it will affect her other meds). She is currently waiting to hear from oncologist about meds also.  If you need to contact her she stated she is leaving home at 530pm,

## 2019-06-06 NOTE — TELEPHONE ENCOUNTER
Called and spoke with patient. Did not tolerate Risperdal. Now on liquid Haldol. Advised to discuss with Dr. Samia Peres use of CBD oil for use in Tourette's syndrome as not familiar with efficacy. Also cautioned about sedating effects of CBD and use of other medications such as gabapentin, Valium, and Flexeril. Answered all questions.

## 2019-07-12 ENCOUNTER — HOSPITAL ENCOUNTER (OUTPATIENT)
Dept: CT IMAGING | Age: 74
Discharge: HOME | End: 2019-07-12
Attending: PHYSICIAN ASSISTANT
Payer: MEDICARE

## 2019-07-12 DIAGNOSIS — C50.912 MALIGNANT NEOPLASM OF LEFT BREAST (HCC): ICD-10-CM

## 2019-07-12 LAB — CREAT UR-MCNC: 0.9 MG/DL (ref 0.6–1.3)

## 2019-07-12 PROCEDURE — 74177 CT ABD & PELVIS W/CONTRAST: CPT

## 2019-07-12 PROCEDURE — 82565 ASSAY OF CREATININE: CPT

## 2019-07-12 PROCEDURE — 74011636320 HC RX REV CODE- 636/320

## 2019-07-12 RX ADMIN — IOPAMIDOL 98 ML: 612 INJECTION, SOLUTION INTRAVENOUS at 13:47

## 2019-08-08 ENCOUNTER — TELEPHONE (OUTPATIENT)
Dept: INTERNAL MEDICINE CLINIC | Age: 74
End: 2019-08-08

## 2019-08-08 NOTE — TELEPHONE ENCOUNTER
Patient is calling in regards to her sister, Tarsha Ribeiro. She has been seeing a doctor in [facility] who is retiring She wants to know if you would be willing to take her as a new patient.     Tarsha Ribeiro [phone]

## 2019-08-12 RX ORDER — MELOXICAM 15 MG/1
15 TABLET ORAL DAILY
Qty: 90 TAB | Refills: 1 | Status: SHIPPED | OUTPATIENT
Start: 2019-08-12 | End: 2020-01-21 | Stop reason: SDUPTHER

## 2019-08-12 NOTE — TELEPHONE ENCOUNTER
Last Visit: 5/14/19 with MD Inés Portillo  Next Appointment: 11/12/19 with MD Melgar  Previous Refill Encounter(s): 1/30/19 #90 with 1 refill    Requested Prescriptions     Pending Prescriptions Disp Refills    meloxicam (MOBIC) 15 mg tablet 90 Tab 1     Sig: Take 1 Tab by mouth daily.  Take WITH FOOD

## 2019-09-10 ENCOUNTER — HOSPITAL ENCOUNTER (OUTPATIENT)
Dept: MAMMOGRAPHY | Age: 74
Discharge: HOME | End: 2019-09-10
Attending: INTERNAL MEDICINE
Payer: MEDICARE

## 2019-09-10 DIAGNOSIS — Z17.0 MALIGNANT NEOPLASM OF LEFT BREAST IN FEMALE, ESTROGEN RECEPTOR POSITIVE, UNSPECIFIED SITE OF BREAST (HCC): ICD-10-CM

## 2019-09-10 DIAGNOSIS — C50.912 MALIGNANT NEOPLASM OF LEFT BREAST IN FEMALE, ESTROGEN RECEPTOR POSITIVE, UNSPECIFIED SITE OF BREAST (HCC): ICD-10-CM

## 2019-09-10 DIAGNOSIS — M85.89 OSTEOPENIA OF MULTIPLE SITES: ICD-10-CM

## 2019-09-10 DIAGNOSIS — M48.50XA VERTEBRAL COMPRESSION FRACTURE (HCC): ICD-10-CM

## 2019-09-10 DIAGNOSIS — C78.00 MALIGNANT NEOPLASM METASTATIC TO LUNG, UNSPECIFIED LATERALITY (HCC): ICD-10-CM

## 2019-09-10 PROCEDURE — 77061 BREAST TOMOSYNTHESIS UNI: CPT

## 2019-10-10 ENCOUNTER — TELEPHONE (OUTPATIENT)
Dept: INTERNAL MEDICINE CLINIC | Age: 74
End: 2019-10-10

## 2019-10-10 RX ORDER — AMOXICILLIN AND CLAVULANATE POTASSIUM 875; 125 MG/1; MG/1
1 TABLET, FILM COATED ORAL EVERY 12 HOURS
Qty: 20 TAB | Refills: 0 | Status: SHIPPED | OUTPATIENT
Start: 2019-10-10 | End: 2019-10-20

## 2019-10-10 NOTE — TELEPHONE ENCOUNTER
Allergic to Flegel - do not use Cipro        Pt says she woke up at 3 am this morning with stomach pain no fever. Took a tylenol and it is a little better but still feeling queasy. Wants to know if Dr. Keily Munson will call in an antibiotic for her Diverticulitis in case it starts acting up. Says she knows Dr. Huynh Camp be out of office next week and is afraid this is the start of it. Says she knows not to take unless it is a flare up. [facility].

## 2019-10-17 ENCOUNTER — CLINICAL SUPPORT (OUTPATIENT)
Dept: INTERNAL MEDICINE CLINIC | Age: 74
End: 2019-10-17

## 2019-10-17 DIAGNOSIS — Z23 ENCOUNTER FOR IMMUNIZATION: ICD-10-CM

## 2019-10-17 NOTE — PROGRESS NOTES
Georgiana Sol is a 69 y.o. female who presents for routine immunizations. High Dose Influenza- Right Deltoid  She denies any symptoms , reactions or allergies that would exclude them from being immunized today. Risks and adverse reactions were discussed and the VIS was given to them. All questions were addressed. She was advised to wait 15 min post injection. There were no reactions observed.     Cinthia Aase, LPN

## 2019-10-17 NOTE — PATIENT INSTRUCTIONS
Vaccine Information Statement    Influenza (Flu) Vaccine (Inactivated or Recombinant): What You Need to Know    Many Vaccine Information Statements are available in Welsh and other languages. See www.immunize.org/vis  Hojas de información sobre vacunas están disponibles en español y en muchos otros idiomas. Visite www.immunize.org/vis    1. Why get vaccinated? Influenza vaccine can prevent influenza (flu). Flu is a contagious disease that spreads around the [place] [facility] every year, usually between October and May. Anyone can get the flu, but it is more dangerous for some people. Infants and young children, people 72years of age and older, pregnant women, and people with certain health conditions or a weakened immune system are at greatest risk of flu complications. Pneumonia, bronchitis, sinus infections and ear infections are examples of flu-related complications. If you have a medical condition, such as heart disease, cancer or diabetes, flu can make it worse. Flu can cause fever and chills, sore throat, muscle aches, fatigue, cough, headache, and runny or stuffy nose. Some people may have vomiting and diarrhea, though this is more common in children than adults. Each year thousands of people in the [facility] die from flu, and many more are hospitalized. Flu vaccine prevents millions of illnesses and flu-related visits to the doctor each year. 2. Influenza vaccines     CDC recommends everyone 10months of age and older get vaccinated every flu season. Children 6 months through 6years of age may need 2 doses during a single flu season. Everyone else needs only 1 dose each flu season. It takes about 2 weeks for protection to develop after vaccination. There are many flu viruses, and they are always changing. Each year a new flu vaccine is made to protect against three or four viruses that are likely to cause disease in the upcoming flu season.  Even when the vaccine doesnt exactly match these viruses, it may still provide some protection. Influenza vaccine does not cause flu. Influenza vaccine may be given at the same time as other vaccines. 3. Talk with your health care provider    Tell your vaccine provider if the person getting the vaccine:   Has had an allergic reaction after a previous dose of influenza vaccine, or has any severe, life-threatening allergies.  Has ever had Guillain-Barré Syndrome (also called GBS). In some cases, your health care provider may decide to postpone influenza vaccination to a future visit. People with minor illnesses, such as a cold, may be vaccinated. People who are moderately or severely ill should usually wait until they recover before getting influenza vaccine. Your health care provider can give you more information. 4. Risks of a reaction     Soreness, redness, and swelling where shot is given, fever, muscle aches, and headache can happen after influenza vaccine.  There may be a very small increased risk of Guillain-Barré Syndrome (GBS) after inactivated influenza vaccine (the flu shot). Ulysses Ricker children who get the flu shot along with pneumococcal vaccine (PCV13), and/or DTaP vaccine at the same time might be slightly more likely to have a seizure caused by fever. Tell your health care provider if a child who is getting flu vaccine has ever had a seizure. People sometimes faint after medical procedures, including vaccination. Tell your provider if you feel dizzy or have vision changes or ringing in the ears. As with any medicine, there is a very remote chance of a vaccine causing a severe allergic reaction, other serious injury, or death. 5. What if there is a serious problem? An allergic reaction could occur after the vaccinated person leaves the clinic.  If you see signs of a severe allergic reaction (hives, swelling of the face and throat, difficulty breathing, a fast heartbeat, dizziness, or weakness), call 9-1-1 and get the person to the nearest hospital.    For other signs that concern you, call your health care provider. Adverse reactions should be reported to the Vaccine Adverse Event Reporting System (VAERS). Your health care provider will usually file this report, or you can do it yourself. Visit the VAERS website at www.vaers. [facility].gov or call 6-193.209.3256. VAERS is only for reporting reactions, and VAERS staff do not give medical advice. 6. The National Vaccine Injury Compensation Program    The [facility] Vaccine Injury Compensation Program (VICP) is a federal program that was created to compensate people who may have been injured by certain vaccines. Visit the VICP website at www.hrsa.gov/vaccinecompensation or call 8-537.419.5972 to learn about the program and about filing a claim. There is a time limit to file a claim for compensation. 7. How can I learn more?  Ask your health care provider.  Call your local or state health department.  Contact the Centers for Disease Control and Prevention (CDC):  - Call 5-598.795.9924 (1-800-CDC-INFO) or  - Visit CDCs influenza website at www.cdc.gov/flu    Vaccine Information Statement (Interim)  Inactivated Influenza Vaccine   8/15/2019  42 PABLO Collier [id number]   Department of Health and Human Services  Centers for Disease Control and Prevention    Office Use Only

## 2019-10-21 ENCOUNTER — TELEPHONE (OUTPATIENT)
Dept: INTERNAL MEDICINE CLINIC | Age: 74
End: 2019-10-21

## 2019-10-21 NOTE — TELEPHONE ENCOUNTER
Wednesday 10/23 at 2:30pm. Will call her if someone cancels for tomorrow. If can't wait until then, recommend urgent care.

## 2019-10-21 NOTE — TELEPHONE ENCOUNTER
Pt is c/o of very sore throat, headache, cough, fever 100.4 with Tylenol and mouth ulcer that oncologist called in a mouthwash for, said she just feels lousy. Pt didn't take the antibiotic you called in for her diverculosis. Doesn't want to go to [facility].  Can you fit her in your schedule anywhere? Please advise her at [phone].

## 2019-10-21 NOTE — TELEPHONE ENCOUNTER
Called patient and verified full name and date of birth. Informed her of Dr. Brooklyn Ortiz' message and before I could even get the rest of the message out, she stated that she was on her way to [facility]. I still let her know that she should call us tomorrow if she isn't feeling well or call first thing Wednesday morning to get an appointment if she isn't feeling well. Patient verbalized understanding without questions or concerns.

## 2019-10-22 ENCOUNTER — PATIENT MESSAGE (OUTPATIENT)
Dept: INTERNAL MEDICINE CLINIC | Age: 74
End: 2019-10-22

## 2019-10-23 ENCOUNTER — HOSPITAL ENCOUNTER (OUTPATIENT)
Dept: GENERAL RADIOLOGY | Age: 74
Discharge: HOME | End: 2019-10-23
Payer: MEDICARE

## 2019-10-23 ENCOUNTER — OFFICE VISIT (OUTPATIENT)
Dept: INTERNAL MEDICINE CLINIC | Age: 74
End: 2019-10-23

## 2019-10-23 DIAGNOSIS — D84.821 IMMUNOSUPPRESSED DUE TO CHEMOTHERAPY (HCC): ICD-10-CM

## 2019-10-23 DIAGNOSIS — F95.2 TOURETTE SYNDROME: ICD-10-CM

## 2019-10-23 DIAGNOSIS — T45.1X5A IMMUNOSUPPRESSED DUE TO CHEMOTHERAPY (HCC): ICD-10-CM

## 2019-10-23 DIAGNOSIS — C50.912 MALIGNANT NEOPLASM OF LEFT BREAST IN FEMALE, ESTROGEN RECEPTOR POSITIVE, UNSPECIFIED SITE OF BREAST (HCC): ICD-10-CM

## 2019-10-23 DIAGNOSIS — Z79.899 IMMUNOSUPPRESSED DUE TO CHEMOTHERAPY (HCC): ICD-10-CM

## 2019-10-23 DIAGNOSIS — C78.00 MALIGNANT NEOPLASM METASTATIC TO LUNG, UNSPECIFIED LATERALITY (HCC): ICD-10-CM

## 2019-10-23 DIAGNOSIS — R05.9 COUGH: ICD-10-CM

## 2019-10-23 DIAGNOSIS — R53.82 CHRONIC FATIGUE: ICD-10-CM

## 2019-10-23 DIAGNOSIS — E78.5 HYPERLIPIDEMIA, UNSPECIFIED HYPERLIPIDEMIA TYPE: ICD-10-CM

## 2019-10-23 DIAGNOSIS — R50.9 FEVER, UNSPECIFIED FEVER CAUSE: ICD-10-CM

## 2019-10-23 DIAGNOSIS — I10 ESSENTIAL HYPERTENSION: ICD-10-CM

## 2019-10-23 DIAGNOSIS — R05.9 COUGH: Primary | ICD-10-CM

## 2019-10-23 DIAGNOSIS — Z17.0 MALIGNANT NEOPLASM OF LEFT BREAST IN FEMALE, ESTROGEN RECEPTOR POSITIVE, UNSPECIFIED SITE OF BREAST (HCC): ICD-10-CM

## 2019-10-23 PROCEDURE — 71046 X-RAY EXAM CHEST 2 VIEWS: CPT

## 2019-10-23 NOTE — PROGRESS NOTES
Chief Complaint   Patient presents with    Cough     Patient states she had a hacking dry cough and sore throat for 4 days. Now it is only irriated due to coughing. Patient states she used throat drops, tylenol, antihistamine. Other symptoms include loose stool and cramping stomach. 1. Have you been to the ER, urgent care clinic or hospitalized since your last visit? YES. Patient states she went to [facility] and got a strep test and flu test Monday, October 21, 2019.      2. Have you seen or consulted any other health care providers outside of the [address] [place] [address] since your last visit (Include any pap smears or colon screening)? Oncologist, last seen 10/18/2019 for injections and labs.

## 2019-10-24 ENCOUNTER — PATIENT MESSAGE (OUTPATIENT)
Dept: INTERNAL MEDICINE CLINIC | Age: 74
End: 2019-10-24

## 2019-10-25 NOTE — TELEPHONE ENCOUNTER
Called and spoke with patient. Advised to begin Augmentin for 10 days given low grade fever, immunosuprressed status on chemo for metastatic breast cancer, and purulent nasal discharge. Patient has filled prescription and will begin. Advised to call back or go to ED for any worsening.

## 2019-10-25 NOTE — TELEPHONE ENCOUNTER
From: Georgiana Sol  To: Claudia Garcia MD  Sent: 10/24/2019 7:07 PM EDT  Subject: Visit Carolina Lin,  I hate to bother you but wanted to let you know that I have been blowing very thick yellow stuff from my right nostril today, and tonight there are traces of blood in the mucous. Just checked my temp after being normal all day today and yesterday but it is now 99.5. I am more congested today than when you saw me yesterday and this is not going well. Do you think I need an antibiotic? I didn't want to wait until the weekend to notify you of this. Thank you for your help!

## 2019-10-27 VITALS
BODY MASS INDEX: 28.43 KG/M2 | HEIGHT: 59 IN | OXYGEN SATURATION: 93 % | RESPIRATION RATE: 16 BRPM | HEART RATE: 77 BPM | SYSTOLIC BLOOD PRESSURE: 118 MMHG | WEIGHT: 141 LBS | TEMPERATURE: 98.7 F | DIASTOLIC BLOOD PRESSURE: 72 MMHG

## 2019-10-27 NOTE — PROGRESS NOTES
Oleg Saha is a 78y.o. year old female who presents today for an acute visit. She has a history of hypertension, dyslipidemia,  metastatic breast cancer, GERD, diverticulosis with recurrent diverticulitis, osteoarthritis s/p right knee replacement (2012), lumbar degenerative disease, osteopenia, compression fracture, and Tourette's syndrome. She reports that she noted the onset of a mild sore throat and nonproductive cough on 10/16/2019, and states that she received the influenza vaccine on 10/17/2019. She states that on 10/19/2019, she noted that her cough worsened and she developed a fever to 101.6 F. She states that her fever persisted for the next four days, and she continued to have nasal congestion with clear drainage, nonproductive cough, and fatigue. She denies any shortness of breath or chest pain, and denies headache, stiff neck, or facial pain. She presented to [facility] for evaluation on 10/21/2019 and evaluation included rapid strep testing and influenza A/B, which were negative, and WBC 8.9 (71% neutrophils), Hb 12.5/ Hct 37.3 and platelets 728. She was diagnosed with a URI and treated with Tessalon and Tylenol. She presents today for follow-up reporting that her fever has persisted and she continues to have a nonproductive cough. She states that she remains fatigued with nasal congestion and postnasal drainage, but continues to report clear drainage. She reports that she has been using Nyquil and Tylenol for symptomatic treatment. She states that she restarted her cycle of Ibrance on 10/18/2019 as recommended by Dr. Parris Allen despite not feeling well since she had been off therapy for two weeks. She is otherwise without new complaints. She has a history of left breast cancer, which was diagnosed in 7/2010 as invasive ductal adenocarcinoma, s/p left modified radical mastectomy with sentinel node biopsy, performed by Dr. Brock Sicard.  She had 3 positive lymph nodes and was classified as stage 1B, T1c N1 M0, ER and NC positive, Her-2/sarah negative by FISH. She underwent 6 cycles of chemotherapy with Docetaxel and Cytoxan. She was subsequently unable to tolerate anastrozole, exemestane, tamoxifen, and letrozole due to profound fatigue and arthralgias. Her course was complicated by chronic left arm lymphedema, managed with a compression sleeve. A chest CT scan in 3/2014 noted several small pulmonary nodules which were concerning for metastases but were too small to biopsy. They were followed with sequential CT scans , and in 12/2014, repeat chest CT scan showed enlarging bilateral pulmonary nodules compatible with progression of metastatic disease. A fine needle aspirate was performed on 12/21/2014 which showed benign pulmonary parenchyma with alveolar hemorrhage. Repeat chest CT scan in 3/12/2015 showed mild interval progression of the lung metastases with enlarging nodules and development of new nodules. A CT guided needle biopsy was performed on 3/23/2015, which confirmed metastatic adenocarcinoma consistent with breast primary (stage IV, ER/NC positive, and TTF-1 negative). On 4/13/2015, she was started on therapy with Ibrance and letrozole. Follow-up CT scan (7/15/15) showed partial response with decreasing size of some nodules and resolution of other nodules. Most recent chest, abdomen, and pelvis CT scan was obtained in 6/2017, showing stable or decreased multiple bilateral pulmonary nodules and no suspicious new pulmonary nodules. She continues on palbociclib, but was changed from letrozole to fulvestrant. She describes persistent fatigue which she attributes to monthly treatment with fulvestrant (Faslodex). She underwent restaging chest, abdomen, and pelvis CT scan in 7/2019 which showed that her pulmonary nodules were unchanged, and no other evidence of metastatic disease.  She is followed by Dr. Daria Galvez. She states that she established care with Dr. Alayna Portillo, but was told that she may have her mammograms and breast exams in our office with referral to her if something arises. She also has a history of a right ovarian cyst, but was released from the care of Dr. Marilyn Daley since it was concluded to be benign. She has a history of hypertension treated with losartan. She monitors her blood pressure mainly when visiting multiple physicians and reports that it is well controlled. She has no history of heart disease, and denies any chest pain, shortness of breath at rest or with exertion, palpitations, lightheadedness, or edema. In 4/11/2016, she sustained a fall with subsequent severe pain in her mid to upper lumbar region and wrapping around waist. She was treated with meloxicam, tylenol and flexeril, but because of persistent discomfort, she presented to [facility] on 4/15/2016 and lumbosacral spine films showed marked degenerative changes with disc space obliteration throughout lumbar spine and slight anterior spondylolisthesis of L4. She was evaluated by Dr. Ladell Brittle on 4/18/2016 who recommended physical therapy and evaluation by Dr. Yoana Dhillon for her degenerative spine changes. She continued to take meloxicam and tylenol and started physical therapy, but noted worsening of her pain. She subsequently was evaluated by Dr. Avelina Villa, who obtained a lumbar MRI on 4/26/2016, which showed a new subacute compression fracture of T11 vertebral body with diffuse marrow edema and mild paravertebral inflammatory stranding, no retropulsion or central stenosis; more severe degenerative disease along the right side at L4-L5, L5-S1, potential mild compression of right exiting L4 and L5 nerve roots. She was referred to Dr. Kenya Anderson for kyphoplasty of the T11 compression fracture given failure of pain control with conservative measures.  She underwent the procedure on 7/0/2381 without complications, and G61 vertebral body bone core and aspirate biopsies were performed, which showed only reactive bone changes and no evidence of malignancy. She has a history of osteopenia, with a history of fracture of her sacrum. She reports that she was treated with alendronate in 12/2011 but discontinued it in 4/2013 due to intolerence. Her last bone density scan (11/2017) was consistent with osteopenia with femoral neck T-scores: left -1.4/ right -1.4 and distal radius -2.2 (lumbar T-score could not be assessed). She continues to take calcium and vitamin D. In 10/2016, she developed left sided low back pain with radiation to her left leg over the last several weeks. She underwent a lumbar MRI (9/2016) showing scoliosis and advanced multilevel degenerative changes with areas of foraminal stenosis at L3-L4 and L5-S1; mild/moderate central canal stenosis at L4-L5 and L5-S1. She was evaluated by Dr. Joe Perez and treated with pregabalin with some improvement. She subsequently received an epidural steroid injection with improvement and discontinued pregabalin. In 4/2017, she noted increasing cervical and upper back discomfort. She has had multiple cervical MRI's,and underwent repeat in 4/2017 which showed multilevel degenerative disc disease and facet arthropathy without change from prior studies with mild central canal stenosis C3-C4 and C5-C6, and moderate central canal stenosis C6-C7. She was treated with Mobic and Flexeril, and gabapentin at bedtime. She complained of right shoulder pain.  X-ray of her shoulder (11/2017) was negative, and she had a right shoulder MRI (5/14/2018) which showed deep partial thickness undersurface tear of the distal supraspinatus, likely with full thickness slitlike perforations; no gross tendon retraction, but there is moderate muscle atrophy; partial thickness tearing with longitudinal components involving the biceps tendon at its sulcal turn, some associated tenosynovitis; accompanying short length longitudinal split tear in the distal subscapularis; moderate infraspinatus tendinosis, and mild to moderate subacromial subdeltoid bursitis. She underwent evaluation by Dr. Chana Dias on 5/18/2018, and received a glenohumeral cortisone injection. She reported worsening low back pain and right sciatica, and underwent an epidural steroid injection at right L4-5 transforaminal approach by Dr. Guy Gabriel on 3/18/2019. She has noted improvement in her right hip and leg pain. She continues to take gabapentin 300 mg at night. She has a history of GERD and diverticulosis with recurrent diverticulitis,. She was evaluated by Dr. Kimberly Holliday in 2/23/2016 and underwent an upper endoscopy, which revealed a Schatzki's ring at the gastroesophageal junction (dilated) with mild distal esophagitis and a small hiatal hernia. A screening colonoscopy was also performed showing moderately severe diverticulosis of the ascending and descending colon and a 2 mm sessile sigmoid polyp (pathology: hyperplastic). Recommendations for follow-up colonoscopy in 10 years. She denies any abdominal pain, nausea, vomiting, melena, hematochezia, or change in bowel movements. She was evaluated in 8/2017 by GERALD Tamez with complaints of abdominal pain and was treated with Cipro for presumed diverticulitis. She contacted the office again in 10/2017 and 1/2018 with a recurrence of symptoms, and was treated for a 10 day course with Cipro with improvement. Abdominal CT scan in 2/6/2018 for staging for her breast cancer did show evidence of moderate to severe diverticulosis, but no evidence of diverticulitis. She did present with similar symptoms in 3/2018 and was evaluated by GERALD Vigil and prescribed Cipro. However, her symptoms resolved prior to taking the antibiotics. In 7/2018, she again presented with left lower quadrant pain, and an abdominal CT scan (7/13/2018) which showed evidence of uncomplicated diverticulitis involving the lower left colon and upper sigmoid.  She was treated with Cipro and Flagyl initially, but developed a rash due to Flagyl, and was subsequently changed to Augmentin. She had a follow-up visit with Dr. Gala Grande and it was his opinion that she also had IBS which mimicked her episodes of diverticulitis. He recommended continuing on a probiotic and prescribed hyoscyamine to use as needed. She also was having more difficulty with burning epigastric pain, and was advised by Dr. Gala Grande to begin Zantac five days per week (off 2 days). She has noted improvement since doing so. She also has a history of Tourette's syndrome controlled with Haldol. She is followed by Dr. Audelia Hannah. Past Medical History:   Diagnosis Date    Arthritis     Breast cancer metastasized to lung [facility])     Left Breast    Chronic pain     Colon polyps 2/22/16    distal sigmoid, Dr. Benny Dang DDD (degenerative disc disease)     Diverticulitis of colon     Diverticulosis 2/22/16    mild in the ascedning and sigmoid colon, Dr. Mak Media (hyperlipidemia)     Hypertension     Osteopenia     Tourette syndrome     Vitamin D deficiency      Past Surgical History:   Procedure Laterality Date    HX APPENDECTOMY      HX BREAST BIOPSY  7-30-10    Left Breast w/Nipple Duct exploration and excisional biopsy-left    HX DILATION AND CURETTAGE      x3    HX MODIFIED RADICAL MASTECTOMY  9-3-10    left    HX ORTHOPAEDIC Right 2012    knee replacement    HX TONSILLECTOMY      HYSTEROSCOPY DIAGNOSTIC       Current Outpatient Medications   Medication Sig    meloxicam (MOBIC) 15 mg tablet Take 1 Tab by mouth daily. Take WITH FOOD    raNITIdine (ZANTAC) 300 mg tab Take 300 mg by mouth daily.  gabapentin (NEURONTIN) 300 mg capsule Take 1 Cap by mouth nightly.  haloperidol (HALDOL) 2 mg tablet Take 1 Tab by mouth two (2) times a day.  diazePAM (VALIUM) 5 mg tablet Take 1 Tab by mouth every six (6) hours as needed for Anxiety or Sleep. Max Daily Amount: 20 mg.    losartan (COZAAR) 25 mg tablet Take 1 Tab by mouth daily.     palbociclib 75 mg cap 75 mg.    fulvestrant (FASLODEX IM) by IntraMUSCular route.  cyclobenzaprine (FLEXERIL) 5 mg tablet Take 5 mg by mouth.  CYANOCOBALAMIN, VITAMIN B-12, (VITAMIN B-12 PO) Take  by mouth.  Biotin 2,500 mcg cap Take  by mouth.  calcium-vitamin D (CALCIUM 500+D) 500 mg(1,250mg) -200 unit per tablet Take 1 Tab by mouth two (2) times daily (with meals).  cholecalciferol, vitamin d3, (VITAMIN D) 1,000 unit tablet Take 2,000 Units by mouth daily.  ascorbic acid (VITAMIN C) 500 mg tablet Take  by mouth. No current facility-administered medications for this visit. Allergies and Intolerances: Allergies   Allergen Reactions    Keflex [Cephalexin] Rash    Metronidazole Rash    Other Medication Nausea Only     Anesthesia    Shellfish Containing Products Nausea and Vomiting     More of just eating the actual shellfish.  Sulfa (Sulfonamide Antibiotics) Rash    Ultram [Tramadol] Nausea and Vomiting     Patient states she is allergic to most Narcotics    Vancomycin Rash     Family History:   Family History   Problem Relation Age of Onset    Osteoporosis Sister     Osteoporosis Mother     Stroke Father     Hypertension Father     Cancer Paternal Aunt         breast     Social History:   She  reports that she has quit smoking. She quit after 3.00 years of use. She has never used smokeless tobacco. She stopped smoking over 40 years ago. She is  and lives with . They have one daughter and two grandchildren.   Social History     Substance and Sexual Activity   Alcohol Use Yes    Alcohol/week: 5.8 standard drinks    Types: 7 Glasses of wine per week     Immunization History:  Immunization History   Administered Date(s) Administered    Influenza High Dose Vaccine PF 10/01/2015, 10/25/2016, 10/05/2017    Influenza Vaccine 09/01/2014    Influenza Vaccine (Tri) Adjuvanted 10/05/2018, 10/17/2019    Influenza Vaccine Split 11/01/2011, 10/02/2012    Influenza Vaccine Whole 10/08/2010    Pneumococcal Conjugate (PCV-13) 10/27/2015    Pneumococcal Polysaccharide (PPSV-23) 04/15/2013    TD Vaccine 05/05/2008    Tdap 09/12/2014    Zoster 09/20/2011       Review of Systems:   As above included in HPI. Otherwise 11 point review of systems negative including constitutional, skin, HENT, eyes, respiratory, cardiovascular, gastrointestinal, genitourinary, musculoskeletal, endo/heme/aller, neurological.    Physical:   Vitals:   BP: 118/72  HR: 77  WT: 141 lb (64 kg)  BMI:  28.48 kg/m2  T: 98.7 F    Exam:   Patient appears in no apparent distress. Affect is appropriate. HEENT --Anicteric sclerae, tympanic membranes normal,  ear canals normal.  PERRL, EOMI, conjunctiva and lids normal.   Sinuses were nontender, turbinates normal, hearing normal.  Oropharynx without  erythema, normal tongue, oral mucosa and tonsils. No cervical lymphadenopathy. No thyromegaly, JVD, or bruits. Carotid pulses 2+ with normal upstroke. Lungs --Clear to auscultation. No wheezing or rales. Heart --Regular rate and rhythm, no murmurs, rubs, gallops, or clicks. Abdomen -- Soft and nontender, no hepatosplenomegaly or masses. Extremities -- Without edema. Review of Data:  Labs:  No visits with results within 1 Month(s) from this visit. Latest known visit with results is:   Hospital Outpatient Visit on 07/12/2019   Component Date Value Ref Range Status    Creatinine, POC 07/12/2019 0.9  0.6 - 1.3 MG/DL Final    GFRAA, POC 07/12/2019 >60  >60 ml/min/1.73m2 Final    GFRNA, POC 07/12/2019 >60  >60 ml/min/1.73m2 Final     5/3/2019 Dr. Glen Garcia office WBC 6.3 ANC 4700       Hb 13.5/ Hct 39.0       Platelets 596       Glucose 90       Na 142/ K 4.3/ CO2 28       Ca 9.2       AST 12/ ALT 11       Alkaline phosphatase 56       Creatinine 1.04/ eGFR 63    Health Maintenance:  Screening:    Mammogram: negative (9/2019). Breast exam negative 5/14/2019 (perform every 6 months)   PAP smear: negative (9/2013) Dr Marylin Dickerson. Pelvic ultrasound negative (9/2015). No further screening needed. Colorectal: colonoscopy (2/2016) hyperplastic polyp; Dr. Donal Buck. Follow-up 2026. Depression: none   DM (HbA1c/FPG): FPG 90 (5/2019)   Hepatitis C: unknown   Falls: none   DEXA: osteopenia (11/2017) s/p T11 compression fracture in 4/2016. Smoking:distant past   Glaucoma: regular eye exams with Dr. Karel Mccarty (last 4/2019). Had \"3 snip\" procedure to manage dry eyes, but not effective. Vitamin D: 36.8 (10/2018)   Medicare Wellness: 5/14/2019      Impression:  Patient Active Problem List   Diagnosis Code    Tourette syndrome F95.2    Osteoarthritis, Status post right total knee replacement M19.90    Lumbar spinal stenosis M48.061    HLD (hyperlipidemia) E78.5    Breast cancer ([facility] [address] metastatic to lungs  C50.919    Essential hypertension I10    Diverticulosis K57.90    Osteopenia M85.80    Lymphedema of arm left I89.0    Insomnia G47.00    Fatigue R53.83    Diverticulitis, recurrent K57.92    T11 Vertebral compression fracture (HCC) s/p kyphoplasty M48.50XA    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.812    Spinal stenosis of cervical region M48.02    Right shoulder pain M25.511    Malignant neoplasm metastatic to lung (HCC) C78.00    Nontraumatic incomplete tear of right rotator cuff M75.111       Plan:  Acute upper respiratory infection. Patient presents with fever, nasal congestion, post-nasal drainage, and nonproductive cough. Evaluated at [facility] yesterday. WBC 8.9 with left shift (72% neutrophils), rapid strep and influenza A/B negative. On immunosuppression with Ibrance to treat metastatic breast cancer. Given fever and cough, will obtain chest x-ray to rule-out pneumonia. Continue with symptomatic treatment. Will have low threshold to initiate antibiotics based on chest x-ray results, or for any worsening symptoms or change in cough or sputum. Other issues:  1. Hypertension. Well controlled on losartan 25 mg daily.  Renal function has been normal with creatinine 1.04/ eGFR 63. Continue to follow. 2. Breast cancer with pulmonary metastasis. Continue current therapy as per Dr. Lynnann Landau. Recent CT scans (last 1/2019) with stable small pulmonary nodules. On Ibrance and Faslodex. Wishing to continue with treatment despite excessive fatigue. Not using compression sleeve for lymphedema, but receiving massage therapy every three weeks with good results. Follow. 3. Hyperlipidemia. Calculated 10 year ASCVD risk is 9.7 %, which places her in one of the four statin benefit groups (primary prevention with risk > 7.5%).  and HDL 63 in 10/2018. Given lack of history of ASCVD, no evidence of atherosclerotic disease on chest and abdominal CT scan, and history of metastatic breast cancer, will not recommend treatment with statin at this time. Continue to emphasized importance of lifestyle modifications, including diet and exercise. 4. T11 compression fracture. S/P kyphoplasty with no further difficulties. Follow. 5. Osteopenia. Last bone density scan 11/2017. Using femoral neck T-scores, calculated FRAX score estimates her 10 year risk of a major osteoporetic fracture at 15% and hip fracture at 2.0%, which alone are not an indication for biphosphonate treatment. However, the development of a vertebral compression fracture in 4/2016 would be an indication for treatment. In addition, treatment with an estrogen receptor blocker increases likelihood of progression. She did not tolerate alendronate in the past. Had discussed the benefits and risks of therapy with the patient previously, and preauthorization had been obtained for her to begin receiving denosumab therapy through Dr. Franky Menard office. However, she has had some difficulty with side effects from treatment with Ibrance, and had decided not to proceed with Prolia therapy. It was readdressed after her repeat bone density in 11/2017 and she decided to defer treatment as not wishing to proceed. Continue calcium and Vitamin D. Encouraged to continue exercises as instructed in physical therapy. Will reassess bone density scan in 11/2019. If evidence of progression of osteopenia, will readdress treatment options. 6. Recurrent diverticulitis. Colonoscopy (2/2016) with moderately severe diverticulosis in the ascending and descending colon. Has had multiple recent episodes of abdominal pain which were treated empirically with Cipro. Episode of symptoms in 3/2018 resolved prior to initiating antibiotics, bringing into question whether her abdominal symptoms were due to diverticulitis or irritable bowel syndrome. Discussed at last visit that she should have abdominal CT scan to confirm the diagnosis next time she had abdominal complaints. Had recurrent episode in 7/2018, and abdominal CT scan confirmed diagnosis of acute diverticulitis. Treated with Augmentin as developed rash on Flagyl. Subsequently evaluated by Dr. Angel Luis Rosario, and instructed to continue Probiotics and use hyoscyamine as needed when develop abdominal complaints. It was his opinion that her complaints may also be related to IBS, so wanting to prevent frequent antibiotics if not needed. Reports no recent symptoms since initiating. No on Zantac for GERD symptoms and doing well. Will continue to follow. 7. Fatigue. Severe and persistent despite changing from letrozole to fulvestrant. Wishing to continue therapy. Discussed that may be exacerbated by treatment with Haldol for Tourette's syndrome and gabapentin. Follow. 8. Right shoulder pain. MRI with partial rotator cuff tear. Evaluated by Dr. Randy Beavers and received a glenohumeral cortisone injection. Recommended physical therapy, but patient did not proceed. Not a significant complaint today. Followed by Dr. Kathy Elliott. 9. Low back pain/ neck pain. Improved with physical therapy. Using meloxicam, cyclobenzaprine, and gabapentin.  Developed worsening right hip and leg pain and did proceed with L4-L5 epidural injection on the right by Dr. Casey Ulloa. Has noted iprovement. Follow. 10. Tourette's syndrome. Followed by Dr. Kya Bird. On Haldol with plans to switch to Risperdal if Haldol becomes unavailable. Using diazepam occasionally for sleep. 11. Right ovarian cyst. No further monitoring needed as per Dr. Les Hampton. Patient expressed concern about not following cyst, but surveillance CT scans obtained every 6 months for breast cancer do include imaging of pelvis. Has been stable. Patient reassured. Follow. 12. Health maintenance. Discussed possibility of Shingrix vaccine, but given immunosuppression not wishing to proceed. Discussed that should address with Dr. Karthik Rivera. Other immunizations up to date. Mammogram up to date. Will perform breast exam every visit. Vitamin D level normal. Continue maintenance dose supplement. Continue regular eye exams with Dr. Chris Cronin. Medicare wellness visit up to date. Patient understands recommendations and agrees with plan. Follow-up as previously scheduled. Addendum    XR Results (most recent):  Results from Hospital Encounter encounter on 10/23/19   XR CHEST PA LAT    Narrative TWO VIEW CHEST RADIOGRAPH     CPT CODE: 70017    INDICATION: Nonproductive cough and fever for 4-5 days, immunocompromised,  metastatic breast cancer. COMPARISON: CT chest 7/12/2019    FINDINGS:     Cardiomediastinal silhouette is normal. Pulmonary vasculature is normal. Mild  reticulation at the periphery of the lung bases. No focal opacity or  pneumothorax. Osteopenia. Levoscoliosis. Kyphoplasty/vertebroplasty lower  thoracic spine. Degenerative disc disease. Impression IMPRESSION:    Mild reticulations at the lung bases, either fibrosis or atelectasis. Reviewed chest x-ray results. No evidence of pneumonia. Will hold off on antibiotics for now. Discussed with patient and aware to call with any change.

## 2019-10-31 ENCOUNTER — TELEPHONE (OUTPATIENT)
Dept: INTERNAL MEDICINE CLINIC | Age: 74
End: 2019-10-31

## 2019-10-31 NOTE — TELEPHONE ENCOUNTER
Pt calling, says she is still sick from the virus. Says she is some better but still has cough and congestion. No fever. Wants to know if she is contagious? Wants to know if this is normal or do you think it could be that she has had her chemo treatment too?

## 2019-11-01 NOTE — TELEPHONE ENCOUNTER
Called and spoke with patient earlier this evening. She reported that her fever had resolved, but she had persistent nonproductive cough and postnasal drainage. She states that the drainage was at times yellow, but appeared to be decreasing. She was completing week two of three weeks of chemotherapy [facility] [place] for metastatic breast cancer. She was concerned that her symptoms were persisting, and was requesting advice. Discussed that her symptoms appear to be improving, but may take several more days before resolving. Given her immunosuppression, this may be somewhat more prolonged than for an immunocompetent patient. Advised to continue to observe and treat symptomatically over the weekend. If her symptoms have not improved by early next week advised her to call to readdress. Also advised to call sooner if she noted any worsening or recurrence of fever.

## 2019-11-04 ENCOUNTER — PATIENT MESSAGE (OUTPATIENT)
Dept: INTERNAL MEDICINE CLINIC | Age: 74
End: 2019-11-04

## 2019-11-04 RX ORDER — DOXYCYCLINE 100 MG/1
100 CAPSULE ORAL 2 TIMES DAILY
Qty: 20 CAP | Refills: 0 | Status: SHIPPED | OUTPATIENT
Start: 2019-11-04 | End: 2019-11-14

## 2019-11-04 NOTE — TELEPHONE ENCOUNTER
From: Ted Speak  To: Leatha Black MD  Sent: 11/4/2019 8:51 AM EST  Subject: Visit Follow-Up Question    Good morning Dr. Dennis Chen,  I am responding today as you requested after 2 weeks of this viral infection. I had a pretty difficult weekend continuing with lots of congestion, blowing thick yellow mucus, and coughing, often loose and productive. This morning continues with the same symptoms. I really feel I need something to help me get rid of this. I have a few more days on the chemo pills which I'm sure has contributed to my feeling so weak and low. I have a Z pack that was given to me by oncology in January 2017 that was never used, but suppose that is outdated? Sorry to bother you on a non-working day. .. or do you actually have one of those? ? Thank you for your help.

## 2019-11-04 NOTE — TELEPHONE ENCOUNTER
Called and spoke with patient. Reports persistent nasal congestion with that yellow drainage and productive cough. States that fever and chills have resolved and feeling somewhat better. On week two of treatment with Ibrance with completion on Thursday and 2 week holiday. Advised that given continued purulent drainage and productive cough, would recommend beginning antibiotic therapy. Did not tolerate Augmentin due to diarrhea. Unable to treat with azithromycin due to potential QTc prolongation while on Haldol. Will treat with doxycycline. Patient has appointment previously scheduled for next week and will follow-up then. Script sent to [facility].

## 2019-11-05 ENCOUNTER — HOSPITAL ENCOUNTER (OUTPATIENT)
Dept: LAB | Age: 74
Discharge: HOME | End: 2019-11-05
Payer: MEDICARE

## 2019-11-05 DIAGNOSIS — F95.2 TOURETTE SYNDROME: ICD-10-CM

## 2019-11-05 DIAGNOSIS — E78.5 HYPERLIPIDEMIA, UNSPECIFIED HYPERLIPIDEMIA TYPE: ICD-10-CM

## 2019-11-05 DIAGNOSIS — R53.82 CHRONIC FATIGUE: ICD-10-CM

## 2019-11-05 DIAGNOSIS — I89.0 LYMPHEDEMA OF ARM: ICD-10-CM

## 2019-11-05 DIAGNOSIS — I10 ESSENTIAL HYPERTENSION: ICD-10-CM

## 2019-11-05 DIAGNOSIS — M85.89 OSTEOPENIA OF MULTIPLE SITES: ICD-10-CM

## 2019-11-05 DIAGNOSIS — M85.9 DISORDER OF BONE DENSITY AND STRUCTURE, UNSPECIFIED: ICD-10-CM

## 2019-11-05 DIAGNOSIS — M48.50XA VERTEBRAL COMPRESSION FRACTURE (HCC): ICD-10-CM

## 2019-11-05 DIAGNOSIS — G47.00 INSOMNIA, UNSPECIFIED TYPE: ICD-10-CM

## 2019-11-05 DIAGNOSIS — M48.062 SPINAL STENOSIS OF LUMBAR REGION WITH NEUROGENIC CLAUDICATION: ICD-10-CM

## 2019-11-05 DIAGNOSIS — K57.92 DIVERTICULITIS: ICD-10-CM

## 2019-11-05 DIAGNOSIS — Z17.0 MALIGNANT NEOPLASM OF LEFT BREAST IN FEMALE, ESTROGEN RECEPTOR POSITIVE, UNSPECIFIED SITE OF BREAST (HCC): ICD-10-CM

## 2019-11-05 DIAGNOSIS — C50.912 MALIGNANT NEOPLASM OF LEFT BREAST IN FEMALE, ESTROGEN RECEPTOR POSITIVE, UNSPECIFIED SITE OF BREAST (HCC): ICD-10-CM

## 2019-11-05 DIAGNOSIS — C78.00 MALIGNANT NEOPLASM METASTATIC TO LUNG, UNSPECIFIED LATERALITY (HCC): ICD-10-CM

## 2019-11-05 LAB
25(OH)D3 SERPL-MCNC: 33.4 NG/ML (ref 30–100)
APPEARANCE UR: CLEAR
BACTERIA URNS QL MICRO: NEGATIVE /HPF
BILIRUB UR QL: NEGATIVE
CHOLEST SERPL-MCNC: 173 MG/DL
COLOR UR: YELLOW
EPITH CASTS URNS QL MICRO: NORMAL /LPF (ref 0–5)
GLUCOSE UR STRIP.AUTO-MCNC: NEGATIVE MG/DL
HDLC SERPL-MCNC: 51 MG/DL (ref 40–60)
HDLC SERPL: 3.4 {RATIO} (ref 0–5)
HGB UR QL STRIP: NEGATIVE
KETONES UR QL STRIP.AUTO: NEGATIVE MG/DL
LDLC SERPL CALC-MCNC: 107 MG/DL (ref 0–100)
LEUKOCYTE ESTERASE UR QL STRIP.AUTO: NEGATIVE
LIPID PROFILE,FLP: ABNORMAL
NITRITE UR QL STRIP.AUTO: NEGATIVE
PH UR STRIP: 7 [PH] (ref 5–8)
PROT UR STRIP-MCNC: NEGATIVE MG/DL
RBC #/AREA URNS HPF: NORMAL /HPF (ref 0–5)
SP GR UR REFRACTOMETRY: 1.01 (ref 1–1.03)
TRIGL SERPL-MCNC: 75 MG/DL (ref ?–150)
TSH SERPL DL<=0.05 MIU/L-ACNC: 0.5 UIU/ML (ref 0.36–3.74)
UROBILINOGEN UR QL STRIP.AUTO: 0.2 EU/DL (ref 0.2–1)
VLDLC SERPL CALC-MCNC: 15 MG/DL
WBC URNS QL MICRO: NORMAL /HPF (ref 0–4)

## 2019-11-05 PROCEDURE — 82306 VITAMIN D 25 HYDROXY: CPT

## 2019-11-05 PROCEDURE — 80061 LIPID PANEL: CPT

## 2019-11-05 PROCEDURE — 36415 COLL VENOUS BLD VENIPUNCTURE: CPT

## 2019-11-05 PROCEDURE — 81001 URINALYSIS AUTO W/SCOPE: CPT

## 2019-11-05 PROCEDURE — 84443 ASSAY THYROID STIM HORMONE: CPT

## 2019-11-11 ENCOUNTER — HOSPITAL ENCOUNTER (OUTPATIENT)
Dept: BONE DENSITY | Age: 74
Discharge: HOME | End: 2019-11-11
Attending: INTERNAL MEDICINE
Payer: MEDICARE

## 2019-11-11 DIAGNOSIS — C50.912 MALIGNANT NEOPLASM OF LEFT BREAST IN FEMALE, ESTROGEN RECEPTOR POSITIVE, UNSPECIFIED SITE OF BREAST (HCC): ICD-10-CM

## 2019-11-11 DIAGNOSIS — C78.00 MALIGNANT NEOPLASM METASTATIC TO LUNG, UNSPECIFIED LATERALITY (HCC): ICD-10-CM

## 2019-11-11 DIAGNOSIS — Z17.0 MALIGNANT NEOPLASM OF LEFT BREAST IN FEMALE, ESTROGEN RECEPTOR POSITIVE, UNSPECIFIED SITE OF BREAST (HCC): ICD-10-CM

## 2019-11-11 DIAGNOSIS — M48.50XA VERTEBRAL COMPRESSION FRACTURE (HCC): ICD-10-CM

## 2019-11-11 DIAGNOSIS — M85.89 OSTEOPENIA OF MULTIPLE SITES: ICD-10-CM

## 2019-11-11 PROCEDURE — 77080 DXA BONE DENSITY AXIAL: CPT

## 2019-11-12 ENCOUNTER — OFFICE VISIT (OUTPATIENT)
Dept: INTERNAL MEDICINE CLINIC | Age: 74
End: 2019-11-12

## 2019-11-12 VITALS
WEIGHT: 141 LBS | RESPIRATION RATE: 16 BRPM | SYSTOLIC BLOOD PRESSURE: 124 MMHG | DIASTOLIC BLOOD PRESSURE: 62 MMHG | TEMPERATURE: 98.3 F | HEART RATE: 68 BPM | HEIGHT: 59 IN | BODY MASS INDEX: 28.43 KG/M2

## 2019-11-12 DIAGNOSIS — G89.29 CHRONIC RIGHT SHOULDER PAIN: ICD-10-CM

## 2019-11-12 DIAGNOSIS — M25.511 CHRONIC RIGHT SHOULDER PAIN: ICD-10-CM

## 2019-11-12 DIAGNOSIS — M48.062 SPINAL STENOSIS OF LUMBAR REGION WITH NEUROGENIC CLAUDICATION: ICD-10-CM

## 2019-11-12 DIAGNOSIS — E78.5 HYPERLIPIDEMIA, UNSPECIFIED HYPERLIPIDEMIA TYPE: ICD-10-CM

## 2019-11-12 DIAGNOSIS — M85.89 OSTEOPENIA OF MULTIPLE SITES: ICD-10-CM

## 2019-11-12 DIAGNOSIS — K57.90 DIVERTICULOSIS: ICD-10-CM

## 2019-11-12 DIAGNOSIS — M75.111 NONTRAUMATIC INCOMPLETE TEAR OF RIGHT ROTATOR CUFF: ICD-10-CM

## 2019-11-12 DIAGNOSIS — C78.00 MALIGNANT NEOPLASM METASTATIC TO LUNG, UNSPECIFIED LATERALITY (HCC): ICD-10-CM

## 2019-11-12 DIAGNOSIS — F95.2 TOURETTE SYNDROME: ICD-10-CM

## 2019-11-12 DIAGNOSIS — I89.0 LYMPHEDEMA OF ARM: ICD-10-CM

## 2019-11-12 DIAGNOSIS — K57.92 DIVERTICULITIS: ICD-10-CM

## 2019-11-12 DIAGNOSIS — M15.9 PRIMARY OSTEOARTHRITIS INVOLVING MULTIPLE JOINTS: ICD-10-CM

## 2019-11-12 DIAGNOSIS — G47.00 INSOMNIA, UNSPECIFIED TYPE: ICD-10-CM

## 2019-11-12 DIAGNOSIS — Z17.0 MALIGNANT NEOPLASM OF LEFT BREAST IN FEMALE, ESTROGEN RECEPTOR POSITIVE, UNSPECIFIED SITE OF BREAST (HCC): ICD-10-CM

## 2019-11-12 DIAGNOSIS — R53.82 CHRONIC FATIGUE: ICD-10-CM

## 2019-11-12 DIAGNOSIS — C50.912 MALIGNANT NEOPLASM OF LEFT BREAST IN FEMALE, ESTROGEN RECEPTOR POSITIVE, UNSPECIFIED SITE OF BREAST (HCC): ICD-10-CM

## 2019-11-12 DIAGNOSIS — I10 ESSENTIAL HYPERTENSION: Primary | ICD-10-CM

## 2019-11-12 DIAGNOSIS — S22.080S COMPRESSION FRACTURE OF T11 VERTEBRA, SEQUELA: ICD-10-CM

## 2019-11-12 DIAGNOSIS — M48.02 SPINAL STENOSIS OF CERVICAL REGION: ICD-10-CM

## 2019-11-12 NOTE — PROGRESS NOTES
Chief Complaint   Patient presents with    Hypertension     6 month follow up with labs. Health Maintenance Due   Topic Date Due    Shingrix Vaccine Age 50> (1 of 2) 09/28/1997     1. Have you been to the ER, urgent care clinic or hospitalized since your last visit? NO.     2. Have you seen or consulted any other health care providers outside of the [address] [place] [address] since your last visit (Include any pap smears or colon screening)? NO      Learning Assessment 11/12/2019   PRIMARY LEARNER Patient   HIGHEST LEVEL OF EDUCATION - PRIMARY LEARNER  4 YEARS OF COLLEGE   BARRIERS PRIMARY LEARNER NONE   CO-LEARNER CAREGIVER No   PRIMARY LANGUAGE ENGLISH    NEED -   LEARNER PREFERENCE PRIMARY READING     DEMONSTRATION     LISTENING     -     -   ANSWERED BY patient   RELATIONSHIP SELF     Abuse Screening Questionnaire 11/12/2019   Do you ever feel afraid of your partner? N   Are you in a relationship with someone who physically or mentally threatens you? N   Is it safe for you to go home? Y     3 most recent PHQ Screens 11/12/2019   Little interest or pleasure in doing things Not at all   Feeling down, depressed, irritable, or hopeless Not at all   Total Score PHQ 2 0     Fall Risk Assessment, last 12 mths 11/12/2019   Able to walk? Yes   Fall in past 12 months?  No   Fall with injury? -   Number of falls in past 12 months -   Fall Risk Score -

## 2019-11-14 DIAGNOSIS — G47.00 INSOMNIA, UNSPECIFIED TYPE: ICD-10-CM

## 2019-11-14 RX ORDER — DIAZEPAM 5 MG/1
5 TABLET ORAL
Qty: 30 TAB | Refills: 0 | Status: SHIPPED | OUTPATIENT
Start: 2019-11-14 | End: 2020-05-05 | Stop reason: SDUPTHER

## 2019-11-14 NOTE — TELEPHONE ENCOUNTER
Pt calling to let Dr. Clarita Bosworth know she does need her to call in the Valium. Says her neurologist still has not responded to request. Says she talked with Dr. Clarita Bosworth about this at visit.

## 2019-11-14 NOTE — TELEPHONE ENCOUNTER
[facility]  reports [facility] last [facility] date for Valium as 7/9/19 for a 30 d/s per Dr. Nelson Glass    Last Visit: 11/12/19 with MD Philippe Crockett  Next Appointment: 5/21/20 with MD Melgar  Previous Refill Encounter(s): 3/11/19 #30    Requested Prescriptions     Pending Prescriptions Disp Refills    diazePAM (VALIUM) 5 mg tablet 30 Tab 0     Sig: Take 1 Tab by mouth every six (6) hours as needed for Anxiety or Sleep. Max Daily Amount: 20 mg.

## 2019-11-16 NOTE — PROGRESS NOTES
Sajan Quach is a 78y.o. year old female who presents today for a physical exam and for evaluation of hypertension, dyslipidemia,  metastatic breast cancer, GERD, diverticulosis with recurrent diverticulitis, osteoarthritis s/p right knee replacement (2012), lumbar degenerative disease, osteopenia, compression fracture, and Tourette's syndrome. She was last seen on 10/23/2019 for an acute visit for a nonproductive cough and fever for several days. She reported that she had received the influenza vaccine on 10/17/2019, and she also started her cycle of Ibrance on 10/18/2019. On 10/19/2019, she noted a cough and a fever to 101.6 F. She stated that her fever persisted for the next several days, and she continued to have nasal congestion with clear drainage, nonproductive cough, and fatigue. She denied any shortness of breath or chest pain, and denies headache, stiff neck, or facial pain. She presented to [facility] for evaluation on 10/21/2019 and evaluation included rapid strep testing and influenza A/B, which were negative, and WBC 8.9 (71% neutrophils), Hb 12.5/ Hct 37.3 and platelets 473. She was diagnosed with a URI and treated with Tessalon and Tylenol. She presented for evaluation and reported that her fever persisted and she continued to have a nonproductive cough, nasal congestion and clear nasal discharge. She had been using Nyquil and Tylenol for symptomatic treatment. A chest x-ray was obtained (10/23/2019) which showed no acute infiltrates, but mild reticulations at the lung bases. Due to change in nasal discharge and persistent low grade fever, she was started on Augmentin on 10/24/2019, but discontinued after 48 hours due to diarrhea. On 11/4/2019, due to persistent yellow nasal discharge, she was started on doxycycline for 10 days. She presents today for follow-up and reports that she has improved. She states that the doxycycline has helped, and she has two more days of therapy.  She will be completing her cycle of Ibrance on 11/14/2019 as well. She is otherwise without new complaints. She has a history of left breast cancer, which was diagnosed in 7/2010 as invasive ductal adenocarcinoma, s/p left modified radical mastectomy with sentinel node biopsy, performed by Dr. Sean Velasquez. She had 3 positive lymph nodes and was classified as stage 1B, T1c N1 M0, ER and SC positive, Her-2/sarah negative by FISH. She underwent 6 cycles of chemotherapy with Docetaxel and Cytoxan. She was subsequently unable to tolerate anastrozole, exemestane, tamoxifen, and letrozole due to profound fatigue and arthralgias. Her course was complicated by chronic left arm lymphedema, managed with a compression sleeve. A chest CT scan in 3/2014 noted several small pulmonary nodules which were concerning for metastases but were too small to biopsy. They were followed with sequential CT scans , and in 12/2014, repeat chest CT scan showed enlarging bilateral pulmonary nodules compatible with progression of metastatic disease. A fine needle aspirate was performed on 12/21/2014 which showed benign pulmonary parenchyma with alveolar hemorrhage. Repeat chest CT scan in 3/12/2015 showed mild interval progression of the lung metastases with enlarging nodules and development of new nodules. A CT guided needle biopsy was performed on 3/23/2015, which confirmed metastatic adenocarcinoma consistent with breast primary (stage IV, ER/SC positive, and TTF-1 negative). On 4/13/2015, she was started on therapy with Ibrance and letrozole. Follow-up CT scan (7/15/15) showed partial response with decreasing size of some nodules and resolution of other nodules. Most recent chest, abdomen, and pelvis CT scan was obtained in 6/2017, showing stable or decreased multiple bilateral pulmonary nodules and no suspicious new pulmonary nodules. She continues on palbociclib, but was changed from letrozole to fulvestrant.  She describes persistent fatigue which she attributes to monthly treatment with fulvestrant (Faslodex). She underwent restaging chest, abdomen, and pelvis CT scan in 7/2019 which showed that her pulmonary nodules were unchanged, and no other evidence of metastatic disease. She is followed by Dr. Rigoberto Leary. She states that she established care with Dr. Shanelle See, but was told that she may have her mammograms and breast exams in our office with referral to her if something arises. She also has a history of a right ovarian cyst, but was released from the care of Dr. Keith Mckeon since it was concluded to be benign. She has a history of hypertension treated with losartan. She monitors her blood pressure mainly when visiting multiple physicians and reports that it is well controlled. She has no history of heart disease, and denies any chest pain, shortness of breath at rest or with exertion, palpitations, lightheadedness, or edema. In 4/11/2016, she sustained a fall with subsequent severe pain in her mid to upper lumbar region and wrapping around waist. She was treated with meloxicam, tylenol and flexeril, but because of persistent discomfort, she presented to [facility] on 4/15/2016 and lumbosacral spine films showed marked degenerative changes with disc space obliteration throughout lumbar spine and slight anterior spondylolisthesis of L4. She was evaluated by Dr. Feliberto Rocha on 4/18/2016 who recommended physical therapy and evaluation by Dr. Boubacar Smith for her degenerative spine changes. She continued to take meloxicam and tylenol and started physical therapy, but noted worsening of her pain.  She subsequently was evaluated by Dr. Mane Gomez, who obtained a lumbar MRI on 4/26/2016, which showed a new subacute compression fracture of T11 vertebral body with diffuse marrow edema and mild paravertebral inflammatory stranding, no retropulsion or central stenosis; more severe degenerative disease along the right side at L4-L5, L5-S1, potential mild compression of right exiting L4 and L5 nerve roots. She was referred to Dr. Rose Marie Crowley for kyphoplasty of the T11 compression fracture given failure of pain control with conservative measures. She underwent the procedure on 6/4/8048 without complications, and C73 vertebral body bone core and aspirate biopsies were performed, which showed only reactive bone changes and no evidence of malignancy. She has a history of osteopenia, with a history of fracture of her sacrum. She reports that she was treated with alendronate in 12/2011 but discontinued it in 4/2013 due to intolerence. Bone density scan (11/2017) was consistent with osteopenia with femoral neck T-scores: left -1.4/ right -1.4 and distal radius -2.2 (lumbar T-score could not be assessed). A repeat bone density scan was obtained (11/2019) showing continued evidence of osteopenia with femoral neck T-scores: left -1.4/ right -1.4 and distal radius -2.4 (lumbar T-score could not be assessed). She continues to take calcium and vitamin D. In 10/2016, she developed left sided low back pain with radiation to her left leg over the last several weeks. She underwent a lumbar MRI (9/2016) showing scoliosis and advanced multilevel degenerative changes with areas of foraminal stenosis at L3-L4 and L5-S1; mild/moderate central canal stenosis at L4-L5 and L5-S1. She was evaluated by Dr. Wellington Chu and treated with pregabalin with some improvement. She subsequently received an epidural steroid injection with improvement and discontinued pregabalin. In 4/2017, she noted increasing cervical and upper back discomfort. She has had multiple cervical MRI's,and underwent repeat in 4/2017 which showed multilevel degenerative disc disease and facet arthropathy without change from prior studies with mild central canal stenosis C3-C4 and C5-C6, and moderate central canal stenosis C6-C7. She was treated with Mobic and Flexeril, and gabapentin at bedtime. She complained of right shoulder pain.  X-ray of her shoulder (11/2017) was negative, and she had a right shoulder MRI (5/14/2018) which showed deep partial thickness undersurface tear of the distal supraspinatus, likely with full thickness slitlike perforations; no gross tendon retraction, but there is moderate muscle atrophy; partial thickness tearing with longitudinal components involving the biceps tendon at its sulcal turn, some associated tenosynovitis; accompanying short length longitudinal split tear in the distal subscapularis; moderate infraspinatus tendinosis, and mild to moderate subacromial subdeltoid bursitis. She underwent evaluation by Dr. Coty Campbell on 5/18/2018, and received a glenohumeral cortisone injection. She reported worsening low back pain and right sciatica, and underwent an epidural steroid injection at right L4-5 transforaminal approach by Dr. Cass Rowland on 3/18/2019. She has noted improvement in her right hip and leg pain. She continues to take gabapentin 300 mg at night. She has a history of GERD and diverticulosis with recurrent diverticulitis,. She was evaluated by Dr. Chiqui Barrera in 2/23/2016 and underwent an upper endoscopy, which revealed a Schatzki's ring at the gastroesophageal junction (dilated) with mild distal esophagitis and a small hiatal hernia. A screening colonoscopy was also performed showing moderately severe diverticulosis of the ascending and descending colon and a 2 mm sessile sigmoid polyp (pathology: hyperplastic). Recommendations for follow-up colonoscopy in 10 years. She denies any abdominal pain, nausea, vomiting, melena, hematochezia, or change in bowel movements. She was evaluated in 8/2017 by GERALD Tang with complaints of abdominal pain and was treated with Cipro for presumed diverticulitis. She contacted the office again in 10/2017 and 1/2018 with a recurrence of symptoms, and was treated for a 10 day course with Cipro with improvement.  Abdominal CT scan in 2/6/2018 for staging for her breast cancer did show evidence of moderate to severe diverticulosis, but no evidence of diverticulitis. She did present with similar symptoms in 3/2018 and was evaluated by GERALD Vigil and prescribed Cipro. However, her symptoms resolved prior to taking the antibiotics. In 7/2018, she again presented with left lower quadrant pain, and an abdominal CT scan (7/13/2018) which showed evidence of uncomplicated diverticulitis involving the lower left colon and upper sigmoid. She was treated with Cipro and Flagyl initially, but developed a rash due to Flagyl, and was subsequently changed to Augmentin. She had a follow-up visit with Dr. Onelia Mcgregor and it was his opinion that she also had IBS which mimicked her episodes of diverticulitis. He recommended continuing on a probiotic and prescribed hyoscyamine to use as needed. She also was having more difficulty with burning epigastric pain, and was advised by Dr. Onelia Mcgregor to begin Zantac five days per week (off 2 days). She has noted improvement since doing so. She also has a history of Tourette's syndrome controlled with Haldol. She is followed by Dr. Alvaro Chaudhary.      Past Medical History:   Diagnosis Date    Arthritis     Breast cancer metastasized to lung [facility])     Left Breast    Chronic pain     Colon polyps 2/22/16    distal sigmoid, Dr. Pepe HERNANDEZ (degenerative disc disease)     Diverticulitis of colon     Diverticulosis 2/22/16    mild in the ascedning and sigmoid colon, Dr. Nathen Crawford (hyperlipidemia)     Hypertension     Osteopenia     Tourette syndrome     Vitamin D deficiency      Past Surgical History:   Procedure Laterality Date    HX APPENDECTOMY      HX BREAST BIOPSY  7-30-10    Left Breast w/Nipple Duct exploration and excisional biopsy-left    HX DILATION AND CURETTAGE      x3    HX MODIFIED RADICAL MASTECTOMY  9-3-10    left    HX ORTHOPAEDIC Right 2012    knee replacement    HX TONSILLECTOMY      HYSTEROSCOPY DIAGNOSTIC       Current Outpatient Medications   Medication Sig  pantoprazole sodium (PROTONIX PO) Take  by mouth.  meloxicam (MOBIC) 15 mg tablet Take 1 Tab by mouth daily. Take WITH FOOD    haloperidol (HALDOL) 2 mg tablet Take 1 Tab by mouth two (2) times a day.  losartan (COZAAR) 25 mg tablet Take 1 Tab by mouth daily.  palbociclib 75 mg cap 75 mg.    fulvestrant (FASLODEX IM) by IntraMUSCular route.  CYANOCOBALAMIN, VITAMIN B-12, (VITAMIN B-12 PO) Take  by mouth.  Biotin 2,500 mcg cap Take  by mouth.  calcium-vitamin D (CALCIUM 500+D) 500 mg(1,250mg) -200 unit per tablet Take 1 Tab by mouth two (2) times daily (with meals).  cholecalciferol, vitamin d3, (VITAMIN D) 1,000 unit tablet Take 2,000 Units by mouth daily.  ascorbic acid (VITAMIN C) 500 mg tablet Take  by mouth.  diazePAM (VALIUM) 5 mg tablet Take 1 Tab by mouth every six (6) hours as needed for Anxiety or Sleep. Max Daily Amount: 20 mg.    gabapentin (NEURONTIN) 300 mg capsule Take 1 Cap by mouth nightly.  cyclobenzaprine (FLEXERIL) 5 mg tablet Take 5 mg by mouth. No current facility-administered medications for this visit. Allergies and Intolerances: Allergies   Allergen Reactions    Keflex [Cephalexin] Rash    Metronidazole Rash    Other Medication Nausea Only     Anesthesia    Shellfish Containing Products Nausea and Vomiting     More of just eating the actual shellfish.  Sulfa (Sulfonamide Antibiotics) Rash    Ultram [Tramadol] Nausea and Vomiting     Patient states she is allergic to most Narcotics    Vancomycin Rash     Family History:   Family History   Problem Relation Age of Onset    Osteoporosis Sister     Osteoporosis Mother     Stroke Father     Hypertension Father     Cancer Paternal Aunt         breast     Social History:   She  reports that she has quit smoking. She quit after 3.00 years of use. She has never used smokeless tobacco. She stopped smoking over 40 years ago. She is  and lives with .  They have one daughter and two grandchildren. Social History     Substance and Sexual Activity   Alcohol Use Yes    Alcohol/week: 5.8 standard drinks    Types: 7 Glasses of wine per week     Immunization History:  Immunization History   Administered Date(s) Administered    Influenza High Dose Vaccine PF 10/01/2015, 10/25/2016, 10/05/2017    Influenza Vaccine 09/01/2014    Influenza Vaccine (Tri) Adjuvanted 10/05/2018, 10/17/2019    Influenza Vaccine Split 11/01/2011, 10/02/2012    Influenza Vaccine Whole 10/08/2010    Pneumococcal Conjugate (PCV-13) 10/27/2015    Pneumococcal Polysaccharide (PPSV-23) 04/15/2013    TD Vaccine 05/05/2008    Tdap 09/12/2014    Zoster 09/20/2011       Review of Systems:   As above included in HPI. Otherwise 11 point review of systems negative including constitutional, skin, HENT, eyes, respiratory, cardiovascular, gastrointestinal, genitourinary, musculoskeletal, endo/heme/aller, neurological.    Physical:   Vitals:   BP: 124/62  HR: 68  WT: 141 lb (64 kg)  BMI:  28.48 kg/m2    Exam:   Patient appears in no apparent distress. Affect is appropriate. HEENT --Anicteric sclerae, tympanic membranes normal,  ear canals normal.  PERRL, EOMI, conjunctiva and lids normal.   Sinuses were nontender, turbinates normal, hearing normal.  Oropharynx without  erythema, normal tongue, oral mucosa and tonsils. No cervical lymphadenopathy. No thyromegaly, JVD, or bruits. Carotid pulses 2+ with normal upstroke. Lungs --Clear to auscultation. No wheezing or rales. Heart --Regular rate and rhythm, no murmurs, rubs, gallops, or clicks. Breasts --declined   Chest wall --Nontender to palpation. PMI normal.  Abdomen -- Soft and nontender, no hepatosplenomegaly or masses. Extremities -- Without cyanosis, clubbing, edema. 2+ pulses equally and bilaterally.   Normal looking digits, ROM intact  Neuro -- CN 2-12 intact, strength 5/5 with intact soft touch in all extremities  Derm  no obvious abnormalities noted, no rash Review of Data:  Labs:  Hospital Outpatient Visit on 11/05/2019   Component Date Value Ref Range Status    LIPID PROFILE 11/05/2019        Final    Cholesterol, total 11/05/2019 173  <200 MG/DL Final    Triglyceride 11/05/2019 75  <150 MG/DL Final    HDL Cholesterol 11/05/2019 51  40 - 60 MG/DL Final    LDL, calculated 11/05/2019 107* 0 - 100 MG/DL Final    VLDL, calculated 11/05/2019 15  MG/DL Final    CHOL/HDL Ratio 11/05/2019 3.4  0 - 5.0   Final    Color 11/05/2019 YELLOW    Final    Appearance 11/05/2019 CLEAR    Final    Specific gravity 11/05/2019 1.013  1.005 - 1.030   Final    pH (UA) 11/05/2019 7.0  5.0 - 8.0   Final    Protein 11/05/2019 NEGATIVE   NEG mg/dL Final    Glucose 11/05/2019 NEGATIVE   NEG mg/dL Final    Ketone 11/05/2019 NEGATIVE   NEG mg/dL Final    Bilirubin 11/05/2019 NEGATIVE   NEG   Final    Blood 11/05/2019 NEGATIVE   NEG   Final    Urobilinogen 11/05/2019 0.2  0.2 - 1.0 EU/dL Final    Nitrites 11/05/2019 NEGATIVE   NEG   Final    Leukocyte Esterase 11/05/2019 NEGATIVE   NEG   Final    WBC 11/05/2019 0 to 1  0 - 4 /hpf Final    RBC 11/05/2019 0 to 2  0 - 5 /hpf Final    Epithelial cells 11/05/2019 FEW  0 - 5 /lpf Final    Bacteria 11/05/2019 NEGATIVE   NEG /hpf Final    Vitamin D 25-Hydroxy 11/05/2019 33.4  30 - 100 ng/mL Final    TSH 11/05/2019 0.50  0.36 - 3.74 uIU/mL Final     5/3/2019 Dr. Abel Age office WBC 6.3 ANC 4700       Hb 13.5/ Hct 39.0       Platelets 180       Glucose 90       Na 142/ K 4.3/ CO2 28       Ca 9.2       AST 12/ ALT 11       Alkaline phosphatase 56       Creatinine 1.04/ eGFR 63    Health Maintenance:  Screening:    Mammogram: negative (9/2019). Breast exam negative 5/14/2019 (perform every 6 months)   PAP smear: negative (9/2013) Dr Les Hampton. Pelvic ultrasound negative (9/2015). No further screening needed. Colorectal: colonoscopy (2/2016) hyperplastic polyp; Dr. Vivek Bishop. Follow-up 2026.    Depression: none   DM (HbA1c/FPG): FPG 90 (5/2019)   Hepatitis C: unknown   Falls: none   DEXA: osteopenia (11/2019) s/p T11 compression fracture in 4/2016. Smoking:distant past   Glaucoma: regular eye exams with Dr. Molly Marie (last 4/2019). Had \"3 snip\" procedure to manage dry eyes, but not effective. Vitamin D: 33.4 (11/2019)   Medicare Wellness: 5/14/2019      Impression:  Patient Active Problem List   Diagnosis Code    Tourette syndrome F95.2    Osteoarthritis, Status post right total knee replacement M19.90    Lumbar spinal stenosis M48.061    HLD (hyperlipidemia) E78.5    Breast cancer ([facility], [place] [address] metastatic to lungs  C50.919    Essential hypertension I10    Diverticulosis K57.90    Osteopenia M85.80    Lymphedema of arm left I89.0    Insomnia G47.00    Fatigue R53.83    Diverticulitis, recurrent K57.92    T11 Vertebral compression fracture (HCC) s/p kyphoplasty M48.50XA    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.812    Spinal stenosis of cervical region M48.02    Right shoulder pain M25.511    Malignant neoplasm metastatic to lung (HCC) C78.00    Nontraumatic incomplete tear of right rotator cuff M75.111       Plan:  1. Upper respiratory infection. Significantly improved since started on doxycycline (day 8/10). Instructed to complete course. Follow. 2. Hypertension. Well controlled on losartan 25 mg daily. Renal function has been normal with creatinine 1.04/ eGFR 63 in 5/2019. Continue to follow. 2. Breast cancer with pulmonary metastasis. Continue current therapy as per Dr. Arnel Antoino. Recent CT scans (last 7/2019) with stable small pulmonary nodules. On Ibrance and Faslodex. Wishing to continue with treatment despite excessive fatigue. Not using compression sleeve for lymphedema, but receiving massage therapy every three weeks with good results. Follow. 3. Hyperlipidemia. Calculated 10 year ASCVD risk is 18.3%, which places her in one of the four statin benefit groups (primary prevention with risk > 7.5%).  LDL 107 and HDL 51. Given lack of history of ASCVD, no evidence of atherosclerotic disease on chest and abdominal CT scans, and history of metastatic breast cancer, will not recommend treatment with statin at this time. Continue to emphasized importance of lifestyle modifications, including diet and exercise. 4. T11 compression fracture. S/P kyphoplasty with no further difficulties. Follow. 5. Osteopenia. Last bone density scan 11/2019. Using femoral neck T-scores, calculated FRAX score estimates her 10 year risk of a major osteoporetic fracture at 16 % and hip fracture at 2.3%, which alone are not an indication for biphosphonate treatment. However, the development of a vertebral compression fracture in 4/2016 would be considered an indication for treatment. In addition, treatment with an estrogen receptor blocker increases likelihood of progression. She did not tolerate alendronate in the past and has been reluctant to proceed with Prolia therapy. Discussed again today, and given stable bone density study, expressed that not wishing to proceed with treatment. Continue calcium and Vitamin D. Encouraged to continue exercises as instructed in physical therapy. 6. Recurrent diverticulitis. Colonoscopy (2/2016) with moderately severe diverticulosis in the ascending and descending colon. Has had multiple recent episodes of abdominal pain which were treated empirically with Cipro. Episode of symptoms in 3/2018 resolved prior to initiating antibiotics, bringing into question whether her abdominal symptoms were due to diverticulitis or irritable bowel syndrome. Discussed at last visit that she should have abdominal CT scan to confirm the diagnosis next time she had abdominal complaints. Had recurrent episode in 7/2018, and abdominal CT scan confirmed diagnosis of acute diverticulitis. Treated with Augmentin as developed rash on Flagyl.  Subsequently evaluated by Dr. Margarette Martin, and instructed to continue Probiotics and use hyoscyamine as needed when develop abdominal complaints. It was his opinion that her complaints may also be related to IBS, so wanting to prevent frequent antibiotics if not needed. Reports no recent symptoms since initiating. No on Zantac for GERD symptoms and doing well. Will continue to follow. 7. Fatigue. Severe and persistent despite changing from letrozole to fulvestrant. Wishing to continue therapy. Discussed that may be exacerbated by treatment with Haldol for Tourette's syndrome and gabapentin. Follow. 8. Right shoulder pain. MRI with partial rotator cuff tear. Evaluated by Dr. Galileo Faulkner and received a glenohumeral cortisone injection. Recommended physical therapy, but patient did not proceed. Not a significant complaint today. Followed by Dr. Michelle Chacon. 9. Low back pain/ neck pain. Improved with physical therapy. Using meloxicam, cyclobenzaprine, and gabapentin. Developed worsening right hip and leg pain and did proceed with L4-L5 epidural injection on the right by Dr. Meagan Ovalle. Has noted iprovement. Follow. 10. Tourette's syndrome. Followed by Dr. Agustina Pepe. On Haldol with plans to switch to Risperdal if Haldol becomes unavailable. Using diazepam occasionally for sleep. 11. Right ovarian cyst. No further monitoring needed as per Dr. Manuel Townsend. Patient expressed concern about not following cyst, but surveillance CT scans obtained every 6 months for breast cancer do include imaging of pelvis. Has been stable. Patient reassured. Follow. 12. GERD. Will discontinue Zantac given recall. Using Protonix daily. Follow. 13. Health maintenance. Already received influenza vaccine. Discussed possibility of Shingrix vaccine, but given immunosuppression not wishing to proceed. Other immunizations up to date. Mammogram up to date. Will perform breast exam every visit. Vitamin D level normal. Continue maintenance dose supplement. Continue regular eye exams with Dr. Dinah Gamble. Medicare wellness visit up to date. Total time: 40 minutes spent with the patient in face-to-face consultation of which greater than 50% was spent on counseling, answering questions and/or coordination of care. Complex medical review and management performed. Patient understands recommendations and agrees with plan. Follow-up in 6 months.

## 2019-11-18 ENCOUNTER — DOCUMENTATION ONLY (OUTPATIENT)
Dept: INTERNAL MEDICINE CLINIC | Age: 74
End: 2019-11-18

## 2019-12-16 ENCOUNTER — HOSPITAL ENCOUNTER (OUTPATIENT)
Dept: GENERAL RADIOLOGY | Age: 74
Discharge: HOME | End: 2019-12-16
Payer: MEDICARE

## 2019-12-16 ENCOUNTER — TELEPHONE (OUTPATIENT)
Dept: INTERNAL MEDICINE CLINIC | Age: 74
End: 2019-12-16

## 2019-12-16 DIAGNOSIS — Z17.0 MALIGNANT NEOPLASM OF LEFT BREAST IN FEMALE, ESTROGEN RECEPTOR POSITIVE, UNSPECIFIED SITE OF BREAST (HCC): ICD-10-CM

## 2019-12-16 DIAGNOSIS — S22.080S COMPRESSION FRACTURE OF T11 VERTEBRA, SEQUELA: ICD-10-CM

## 2019-12-16 DIAGNOSIS — C50.912 MALIGNANT NEOPLASM OF LEFT BREAST IN FEMALE, ESTROGEN RECEPTOR POSITIVE, UNSPECIFIED SITE OF BREAST (HCC): ICD-10-CM

## 2019-12-16 DIAGNOSIS — C78.00 MALIGNANT NEOPLASM METASTATIC TO LUNG, UNSPECIFIED LATERALITY (HCC): ICD-10-CM

## 2019-12-16 DIAGNOSIS — M85.89 OSTEOPENIA OF MULTIPLE SITES: ICD-10-CM

## 2019-12-16 DIAGNOSIS — R07.89 LEFT-SIDED CHEST WALL PAIN: ICD-10-CM

## 2019-12-16 DIAGNOSIS — R07.89 LEFT-SIDED CHEST WALL PAIN: Primary | ICD-10-CM

## 2019-12-16 PROCEDURE — 71100 X-RAY EXAM RIBS UNI 2 VIEWS: CPT

## 2019-12-16 PROCEDURE — 72070 X-RAY EXAM THORAC SPINE 2VWS: CPT

## 2019-12-16 NOTE — TELEPHONE ENCOUNTER
Pt thinks she may have cracked a rib yesterday, picking up her 11year old grandson who was asleep, said she felt it when it happened. She is having trouble moving certain ways and took some aspirin but it did nothing for discomfort. Doesn't know if she should get xray. Please advise her at [phone], knows Dr Brooklyn Ortiz not in office today.

## 2019-12-16 NOTE — TELEPHONE ENCOUNTER
Called and spoke with patient. Reports that she was lifting her grandson yesterday and felt acute pain on her posterior chest wall. Having persistent severe pain today, exacerbated by any movement. History of metastatic breast cancer and osteopenia. Also with history of thoracic compression fracture. Will proceed with imaging of thoracic spine and left ribs. Orders placed and patient will proceed to [facility].

## 2019-12-17 NOTE — TELEPHONE ENCOUNTER
Pt called said she had the xray done yesterday, and she said today she is in \"serious pain\" when she moves

## 2019-12-17 NOTE — TELEPHONE ENCOUNTER
Called and discussed thoracic spine and left rib series with patient. No acute fracture seen. Recommended gentle stretching, heat, Tylenol, and Flexeril at bedtime. Advised to call back in a few days if no improvement.

## 2020-01-03 ENCOUNTER — HOSPITAL ENCOUNTER (OUTPATIENT)
Dept: CT IMAGING | Age: 75
Discharge: HOME | End: 2020-01-03
Attending: NURSE PRACTITIONER
Payer: MEDICARE

## 2020-01-03 DIAGNOSIS — C50.912 MALIGNANT NEOPLASM OF LEFT BREAST (HCC): ICD-10-CM

## 2020-01-03 LAB — CREAT UR-MCNC: 1 MG/DL (ref 0.6–1.3)

## 2020-01-03 PROCEDURE — 74177 CT ABD & PELVIS W/CONTRAST: CPT

## 2020-01-03 PROCEDURE — 82565 ASSAY OF CREATININE: CPT

## 2020-01-03 PROCEDURE — 74011636320 HC RX REV CODE- 636/320

## 2020-01-03 RX ADMIN — IOPAMIDOL 80 ML: 612 INJECTION, SOLUTION INTRAVENOUS at 14:09

## 2020-01-21 RX ORDER — LOSARTAN POTASSIUM 25 MG/1
25 TABLET ORAL DAILY
Qty: 90 TAB | Refills: 3 | Status: SHIPPED | OUTPATIENT
Start: 2020-01-21 | End: 2021-01-19 | Stop reason: SDUPTHER

## 2020-01-21 RX ORDER — MELOXICAM 15 MG/1
15 TABLET ORAL DAILY
Qty: 90 TAB | Refills: 2 | Status: SHIPPED | OUTPATIENT
Start: 2020-01-21 | End: 2020-11-02 | Stop reason: SDUPTHER

## 2020-01-21 NOTE — TELEPHONE ENCOUNTER
Patient's new pharmacy - [facility]    Last Visit: 11/12/19 with MD Kaye Chang  Next Appointment: 5/21/20 with MD Melgar  Previous Refill Encounter(s): 1/30/19 Cozaar #90 with 3 refills, 8/12/19 Mobic #90 with 1 refill    Requested Prescriptions     Pending Prescriptions Disp Refills    meloxicam (MOBIC) 15 mg tablet 90 Tab 1     Sig: Take 1 Tab by mouth daily. Take WITH FOOD    losartan (COZAAR) 25 mg tablet 90 Tab 3     Sig: Take 1 Tab by mouth daily.

## 2020-03-24 ENCOUNTER — TELEPHONE (OUTPATIENT)
Dept: INTERNAL MEDICINE CLINIC | Age: 75
End: 2020-03-24

## 2020-03-24 DIAGNOSIS — M48.062 SPINAL STENOSIS OF LUMBAR REGION WITH NEUROGENIC CLAUDICATION: Primary | ICD-10-CM

## 2020-03-24 DIAGNOSIS — M54.30 SCIATICA, UNSPECIFIED LATERALITY: ICD-10-CM

## 2020-03-24 RX ORDER — GABAPENTIN 300 MG/1
300 CAPSULE ORAL 2 TIMES DAILY
Qty: 180 CAP | Refills: 0 | Status: SHIPPED | OUTPATIENT
Start: 2020-03-24 | End: 2020-10-14 | Stop reason: SDUPTHER

## 2020-03-24 NOTE — TELEPHONE ENCOUNTER
Patient stating she's having a lot of pain in her lower back and down her leg. She has trouble walking because of it. She doesn't know it it may be sciatic related. She is taking Gabapentin at night. Can't take it during the day because it knocks her out. Tylenol does nothing for the pain. Please advise.

## 2020-03-24 NOTE — TELEPHONE ENCOUNTER
Called and spoke with patient. Developed flare of low back pain with right sciatica. Helped previously by physical therapy. Improved by sitting and with ambulation, but very painful when attempting to stand up. No alarm symptoms. Advised to resume exercises and stretches recommended by physical therapy. Will increase gabapentin to 300 mg bid. May take Tylenol ES 1000 mg tid for pain. Will call back if no improvement. Did not wish to proceed with Medrol dose pack but will reconsider if does not improve.     Gabapentin refill sent to [facility].

## 2020-03-27 ENCOUNTER — TELEPHONE (OUTPATIENT)
Dept: INTERNAL MEDICINE CLINIC | Age: 75
End: 2020-03-27

## 2020-03-27 RX ORDER — METHYLPREDNISOLONE 4 MG/1
TABLET ORAL
Qty: 1 DOSE PACK | Refills: 0 | Status: SHIPPED | OUTPATIENT
Start: 2020-03-27 | End: 2020-05-21 | Stop reason: ALTCHOICE

## 2020-03-27 NOTE — TELEPHONE ENCOUNTER
Called and spoke with patient. Reports increasing left sciatica pain today despite exercising. States that she was cleaning linen closet last week and believes that triggered flare. Taking gabapentin bid and Tylenol. No alarm symptoms. Will send Medrol dose pack to [facility] to see if will help alleviate symptoms. Advised to continue gentle stretches and application of heat. Advised to call back if worsens.

## 2020-03-27 NOTE — TELEPHONE ENCOUNTER
Pt c/o severe sciatica pain, it has gotten worse and is barely able to walk. Has used the Gabapentin and Tylenol with no real results. With her chemo pills has finished the 21 day cycle yesterday so immune system is low. Said she has used ice & heat as well for the pain. Please advise her at [phone].

## 2020-04-16 RX ORDER — HALOPERIDOL 2 MG/1
2 TABLET ORAL 2 TIMES DAILY
Qty: 180 TAB | Refills: 0 | Status: SHIPPED | OUTPATIENT
Start: 2020-04-16 | End: 2020-07-22 | Stop reason: SDUPTHER

## 2020-04-16 NOTE — TELEPHONE ENCOUNTER
Previous prescription was sent to [facility] on 03/29/2019 50 ds. Patient is requesting a 90 ds sent to [facility]. Please review and sign if appropriate. Thank you. Last visit 11/12/2019 MD [id number]   Next appointment 05/21/2020 MD Melgar   Previous refill encounter(s) 03/29/2019 Haldol #100     Requested Prescriptions     Pending Prescriptions Disp Refills    haloperidoL (HALDOL) 2 mg tablet 180 Tab 0     Sig: Take 1 Tab by mouth two (2) times a day.

## 2020-05-05 DIAGNOSIS — G47.00 INSOMNIA, UNSPECIFIED TYPE: ICD-10-CM

## 2020-05-05 RX ORDER — DIAZEPAM 5 MG/1
5 TABLET ORAL
Qty: 30 TAB | Refills: 0 | Status: SHIPPED | OUTPATIENT
Start: 2020-05-05 | End: 2020-08-19 | Stop reason: SDUPTHER

## 2020-05-05 NOTE — TELEPHONE ENCOUNTER
Called and spoke with patient. Reports episode of constipation on 5/1-5/2/2020 after taking dose of Zofran. Reports on 5/3/2020 developed diffuse abdominal pain with several episodes of diarrhea, and on 5/4/2020, noted that her pain seemed to localize in her LLQ. Reports that she has continued to have some watery stools, but feels somewhat better today with increased energy and less pain. Denies any fever or chills, and states appetite good and eating well. Discussed that unclear if episode due to IBS vs diverticulitis, but given clinical improvement, will hold off on antibiotics. Advised to call back if worsens. Reviewed report generated by the [facility]. Does not demonstrate aberrancies or inconsistencies with regard to the prescribing of controlled medications to this patient by other providers. Last filled Valium 11/16/2020 per . Refill sent to [facility].

## 2020-05-05 NOTE — TELEPHONE ENCOUNTER
Patient started on Sunday with diverticulitis and she is feeling better today only tenderness on left side and still with diarrhea. No fever. Please advise.

## 2020-05-13 ENCOUNTER — PATIENT MESSAGE (OUTPATIENT)
Dept: INTERNAL MEDICINE CLINIC | Age: 75
End: 2020-05-13

## 2020-05-13 DIAGNOSIS — Z17.0 MALIGNANT NEOPLASM OF LEFT BREAST IN FEMALE, ESTROGEN RECEPTOR POSITIVE, UNSPECIFIED SITE OF BREAST (HCC): ICD-10-CM

## 2020-05-13 DIAGNOSIS — C50.912 MALIGNANT NEOPLASM OF LEFT BREAST IN FEMALE, ESTROGEN RECEPTOR POSITIVE, UNSPECIFIED SITE OF BREAST (HCC): ICD-10-CM

## 2020-05-13 DIAGNOSIS — I89.0 LYMPHEDEMA OF ARM: ICD-10-CM

## 2020-05-13 DIAGNOSIS — C78.00 MALIGNANT NEOPLASM METASTATIC TO LUNG, UNSPECIFIED LATERALITY (HCC): Primary | ICD-10-CM

## 2020-05-14 NOTE — TELEPHONE ENCOUNTER
From: Madelaine Wilhelm  To: Doc Marie MD  Sent: 5/13/2020 12:49 PM EDT  Subject: Referral Request    Ad Reyes,   As you know, I have had lymphedema of the left arm and hand for over 9 years and manage it myself with my compression garments and private massage. Because of the Covid-19 I have been unable to get the massages I need. I have just acquired information from [address] [place] [address] in [place], [place] through their lymphedema specialist that they can help me with this situation but need an order from you. Would you be so kind as to send an order to Fax no. [phone] that I might be able to get an appointment ASAP? Thank you so much. I look forward to our scheduled visit next week.  Madelaine Wilhelm

## 2020-05-21 ENCOUNTER — TELEPHONE (OUTPATIENT)
Dept: INTERNAL MEDICINE CLINIC | Age: 75
End: 2020-05-21

## 2020-05-21 ENCOUNTER — VIRTUAL VISIT (OUTPATIENT)
Dept: INTERNAL MEDICINE CLINIC | Age: 75
End: 2020-05-21

## 2020-05-21 DIAGNOSIS — R53.82 CHRONIC FATIGUE: ICD-10-CM

## 2020-05-21 DIAGNOSIS — F95.2 TOURETTE SYNDROME: ICD-10-CM

## 2020-05-21 DIAGNOSIS — C78.00 MALIGNANT NEOPLASM METASTATIC TO LUNG, UNSPECIFIED LATERALITY (HCC): ICD-10-CM

## 2020-05-21 DIAGNOSIS — M48.062 SPINAL STENOSIS OF LUMBAR REGION WITH NEUROGENIC CLAUDICATION: Primary | ICD-10-CM

## 2020-05-21 DIAGNOSIS — M85.9 DISORDER OF BONE DENSITY AND STRUCTURE, UNSPECIFIED: ICD-10-CM

## 2020-05-21 DIAGNOSIS — E78.5 HYPERLIPIDEMIA, UNSPECIFIED HYPERLIPIDEMIA TYPE: ICD-10-CM

## 2020-05-21 DIAGNOSIS — G89.29 CHRONIC RIGHT SHOULDER PAIN: ICD-10-CM

## 2020-05-21 DIAGNOSIS — S22.080S COMPRESSION FRACTURE OF T11 VERTEBRA, SEQUELA: ICD-10-CM

## 2020-05-21 DIAGNOSIS — K57.92 DIVERTICULITIS: ICD-10-CM

## 2020-05-21 DIAGNOSIS — Z17.0 MALIGNANT NEOPLASM OF LEFT BREAST IN FEMALE, ESTROGEN RECEPTOR POSITIVE, UNSPECIFIED SITE OF BREAST (HCC): ICD-10-CM

## 2020-05-21 DIAGNOSIS — C50.912 MALIGNANT NEOPLASM OF LEFT BREAST IN FEMALE, ESTROGEN RECEPTOR POSITIVE, UNSPECIFIED SITE OF BREAST (HCC): ICD-10-CM

## 2020-05-21 DIAGNOSIS — M54.31 RIGHT SIDED SCIATICA: ICD-10-CM

## 2020-05-21 DIAGNOSIS — M25.511 CHRONIC RIGHT SHOULDER PAIN: ICD-10-CM

## 2020-05-21 DIAGNOSIS — I10 ESSENTIAL HYPERTENSION: ICD-10-CM

## 2020-05-21 DIAGNOSIS — Z71.89 ACP (ADVANCE CARE PLANNING): ICD-10-CM

## 2020-05-21 DIAGNOSIS — I89.0 LYMPHEDEMA OF ARM: ICD-10-CM

## 2020-05-21 DIAGNOSIS — M85.89 OSTEOPENIA OF MULTIPLE SITES: ICD-10-CM

## 2020-05-21 DIAGNOSIS — Z00.00 MEDICARE ANNUAL WELLNESS VISIT, SUBSEQUENT: ICD-10-CM

## 2020-05-21 DIAGNOSIS — K57.90 DIVERTICULOSIS: ICD-10-CM

## 2020-05-21 RX ORDER — CYCLOBENZAPRINE HCL 5 MG
5 TABLET ORAL
Qty: 30 TAB | Refills: 0 | Status: SHIPPED | OUTPATIENT
Start: 2020-05-21 | End: 2021-08-09

## 2020-05-21 NOTE — ACP (ADVANCE CARE PLANNING)
Advance Care Planning       Advance Care Planning (ACP) Physician/NP/PA (Provider) Conversation        Date of ACP Conversation: 5/21/2020    Conversation Conducted with:   Patient with Decision Making David Rivas Maker:    Current Designated Health Care Decision Maker:   (If there is a valid [place] named in the [address]" box in the ACP activity, but it is not visible above, be sure to open that field and then select the health care decision maker relationship (ie \"primary\") in the blank space to the right of the name.)    Note: Assess and validate information in current ACP documents, as indicated. If no Authorized Decision Maker has previously been identified, then patient chooses [place]:  \"Who would you like to name as your primary health care decision-maker? \"    Name: Maryjane Merchant   Relationship:  Phone number: [phone]  López Bhatia this person be reached easily? \" YES  \"Who would you like to name as your back-up decision maker? \"   Name: Rosalesjustine Hook  Relationship: daughter Phone number: [phone]  López Six this person be reached easily? \" YES    Note: If the relationship of these Decision-Makers to the patient does NOT follow your state's Next of Kin hierarchy, recommend that patient complete ACP document that meets state-specific requirements to allow them to act on the patient's behalf when appropriate. Care Preferences:    Hospitalization: \"If your health worsens and it becomes clear that your chance of recovery is unlikely, what would your preference be regarding hospitalization? \"  If the patient would want hospitalization, answer \"yes\". If the patient would prefer comfort-focused treatment without hospitalization, answer \"no\". yes      Ventilation:   \"If you were in your present state of health and suddenly became very ill and were unable to breathe on your own, what would your preference be about the use of a ventilator (breathing machine) if it was available to you? \"    If patient would desire the use of a ventilator (breathing machine), answer \"yes\", if not answer \"no\":yes    \"If your health worsens and it becomes clear that your chance of recovery is unlikely, what would your preference be about the use of a ventilator (breathing machine) if it was available to you? \"   no, if no chance of recovery      Resuscitation:  \"CPR works best to restart the heart when there is a sudden event, like a heart attack, in someone who is otherwise healthy. Unfortunately, CPR does not typically restart the heart for people who have serious health conditions or who are very sick. \"    \"In the event your heart stopped as a result of an underlying serious health condition, would you want attempts to be made to restart your heart (answer \"yes\" for attempt to resuscitate) or would you prefer a natural death (answer \"no\" for do not attempt to resuscitate)? \"   yes, if chance of recovery    NOTE: If the patient has a valid advance directive AND provides care preference(s) that are inconsistent with that prior directive, advise the patient to consider either: creating a new advance directive that complies with state-specific requirements; or, if that is not possible, orally revoking that prior directive in accordance with state-specific requirements, which must be documented in the EHR. Conversation Outcomes / Follow-Up Plan: Patient has an existing advance directive on file, signed 10/31/2017 . It designates her  and daughter  as her healthcare agents and expresses that she does not wish life prolonging procedures for end of life care. It was reviewed with her and still reflects her wishes. However, she states that if she acquires COVID-19, she would wish the above interventions as outlined above.      Length of Voluntary ACP Conversation in minutes:  16 minutes      Akin Colindres MD

## 2020-05-21 NOTE — PROGRESS NOTES
This is the Subsequent Medicare Annual Wellness Exam, performed 12 months or more after the Initial AWV or the last Subsequent AWV    Consent: Channing Morris, who was seen by synchronous (real-time) audio-video technology, and/or her healthcare decision maker, is aware that this patient-initiated, Telehealth encounter on 5/21/2020 is a billable service. While AWVs are fully covered by Medicare, any services rendered on this date that are not included in an AWV are subject to additional billing, with coverage as determined by her insurance carrier. She is aware that she may receive a bill for any such additional services and has provided verbal consent to proceed: Yes. I have reviewed the patient's medical history in detail and updated the computerized patient record.      History     Patient Active Problem List   Diagnosis Code    Tourette syndrome F95.2    Osteoarthritis, Status post right total knee replacement M19.90    Lumbar spinal stenosis M48.061    HLD (hyperlipidemia) E78.5    Breast cancer ([facility] [address] metastatic to lungs  C50.919    Essential hypertension I10    Diverticulosis K57.90    Osteopenia M85.80    Lymphedema of arm left I89.0    Insomnia G47.00    Fatigue R53.83    Diverticulitis, recurrent K57.92    T11 Vertebral compression fracture (HCC) s/p kyphoplasty M48.50XA    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.812    Spinal stenosis of cervical region M48.02    Right shoulder pain M25.511    Malignant neoplasm metastatic to lung (HCC) C78.00    Nontraumatic incomplete tear of right rotator cuff M75.111    Right sided sciatica M54.31     Past Medical History:   Diagnosis Date    Arthritis     Breast cancer metastasized to lung [facility])     Left Breast    Chronic pain     Colon polyps 2/22/16    distal sigmoid, Dr. Sydnie Benitez DDD (degenerative disc disease)     Diverticulitis of colon     Diverticulosis 2/22/16    mild in the ascedning and sigmoid colon, Dr. Alexx Mcgill  HLD (hyperlipidemia)     Hypertension     Osteopenia     Tourette syndrome     Vitamin D deficiency       Past Surgical History:   Procedure Laterality Date    HX APPENDECTOMY      HX BREAST BIOPSY  7-30-10    Left Breast w/Nipple Duct exploration and excisional biopsy-left    HX DILATION AND CURETTAGE      x3    HX MODIFIED RADICAL MASTECTOMY  9-3-10    left    HX ORTHOPAEDIC Right 2012    knee replacement    HX TONSILLECTOMY      HYSTEROSCOPY DIAGNOSTIC       Current Outpatient Medications   Medication Sig Dispense Refill    cyclobenzaprine (FLEXERIL) 5 mg tablet Take 1 Tab by mouth three (3) times daily as needed for Muscle Spasm(s). 30 Tab 0    diazePAM (Valium) 5 mg tablet Take 1 Tab by mouth every six (6) hours as needed for Anxiety or Sleep. Max Daily Amount: 20 mg. 30 Tab 0    haloperidoL (HALDOL) 2 mg tablet Take 1 Tab by mouth two (2) times a day. 180 Tab 0    gabapentin (NEURONTIN) 300 mg capsule Take 1 Cap by mouth two (2) times a day. Max Daily Amount: 600 mg. 180 Cap 0    meloxicam (MOBIC) 15 mg tablet Take 1 Tab by mouth daily. Take WITH FOOD 90 Tab 2    losartan (COZAAR) 25 mg tablet Take 1 Tab by mouth daily. 90 Tab 3    pantoprazole sodium (PROTONIX PO) Take  by mouth.  palbociclib 75 mg cap 75 mg.      fulvestrant (FASLODEX IM) by IntraMUSCular route.  CYANOCOBALAMIN, VITAMIN B-12, (VITAMIN B-12 PO) Take  by mouth.  Biotin 2,500 mcg cap Take  by mouth.  calcium-vitamin D (CALCIUM 500+D) 500 mg(1,250mg) -200 unit per tablet Take 1 Tab by mouth two (2) times daily (with meals).  cholecalciferol, vitamin d3, (VITAMIN D) 1,000 unit tablet Take 2,000 Units by mouth daily.  ascorbic acid (VITAMIN C) 500 mg tablet Take  by mouth.        Allergies   Allergen Reactions    Keflex [Cephalexin] Rash    Metronidazole Rash    Other Medication Nausea Only     Anesthesia    Shellfish Containing Products Nausea and Vomiting     More of just eating the actual shellfish.  Sulfa (Sulfonamide Antibiotics) Rash    Ultram [Tramadol] Nausea and Vomiting     Patient states she is allergic to most Narcotics    Vancomycin Rash       Family History   Problem Relation Age of Onset    Osteoporosis Sister     Osteoporosis Mother     Stroke Father     Hypertension Father     Cancer Paternal Aunt         breast     Social History     Tobacco Use    Smoking status: Former Smoker     Years: 3.00    Smokeless tobacco: Never Used   Substance Use Topics    Alcohol use: Yes     Alcohol/week: 5.8 standard drinks     Types: 7 Glasses of wine per week       Depression Risk Factor Screening:     3 most recent PHQ Screens 5/21/2020   Little interest or pleasure in doing things Not at all   Feeling down, depressed, irritable, or hopeless Not at all   Total Score PHQ 2 0       Alcohol Risk Factor Screening:   Do you average 1 drink per night or more than 7 drinks a week:  Yes, drinks one drink each night. On any one occasion in the past three months have you have had more than 3 drinks containing alcohol:  No      Functional Ability and Level of Safety:   Hearing: Hearing is good. Activities of Daily Living: The home contains: no safety equipment. Patient does total self care    Ambulation: with no difficulty    Fall Risk:  Fall Risk Assessment, last 12 mths 5/21/2020   Able to walk? Yes   Fall in past 12 months?  No   Fall with injury? -   Number of falls in past 12 months -   Fall Risk Score -       Abuse Screen:  Patient is not abused    Cognitive Screening   Has your family/caregiver stated any concerns about your memory: no  Cognitive Screening: none performed    Patient Care Team   Patient Care Team:  Chastity Saini MD as PCP - General (Internal Medicine)  Chastity Saini MD as PCP - [facility] Empaneled Provider  Bc Seth MD (Rheumatology)  Jameson Helton MD (Neurology)  Shazia Michelle MD (Hematology and Oncology)  Carlie Luna MD (Endocrinology)  Sue Marin MD (Gynecologic Oncology)  Eliza Mendez MD (Dermatology)  Jo Ann Noel MD (Ophthalmology)  Jose Randall DO (Physical Medicine and Rehab)  Ange Willis MD (General Surgery)  Marquis Sara MD (Gastroenterology)  Marcio Mari DO (Orthopedic Surgery)    Assessment/Plan   Education and counseling provided:  Are appropriate based on today's review and evaluation  End-of-Life planning (with patient's consent)  Influenza Vaccine  Colorectal cancer screening tests  Cardiovascular screening blood test  Bone mass measurement (DEXA)  Screening for glaucoma  Diabetes screening test  Shingrix vaccine    Diagnoses and all orders for this visit:    1. Spinal stenosis of lumbar region with neurogenic claudication  -     MRI LUMB SPINE WO CONT; Future  -     CBC WITH AUTOMATED DIFF; Future  -     LIPID PANEL; Future  -     MAGNESIUM; Future  -     METABOLIC PANEL, COMPREHENSIVE; Future  -     TSH 3RD GENERATION; Future  -     URINALYSIS W/MICROSCOPIC; Future  -     VITAMIN D, 25 HYDROXY; Future    2. Right sided sciatica  -     MRI LUMB SPINE WO CONT; Future  -     CBC WITH AUTOMATED DIFF; Future  -     LIPID PANEL; Future  -     MAGNESIUM; Future  -     METABOLIC PANEL, COMPREHENSIVE; Future  -     TSH 3RD GENERATION; Future  -     URINALYSIS W/MICROSCOPIC; Future  -     VITAMIN D, 25 HYDROXY; Future    3. Essential hypertension  -     CBC WITH AUTOMATED DIFF; Future  -     LIPID PANEL; Future  -     MAGNESIUM; Future  -     METABOLIC PANEL, COMPREHENSIVE; Future  -     TSH 3RD GENERATION; Future  -     URINALYSIS W/MICROSCOPIC; Future  -     VITAMIN D, 25 HYDROXY; Future    4. Malignant neoplasm of left breast in female, estrogen receptor positive, unspecified site of breast ([facility] [address]  -     CBC WITH AUTOMATED DIFF; Future  -     LIPID PANEL; Future  -     MAGNESIUM; Future  -     METABOLIC PANEL, COMPREHENSIVE; Future  -     TSH 3RD GENERATION;  Future  - URINALYSIS W/MICROSCOPIC; Future  -     VITAMIN D, 25 HYDROXY; Future    5. Malignant neoplasm metastatic to lung, unspecified laterality ([facility] [address]  -     CBC WITH AUTOMATED DIFF; Future  -     LIPID PANEL; Future  -     MAGNESIUM; Future  -     METABOLIC PANEL, COMPREHENSIVE; Future  -     TSH 3RD GENERATION; Future  -     URINALYSIS W/MICROSCOPIC; Future  -     VITAMIN D, 25 HYDROXY; Future    6. Osteopenia of multiple sites  -     CBC WITH AUTOMATED DIFF; Future  -     LIPID PANEL; Future  -     MAGNESIUM; Future  -     METABOLIC PANEL, COMPREHENSIVE; Future  -     TSH 3RD GENERATION; Future  -     URINALYSIS W/MICROSCOPIC; Future  -     VITAMIN D, 25 HYDROXY; Future    7. Compression fracture of T11 vertebra, sequela  -     CBC WITH AUTOMATED DIFF; Future  -     LIPID PANEL; Future  -     MAGNESIUM; Future  -     METABOLIC PANEL, COMPREHENSIVE; Future  -     TSH 3RD GENERATION; Future  -     URINALYSIS W/MICROSCOPIC; Future  -     VITAMIN D, 25 HYDROXY; Future    8. Chronic fatigue  -     CBC WITH AUTOMATED DIFF; Future  -     LIPID PANEL; Future  -     MAGNESIUM; Future  -     METABOLIC PANEL, COMPREHENSIVE; Future  -     TSH 3RD GENERATION; Future  -     URINALYSIS W/MICROSCOPIC; Future  -     VITAMIN D, 25 HYDROXY; Future    9. Hyperlipidemia, unspecified hyperlipidemia type  -     CBC WITH AUTOMATED DIFF; Future  -     LIPID PANEL; Future  -     MAGNESIUM; Future  -     METABOLIC PANEL, COMPREHENSIVE; Future  -     TSH 3RD GENERATION; Future  -     URINALYSIS W/MICROSCOPIC; Future  -     VITAMIN D, 25 HYDROXY; Future    10. Tourette syndrome  -     CBC WITH AUTOMATED DIFF; Future  -     LIPID PANEL; Future  -     MAGNESIUM; Future  -     METABOLIC PANEL, COMPREHENSIVE; Future  -     TSH 3RD GENERATION; Future  -     URINALYSIS W/MICROSCOPIC; Future  -     VITAMIN D, 25 HYDROXY; Future    11. Diverticulosis  -     CBC WITH AUTOMATED DIFF; Future  -     LIPID PANEL; Future  -     MAGNESIUM;  Future  -     METABOLIC PANEL, COMPREHENSIVE; Future  -     TSH 3RD GENERATION; Future  -     URINALYSIS W/MICROSCOPIC; Future  -     VITAMIN D, 25 HYDROXY; Future    12. Diverticulitis, recurrent  -     CBC WITH AUTOMATED DIFF; Future  -     LIPID PANEL; Future  -     MAGNESIUM; Future  -     METABOLIC PANEL, COMPREHENSIVE; Future  -     TSH 3RD GENERATION; Future  -     URINALYSIS W/MICROSCOPIC; Future  -     VITAMIN D, 25 HYDROXY; Future    13. Lymphedema of arm left  -     CBC WITH AUTOMATED DIFF; Future  -     LIPID PANEL; Future  -     MAGNESIUM; Future  -     METABOLIC PANEL, COMPREHENSIVE; Future  -     TSH 3RD GENERATION; Future  -     URINALYSIS W/MICROSCOPIC; Future  -     VITAMIN D, 25 HYDROXY; Future    14. Chronic right shoulder pain  -     CBC WITH AUTOMATED DIFF; Future  -     LIPID PANEL; Future  -     MAGNESIUM; Future  -     METABOLIC PANEL, COMPREHENSIVE; Future  -     TSH 3RD GENERATION; Future  -     URINALYSIS W/MICROSCOPIC; Future  -     VITAMIN D, 25 HYDROXY; Future    15. Disorder of bone density and structure, unspecified   -     VITAMIN D, 25 HYDROXY; Future    16. Medicare annual wellness visit, subsequent    17. ACP (advance care planning)    Other orders  -     cyclobenzaprine (FLEXERIL) 5 mg tablet; Take 1 Tab by mouth three (3) times daily as needed for Muscle Spasm(s). There are no preventive care reminders to display for this patient. Jasvir Pfeiffer is a 69 y.o. female who was evaluated by an audio-video encounter for concerns as above. Patient identification was verified prior to start of the visit. A caregiver was present when appropriate. Due to this being a TeleHealth encounter (During [place] [address] public health emergency), evaluation of the following organ systems was limited: Vitals/Constitutional/EENT/Resp/CV/GI//MS/Neuro/Skin/Heme-Lymph-Imm.   Pursuant to the emergency declaration under the [facility] waiver authority and the Coronavirus Preparedness and Response Supplemental Appropriations Act, this Virtual Visit was conducted, with patient's (and/or legal guardian's) consent, to reduce the patient's risk of exposure to COVID-19 and provide necessary medical care. Services were provided through a synchronous discussion virtually to substitute for in-person clinic visit. I was in the office. The patient was at home.       Nikki Cuevas MD

## 2020-05-21 NOTE — TELEPHONE ENCOUNTER
Nicole Linn 3 hours ago (1:35 PM)         Patient forgot to mention to you that she has been having a pain in her side. Stating it started on the lower left side of her abdomen but has moved up higher near the waist. Stating she was up from 2-4 am with the pain. Stating it seemed to be getting better before her call with you but she's still having it. Please advise. Documentation        Spoke with patient. Reports having pain on the left side of her abdomen, more lateral and wrapping around trunk. States that pain is aggravated with movement, deep breathing, and cough. No nausea, vomiting, or diarrhea. No fever or chills. Reports pain not present when sitting or resting today, but noticed it when moving around. Discussed that may be musculoskeletal in origin and advised to continue to observe. Will call back for any worsening.

## 2020-05-21 NOTE — PATIENT INSTRUCTIONS
Medicare Wellness Visit, Female The best way to improve and maintain good health is to have a healthy lifestyle by eating a well-balanced diet, exercising regularly, limiting alcohol and stopping smoking. Regular visits with your physician or non-physician health care provider also support your good health. Preventive screening tests can find health problems before they become diseases or illnesses. Preventive services such as immunizations prevent serious infections. All people over age 72 should have a Pneumovax and a Prevnar-13 shot to prevent potentially life threatening infections with the pneumococcus bacteria, a common cause of pneumonia. These are once in a lifetime unless you and your provider decide differently. All people over 65 should have a yearly influenza vaccine or \"flu\" shot. This does not prevent infection with cold viruses but has been proven to prevent hospitalization and death from influenza. Although Medicare part B \"regular Medicare\" currently only covers tetanus vaccination in the context of an injury, a tetanus vaccine (Tdap or Td) is recommended every 10 years. A shingles vaccine is recommended once in a lifetime after age 61. The Shingles vaccine is also not covered by Medicare part B. Note, however, that both the Shingles vaccine and Tdap/Td are generally covered by secondary carriers. Please check your coverage and out of pocket expenses. Consider contacting your local health department because it may stock these vaccines for a reasonable charge. We currently have documentation of the following immunization history for you: 
Immunization History Administered Date(s) Administered  Influenza High Dose Vaccine PF 10/01/2015, 10/25/2016, 10/05/2017  Influenza Vaccine 09/01/2014  Influenza Vaccine (Tri) Adjuvanted 10/05/2018, 10/17/2019  Influenza Vaccine Split 11/01/2011, 10/02/2012  Influenza Vaccine Whole 10/08/2010  Pneumococcal Conjugate (PCV-13) 10/27/2015  Pneumococcal Polysaccharide (PPSV-23) 04/15/2013  TD Vaccine 05/05/2008  Tdap 09/12/2014  Zoster 09/20/2011 Screening for infection with Hepatitis C is recommended for anyone born between [birth year] through [address]. The table at the bottom of this document indicates the status of this and other preventive services. A bone mass density test (DEXA) to screen for osteoporosis or thinning of the bones should be done at least once after age 72 and may be done up to every 2 years as determined by you and your health care provider. The most recent DEXA we have on file for you is: DEXA Results (most recent): 
Results from Hospital Encounter encounter on 11/11/19 DEXA BONE DENSITY STUDY AXIAL Narrative DEXA BONE DENSITOMETRY, CENTRAL 
 
CLINICAL INDICATION/HISTORY: Postmenopausal. History of osteopenia. Metastatic 
breast cancer chemotherapy. T12 compression fracture. Taking calcium and vitamin D. TECHNIQUE: Using GE LUNAR Prodigy densitometer, bone density measurement was 
performed in the lumbar spine the proximal left and right femora and the left 
forearm. T Score refers to standard deviations above or below average compared 
to a young adult of the same sex. Z Score refers to standard deviations above or 
below average compared to a patient of the same sex, age, race and weight. COMPARISON: April 18, 2005. April 15, 2010. November 13, 2017. FINDINGS:  
 
On PA image of the lumbar spine obtained for localization of vertebral levels (not a diagnostic quality radiograph), there is apparent increased density at 
multiple levels. The density is not well characterized on the basis of this 
image, but likely degenerative. These changes render the lumbar spine invalid 
as a site for densitometry in this patient. Left distal 1/3 Radius BMD:  0.673 g/cm2 T Score: -2.4 Z Score: -0.4 BMD decreased 2.3%, which is not statistically significant within a 95 percent 
confidence interval compared to preceding study. BMD decreased 18.9%, which is statistically significant compared to baseline 
study which at the forearm was the study of 2010. Left Total Proximal Femur BMD: 0.837 g/cm2 T Score:  -1.4 Z Score:  0.3 BMD increased 0.2%, which  is not statistically significant within a 95 percent 
confidence interval compared to preceding study. BMD decreased 7.5%, which is statistically significant compared to baseline 
study. Right Total Proximal Femur BMD: 0.831 g/cm2 T Score:  -1.4 Z Score:  0.2 BMD decreased 0.2%, which is not statistically significant within a 95 percent 
confidence interval compared to preceding study. BMD decreased 6.5%, which is statistically significant compared to baseline 
study. Left Femoral Neck BMD:  0.838 g/cm2 T Score: -1.4 Z Score: 0.4 Right Femoral Neck BMD:  0.844 g/cm2 T Score: -1.4 Z Score: 0.4 Impression IMPRESSION: 
 
1. BMD measures consistent with osteopenia/low bone density. 2.  Compared to the preceding study, BMD is without statistically significant 
interval change. 3.  Compared to the baseline study, BMD has decreased. Based upon current ISCD guidelines, the patient's overall diagnostic category, 
selected using WHO criteria in postmenopausal women and males aged 48 and above, 
is selected based upon the lowest T Score from among the lumbar spine, total 
femur, femoral neck, (or distal third radius if measured). WHO Definition of Osteoporosis and Osteopenia on DXA (specified for 
post-menopausal  females): 
 
  Normal:                     T Score at or above -1 SD Osteopenia:              T Score between -1 and -2.5 SD Osteoporosis:           T Score at or below -2.5 SD The risk of fracture approximately doubles for each 1 SD decrease in T Score. It is important to consider other factors in assessing a patient's risk of 
fracture, including age, risk of falling/injury, history of fragility fracture, 
family history of osteoporosis, smoking, low weight. Various fracture risk tools have been developed for adult patients and are 
available online. For example, the FRAX tool developed by [facility] is widely used. Reference www.iscd.org. It is also important to note that DXA measures bone density but does not 
distinguish among causes of decreased bone density, which include primary versus 
secondary osteoporosis (such as metabolic bone disorders or possible effects of 
medications) and also other conditions (such as osteomalacia). Clinical 
considerations should determine what additional evaluation may be warranted to 
exclude secondary conditions in a patient with low bone density. Please note that reliable, valid comparisons can not be made between studies 
which have been performed on different densitometers. If clinically warranted, 
follow up study performed at this site would best permit assessment of trend for 
possible change in bone mineral density over time in comparison to this study. Thank you for this referral.  
 
 
Screening for diabetes mellitus with a blood sugar test (glucose) should be done at least every 3 years until age 79. You and your health care provider may decide whether to continue screening after age 79. The most recent blood glucose we have on file for you is:  
Lab Results Component Value Date/Time Glucose 115 (H) 07/13/2018 02:30 PM  
 
 
 
Glaucoma is a disease of the eye due to increased ocular pressure that can lead to blindness.  People with risk factors for glaucoma ( race, diabetes, family history) should be screened at least every 2 years by an eye professional.  
 
Cardiovascular screening tests that check for elevated lipids or cholesterol (fatty part of blood) which can lead to heart disease and strokes should be done every 4-6 years through age 79. You and your health care provider may decide whether to continue screening after age 79. The most recent lipid panel we have on file for you is:  
Lab Results Component Value Date/Time Cholesterol, total 173 11/05/2019 09:23 AM  
 HDL Cholesterol 51 11/05/2019 09:23 AM  
 LDL, calculated 107 (H) 11/05/2019 09:23 AM  
 VLDL, calculated 15 11/05/2019 09:23 AM  
 Triglyceride 75 11/05/2019 09:23 AM  
 CHOL/HDL Ratio 3.4 11/05/2019 09:23 AM  
 
 
Colorectal cancer screening that evaluates for blood or polyps in your colon for people with average risk should be done yearly as a stool test, every five years as a flexible sigmoidoscope or every 10 years as a colonoscopy up to age 76. You and your health care provider may decide whether to continue screening after age 76. Breast cancer screening with a mammogram is recommended at least once every 2 years  for women age 54-69. You and your health care provider may decide whether to continue screening after age 76. The most recent mammogram we have on file for you is: [facility] Results (most recent): 
Results from Hospital Encounter encounter on 09/10/19 [facility] 3D PEACE W MAMMO RT DX INCL CAD Narrative EXAM: [facility] 3D PEACE W MAMMO RT DX INCL CAD CLINICAL INDICATION/HISTORY : Left mastectomy. COMPARISON: None TECHNIQUE: 
 
FINDINGS:   
 
[No suspicious masses, microcalcifications or areas of architectural distortion. CAD analysis was performed with a computer aided detection system. Any areas 
marked were correlated with the mammogram.] Impression IMPRESSION: 
 
1. No evidence of malignancy. Suggest routine followup. BIRADS: BI-RADS 1 - Negative BREAST DENSITY: B-There are scattered areas of fibroglandular density.  
 
  
 
 
Screening for cervical cancer with a pap smear is recommended for all women with a cervix until age 72. The frequency of this test is based on the details of her prior pap smear testing. You and your health care provider may decide whether to continue screening after age 72. People who have smoked the equivalent of 1 pack per day for 30 years or more may benefit from screening for lung cancer with a yearly low dose CT scan until they have been non smokers for 15 years or competing health conditions render this unlikely to be beneficial. Our records show: n/a Your Medicare Wellness Exam is recommended annually. Here is a list of your current Health Maintenance items with a due date: 
Health Maintenance Topic Date Due  Shingrix Vaccine Age 50> (1 of 2) 11/26/2020 (Originally 9/28/1997)  Influenza Age 5 to Adult  08/01/2020  Breast Cancer Screen Mammogram  09/10/2020  GLAUCOMA SCREENING Q2Y  04/10/2021  Medicare Yearly Exam  05/22/2021  
 DTaP/Tdap/Td series (2 - Td) 09/12/2024  Lipid Screen  11/05/2024  Colonoscopy  02/22/2026  Bone Densitometry (Dexa) Screening  Completed  Pneumococcal 65+ years  Completed  Hepatitis C Screening  Addressed

## 2020-05-22 PROBLEM — M54.31 RIGHT SIDED SCIATICA: Status: ACTIVE | Noted: 2020-05-22

## 2020-05-22 NOTE — PROGRESS NOTES
Evette Argueta is a 69 y.o. female who was seen by synchronous (real-time) audio-video technology on 5/21/2020. Consent: Evette Argueta, who was seen by synchronous (real-time) audio-video technology, and/or her healthcare decision maker, is aware that this patient-initiated, Telehealth encounter on 5/21/2020 is a billable service, with coverage as determined by her insurance carrier. She is aware that she may receive a bill and has provided verbal consent to proceed: Yes. Evette Argueta is a 78y.o. year old female who has a history of hypertension, dyslipidemia,  metastatic breast cancer, GERD, diverticulosis with recurrent diverticulitis, osteoarthritis s/p right knee replacement (2012), lumbar degenerative disease, osteopenia, compression fracture, and Tourette's syndrome. She is accompanied by her . She has been following social distancing guidelines and wearing a mask when going out. She reports continued chronic fatigue related to her chemotherapy, but states that she does not wish to discontinue since she states that it has been keeping her cancer in remission. She does describe worsening right sided sciatica and low back pain for the last several days. She states that she had tried stretching exercises, but it seems to be making it worse. She is not finding that her nightly gabapentin or Tylenol is helping. She is finding heat application to be helpful. She has had similar difficulty in 3/2020 and was prescribed a Medrol dose pack with some improvement. However, reports continued intermittent difficulty and now finding it dificult to walk any distance. She denies any fevers, chills, weight change, saddle paresthesia, neurogenic bowel or bladder symptoms, or recent trauma. She is otherwise without new complaints.      She has a history of left breast cancer, which was diagnosed in 7/2010 as invasive ductal adenocarcinoma, s/p left modified radical mastectomy with sentinel node biopsy, performed by Dr. Cyndee Plascencia. She had 3 positive lymph nodes and was classified as stage 1B, T1c N1 M0, ER and MS positive, Her-2/sarah negative by FISH. She underwent 6 cycles of chemotherapy with Docetaxel and Cytoxan. She was subsequently unable to tolerate anastrozole, exemestane, tamoxifen, and letrozole due to profound fatigue and arthralgias. Her course was complicated by chronic left arm lymphedema, managed with a compression sleeve. A chest CT scan in 3/2014 noted several small pulmonary nodules which were concerning for metastases but were too small to biopsy. They were followed with sequential CT scans , and in 12/2014, repeat chest CT scan showed enlarging bilateral pulmonary nodules compatible with progression of metastatic disease. A fine needle aspirate was performed on 12/21/2014 which showed benign pulmonary parenchyma with alveolar hemorrhage. Repeat chest CT scan in 3/12/2015 showed mild interval progression of the lung metastases with enlarging nodules and development of new nodules. A CT guided needle biopsy was performed on 3/23/2015, which confirmed metastatic adenocarcinoma consistent with breast primary (stage IV, ER/MS positive, and TTF-1 negative). On 4/13/2015, she was started on therapy with Ibrance and letrozole. Follow-up CT scan (7/15/15) showed partial response with decreasing size of some nodules and resolution of other nodules. Most recent chest, abdomen, and pelvis CT scan was obtained in 6/2017, showing stable or decreased multiple bilateral pulmonary nodules and no suspicious new pulmonary nodules. She continues on palbociclib, but was changed from letrozole to fulvestrant. She describes persistent fatigue which she attributes to monthly treatment with fulvestrant (Faslodex). She underwent restaging chest, abdomen, and pelvis CT scan in 7/2019 which showed that her pulmonary nodules were unchanged, and no other evidence of metastatic disease.  She is followed by Dr. Claudia Rubio. She states that she established care with Dr. Chrissy Sinha, but was told that she may have her mammograms and breast exams in our office with referral to her if something arises. She also has a history of a right ovarian cyst, but was released from the care of Dr. Lilia Bowie since it was concluded to be benign. She has a history of hypertension treated with losartan. She monitors her blood pressure mainly when visiting multiple physicians and reports that it is well controlled. She has no history of heart disease, and denies any chest pain, shortness of breath at rest or with exertion, palpitations, lightheadedness, or edema. In 4/11/2016, she sustained a fall with subsequent severe pain in her mid to upper lumbar region and wrapping around waist. She was treated with meloxicam, tylenol and flexeril, but because of persistent discomfort, she presented to [facility] on 4/15/2016 and lumbosacral spine films showed marked degenerative changes with disc space obliteration throughout lumbar spine and slight anterior spondylolisthesis of L4. She was evaluated by Dr. Sybil Balbuena on 4/18/2016 who recommended physical therapy and evaluation by Dr. Jolly Dancer for her degenerative spine changes. She continued to take meloxicam and tylenol and started physical therapy, but noted worsening of her pain. She subsequently was evaluated by Dr. Verito Cabral, who obtained a lumbar MRI on 4/26/2016, which showed a new subacute compression fracture of T11 vertebral body with diffuse marrow edema and mild paravertebral inflammatory stranding, no retropulsion or central stenosis; more severe degenerative disease along the right side at L4-L5, L5-S1, potential mild compression of right exiting L4 and L5 nerve roots. She was referred to Dr. Fan Chu for kyphoplasty of the T11 compression fracture given failure of pain control with conservative measures.  She underwent the procedure on 3/7/6384 without complications, and O33 vertebral body bone core and aspirate biopsies were performed, which showed only reactive bone changes and no evidence of malignancy. She has a history of osteopenia, with a history of fracture of her sacrum. She reports that she was treated with alendronate in 12/2011 but discontinued it in 4/2013 due to intolerence. Bone density scan (11/2017) was consistent with osteopenia with femoral neck T-scores: left -1.4/ right -1.4 and distal radius -2.2 (lumbar T-score could not be assessed). A repeat bone density scan was obtained (11/2019) showing continued evidence of osteopenia with femoral neck T-scores: left -1.4/ right -1.4 and distal radius -2.4 (lumbar T-score could not be assessed). She continues to take calcium and vitamin D. In 10/2016, she developed left sided low back pain with radiation to her left leg over the last several weeks. She underwent a lumbar MRI (9/2016) showing scoliosis and advanced multilevel degenerative changes with areas of foraminal stenosis at L3-L4 and L5-S1; mild/moderate central canal stenosis at L4-L5 and L5-S1. She was evaluated by Dr. Scarlet Sow and treated with pregabalin with some improvement. She subsequently received an epidural steroid injection with improvement and discontinued pregabalin. In 4/2017, she noted increasing cervical and upper back discomfort. She has had multiple cervical MRI's,and underwent repeat in 4/2017 which showed multilevel degenerative disc disease and facet arthropathy without change from prior studies with mild central canal stenosis C3-C4 and C5-C6, and moderate central canal stenosis C6-C7. She was treated with Mobic and Flexeril, and gabapentin at bedtime. She complained of right shoulder pain.  X-ray of her shoulder (11/2017) was negative, and she had a right shoulder MRI (5/14/2018) which showed deep partial thickness undersurface tear of the distal supraspinatus, likely with full thickness slitlike perforations; no gross tendon retraction, but there is moderate muscle atrophy; partial thickness tearing with longitudinal components involving the biceps tendon at its sulcal turn, some associated tenosynovitis; accompanying short length longitudinal split tear in the distal subscapularis; moderate infraspinatus tendinosis, and mild to moderate subacromial subdeltoid bursitis. She underwent evaluation by Dr. Suman Orantes on 5/18/2018, and received a glenohumeral cortisone injection. She reported worsening low back pain and right sciatica, and underwent an epidural steroid injection at right L4-5 transforaminal approach by Dr. Melissa Lundberg on 3/18/2019. She has noted improvement in her right hip and leg pain. She continues to take gabapentin 300 mg at night. She has a history of GERD and diverticulosis with recurrent diverticulitis,. She was evaluated by Dr. Daisy Morales in 2/23/2016 and underwent an upper endoscopy, which revealed a Schatzki's ring at the gastroesophageal junction (dilated) with mild distal esophagitis and a small hiatal hernia. A screening colonoscopy was also performed showing moderately severe diverticulosis of the ascending and descending colon and a 2 mm sessile sigmoid polyp (pathology: hyperplastic). Recommendations for follow-up colonoscopy in 10 years. She denies any abdominal pain, nausea, vomiting, melena, hematochezia, or change in bowel movements. She was evaluated in 8/2017 by GERALD Hernandez Asa with complaints of abdominal pain and was treated with Cipro for presumed diverticulitis. She contacted the office again in 10/2017 and 1/2018 with a recurrence of symptoms, and was treated for a 10 day course with Cipro with improvement. Abdominal CT scan in 2/6/2018 for staging for her breast cancer did show evidence of moderate to severe diverticulosis, but no evidence of diverticulitis. She did present with similar symptoms in 3/2018 and was evaluated by GERALD Vigil and prescribed Cipro. However, her symptoms resolved prior to taking the antibiotics.  In 7/2018, she again presented with left lower quadrant pain, and an abdominal CT scan (7/13/2018) which showed evidence of uncomplicated diverticulitis involving the lower left colon and upper sigmoid. She was treated with Cipro and Flagyl initially, but developed a rash due to Flagyl, and was subsequently changed to Augmentin. She had a follow-up visit with Dr. Aron Zuniga and it was his opinion that she also had IBS which mimicked her episodes of diverticulitis. He recommended continuing on a probiotic and prescribed hyoscyamine to use as needed. She also was having more difficulty with burning epigastric pain, and was advised by Dr. Aron Zuniga to begin Zantac five days per week (off 2 days). She has noted improvement since doing so. She also has a history of Tourette's syndrome controlled with Haldol. She is followed by Dr. Sarah Campbell. Past Medical History:   Diagnosis Date    Arthritis     Breast cancer metastasized to lung [facility])     Left Breast    Chronic pain     Colon polyps 2/22/16    distal sigmoid, Dr. Sydnie Benitez DDD (degenerative disc disease)     Diverticulitis of colon     Diverticulosis 2/22/16    mild in the ascedning and sigmoid colon, Dr. Marika Tse (hyperlipidemia)     Hypertension     Osteopenia     Tourette syndrome     Vitamin D deficiency      Past Surgical History:   Procedure Laterality Date    HX APPENDECTOMY      HX BREAST BIOPSY  7-30-10    Left Breast w/Nipple Duct exploration and excisional biopsy-left    HX DILATION AND CURETTAGE      x3    HX MODIFIED RADICAL MASTECTOMY  9-3-10    left    HX ORTHOPAEDIC Right 2012    knee replacement    HX TONSILLECTOMY      HYSTEROSCOPY DIAGNOSTIC       Current Outpatient Medications   Medication Sig    cyclobenzaprine (FLEXERIL) 5 mg tablet Take 1 Tab by mouth three (3) times daily as needed for Muscle Spasm(s).  diazePAM (Valium) 5 mg tablet Take 1 Tab by mouth every six (6) hours as needed for Anxiety or Sleep.  Max Daily Amount: 20 mg.    haloperidoL (HALDOL) 2 mg tablet Take 1 Tab by mouth two (2) times a day.  gabapentin (NEURONTIN) 300 mg capsule Take 1 Cap by mouth two (2) times a day. Max Daily Amount: 600 mg.    meloxicam (MOBIC) 15 mg tablet Take 1 Tab by mouth daily. Take WITH FOOD    losartan (COZAAR) 25 mg tablet Take 1 Tab by mouth daily.  pantoprazole sodium (PROTONIX PO) Take  by mouth.  palbociclib 75 mg cap 75 mg.    fulvestrant (FASLODEX IM) by IntraMUSCular route.  CYANOCOBALAMIN, VITAMIN B-12, (VITAMIN B-12 PO) Take  by mouth.  Biotin 2,500 mcg cap Take  by mouth.  calcium-vitamin D (CALCIUM 500+D) 500 mg(1,250mg) -200 unit per tablet Take 1 Tab by mouth two (2) times daily (with meals).  cholecalciferol, vitamin d3, (VITAMIN D) 1,000 unit tablet Take 2,000 Units by mouth daily.  ascorbic acid (VITAMIN C) 500 mg tablet Take  by mouth. No current facility-administered medications for this visit. Allergies and Intolerances: Allergies   Allergen Reactions    Keflex [Cephalexin] Rash    Metronidazole Rash    Other Medication Nausea Only     Anesthesia    Shellfish Containing Products Nausea and Vomiting     More of just eating the actual shellfish.  Sulfa (Sulfonamide Antibiotics) Rash    Ultram [Tramadol] Nausea and Vomiting     Patient states she is allergic to most Narcotics    Vancomycin Rash     Family History:   Family History   Problem Relation Age of Onset    Osteoporosis Sister     Osteoporosis Mother     Stroke Father     Hypertension Father     Cancer Paternal Aunt         breast     Social History:   She  reports that she has quit smoking. She quit after 3.00 years of use. She has never used smokeless tobacco. She stopped smoking over 40 years ago. She is  and lives with . They have one daughter and two grandchildren.   Social History     Substance and Sexual Activity   Alcohol Use Yes    Alcohol/week: 5.8 standard drinks    Types: 7 Glasses of wine per week     Immunization History:  Immunization History   Administered Date(s) Administered    Influenza High Dose Vaccine PF 10/01/2015, 10/25/2016, 10/05/2017    Influenza Vaccine 09/01/2014    Influenza Vaccine (Tri) Adjuvanted 10/05/2018, 10/17/2019    Influenza Vaccine Split 11/01/2011, 10/02/2012    Influenza Vaccine Whole 10/08/2010    Pneumococcal Conjugate (PCV-13) 10/27/2015    Pneumococcal Polysaccharide (PPSV-23) 04/15/2013    TD Vaccine 05/05/2008    Tdap 09/12/2014    Zoster 09/20/2011       Review of Systems:   As above included in HPI. Otherwise 11 point review of systems negative including constitutional, skin, HENT, eyes, respiratory, cardiovascular, gastrointestinal, genitourinary, musculoskeletal, endo/heme/aller, neurological.    Physical:   Objective:   Vital Signs: (As obtained by patient/caregiver at home)  There were no vitals taken for this visit.      Constitutional: [x] Appears well-developed and well-nourished [x] No apparent distress      [] Abnormal -     Mental status: [x] Alert and awake  [x] Oriented to person/place/time [x] Able to follow commands    [] Abnormal -     Eyes:   EOM    [x]  Normal    [] Abnormal -   Sclera  [x]  Normal    [] Abnormal -          Discharge [x]  None visible   [] Abnormal -     HENT: [x] Normocephalic, atraumatic  [] Abnormal -   [x] Mouth/Throat: Mucous membranes are moist    External Ears [x] Normal  [] Abnormal -    Neck: [x] No visualized mass [] Abnormal -     Pulmonary/Chest: [x] Respiratory effort normal   [x] No visualized signs of difficulty breathing or respiratory distress        [] Abnormal -      Musculoskeletal:   [x] Normal gait with no signs of ataxia         [x] Normal range of motion of neck        [] Abnormal -     Neurological:        [x] No Facial Asymmetry (Cranial nerve 7 motor function) (limited exam due to video visit)          [x] No gaze palsy        [] Abnormal -          Skin:        [x] No significant exanthematous lesions or discoloration noted on facial skin         [] Abnormal -            Psychiatric:       [x] Normal Affect [] Abnormal -        [x] No Hallucinations        Review of Data:  Labs:  No visits with results within 1 Month(s) from this visit. Latest known visit with results is:   Hospital Outpatient Visit on 01/03/2020   Component Date Value Ref Range Status    Creatinine, POC 01/03/2020 1.0  0.6 - 1.3 MG/DL Final    GFRAA, POC 01/03/2020 >60  >60 ml/min/1.73m2 Final    GFRNA, POC 01/03/2020 55* >60 ml/min/1.73m2 Final     5/3/2019 Dr. Salvador Mandujano office WBC 6.3 ANC 4700       Hb 13.5/ Hct 39.0       Platelets 581       Glucose 90       Na 142/ K 4.3/ CO2 28       Ca 9.2       AST 12/ ALT 11       Alkaline phosphatase 56       Creatinine 1.04/ eGFR 63    Health Maintenance:  Screening:    Mammogram: negative (9/2019). Breast exam negative 5/14/2019 (perform every 6 months)   PAP smear: negative (9/2013) Dr Toñito De Leon. Pelvic ultrasound negative (9/2015). No further screening needed. Colorectal: colonoscopy (2/2016) hyperplastic polyp; Dr. Franco Lundberg. Follow-up 2026. Depression: none   DM (HbA1c/FPG): FPG 90 (5/2019)   Hepatitis C: unknown   Falls: none   DEXA: osteopenia (11/2019) s/p T11 compression fracture in 4/2016. Smoking:distant past   Glaucoma: regular eye exams with Dr. Brooklyn Peterson (last 4/2019). Had \"3 snip\" procedure to manage dry eyes, but not effective.     Vitamin D: 33.4 (11/2019)   Medicare Wellness: today      Impression:  Patient Active Problem List   Diagnosis Code    Tourette syndrome F95.2    Osteoarthritis, Status post right total knee replacement M19.90    Lumbar spinal stenosis M48.061    HLD (hyperlipidemia) E78.5    Breast cancer ([facility] [address] metastatic to lungs  C50.919    Essential hypertension I10    Diverticulosis K57.90    Osteopenia M85.80    Lymphedema of arm left I89.0    Insomnia G47.00    Fatigue R53.83    Diverticulitis, recurrent K57.92    T11 Vertebral compression fracture (HCC) s/p kyphoplasty M48.50XA    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.812    Spinal stenosis of cervical region M48.02    Right shoulder pain M25.511    Malignant neoplasm metastatic to lung (HCC) C78.00    Nontraumatic incomplete tear of right rotator cuff M75.111    Right sided sciatica M54.31       Plan:  1. Low back pain with right sciatica. Known lumbar stenosis and facet arthropathy. Using meloxicam, cyclobenzaprine, gabapentin, and Tylenol. Previously did undergo L4-L5 epidural injection on the right by Dr. Zayra Cunha in 2016 for similar symptoms. Now with recurrence of symptoms for the last several months without sustained improvement despite treatment with steroids, NSAIDS, muscle relaxants and gabapentin. Given worsening symptoms and last MRI in 10/2016, will proceed with repeat lumbar MRI. Further recommendations after review. Follow. 2. Hypertension. Well controlled on losartan 25 mg daily. Renal function has been normal with creatinine 1.04/ eGFR 63 in 5/2019. Assessed regularly by oncology. Continue to follow. 2. Breast cancer with pulmonary metastasis. Continue current therapy as per Dr. Melisa Esteban. Recent CT scans (last 7/2019) with stable small pulmonary nodules. On Ibrance and Faslodex. Wishing to continue with treatment despite excessive fatigue. Not using compression sleeve for lymphedema, but receiving massage therapy every three weeks with good results. Recently changed to [facility]. [place] dur to pandemic. Follow. 3. Hyperlipidemia. Calculated 10 year ASCVD risk is 18.3%, which places her in one of the four statin benefit groups (primary prevention with risk > 7.5%).  and HDL 51. Given lack of history of ASCVD, no evidence of atherosclerotic disease on chest and abdominal CT scans, and history of metastatic breast cancer, will not recommend treatment with statin at this time.  Continue to emphasized importance of lifestyle modifications, including diet and exercise. 4. T11 compression fracture. S/P kyphoplasty with no further difficulties. Follow. 5. Osteopenia. Last bone density scan 11/2019. Using femoral neck T-scores, calculated FRAX score estimates her 10 year risk of a major osteoporetic fracture at 16 % and hip fracture at 2.3%, which alone are not an indication for biphosphonate treatment. However, the development of a vertebral compression fracture in 4/2016 would be considered an indication for treatment. In addition, treatment with an estrogen receptor blocker increases likelihood of progression. She did not tolerate alendronate in the past and has been reluctant to proceed with Prolia therapy. Discussed again today, and given stable bone density study, expressed that not wishing to proceed with treatment. Continue calcium and Vitamin D. Encouraged to continue exercises as instructed in physical therapy. 6. Recurrent diverticulitis. Colonoscopy (2/2016) with moderately severe diverticulosis in the ascending and descending colon. Has had multiple recent episodes of abdominal pain which were treated empirically with Cipro. Episode of symptoms in 3/2018 resolved prior to initiating antibiotics, bringing into question whether her abdominal symptoms were due to diverticulitis or irritable bowel syndrome. Discussed at last visit that she should have abdominal CT scan to confirm the diagnosis next time she had abdominal complaints. Had recurrent episode in 7/2018, and abdominal CT scan confirmed diagnosis of acute diverticulitis. Treated with Augmentin as developed rash on Flagyl. Subsequently evaluated by Dr. Jony Boyd, and instructed to continue Probiotics and use hyoscyamine as needed when develop abdominal complaints. It was his opinion that her complaints may also be related to IBS, so wanting to prevent frequent antibiotics if not needed. Reports no recent symptoms since initiating. Now on Protonix for GERD symptoms and doing well.  Will continue to follow. 7. Fatigue. Severe and persistent despite changing from letrozole to fulvestrant. Wishing to continue therapy. Discussed that may be exacerbated by treatment with Haldol for Tourette's syndrome and gabapentin. Follow. 8. Right shoulder pain. MRI with partial rotator cuff tear. Evaluated by Dr. Jeremy Jerez and received a glenohumeral cortisone injection. Recommended physical therapy, but patient did not proceed. Not a significant complaint today. Followed by Dr. Elsa Apple. 10. Tourette's syndrome. Followed by Dr. Ladene Klinefelter. On Haldol with plans to switch to Risperdal if Haldol becomes unavailable. Using diazepam occasionally for sleep. 11. Right ovarian cyst. No further monitoring needed as per Dr. Patti Rosenthal. Patient expressed concern about not following cyst, but surveillance CT scans obtained every 6 months for breast cancer do include imaging of pelvis. Has been stable. Patient reassured. Follow. 12. GERD. Discontinued Zantac given recall. Using Protonix daily. Follow. 13. Health maintenance. Already received influenza vaccine. Discussed possibility of Shingrix vaccine, but given immunosuppression not wishing to proceed. Other immunizations up to date. Mammogram up to date. Will perform breast exam every visit. Vitamin D level normal. Continue maintenance dose supplement. Continue regular eye exams with Dr. Cristo Bowles. Counseled patient regarding strict mitigation measures for COVID-19, including hand washing, social distancing and diligent hand washing, as recommended by the CDC. In addition, an annual Medicare wellness visit was done today. Total time: 40 minutes spent with the patient on counseling, answering questions and/or coordination of care. Complex medical review and management performed. Patient understands recommendations and agrees with plan. Follow-up in 6 months. We discussed the expected course, resolution and complications of the diagnosis(es) in detail. Medication risks, benefits, costs, interactions, and alternatives were discussed as indicated. I advised her to contact the office if her condition worsens, changes or fails to improve as anticipated. She expressed understanding with the diagnosis(es) and plan. Fabiola Adan is a 69 y.o. female who was evaluated by a video visit encounter for concerns as above. Patient identification was verified prior to start of the visit. A caregiver was present when appropriate. Due to this being a TeleHealth encounter (During YVLGR-79 public health emergency), evaluation of the following organ systems was limited: Vitals/Constitutional/EENT/Resp/CV/GI//MS/Neuro/Skin/Heme-Lymph-Imm. Pursuant to the emergency declaration under the [facility], 1135 waiver authority and the [employer] Act, this Virtual  Visit was conducted, with patient's (and/or legal guardian's) consent, to reduce the patient's risk of exposure to COVID-19 and provide necessary medical care. Services were provided through a video synchronous discussion virtually to substitute for in-person clinic visit. Patient was at home and provider was located in the office.       Adilia Contreras MD

## 2020-05-27 ENCOUNTER — HOSPITAL ENCOUNTER (OUTPATIENT)
Age: 75
Discharge: HOME | End: 2020-05-27
Attending: INTERNAL MEDICINE
Payer: MEDICARE

## 2020-05-27 DIAGNOSIS — M54.31 RIGHT SIDED SCIATICA: ICD-10-CM

## 2020-05-27 DIAGNOSIS — M48.062 SPINAL STENOSIS OF LUMBAR REGION WITH NEUROGENIC CLAUDICATION: ICD-10-CM

## 2020-05-27 PROCEDURE — 72148 MRI LUMBAR SPINE W/O DYE: CPT

## 2020-05-28 ENCOUNTER — TELEPHONE (OUTPATIENT)
Dept: INTERNAL MEDICINE CLINIC | Age: 75
End: 2020-05-28

## 2020-05-28 NOTE — TELEPHONE ENCOUNTER
Called and discussed results of lumbar MRI. Advised follow-up with Dr. Lucas Mtz for evaluation given worsening sciatica and progression of degenerative disease and stenosis on imaging.

## 2020-06-03 ENCOUNTER — TELEPHONE (OUTPATIENT)
Dept: INTERNAL MEDICINE CLINIC | Age: 75
End: 2020-06-03

## 2020-07-08 ENCOUNTER — HOSPITAL ENCOUNTER (OUTPATIENT)
Dept: CT IMAGING | Age: 75
Discharge: HOME | End: 2020-07-08
Attending: INTERNAL MEDICINE
Payer: MEDICARE

## 2020-07-08 DIAGNOSIS — C78.00 SECONDARY MALIGNANT NEOPLASM OF LUNG (HCC): ICD-10-CM

## 2020-07-08 DIAGNOSIS — C50.912 MALIGNANT NEOPLASM OF LEFT BREAST (HCC): ICD-10-CM

## 2020-07-08 DIAGNOSIS — C79.51 BONE METASTASIS (HCC): ICD-10-CM

## 2020-07-08 LAB — CREAT UR-MCNC: 0.9 MG/DL (ref 0.6–1.3)

## 2020-07-08 PROCEDURE — 82565 ASSAY OF CREATININE: CPT

## 2020-07-08 PROCEDURE — 74177 CT ABD & PELVIS W/CONTRAST: CPT

## 2020-07-08 PROCEDURE — 74011636320 HC RX REV CODE- 636/320

## 2020-07-08 RX ADMIN — IOPAMIDOL 100 ML: 612 INJECTION, SOLUTION INTRAVENOUS at 14:26

## 2020-07-13 ENCOUNTER — PATIENT MESSAGE (OUTPATIENT)
Dept: INTERNAL MEDICINE CLINIC | Age: 75
End: 2020-07-13

## 2020-07-13 DIAGNOSIS — C50.912 MALIGNANT NEOPLASM OF LEFT BREAST IN FEMALE, ESTROGEN RECEPTOR POSITIVE, UNSPECIFIED SITE OF BREAST (HCC): ICD-10-CM

## 2020-07-13 DIAGNOSIS — S22.080S COMPRESSION FRACTURE OF T11 VERTEBRA, SEQUELA: ICD-10-CM

## 2020-07-13 DIAGNOSIS — C78.00 MALIGNANT NEOPLASM METASTATIC TO LUNG, UNSPECIFIED LATERALITY (HCC): ICD-10-CM

## 2020-07-13 DIAGNOSIS — Z17.0 MALIGNANT NEOPLASM OF LEFT BREAST IN FEMALE, ESTROGEN RECEPTOR POSITIVE, UNSPECIFIED SITE OF BREAST (HCC): ICD-10-CM

## 2020-07-13 DIAGNOSIS — M85.89 OSTEOPENIA OF MULTIPLE SITES: Primary | ICD-10-CM

## 2020-07-13 NOTE — TELEPHONE ENCOUNTER
From: Neeraj Azul  To: Monisha Glass MD  Sent: 7/13/2020 11:46 AM EDT  Subject: Referral Request    Just spoke with Dr. Bassam Butterfield office for an appointment regarding the Prolia injections as you had recommended. Since it has been 8 years since I last saw him they are requesting a referral from you. I appreciate your help. Thanks and have a good day.   Neeraj Azul

## 2020-07-22 RX ORDER — HALOPERIDOL 2 MG/1
2 TABLET ORAL 2 TIMES DAILY
Qty: 180 TAB | Refills: 0 | Status: SHIPPED | OUTPATIENT
Start: 2020-07-22 | End: 2020-11-02 | Stop reason: SDUPTHER

## 2020-07-22 NOTE — TELEPHONE ENCOUNTER
Last Visit: 05/21/2020 with MD Ramirez Conde  Next Appointment: 11/24/2020 with MD Ramirez Conde  Previous Refill Encounter(s): 04/16/2020 per MD Ramirez Conde #180    Requested Prescriptions     Pending Prescriptions Disp Refills    haloperidoL (HALDOL) 2 mg tablet 180 Tab 0     Sig: Take 1 Tab by mouth two (2) times a day.

## 2020-08-05 ENCOUNTER — TELEPHONE (OUTPATIENT)
Dept: INTERNAL MEDICINE CLINIC | Age: 75
End: 2020-08-05

## 2020-08-05 RX ORDER — METHYLPREDNISOLONE 4 MG/1
4 TABLET ORAL
Qty: 1 DOSE PACK | Refills: 0 | Status: SHIPPED | OUTPATIENT
Start: 2020-08-05 | End: 2020-11-27 | Stop reason: ALTCHOICE

## 2020-08-05 NOTE — TELEPHONE ENCOUNTER
Pt request the following medication for \"horrific left leg flare up\" over the last 7 days    MethyIPREDISolone (MEDROL DOSEPACK) 4 MG     She is just leaving Dr Piper Villela office (chiropractor). Who thinks she has a pinched nerve. Stated this was her first visit and he is treating her with light traction.  Stated Dr Godoy Daily felt like light traction alone with the [facility] would give her relief      Stated again she is really hurting, can barely walk    Pharmacy  Desirae Conte

## 2020-08-13 ENCOUNTER — PATIENT MESSAGE (OUTPATIENT)
Dept: INTERNAL MEDICINE CLINIC | Age: 75
End: 2020-08-13

## 2020-08-14 NOTE — TELEPHONE ENCOUNTER
Called and spoke with patient. Advised follow-up with Dr. Елена Grey for consideration of another localized KAREN in addition to chiropractor treatment. Discussed rationale for localized vs systemic steroids and answered all questions.

## 2020-08-14 NOTE — TELEPHONE ENCOUNTER
From: Jean Marie  To: Deena Garrison MD  Sent: 8/13/2020 10:10 PM EDT  Subject: Non-Urgent Medical Question    I apologize for having requested another steroid dose pack last week so abruptly but had been having back and leg issues for a week and it had worsened until I could hardly walk again. I needed some immediate relief. Unfortunately the Prednisone did not act as quickly as it did in March. I also wanted to inform you that I have been seeing Dr. John Rodriguez, chiropractor, tomorrow being my 5th visit. She is doing very gentle traction as she says I have a pinched nerve at S-1 and is trying to open things up a bit. She would like to see me again 3x next week as well and then reevaluate. I have improved approx. 50%, but not without a fair amount of pain when I walk and unable to step up a step on that leg. I would appreciate your guidance.

## 2020-08-19 DIAGNOSIS — G47.00 INSOMNIA, UNSPECIFIED TYPE: ICD-10-CM

## 2020-08-19 RX ORDER — DIAZEPAM 5 MG/1
5 TABLET ORAL
Qty: 30 TAB | Refills: 0 | Status: SHIPPED | OUTPATIENT
Start: 2020-08-19 | End: 2020-12-11 | Stop reason: SDUPTHER

## 2020-08-19 NOTE — TELEPHONE ENCOUNTER
[facility] date for Valium as 5/5/20 for a 8 d/s. No UDS or CSA on file    Last Visit: 5/21/20 with MD Mario Cole  Next Appointment: 11/24/20 with MD Mario Cole  Previous Refill Encounter(s): 5/5/20 #30    Requested Prescriptions     Pending Prescriptions Disp Refills    diazePAM (Valium) 5 mg tablet 30 Tab 0     Sig: Take 1 Tab by mouth every six (6) hours as needed for Anxiety or Sleep. Max Daily Amount: 20 mg.

## 2020-09-02 ENCOUNTER — HOSPITAL ENCOUNTER (OUTPATIENT)
Dept: LAB | Age: 75
Discharge: HOME | End: 2020-09-02
Payer: MEDICARE

## 2020-09-02 PROCEDURE — 88175 CYTOPATH C/V AUTO FLUID REDO: CPT

## 2020-09-22 ENCOUNTER — TELEPHONE (OUTPATIENT)
Dept: INTERNAL MEDICINE CLINIC | Age: 75
End: 2020-09-22

## 2020-09-22 DIAGNOSIS — L72.3 SEBACEOUS CYST: Primary | ICD-10-CM

## 2020-09-22 NOTE — TELEPHONE ENCOUNTER
Please call the patient in reference to a referral to general surgeon for an oncology issue. She will be home until 5:15pm today.

## 2020-09-22 NOTE — TELEPHONE ENCOUNTER
Called and spoke with patient. Reports that wound on left hip has been chronic for approximately a year. She states that it is the size of a pencil eraser. She states that it has a small amount of redness and began to drain a small amount of yellow purulent fluid. She was placed on doxycycline by the NP at oncology and recommending that she be evaluated by surgery. Discussed that given that size of wound is small, would recommend Dr. Alexandra Abraham (plastic surgery) or Dr. Nahum Barrera (general surgery). Patient requesting [facility] Persaud physician. She is coming in tomorrow for her flu vaccine. Requesting that I take a look at the wound at that time. Please let me know when she is here so I can come in and quickly look at it. Thanks.

## 2020-09-22 NOTE — TELEPHONE ENCOUNTER
Called and spoke with patient she reports an open wound on her left hip that was caused by injections that she was obtaining thru Oncology. She said she saw her Oncologist today and they recommended she see a general surgeon for possible debridement of the area. They also started her on oral antibiotics. She reports the area is red with yellow puss draining form it. Patient wanted to know who Dr. Laura Diana would recommend that is local and with [facility]. Her Oncologist works out of [employer] and will place the referral but didn't have a recommendation of anyone around here to refer to. Patient stated you can call her after 5:15pm on her cell if you have additional questions the number is [phone]. She doesn't need a referral from us just a name that Dr. Laura Diana would recommend.

## 2020-09-23 ENCOUNTER — CLINICAL SUPPORT (OUTPATIENT)
Dept: INTERNAL MEDICINE CLINIC | Age: 75
End: 2020-09-23
Payer: MEDICARE

## 2020-09-23 DIAGNOSIS — Z23 NEEDS FLU SHOT: Primary | ICD-10-CM

## 2020-09-23 PROCEDURE — 90694 VACC AIIV4 NO PRSRV 0.5ML IM: CPT | Performed by: INTERNAL MEDICINE

## 2020-09-23 NOTE — TELEPHONE ENCOUNTER
Examined patient. Small 2 cm sebaceous cyst with black core. No erythema. Scant yellow drainage. Will refer to Dr. Demarcus Self for evaluation. Advised that may discontinue doxycycline as no evidence currently of infection. States only took one dose since prescribed. Referral placed for Dr. Demarcus Self.

## 2020-10-14 DIAGNOSIS — M54.30 SCIATICA, UNSPECIFIED LATERALITY: ICD-10-CM

## 2020-10-14 DIAGNOSIS — M48.062 SPINAL STENOSIS OF LUMBAR REGION WITH NEUROGENIC CLAUDICATION: ICD-10-CM

## 2020-10-14 RX ORDER — GABAPENTIN 300 MG/1
300 CAPSULE ORAL 2 TIMES DAILY
Qty: 180 CAP | Refills: 0 | Status: SHIPPED | OUTPATIENT
Start: 2020-10-14 | End: 2021-05-27

## 2020-10-14 NOTE — TELEPHONE ENCOUNTER
Last visit 05/21/2020 Virtual visit MD Melgar   Next appointment 11/24/2020 MD Melgar   Previous refill encounter(s)   03/24/2020 Neurontin #180. Requested Prescriptions     Pending Prescriptions Disp Refills    gabapentin (NEURONTIN) 300 mg capsule 180 Cap 0     Sig: Take 1 Cap by mouth two (2) times a day. Max Daily Amount: 600 mg.

## 2020-10-19 ENCOUNTER — TRANSCRIBE ORDER (OUTPATIENT)
Dept: SCHEDULING | Age: 75
End: 2020-10-19

## 2020-10-19 DIAGNOSIS — Z90.12 HISTORY OF LEFT MASTECTOMY: Primary | ICD-10-CM

## 2020-10-19 DIAGNOSIS — C50.912 MALIGNANT NEOPLASM OF LEFT BREAST (HCC): Primary | ICD-10-CM

## 2020-10-19 DIAGNOSIS — Z12.31 VISIT FOR SCREENING MAMMOGRAM: Primary | ICD-10-CM

## 2020-10-21 ENCOUNTER — TELEPHONE (OUTPATIENT)
Dept: INTERNAL MEDICINE CLINIC | Age: 75
End: 2020-10-21

## 2020-10-21 NOTE — TELEPHONE ENCOUNTER
Pt calling to speak to Dr. Trung Dean. Wants to know if Dr. Trung Dean has been able to review info from Dr. Hailee Vega? Says she needs to make a decision on surgery but doesn't want to do that without hearing from Dr. Trung Dean. She also still is not feeling good, GI issues. She has appt tomorrow with the pa at Dr. Winn Guardilawrence office. Says she is not doing well.

## 2020-11-02 RX ORDER — HALOPERIDOL 2 MG/1
2 TABLET ORAL 2 TIMES DAILY
Qty: 180 TAB | Refills: 0 | Status: SHIPPED | OUTPATIENT
Start: 2020-11-02 | End: 2021-01-29 | Stop reason: SDUPTHER

## 2020-11-02 RX ORDER — MELOXICAM 15 MG/1
15 TABLET ORAL DAILY
Qty: 90 TAB | Refills: 2 | Status: SHIPPED | OUTPATIENT
Start: 2020-11-02 | End: 2021-01-29 | Stop reason: SDUPTHER

## 2020-11-02 NOTE — TELEPHONE ENCOUNTER
Last Visit: 5/21/20 with MD Agapito Bui Next Appointment: 11/24/20 with MD gAapito Bui Previous Refill Encounter(s): 1/21/20 Mobic #90 with 2 refills, 7/22/20 Haldol #180 Requested Prescriptions Pending Prescriptions Disp Refills  meloxicam (MOBIC) 15 mg tablet 90 Tab 2 Sig: Take 1 Tab by mouth daily. Take WITH FOOD  
 haloperidoL (HALDOL) 2 mg tablet 180 Tab 0 Sig: Take 1 Tab by mouth two (2) times a day.

## 2020-11-11 ENCOUNTER — TRANSCRIBE ORDER (OUTPATIENT)
Dept: SCHEDULING | Age: 75
End: 2020-11-11

## 2020-11-11 DIAGNOSIS — N64.59 OTHER SIGNS AND SYMPTOMS IN BREAST: ICD-10-CM

## 2020-11-11 DIAGNOSIS — C79.9 METASTASIS (HCC): Primary | ICD-10-CM

## 2020-11-11 DIAGNOSIS — C50.912 MALIGNANT NEOPLASM OF UNSPECIFIED SITE OF LEFT FEMALE BREAST (HCC): ICD-10-CM

## 2020-11-17 ENCOUNTER — HOSPITAL ENCOUNTER (OUTPATIENT)
Dept: LAB | Age: 75
Discharge: HOME | End: 2020-11-17
Payer: MEDICARE

## 2020-11-17 DIAGNOSIS — Z17.0 MALIGNANT NEOPLASM OF LEFT BREAST IN FEMALE, ESTROGEN RECEPTOR POSITIVE, UNSPECIFIED SITE OF BREAST (HCC): ICD-10-CM

## 2020-11-17 DIAGNOSIS — I89.0 LYMPHEDEMA OF ARM: ICD-10-CM

## 2020-11-17 DIAGNOSIS — M54.31 RIGHT SIDED SCIATICA: ICD-10-CM

## 2020-11-17 DIAGNOSIS — C78.00 MALIGNANT NEOPLASM METASTATIC TO LUNG, UNSPECIFIED LATERALITY (HCC): ICD-10-CM

## 2020-11-17 DIAGNOSIS — K57.90 DIVERTICULOSIS: ICD-10-CM

## 2020-11-17 DIAGNOSIS — M25.511 CHRONIC RIGHT SHOULDER PAIN: ICD-10-CM

## 2020-11-17 DIAGNOSIS — M85.9 DISORDER OF BONE DENSITY AND STRUCTURE, UNSPECIFIED: ICD-10-CM

## 2020-11-17 DIAGNOSIS — M85.89 OSTEOPENIA OF MULTIPLE SITES: ICD-10-CM

## 2020-11-17 DIAGNOSIS — C50.912 MALIGNANT NEOPLASM OF LEFT BREAST IN FEMALE, ESTROGEN RECEPTOR POSITIVE, UNSPECIFIED SITE OF BREAST (HCC): ICD-10-CM

## 2020-11-17 DIAGNOSIS — S22.080S COMPRESSION FRACTURE OF T11 VERTEBRA, SEQUELA: ICD-10-CM

## 2020-11-17 DIAGNOSIS — M48.062 SPINAL STENOSIS OF LUMBAR REGION WITH NEUROGENIC CLAUDICATION: ICD-10-CM

## 2020-11-17 DIAGNOSIS — E78.5 HYPERLIPIDEMIA, UNSPECIFIED HYPERLIPIDEMIA TYPE: ICD-10-CM

## 2020-11-17 DIAGNOSIS — G89.29 CHRONIC RIGHT SHOULDER PAIN: ICD-10-CM

## 2020-11-17 DIAGNOSIS — R53.82 CHRONIC FATIGUE: ICD-10-CM

## 2020-11-17 DIAGNOSIS — I10 ESSENTIAL HYPERTENSION: ICD-10-CM

## 2020-11-17 DIAGNOSIS — F95.2 TOURETTE SYNDROME: ICD-10-CM

## 2020-11-17 DIAGNOSIS — K57.92 DIVERTICULITIS: ICD-10-CM

## 2020-11-17 LAB
25(OH)D3 SERPL-MCNC: 53.5 NG/ML (ref 30–100)
ALBUMIN SERPL-MCNC: 4.1 G/DL (ref 3.4–5)
ALBUMIN/GLOB SERPL: 1.3 {RATIO} (ref 0.8–1.7)
ALP SERPL-CCNC: 84 U/L (ref 45–117)
ALT SERPL-CCNC: 19 U/L (ref 13–56)
ANION GAP SERPL CALC-SCNC: 3 MMOL/L (ref 3–18)
APPEARANCE UR: CLEAR
AST SERPL-CCNC: 11 U/L (ref 10–38)
BACTERIA URNS QL MICRO: NEGATIVE /HPF
BASOPHILS # BLD: 0 K/UL (ref 0–0.1)
BASOPHILS NFR BLD: 1 % (ref 0–2)
BILIRUB SERPL-MCNC: 0.7 MG/DL (ref 0.2–1)
BILIRUB UR QL: NEGATIVE
BUN SERPL-MCNC: 12 MG/DL (ref 7–18)
BUN/CREAT SERPL: 13 (ref 12–20)
CALCIUM SERPL-MCNC: 9.8 MG/DL (ref 8.5–10.1)
CHLORIDE SERPL-SCNC: 106 MMOL/L (ref 100–111)
CHOLEST SERPL-MCNC: 208 MG/DL
CO2 SERPL-SCNC: 30 MMOL/L (ref 21–32)
COLOR UR: YELLOW
CREAT SERPL-MCNC: 0.91 MG/DL (ref 0.6–1.3)
DIFFERENTIAL METHOD BLD: ABNORMAL
EOSINOPHIL # BLD: 0.1 K/UL (ref 0–0.4)
EOSINOPHIL NFR BLD: 2 % (ref 0–5)
EPITH CASTS URNS QL MICRO: ABNORMAL /LPF (ref 0–5)
ERYTHROCYTE [DISTWIDTH] IN BLOOD BY AUTOMATED COUNT: 14.3 % (ref 11.6–14.5)
GLOBULIN SER CALC-MCNC: 3.1 G/DL (ref 2–4)
GLUCOSE SERPL-MCNC: 95 MG/DL (ref 74–99)
GLUCOSE UR STRIP.AUTO-MCNC: NEGATIVE MG/DL
HCT VFR BLD AUTO: 40.6 % (ref 35–45)
HDLC SERPL-MCNC: 58 MG/DL (ref 40–60)
HDLC SERPL: 3.6 {RATIO} (ref 0–5)
HGB BLD-MCNC: 13.7 G/DL (ref 12–16)
HGB UR QL STRIP: NEGATIVE
KETONES UR QL STRIP.AUTO: NEGATIVE MG/DL
LDLC SERPL CALC-MCNC: 124.6 MG/DL (ref 0–100)
LEUKOCYTE ESTERASE UR QL STRIP.AUTO: ABNORMAL
LIPID PROFILE,FLP: ABNORMAL
LYMPHOCYTES # BLD: 1.3 K/UL (ref 0.9–3.6)
LYMPHOCYTES NFR BLD: 32 % (ref 21–52)
MAGNESIUM SERPL-MCNC: 2 MG/DL (ref 1.6–2.6)
MCH RBC QN AUTO: 34.2 PG (ref 24–34)
MCHC RBC AUTO-ENTMCNC: 33.7 G/DL (ref 31–37)
MCV RBC AUTO: 101.2 FL (ref 74–97)
MONOCYTES # BLD: 0.3 K/UL (ref 0.05–1.2)
MONOCYTES NFR BLD: 6 % (ref 3–10)
NEUTS SEG # BLD: 2.3 K/UL (ref 1.8–8)
NEUTS SEG NFR BLD: 59 % (ref 40–73)
NITRITE UR QL STRIP.AUTO: NEGATIVE
PH UR STRIP: 7.5 [PH] (ref 5–8)
PLATELET # BLD AUTO: 151 K/UL (ref 135–420)
PMV BLD AUTO: 9.3 FL (ref 9.2–11.8)
POTASSIUM SERPL-SCNC: 4.5 MMOL/L (ref 3.5–5.5)
PROT SERPL-MCNC: 7.2 G/DL (ref 6.4–8.2)
PROT UR STRIP-MCNC: NEGATIVE MG/DL
RBC # BLD AUTO: 4.01 M/UL (ref 4.2–5.3)
RBC #/AREA URNS HPF: NEGATIVE /HPF (ref 0–5)
SODIUM SERPL-SCNC: 139 MMOL/L (ref 136–145)
SP GR UR REFRACTOMETRY: 1.01 (ref 1–1.03)
TRIGL SERPL-MCNC: 127 MG/DL (ref ?–150)
TSH SERPL DL<=0.05 MIU/L-ACNC: 0.73 UIU/ML (ref 0.36–3.74)
UROBILINOGEN UR QL STRIP.AUTO: 0.2 EU/DL (ref 0.2–1)
VLDLC SERPL CALC-MCNC: 25.4 MG/DL
WBC # BLD AUTO: 3.9 K/UL (ref 4.6–13.2)
WBC URNS QL MICRO: ABNORMAL /HPF (ref 0–4)

## 2020-11-17 PROCEDURE — 81001 URINALYSIS AUTO W/SCOPE: CPT

## 2020-11-17 PROCEDURE — 84443 ASSAY THYROID STIM HORMONE: CPT

## 2020-11-17 PROCEDURE — 83735 ASSAY OF MAGNESIUM: CPT

## 2020-11-17 PROCEDURE — 80053 COMPREHEN METABOLIC PANEL: CPT

## 2020-11-17 PROCEDURE — 80061 LIPID PANEL: CPT

## 2020-11-17 PROCEDURE — 82306 VITAMIN D 25 HYDROXY: CPT

## 2020-11-17 PROCEDURE — 85025 COMPLETE CBC W/AUTO DIFF WBC: CPT

## 2020-11-17 PROCEDURE — 36415 COLL VENOUS BLD VENIPUNCTURE: CPT

## 2020-11-24 ENCOUNTER — TELEPHONE (OUTPATIENT)
Dept: INTERNAL MEDICINE CLINIC | Age: 75
End: 2020-11-24

## 2020-11-24 ENCOUNTER — HOSPITAL ENCOUNTER (OUTPATIENT)
Dept: GENERAL RADIOLOGY | Age: 75
Discharge: HOME | End: 2020-11-24
Payer: MEDICARE

## 2020-11-24 ENCOUNTER — OFFICE VISIT (OUTPATIENT)
Dept: INTERNAL MEDICINE CLINIC | Age: 75
End: 2020-11-24
Payer: MEDICARE

## 2020-11-24 VITALS
RESPIRATION RATE: 16 BRPM | DIASTOLIC BLOOD PRESSURE: 84 MMHG | TEMPERATURE: 97.7 F | WEIGHT: 143.4 LBS | HEART RATE: 87 BPM | OXYGEN SATURATION: 97 % | BODY MASS INDEX: 28.91 KG/M2 | HEIGHT: 59 IN | SYSTOLIC BLOOD PRESSURE: 132 MMHG

## 2020-11-24 DIAGNOSIS — Z17.0 MALIGNANT NEOPLASM OF LEFT BREAST IN FEMALE, ESTROGEN RECEPTOR POSITIVE, UNSPECIFIED SITE OF BREAST (HCC): ICD-10-CM

## 2020-11-24 DIAGNOSIS — C50.912 MALIGNANT NEOPLASM OF LEFT BREAST IN FEMALE, ESTROGEN RECEPTOR POSITIVE, UNSPECIFIED SITE OF BREAST (HCC): ICD-10-CM

## 2020-11-24 DIAGNOSIS — I89.0 LYMPHEDEMA OF ARM: ICD-10-CM

## 2020-11-24 DIAGNOSIS — C78.00 MALIGNANT NEOPLASM METASTATIC TO LUNG, UNSPECIFIED LATERALITY (HCC): ICD-10-CM

## 2020-11-24 DIAGNOSIS — R53.82 CHRONIC FATIGUE: ICD-10-CM

## 2020-11-24 DIAGNOSIS — M54.31 RIGHT SIDED SCIATICA: ICD-10-CM

## 2020-11-24 DIAGNOSIS — R06.09 DYSPNEA ON EXERTION: ICD-10-CM

## 2020-11-24 DIAGNOSIS — K57.92 DIVERTICULITIS: ICD-10-CM

## 2020-11-24 DIAGNOSIS — E78.5 HYPERLIPIDEMIA, UNSPECIFIED HYPERLIPIDEMIA TYPE: ICD-10-CM

## 2020-11-24 DIAGNOSIS — M47.816 LUMBAR FACET ARTHROPATHY: ICD-10-CM

## 2020-11-24 DIAGNOSIS — M51.36 DDD (DEGENERATIVE DISC DISEASE), LUMBAR: ICD-10-CM

## 2020-11-24 DIAGNOSIS — I10 ESSENTIAL HYPERTENSION: ICD-10-CM

## 2020-11-24 DIAGNOSIS — F95.2 TOURETTE SYNDROME: ICD-10-CM

## 2020-11-24 DIAGNOSIS — R06.09 DYSPNEA ON EXERTION: Primary | ICD-10-CM

## 2020-11-24 DIAGNOSIS — M85.89 OSTEOPENIA OF MULTIPLE SITES: ICD-10-CM

## 2020-11-24 DIAGNOSIS — S22.080S COMPRESSION FRACTURE OF T11 VERTEBRA, SEQUELA: ICD-10-CM

## 2020-11-24 PROCEDURE — 3017F COLORECTAL CA SCREEN DOC REV: CPT | Performed by: INTERNAL MEDICINE

## 2020-11-24 PROCEDURE — G8399 PT W/DXA RESULTS DOCUMENT: HCPCS | Performed by: INTERNAL MEDICINE

## 2020-11-24 PROCEDURE — 93005 ELECTROCARDIOGRAM TRACING: CPT | Performed by: INTERNAL MEDICINE

## 2020-11-24 PROCEDURE — G8427 DOCREV CUR MEDS BY ELIG CLIN: HCPCS | Performed by: INTERNAL MEDICINE

## 2020-11-24 PROCEDURE — 1101F PT FALLS ASSESS-DOCD LE1/YR: CPT | Performed by: INTERNAL MEDICINE

## 2020-11-24 PROCEDURE — 71046 X-RAY EXAM CHEST 2 VIEWS: CPT

## 2020-11-24 PROCEDURE — G8754 DIAS BP LESS 90: HCPCS | Performed by: INTERNAL MEDICINE

## 2020-11-24 PROCEDURE — G8419 CALC BMI OUT NRM PARAM NOF/U: HCPCS | Performed by: INTERNAL MEDICINE

## 2020-11-24 PROCEDURE — G0463 HOSPITAL OUTPT CLINIC VISIT: HCPCS | Performed by: INTERNAL MEDICINE

## 2020-11-24 PROCEDURE — 93010 ELECTROCARDIOGRAM REPORT: CPT | Performed by: INTERNAL MEDICINE

## 2020-11-24 PROCEDURE — G8752 SYS BP LESS 140: HCPCS | Performed by: INTERNAL MEDICINE

## 2020-11-24 PROCEDURE — 1090F PRES/ABSN URINE INCON ASSESS: CPT | Performed by: INTERNAL MEDICINE

## 2020-11-24 PROCEDURE — G9899 SCRN MAM PERF RSLTS DOC: HCPCS | Performed by: INTERNAL MEDICINE

## 2020-11-24 PROCEDURE — G8510 SCR DEP NEG, NO PLAN REQD: HCPCS | Performed by: INTERNAL MEDICINE

## 2020-11-24 PROCEDURE — G8536 NO DOC ELDER MAL SCRN: HCPCS | Performed by: INTERNAL MEDICINE

## 2020-11-24 PROCEDURE — 99215 OFFICE O/P EST HI 40 MIN: CPT | Performed by: INTERNAL MEDICINE

## 2020-11-24 RX ORDER — LETROZOLE 2.5 MG/1
TABLET, FILM COATED ORAL
COMMUNITY
Start: 2020-11-11 | End: 2022-06-25 | Stop reason: ALTCHOICE

## 2020-11-24 NOTE — PATIENT INSTRUCTIONS
Heart-Healthy Diet: Care Instructions Your Care Instructions A heart-healthy diet has lots of vegetables, fruits, nuts, beans, and whole grains, and is low in salt. It limits foods that are high in saturated fat, such as meats, cheeses, and fried foods. It may be hard to change your diet, but even small changes can lower your risk of heart attack and heart disease. Follow-up care is a key part of your treatment and safety. Be sure to make and go to all appointments, and call your doctor if you are having problems. It's also a good idea to know your test results and keep a list of the medicines you take. How can you care for yourself at home? Watch your portions · Learn what a serving is. A \"serving\" and a \"portion\" are not always the same thing. Make sure that you are not eating larger portions than are recommended. For example, a serving of pasta is ½ cup. A serving size of meat is 2 to 3 ounces. A 3-ounce serving is about the size of a deck of cards. Measure serving sizes until you are good at [place]" them. Keep in mind that restaurants often serve portions that are 2 or 3 times the size of one serving. · To keep your energy level up and keep you from feeling hungry, eat often but in smaller portions. · Eat only the number of calories you need to stay at a healthy weight. If you need to lose weight, eat fewer calories than your body burns (through exercise and other physical activity). Eat more fruits and vegetables · Eat a variety of fruit and vegetables every day. Dark green, deep orange, red, or yellow fruits and vegetables are especially good for you. Examples include spinach, carrots, peaches, and berries. · Keep carrots, celery, and other veggies handy for snacks. Buy fruit that is in season and store it where you can see it so that you will be tempted to eat it. · Cook dishes that have a lot of veggies in them, such as stir-fries and soups. Limit saturated and trans fat · Read food labels, and try to avoid saturated and trans fats. They increase your risk of heart disease. · Use olive or canola oil when you cook. · Bake, broil, grill, or steam foods instead of frying them. · Choose lean meats instead of high-fat meats such as hot dogs and sausages. Cut off all visible fat when you prepare meat. · Eat fish, skinless poultry, and meat alternatives such as soy products instead of high-fat meats. Soy products, such as tofu, may be especially good for your heart. · Choose low-fat or fat-free milk and dairy products. Eat foods high in fiber · Eat a variety of grain products every day. Include whole-grain foods that have lots of fiber and nutrients. Examples of whole-grain foods include oats, whole wheat bread, and brown rice. · Buy whole-grain breads and cereals, instead of white bread or pastries. Limit salt and sodium · Limit how much salt and sodium you eat to help lower your blood pressure. · Taste food before you salt it. Add only a little salt when you think you need it. With time, your taste buds will adjust to less salt. · Eat fewer snack items, fast foods, and other high-salt, processed foods. Check food labels for the amount of sodium in packaged foods. · Choose low-sodium versions of canned goods (such as soups, vegetables, and beans). Limit sugar · Limit drinks and foods with added sugar. These include candy, desserts, and soda pop. Limit alcohol · Limit alcohol to no more than 2 drinks a day for men and 1 drink a day for women. Too much alcohol can cause health problems. When should you call for help? Watch closely for changes in your health, and be sure to contact your doctor if: 
  · You would like help planning heart-healthy meals. Where can you learn more? Go to http://www.[employer].com/ Enter V137 in the search box to learn more about \"Heart-Healthy Diet: Care Instructions. \" 
 Current as of: August 22, 2019               Content Version: 12.6 © 5648-9586 [employer]. Care instructions adapted under license by [employer] (which disclaims liability or warranty for this information). If you have questions about a medical condition or this instruction, always ask your healthcare professional. [address] any warranty or liability for your use of this information.

## 2020-11-27 PROBLEM — M51.36 DDD (DEGENERATIVE DISC DISEASE), LUMBAR: Status: ACTIVE | Noted: 2020-11-27

## 2020-11-27 PROBLEM — M47.816 LUMBAR FACET ARTHROPATHY: Status: ACTIVE | Noted: 2020-11-27

## 2020-11-27 NOTE — PROGRESS NOTES
HPI:  Giana Rolon is a 78y.o. year old female who presents today for a physical exam. She has a history of hypertension, dyslipidemia,  metastatic breast cancer, GERD, diverticulosis with recurrent diverticulitis, osteoarthritis s/p right knee replacement (2012), lumbar degenerative disease, osteopenia, compression fracture, and Tourette's syndrome. She is accompanied by her . She has been following social distancing guidelines and wearing a mask when going out. She reports continued chronic fatigue related to her chemotherapy, but states that she does not wish to discontinue since she states that it has been keeping her cancer in remission. She underwent a lumbar MRI (6/2020) for worsening right sciatica, showing very advanced multilevel DDD and DJD with extensive bone marrow edema reaction to DDD; chronic compression fractures; single level of degenerative central spinal stenosis, moderate, at  L4/L5, worsened compared 2016; multilevel degenerative foraminal narrowing worst right L4/L5, bilateral L5/S1; no evidence of metastatic breast cancer. She underwent left L4/5 and right L5-S1 KAREN in 6/2020 and Left S1 TF KAREN in 8/2020 by Dr. Ranjana Thurston with improvement. She reports that she underwent evaluation by Dr. Seven Cisneros for her multiple compression fractures and treatment with letrozole, and he is recommending treatment with Reclast. She complains of new onset dyspnea on exertion for the last 2 months. She describes breathlessness and severe fatigue while showering, carrying groceries in from the car, and when doing chores. She states that she must sit down and rest for 10-15 minutes before continuing. She denies any chest pain, shortness of breath at rest, palpitations, lightheadedness, or edema. She is otherwise without new complaints.      She has a history of left breast cancer, which was diagnosed in 7/2010 as invasive ductal adenocarcinoma, s/p left modified radical mastectomy with sentinel node biopsy, performed by Dr. Olegario Spence. She had 3 positive lymph nodes and was classified as stage 1B, T1c N1 M0, ER and NM positive, Her-2/sarah negative by FISH. She underwent 6 cycles of chemotherapy with Docetaxel and Cytoxan. She was subsequently unable to tolerate anastrozole, exemestane, tamoxifen, and letrozole due to profound fatigue and arthralgias. Her course was complicated by chronic left arm lymphedema, managed with a compression sleeve. A chest CT scan in 3/2014 noted several small pulmonary nodules which were concerning for metastases but were too small to biopsy. They were followed with sequential CT scans , and in 12/2014, repeat chest CT scan showed enlarging bilateral pulmonary nodules compatible with progression of metastatic disease. A fine needle aspirate was performed on 12/21/2014 which showed benign pulmonary parenchyma with alveolar hemorrhage. Repeat chest CT scan in 3/12/2015 showed mild interval progression of the lung metastases with enlarging nodules and development of new nodules. A CT guided needle biopsy was performed on 3/23/2015, which confirmed metastatic adenocarcinoma consistent with breast primary (stage IV, ER/NM positive, and TTF-1 negative). On 4/13/2015, she was started on therapy with Ibrance and letrozole. Follow-up CT scan (7/15/15) showed partial response with decreasing size of some nodules and resolution of other nodules. Most recent chest, abdomen, and pelvis CT scan was obtained in 6/2017, showing stable or decreased multiple bilateral pulmonary nodules and no suspicious new pulmonary nodules. She continues on palbociclib, but was changed from letrozole to fulvestrant. She describes persistent fatigue which she attributes to monthly treatment with fulvestrant (Faslodex). She underwent restaging chest, abdomen, and pelvis CT scan in 7/2019 which showed that her pulmonary nodules were unchanged, and no other evidence of metastatic disease.  She is followed by Dr. Latrice Moncada. She states that she established care with Dr. Angelina Lopez, but was told that she may have her mammograms and breast exams in our office with referral to her if something arises. She also has a history of a right ovarian cyst, but was released from the care of Dr. Julissa Verde since it was concluded to be benign. She has a history of hypertension treated with losartan. She monitors her blood pressure mainly when visiting multiple physicians and reports that it is well controlled. She has no history of heart disease, and denies any chest pain, shortness of breath at rest or with exertion, palpitations, lightheadedness, or edema. In 4/11/2016, she sustained a fall with subsequent severe pain in her mid to upper lumbar region and wrapping around waist. She was treated with meloxicam, tylenol and flexeril, but because of persistent discomfort, she presented to [facility] on 4/15/2016 and lumbosacral spine films showed marked degenerative changes with disc space obliteration throughout lumbar spine and slight anterior spondylolisthesis of L4. She was evaluated by Dr. Dylon Lopez on 4/18/2016 who recommended physical therapy and evaluation by Dr. Leni Barfield for her degenerative spine changes. She continued to take meloxicam and tylenol and started physical therapy, but noted worsening of her pain. She subsequently was evaluated by Dr. Wandy Cruz, who obtained a lumbar MRI on 4/26/2016, which showed a new subacute compression fracture of T11 vertebral body with diffuse marrow edema and mild paravertebral inflammatory stranding, no retropulsion or central stenosis; more severe degenerative disease along the right side at L4-L5, L5-S1, potential mild compression of right exiting L4 and L5 nerve roots. She was referred to Dr. Jeremy Melendez for kyphoplasty of the T11 compression fracture given failure of pain control with conservative measures.  She underwent the procedure on 3/8/1217 without complications, and G23 vertebral body bone core and aspirate biopsies were performed, which showed only reactive bone changes and no evidence of malignancy. She has a history of osteopenia, with a history of fracture of her sacrum. She reports that she was treated with alendronate in 12/2011 but discontinued it in 4/2013 due to intolerence. Bone density scan (11/2017) was consistent with osteopenia with femoral neck T-scores: left -1.4/ right -1.4 and distal radius -2.2 (lumbar T-score could not be assessed). A repeat bone density scan was obtained (11/2019) showing continued evidence of osteopenia with femoral neck T-scores: left -1.4/ right -1.4 and distal radius -2.4 (lumbar T-score could not be assessed). She continues to take calcium and vitamin D. In 10/2016, she developed left sided low back pain with radiation to her left leg over the last several weeks. She underwent a lumbar MRI (9/2016) showing scoliosis and advanced multilevel degenerative changes with areas of foraminal stenosis at L3-L4 and L5-S1; mild/moderate central canal stenosis at L4-L5 and L5-S1. She was evaluated by Dr. ADELITA Fernandez and treated with pregabalin with some improvement. She subsequently received an epidural steroid injection with improvement and discontinued pregabalin. In 4/2017, she noted increasing cervical and upper back discomfort. She has had multiple cervical MRI's,and underwent repeat in 4/2017 which showed multilevel degenerative disc disease and facet arthropathy without change from prior studies with mild central canal stenosis C3-C4 and C5-C6, and moderate central canal stenosis C6-C7. She was treated with Mobic and Flexeril, and gabapentin at bedtime. She complained of right shoulder pain.  X-ray of her shoulder (11/2017) was negative, and she had a right shoulder MRI (5/14/2018) which showed deep partial thickness undersurface tear of the distal supraspinatus, likely with full thickness slitlike perforations; no gross tendon retraction, but there is moderate muscle atrophy; partial thickness tearing with longitudinal components involving the biceps tendon at its sulcal turn, some associated tenosynovitis; accompanying short length longitudinal split tear in the distal subscapularis; moderate infraspinatus tendinosis, and mild to moderate subacromial subdeltoid bursitis. She underwent evaluation by Dr. Huma Powell on 5/18/2018, and received a glenohumeral cortisone injection. She reported worsening low back pain and right sciatica, and underwent an epidural steroid injection at right L4-5 transforaminal approach by Dr. Arlin Dillon on 3/18/2019. She has noted improvement in her right hip and leg pain. She continues to take gabapentin 300 mg at night. She has a history of GERD and diverticulosis with recurrent diverticulitis,. She was evaluated by Dr. Meenu Mirza in 2/23/2016 and underwent an upper endoscopy, which revealed a Schatzki's ring at the gastroesophageal junction (dilated) with mild distal esophagitis and a small hiatal hernia. A screening colonoscopy was also performed showing moderately severe diverticulosis of the ascending and descending colon and a 2 mm sessile sigmoid polyp (pathology: hyperplastic). Recommendations for follow-up colonoscopy in 10 years. She denies any abdominal pain, nausea, vomiting, melena, hematochezia, or change in bowel movements. She was evaluated in 8/2017 by GERALD Aiken with complaints of abdominal pain and was treated with Cipro for presumed diverticulitis. She contacted the office again in 10/2017 and 1/2018 with a recurrence of symptoms, and was treated for a 10 day course with Cipro with improvement. Abdominal CT scan in 2/6/2018 for staging for her breast cancer did show evidence of moderate to severe diverticulosis, but no evidence of diverticulitis. She did present with similar symptoms in 3/2018 and was evaluated by GERALD Vigil and prescribed Cipro. However, her symptoms resolved prior to taking the antibiotics.  In 7/2018, she again presented with left lower quadrant pain, and an abdominal CT scan (7/13/2018) which showed evidence of uncomplicated diverticulitis involving the lower left colon and upper sigmoid. She was treated with Cipro and Flagyl initially, but developed a rash due to Flagyl, and was subsequently changed to Augmentin. She had a follow-up visit with Dr. Mino Martinez and it was his opinion that she also had IBS which mimicked her episodes of diverticulitis. He recommended continuing on a probiotic and prescribed hyoscyamine to use as needed. She also was having more difficulty with burning epigastric pain, and was advised by Dr. Mino Martinez to begin Zantac five days per week (off 2 days). She has noted improvement since doing so. She also has a history of Tourette's syndrome controlled with Haldol. She is followed by Dr. Alyson Meza. Past Medical History:   Diagnosis Date    Arthritis     Breast cancer metastasized to lung [facility])     Left Breast    Chronic pain     Colon polyps 2/22/16    distal sigmoid, Dr. Gianluca HERNANDEZ (degenerative disc disease)     Diverticulitis of colon     Diverticulosis 2/22/16    mild in the ascedning and sigmoid colon, Dr. Mark Painting (hyperlipidemia)     Hypertension     Osteopenia     Tourette syndrome     Vitamin D deficiency      Past Surgical History:   Procedure Laterality Date    HX APPENDECTOMY      HX BREAST BIOPSY  7-30-10    Left Breast w/Nipple Duct exploration and excisional biopsy-left    HX DILATION AND CURETTAGE      x3    HX MODIFIED RADICAL MASTECTOMY  9-3-10    left    HX ORTHOPAEDIC Right 2012    knee replacement    HX TONSILLECTOMY      HYSTEROSCOPY DIAGNOSTIC       Current Outpatient Medications   Medication Sig    letrozole (FEMARA) 2.5 mg tablet TAKE 1 TABLET BY MOUTH EVERY DAY    meloxicam (MOBIC) 15 mg tablet Take 1 Tab by mouth daily. Take WITH FOOD    haloperidoL (HALDOL) 2 mg tablet Take 1 Tab by mouth two (2) times a day.     gabapentin (NEURONTIN) 300 mg capsule Take 1 Cap by mouth two (2) times a day. Max Daily Amount: 600 mg.    diazePAM (Valium) 5 mg tablet Take 1 Tab by mouth every six (6) hours as needed for Anxiety or Sleep. Max Daily Amount: 20 mg.    methylPREDNISolone (MEDROL DOSEPACK) 4 mg tablet Take 1 Tab by mouth Specific Days and Specific Times.  cyclobenzaprine (FLEXERIL) 5 mg tablet Take 1 Tab by mouth three (3) times daily as needed for Muscle Spasm(s).  losartan (COZAAR) 25 mg tablet Take 1 Tab by mouth daily.  pantoprazole sodium (PROTONIX PO) Take  by mouth.  palbociclib 75 mg cap 75 mg.    CYANOCOBALAMIN, VITAMIN B-12, (VITAMIN B-12 PO) Take  by mouth.  Biotin 2,500 mcg cap Take  by mouth.  calcium-vitamin D (CALCIUM 500+D) 500 mg(1,250mg) -200 unit per tablet Take 1 Tab by mouth two (2) times daily (with meals).  cholecalciferol, vitamin d3, (VITAMIN D) 1,000 unit tablet Take 2,000 Units by mouth daily.  ascorbic acid (VITAMIN C) 500 mg tablet Take  by mouth.  fulvestrant (FASLODEX IM) by IntraMUSCular route. No current facility-administered medications for this visit. Allergies and Intolerances: Allergies   Allergen Reactions    Keflex [Cephalexin] Rash    Metronidazole Rash    Other Medication Nausea Only     Anesthesia    Shellfish Containing Products Nausea and Vomiting     More of just eating the actual shellfish.  Sulfa (Sulfonamide Antibiotics) Rash    Ultram [Tramadol] Nausea and Vomiting     Patient states she is allergic to most Narcotics    Vancomycin Rash     Family History:   Family History   Problem Relation Age of Onset    Osteoporosis Sister     Osteoporosis Mother     Stroke Father     Hypertension Father     Cancer Paternal Aunt         breast     Social History:   She  reports that she has quit smoking. She quit after 3.00 years of use. She has never used smokeless tobacco. She stopped smoking over 40 years ago. She is  and lives with .  They have one daughter and two grandchildren. Social History     Substance and Sexual Activity   Alcohol Use Yes    Alcohol/week: 5.8 standard drinks    Types: 7 Glasses of wine per week     Immunization History:  Immunization History   Administered Date(s) Administered    Influenza High Dose Vaccine PF 10/01/2015, 10/25/2016, 10/05/2017    Influenza Vaccine 09/01/2014    Influenza Vaccine (Tri) Adjuvanted (>65 Yrs FLUAD TRI [id number]) 10/05/2018, 10/17/2019    Influenza Vaccine Split 11/01/2011, 10/02/2012    Influenza Vaccine Whole 10/08/2010    Influenza, Quadrivalent, Adjuvanted (>65 Yrs FLUAD QUAD [id number]) 09/23/2020    Pneumococcal Conjugate (PCV-13) 10/27/2015    Pneumococcal Polysaccharide (PPSV-23) 04/15/2013    TD Vaccine 05/05/2008    Tdap 09/12/2014    Zoster 09/20/2011       Review of Systems:   As above included in HPI. Otherwise 11 point review of systems negative including constitutional, skin, HENT, eyes, respiratory, cardiovascular, gastrointestinal, genitourinary, musculoskeletal, endo/heme/aller, neurological.    Physical:     Visit Vitals  /84 (BP 1 Location: Right arm, BP Patient Position: Sitting)   Pulse 87   Temp 97.7 °F (36.5 °C) (Temporal)   Resp 16   Ht 4' 11\" (1.499 m)   Wt 143 lb 6.4 oz (65 kg)   SpO2 97%   BMI 28.96 kg/m²        Patient appears in no apparent distress. Affect is appropriate. HEENT --Anicteric sclerae, tympanic membranes normal,  ear canals normal.  PERRL, EOMI, conjunctiva and lids normal.   Sinuses were nontender, turbinates normal, hearing normal. No cervical lymphadenopathy. No thyromegaly, JVD, or bruits. Carotid pulses 2+ with normal upstroke. Lungs --Clear to auscultation. No wheezing or rales. Heart --Regular rate and rhythm, no murmurs, rubs, gallops, or clicks. Chest wall --Nontender to palpation. PMI normal.  Abdomen -- Soft and nontender, no hepatosplenomegaly or masses. Extremities -- Without cyanosis, clubbing, edema.  Left arm lymphedema  Neuro -- CN 2-12 intact, strength 5/5 with intact soft touch in all extremities  Derm  no obvious abnormalities noted, no rash      Review of Data:  Labs:  Hospital Outpatient Visit on 11/17/2020   Component Date Value Ref Range Status    WBC 11/17/2020 3.9* 4.6 - 13.2 K/uL Final    RBC 11/17/2020 4.01* 4.20 - 5.30 M/uL Final    HGB 11/17/2020 13.7  12.0 - 16.0 g/dL Final    HCT 11/17/2020 40.6  35.0 - 45.0 % Final    MCV 11/17/2020 101.2* 74.0 - 97.0 FL Final    MCH 11/17/2020 34.2* 24.0 - 34.0 PG Final    MCHC 11/17/2020 33.7  31.0 - 37.0 g/dL Final    RDW 11/17/2020 14.3  11.6 - 14.5 % Final    PLATELET 58/91/0879 358  135 - 420 K/uL Final    MPV 11/17/2020 9.3  9.2 - 11.8 FL Final    NEUTROPHILS 11/17/2020 59  40 - 73 % Final    LYMPHOCYTES 11/17/2020 32  21 - 52 % Final    MONOCYTES 11/17/2020 6  3 - 10 % Final    EOSINOPHILS 11/17/2020 2  0 - 5 % Final    BASOPHILS 11/17/2020 1  0 - 2 % Final    ABS. NEUTROPHILS 11/17/2020 2.3  1.8 - 8.0 K/UL Final    ABS. LYMPHOCYTES 11/17/2020 1.3  0.9 - 3.6 K/UL Final    ABS. MONOCYTES 11/17/2020 0.3  0.05 - 1.2 K/UL Final    ABS. EOSINOPHILS 11/17/2020 0.1  0.0 - 0.4 K/UL Final    ABS.  BASOPHILS 11/17/2020 0.0  0.0 - 0.1 K/UL Final    DF 11/17/2020 AUTOMATED    Final    LIPID PROFILE 11/17/2020        Final    Cholesterol, total 11/17/2020 208* <200 MG/DL Final    Triglyceride 11/17/2020 127  <150 MG/DL Final    HDL Cholesterol 11/17/2020 58  40 - 60 MG/DL Final    LDL, calculated 11/17/2020 124.6* 0 - 100 MG/DL Final    VLDL, calculated 11/17/2020 25.4  MG/DL Final    CHOL/HDL Ratio 11/17/2020 3.6  0 - 5.0   Final    Magnesium 11/17/2020 2.0  1.6 - 2.6 mg/dL Final    Sodium 11/17/2020 139  136 - 145 mmol/L Final    Potassium 11/17/2020 4.5  3.5 - 5.5 mmol/L Final    Chloride 11/17/2020 106  100 - 111 mmol/L Final    CO2 11/17/2020 30  21 - 32 mmol/L Final    Anion gap 11/17/2020 3  3.0 - 18 mmol/L Final    Glucose 11/17/2020 95  74 - 99 mg/dL Final    BUN 11/17/2020 12  7.0 - 18 MG/DL Final    Creatinine 11/17/2020 0.91  0.6 - 1.3 MG/DL Final    BUN/Creatinine ratio 11/17/2020 13  12 - 20   Final    GFR est AA 11/17/2020 >60  >60 ml/min/1.73m2 Final    GFR est non-AA 11/17/2020 >60  >60 ml/min/1.73m2 Final    Calcium 11/17/2020 9.8  8.5 - 10.1 MG/DL Final    Bilirubin, total 11/17/2020 0.7  0.2 - 1.0 MG/DL Final    ALT (SGPT) 11/17/2020 19  13 - 56 U/L Final    AST (SGOT) 11/17/2020 11  10 - 38 U/L Final    Alk. phosphatase 11/17/2020 84  45 - 117 U/L Final    Protein, total 11/17/2020 7.2  6.4 - 8.2 g/dL Final    Albumin 11/17/2020 4.1  3.4 - 5.0 g/dL Final    Globulin 11/17/2020 3.1  2.0 - 4.0 g/dL Final    A-G Ratio 11/17/2020 1.3  0.8 - 1.7   Final    TSH 11/17/2020 0.73  0.36 - 3.74 uIU/mL Final    Color 11/17/2020 YELLOW    Final    Appearance 11/17/2020 CLEAR    Final    Specific gravity 11/17/2020 1.008  1.005 - 1.030   Final    pH (UA) 11/17/2020 7.5  5.0 - 8.0   Final    Protein 11/17/2020 Negative  NEG mg/dL Final    Glucose 11/17/2020 Negative  NEG mg/dL Final    Ketone 11/17/2020 Negative  NEG mg/dL Final    Bilirubin 11/17/2020 Negative  NEG   Final    Blood 11/17/2020 Negative  NEG   Final    Urobilinogen 11/17/2020 0.2  0.2 - 1.0 EU/dL Final    Nitrites 11/17/2020 Negative  NEG   Final    Leukocyte Esterase 11/17/2020 TRACE* NEG   Final    WBC 11/17/2020 1 to 2  0 - 4 /hpf Final    RBC 11/17/2020 Negative  0 - 5 /hpf Final    Epithelial cells 11/17/2020 FEW  0 - 5 /lpf Final    Bacteria 11/17/2020 Negative  NEG /hpf Final    Vitamin D 25-Hydroxy 11/17/2020 53.5  30 - 100 ng/mL Final       EKG (11/24/2020) sinus rhythm at 75 bpm, normal intervals; no ischemic changes. Health Maintenance:  Screening:    Mammogram: negative (9/2019). Scheduled 12/2020. PAP smear: negative (9/2013) Dr Yahaira Peterson. Pelvic ultrasound negative (9/2015). No further screening needed.    Colorectal: colonoscopy (2/2016) hyperplastic polyp;  José Miguel. Follow-up 2026. Depression: none   DM (HbA1c/FPG): FPG 95 (11/2020)   Hepatitis C: unknown   Falls: none   DEXA: osteopenia (11/2019) s/p T11 compression fracture in 4/2016. Smoking:distant past   Glaucoma: regular eye exams with Dr. Suha Klein (last 3/2020)   Vitamin D: 53.4 (11/2020)   Medicare Wellness: 5/21/2020      Impression:  Patient Active Problem List   Diagnosis Code    Tourette syndrome F95.2    Osteoarthritis, Status post right total knee replacement M19.90    Lumbar spinal stenosis M48.061    HLD (hyperlipidemia) E78.5    Breast cancer ([facility] [address] metastatic to lungs  C50.919    Essential hypertension I10    Diverticulosis K57.90    Osteopenia M85.80    Lymphedema of arm left I89.0    Insomnia G47.00    Fatigue R53.83    Diverticulitis, recurrent K57.92    T11 Vertebral compression fracture (HCC) s/p kyphoplasty M48.50XA    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.812    Spinal stenosis of cervical region M48.02    Right shoulder pain M25.511    Malignant neoplasm metastatic to lung (HCC) C78.00    Nontraumatic incomplete tear of right rotator cuff M75.111    Right sided sciatica M54.31       Plan:  1. Dyspnea on exertion. Patient reports developing worsening dyspnea and fatigue with exertion. Denies associated chest pain. EKG unremarkable. Will proceed with chest x-ray and echocardiogram. Has upcoming staging CT scans in 1/2021, and does not wish to proceed with imaging early. Further recommendations after review imaging. 2. Hypertension. Well controlled on losartan 25 mg daily. Renal function has been normal with creatinine 0.91/ eGFR >60. Continue to follow. 2. Breast cancer with pulmonary metastasis. Continue current therapy as per Dr. Pat Maldonado. Recent CT scans (last 7/2019) with stable small pulmonary nodules. On Ibrance and letrozole. Wishing to continue with treatment despite excessive fatigue.  Not using compression sleeve for lymphedema, but receiving massage therapy every three weeks with good results. Recently changed to [facility]. [place]'s lymphedema clinic dur to pandemic. Follow. 3. Hyperlipidemia. Calculated 10 year ASCVD risk is 18.2%, which places her in one of the four statin benefit groups (primary prevention with risk > 7.5%).  and HDL 58. Given lack of history of ASCVD, no evidence of atherosclerotic disease on chest and abdominal CT scans, and history of metastatic breast cancer, will not recommend treatment with statin at this time. Continue to emphasized importance of lifestyle modifications, including diet and exercise. 4. T11 compression fracture. S/P kyphoplasty with no further difficulties. Follow. 5. Osteopenia. Last bone density scan 11/2019. Using femoral neck T-scores, calculated FRAX score estimates her 10 year risk of a major osteoporetic fracture at 16 % and hip fracture at 2.3%, which alone are not an indication for biphosphonate treatment. However, the development of a vertebral compression fracture in 4/2016 would be considered an indication for treatment. In addition, treatment with an estrogen receptor blocker increases likelihood of progression. She did not tolerate alendronate in the past. Evaluated by Dr. Lisa Nickerson, and recommending to proceed with Reclast. Continue calcium and Vitamin D. Encouraged to continue exercises as instructed in physical therapy. 6. Recurrent diverticulitis. Colonoscopy (2/2016) with moderately severe diverticulosis in the ascending and descending colon. Has had multiple recent episodes of abdominal pain which were treated empirically with Cipro. Episode of symptoms in 3/2018 resolved prior to initiating antibiotics, bringing into question whether her abdominal symptoms were due to diverticulitis or irritable bowel syndrome. Discussed at last visit that she should have abdominal CT scan to confirm the diagnosis next time she had abdominal complaints.  Had recurrent episode in 7/2018, and abdominal CT scan confirmed diagnosis of acute diverticulitis. Treated with Augmentin as developed rash on Flagyl. Subsequently evaluated by Dr. Dhruv Us, and instructed to continue Probiotics and use hyoscyamine as needed when develop abdominal complaints. It was his opinion that her complaints may also be related to IBS, so wanting to avoid frequent antibiotics if not needed. Reports multiple mild episodes of pain which may reflect IBS since resolved without antibiotics. Not wishing to proceed with repeat abdominal CT scan to assess since has upcoming staging CT scans scheduled for 1/2021. Advised to try hyoscyamine with next episode. Will continue to follow. 7. Low back pain with right sciatica. Known lumbar stenosis and facet arthropathy. Using meloxicam, cyclobenzaprine, gabapentin, and Tylenol. Previously did undergo L4-L5 epidural injection on the right by Dr. Arlin Dillon in 2016 for similar symptoms. Recent recurrence of symptoms without sustained improvement despite treatment with steroids, NSAIDS, muscle relaxants and gabapentin. Underwent repeat lumbar MRI in 5/2020 which showed very advanced multilevel DDD and DJD with chronic compression fractures and worsening since 2016, especially at L4/5. Underwent left L4/5 and right L5-S1 KAREN in 6/2020 and Left S1 TF KAREN in 8/2020 by Dr. Arlin Dillon with improvement. Will continue to follow. 8. Fatigue. Severe and persistent. Has now resumed letrozole. Describes fatigue especially severe in the evening. However, aware of need to continue with current therapy. Follow. 9. Right shoulder pain. MRI with partial rotator cuff tear. Evaluated by Dr. Brad Vail and received a glenohumeral cortisone injection. Recommended physical therapy, but patient did not proceed. Not a significant complaint today as improved. Followed by Dr. Huma Powell. 10. Tourette's syndrome. Followed by Dr. Jumana Garzon. On Haldol with plans to switch to Risperdal if Haldol becomes unavailable.  Using diazepam occasionally for sleep.  11. Right ovarian cyst. No further monitoring needed as per Dr. Dickson Solis. Patient expressed concern about not following cyst, but surveillance CT scans obtained every 6 months for breast cancer do include imaging of pelvis. Has been stable. Patient reassured. Follow. 12. GERD. Using Protonix daily with good control. Follow. 13. Health maintenance. Already received influenza vaccine. Has not yet received Shingrix vaccine. Other immunizations up to date. Mammogram up to date. Will perform breast exam every visit. Vitamin D level normal. Continue maintenance dose supplement. Continue regular eye exams with Dr. Anastasia Hi. Medicare wellness visit up to date. Total time: 40 minutes spent with the patient on counseling, answering questions and/or coordination of care. Complex medical review and management performed. Patient understands recommendations and agrees with plan. Follow-up in 6 months. Addendum    XR Results (most recent):  Results from Hospital Encounter encounter on 11/24/20   XR CHEST PA LAT    Narrative EXAM: XR CHEST PA LAT    INDICATION: Shortness of breath    COMPARISON: October 23, 2019. FINDINGS: PA and lateral radiographs of the chest demonstrate chronic appearing  bilateral interstitial markings and emphysematous changes. No definite acute  lung findings. . The cardiac and mediastinal contours and pulmonary vascularity  are normal. Chronic osseous findings without acute osseous abnormality. .       Impression IMPRESSION: No acute findings or interval change       Chest x-ray without acute changes. Patient informed.

## 2020-11-30 ENCOUNTER — TELEPHONE (OUTPATIENT)
Dept: CARDIOLOGY CLINIC | Age: 75
End: 2020-11-30

## 2020-12-11 DIAGNOSIS — G47.00 INSOMNIA, UNSPECIFIED TYPE: ICD-10-CM

## 2020-12-11 RX ORDER — DIAZEPAM 5 MG/1
5 TABLET ORAL
Qty: 30 TAB | Refills: 0 | Status: SHIPPED | OUTPATIENT
Start: 2020-12-11 | End: 2021-04-29 | Stop reason: SDUPTHER

## 2020-12-11 NOTE — TELEPHONE ENCOUNTER
[facility]  reports [facility] last [facility] date for Valium as 8/19/20 for a 8 d/s. Last Visit: 11/24/20 with MD Jared Carrion  Next Appointment: 6/3/21 with MD Jared Carrion  Previous Refill Encounter(s): 8/19/20 #30    Requested Prescriptions     Pending Prescriptions Disp Refills    diazePAM (Valium) 5 mg tablet 30 Tab 0     Sig: Take 1 Tab by mouth every six (6) hours as needed for Anxiety or Sleep. Max Daily Amount: 20 mg.

## 2020-12-21 ENCOUNTER — TELEPHONE (OUTPATIENT)
Dept: INTERNAL MEDICINE CLINIC | Age: 75
End: 2020-12-21

## 2020-12-21 NOTE — TELEPHONE ENCOUNTER
Patient asking if you would like her to keep track of her bp since it was high last visit if so what time do you want her to take it

## 2020-12-22 ENCOUNTER — HOSPITAL ENCOUNTER (OUTPATIENT)
Dept: MAMMOGRAPHY | Age: 75
Discharge: HOME | End: 2020-12-22
Attending: INTERNAL MEDICINE
Payer: MEDICARE

## 2020-12-22 DIAGNOSIS — Z12.31 VISIT FOR SCREENING MAMMOGRAM: ICD-10-CM

## 2020-12-22 PROCEDURE — 77063 BREAST TOMOSYNTHESIS BI: CPT

## 2020-12-22 NOTE — TELEPHONE ENCOUNTER
Please advise her to monitor and record her blood pressure every morning and evening for the next 2 weeks at least two hours after taking her medication and deliver record of readings to our office for review.

## 2021-01-04 ENCOUNTER — HOSPITAL ENCOUNTER (OUTPATIENT)
Dept: CT IMAGING | Age: 76
Discharge: HOME | End: 2021-01-04
Attending: NURSE PRACTITIONER
Payer: MEDICARE

## 2021-01-04 DIAGNOSIS — C50.912 MALIGNANT NEOPLASM OF UNSPECIFIED SITE OF LEFT FEMALE BREAST (HCC): ICD-10-CM

## 2021-01-04 DIAGNOSIS — N64.59 OTHER SIGNS AND SYMPTOMS IN BREAST: ICD-10-CM

## 2021-01-04 DIAGNOSIS — C79.9 METASTASIS (HCC): ICD-10-CM

## 2021-01-04 LAB — CREAT UR-MCNC: 0.8 MG/DL (ref 0.6–1.3)

## 2021-01-04 PROCEDURE — 82565 ASSAY OF CREATININE: CPT

## 2021-01-04 PROCEDURE — 74011000636 HC RX REV CODE- 636

## 2021-01-04 PROCEDURE — 74177 CT ABD & PELVIS W/CONTRAST: CPT

## 2021-01-04 RX ADMIN — IOPAMIDOL 100 ML: 612 INJECTION, SOLUTION INTRAVENOUS at 13:35

## 2021-01-19 RX ORDER — LOSARTAN POTASSIUM 25 MG/1
25 TABLET ORAL DAILY
Qty: 90 TAB | Refills: 3 | Status: SHIPPED | OUTPATIENT
Start: 2021-01-19 | End: 2022-01-18 | Stop reason: SDUPTHER

## 2021-01-29 RX ORDER — MELOXICAM 15 MG/1
15 TABLET ORAL DAILY
Qty: 90 TAB | Refills: 2 | Status: SHIPPED | OUTPATIENT
Start: 2021-01-29 | End: 2021-11-13

## 2021-01-29 RX ORDER — HALOPERIDOL 2 MG/1
2 TABLET ORAL 2 TIMES DAILY
Qty: 180 TAB | Refills: 0 | Status: SHIPPED | OUTPATIENT
Start: 2021-01-29 | End: 2021-04-29 | Stop reason: SDUPTHER

## 2021-01-29 NOTE — TELEPHONE ENCOUNTER
Last Visit: 11/24/20 with MD Teresa Crowder  Next Appointment: 6/3/21 with MD Melgar  Previous Refill Encounter(s): 11/2/20 Haldol #180, Mobic #90 with 2 refills    Requested Prescriptions     Pending Prescriptions Disp Refills    haloperidoL (HALDOL) 2 mg tablet 180 Tab 0     Sig: Take 1 Tab by mouth two (2) times a day.  meloxicam (MOBIC) 15 mg tablet 270 Tab 0     Sig: Take 1 Tab by mouth daily.  Take WITH FOOD

## 2021-03-03 ENCOUNTER — TRANSCRIBE ORDER (OUTPATIENT)
Dept: SCHEDULING | Age: 76
End: 2021-03-03

## 2021-03-03 DIAGNOSIS — M81.0 SENILE OSTEOPOROSIS: Primary | ICD-10-CM

## 2021-04-07 ENCOUNTER — OFFICE VISIT (OUTPATIENT)
Dept: INTERNAL MEDICINE CLINIC | Age: 76
End: 2021-04-07
Payer: MEDICARE

## 2021-04-07 VITALS
TEMPERATURE: 97.7 F | HEIGHT: 59 IN | RESPIRATION RATE: 16 BRPM | SYSTOLIC BLOOD PRESSURE: 128 MMHG | BODY MASS INDEX: 29.03 KG/M2 | DIASTOLIC BLOOD PRESSURE: 76 MMHG | OXYGEN SATURATION: 100 % | WEIGHT: 144 LBS | HEART RATE: 71 BPM

## 2021-04-07 DIAGNOSIS — Z01.818 PREOPERATIVE EVALUATION TO RULE OUT SURGICAL CONTRAINDICATION: Primary | ICD-10-CM

## 2021-04-07 DIAGNOSIS — Z17.0 MALIGNANT NEOPLASM OF LEFT BREAST IN FEMALE, ESTROGEN RECEPTOR POSITIVE, UNSPECIFIED SITE OF BREAST (HCC): ICD-10-CM

## 2021-04-07 DIAGNOSIS — C78.00 MALIGNANT NEOPLASM METASTATIC TO LUNG, UNSPECIFIED LATERALITY (HCC): ICD-10-CM

## 2021-04-07 DIAGNOSIS — I89.0 LYMPHEDEMA OF ARM: ICD-10-CM

## 2021-04-07 DIAGNOSIS — I10 ESSENTIAL HYPERTENSION: ICD-10-CM

## 2021-04-07 DIAGNOSIS — C50.912 MALIGNANT NEOPLASM OF LEFT BREAST IN FEMALE, ESTROGEN RECEPTOR POSITIVE, UNSPECIFIED SITE OF BREAST (HCC): ICD-10-CM

## 2021-04-07 DIAGNOSIS — F95.2 TOURETTE SYNDROME: ICD-10-CM

## 2021-04-07 DIAGNOSIS — T45.1X5A IMMUNOSUPPRESSED DUE TO CHEMOTHERAPY (HCC): ICD-10-CM

## 2021-04-07 DIAGNOSIS — M85.89 OSTEOPENIA OF MULTIPLE SITES: ICD-10-CM

## 2021-04-07 DIAGNOSIS — Z79.899 IMMUNOSUPPRESSED DUE TO CHEMOTHERAPY (HCC): ICD-10-CM

## 2021-04-07 DIAGNOSIS — E78.5 HYPERLIPIDEMIA, UNSPECIFIED HYPERLIPIDEMIA TYPE: ICD-10-CM

## 2021-04-07 DIAGNOSIS — D84.821 IMMUNOSUPPRESSED DUE TO CHEMOTHERAPY (HCC): ICD-10-CM

## 2021-04-07 PROCEDURE — G9899 SCRN MAM PERF RSLTS DOC: HCPCS | Performed by: INTERNAL MEDICINE

## 2021-04-07 PROCEDURE — 3017F COLORECTAL CA SCREEN DOC REV: CPT | Performed by: INTERNAL MEDICINE

## 2021-04-07 PROCEDURE — G8754 DIAS BP LESS 90: HCPCS | Performed by: INTERNAL MEDICINE

## 2021-04-07 PROCEDURE — G8536 NO DOC ELDER MAL SCRN: HCPCS | Performed by: INTERNAL MEDICINE

## 2021-04-07 PROCEDURE — 99214 OFFICE O/P EST MOD 30 MIN: CPT | Performed by: INTERNAL MEDICINE

## 2021-04-07 PROCEDURE — G8510 SCR DEP NEG, NO PLAN REQD: HCPCS | Performed by: INTERNAL MEDICINE

## 2021-04-07 PROCEDURE — G8419 CALC BMI OUT NRM PARAM NOF/U: HCPCS | Performed by: INTERNAL MEDICINE

## 2021-04-07 PROCEDURE — G0463 HOSPITAL OUTPT CLINIC VISIT: HCPCS | Performed by: INTERNAL MEDICINE

## 2021-04-07 PROCEDURE — G8752 SYS BP LESS 140: HCPCS | Performed by: INTERNAL MEDICINE

## 2021-04-07 PROCEDURE — 1090F PRES/ABSN URINE INCON ASSESS: CPT | Performed by: INTERNAL MEDICINE

## 2021-04-07 PROCEDURE — 1101F PT FALLS ASSESS-DOCD LE1/YR: CPT | Performed by: INTERNAL MEDICINE

## 2021-04-07 PROCEDURE — G8427 DOCREV CUR MEDS BY ELIG CLIN: HCPCS | Performed by: INTERNAL MEDICINE

## 2021-04-07 PROCEDURE — G8399 PT W/DXA RESULTS DOCUMENT: HCPCS | Performed by: INTERNAL MEDICINE

## 2021-04-07 NOTE — PATIENT INSTRUCTIONS
Heart-Healthy Diet: Care Instructions Your Care Instructions A heart-healthy diet has lots of vegetables, fruits, nuts, beans, and whole grains, and is low in salt. It limits foods that are high in saturated fat, such as meats, cheeses, and fried foods. It may be hard to change your diet, but even small changes can lower your risk of heart attack and heart disease. Follow-up care is a key part of your treatment and safety. Be sure to make and go to all appointments, and call your doctor if you are having problems. It's also a good idea to know your test results and keep a list of the medicines you take. How can you care for yourself at home? Watch your portions · Learn what a serving is. A \"serving\" and a \"portion\" are not always the same thing. Make sure that you are not eating larger portions than are recommended. For example, a serving of pasta is ½ cup. A serving size of meat is 2 to 3 ounces. A 3-ounce serving is about the size of a deck of cards. Measure serving sizes until you are good at [place]" them. Keep in mind that restaurants often serve portions that are 2 or 3 times the size of one serving. · To keep your energy level up and keep you from feeling hungry, eat often but in smaller portions. · Eat only the number of calories you need to stay at a healthy weight. If you need to lose weight, eat fewer calories than your body burns (through exercise and other physical activity). Eat more fruits and vegetables · Eat a variety of fruit and vegetables every day. Dark green, deep orange, red, or yellow fruits and vegetables are especially good for you. Examples include spinach, carrots, peaches, and berries. · Keep carrots, celery, and other veggies handy for snacks. Buy fruit that is in season and store it where you can see it so that you will be tempted to eat it. · Cook dishes that have a lot of veggies in them, such as stir-fries and soups. Limit saturated and trans fat · Read food labels, and try to avoid saturated and trans fats. They increase your risk of heart disease. · Use olive or canola oil when you cook. · Bake, broil, grill, or steam foods instead of frying them. · Choose lean meats instead of high-fat meats such as hot dogs and sausages. Cut off all visible fat when you prepare meat. · Eat fish, skinless poultry, and meat alternatives such as soy products instead of high-fat meats. Soy products, such as tofu, may be especially good for your heart. · Choose low-fat or fat-free milk and dairy products. Eat foods high in fiber · Eat a variety of grain products every day. Include whole-grain foods that have lots of fiber and nutrients. Examples of whole-grain foods include oats, whole wheat bread, and brown rice. · Buy whole-grain breads and cereals, instead of white bread or pastries. Limit salt and sodium · Limit how much salt and sodium you eat to help lower your blood pressure. · Taste food before you salt it. Add only a little salt when you think you need it. With time, your taste buds will adjust to less salt. · Eat fewer snack items, fast foods, and other high-salt, processed foods. Check food labels for the amount of sodium in packaged foods. · Choose low-sodium versions of canned goods (such as soups, vegetables, and beans). Limit sugar · Limit drinks and foods with added sugar. These include candy, desserts, and soda pop. Limit alcohol · Limit alcohol to no more than 2 drinks a day for men and 1 drink a day for women. Too much alcohol can cause health problems. When should you call for help? Watch closely for changes in your health, and be sure to contact your doctor if: 
  · You would like help planning heart-healthy meals. Where can you learn more? Go to http://www.[employer].com/ Enter V137 in the search box to learn more about \"Heart-Healthy Diet: Care Instructions. \" Current as of: August 22, 2019               Content Version: 12.6 © 6107-0060 [employer]. Care instructions adapted under license by [employer] (which disclaims liability or warranty for this information). If you have questions about a medical condition or this instruction, always ask your healthcare professional. [address] any warranty or liability for your use of this information.

## 2021-04-07 NOTE — PROGRESS NOTES
1. Have you been to the ER, urgent care clinic or hospitalized since your last visit? NO.     2. Have you seen or consulted any other health care providers outside of the [address] [place] [address] since your last visit (Include any pap smears or colon screening)?  NO

## 2021-04-09 ENCOUNTER — TELEPHONE (OUTPATIENT)
Dept: INTERNAL MEDICINE CLINIC | Age: 76
End: 2021-04-09

## 2021-04-09 PROBLEM — Z79.899 IMMUNOSUPPRESSED DUE TO CHEMOTHERAPY (HCC): Status: ACTIVE | Noted: 2021-04-09

## 2021-04-09 PROBLEM — D84.821 IMMUNOSUPPRESSED DUE TO CHEMOTHERAPY (HCC): Status: ACTIVE | Noted: 2021-04-09

## 2021-04-09 PROBLEM — T45.1X5A IMMUNOSUPPRESSED DUE TO CHEMOTHERAPY (HCC): Status: ACTIVE | Noted: 2021-04-09

## 2021-04-09 NOTE — PROGRESS NOTES
HPI:  Oscar Estrada is a 78y.o. year old female who presents today for preoperative evalluation. She has a history of hypertension, dyslipidemia,  metastatic breast cancer, GERD, diverticulosis with recurrent diverticulitis, osteoarthritis s/p right knee replacement (2012), lumbar degenerative disease, osteopenia, compression fracture, and Tourette's syndrome. She reports that she completed the Moderna COVID-19 vaccine series. She reports that she is scheduled to undergo left cataract surgery with Dr. Carmen Jenkins on 4/14/2021. She reports continued chronic fatigue related to treatment with Ibrance and letrozole. She denies any change in exercise tolerance. She is otherwise without new complaints. She has a history of left breast cancer, which was diagnosed in 7/2010 as invasive ductal adenocarcinoma, s/p left modified radical mastectomy with sentinel node biopsy, performed by Dr. Cierra Osborne. She had 3 positive lymph nodes and was classified as stage 1B, T1c N1 M0, ER and MI positive, Her-2/sarah negative by FISH. She underwent 6 cycles of chemotherapy with Docetaxel and Cytoxan. She was subsequently unable to tolerate anastrozole, exemestane, tamoxifen, and letrozole due to profound fatigue and arthralgias. Her course was complicated by chronic left arm lymphedema, managed with a compression sleeve. A chest CT scan in 3/2014 noted several small pulmonary nodules which were concerning for metastases but were too small to biopsy. They were followed with sequential CT scans , and in 12/2014, repeat chest CT scan showed enlarging bilateral pulmonary nodules compatible with progression of metastatic disease. A fine needle aspirate was performed on 12/21/2014 which showed benign pulmonary parenchyma with alveolar hemorrhage. Repeat chest CT scan in 3/12/2015 showed mild interval progression of the lung metastases with enlarging nodules and development of new nodules.  A CT guided needle biopsy was performed on 3/23/2015, which confirmed metastatic adenocarcinoma consistent with breast primary (stage IV, ER/DC positive, and TTF-1 negative). On 4/13/2015, she was started on therapy with Ibrance and letrozole. Follow-up CT scan (7/15/15) showed partial response with decreasing size of some nodules and resolution of other nodules. Most recent chest, abdomen, and pelvis CT scan was obtained in 6/2017, showing stable or decreased multiple bilateral pulmonary nodules and no suspicious new pulmonary nodules. She continues on palbociclib, but was changed from letrozole to fulvestrant. She describes persistent fatigue which she attributes to monthly treatment with fulvestrant (Faslodex). She underwent restaging chest, abdomen, and pelvis CT scan in 7/2019 which showed that her pulmonary nodules were unchanged, and no other evidence of metastatic disease. She is followed by Dr. Tyrone Gutierrez. She states that she established care with Dr. Tariq Donaldson, but was told that she may have her mammograms and breast exams in our office with referral to her if something arises. She also has a history of a right ovarian cyst, but was released from the care of Dr. Talha Cullen since it was concluded to be benign. She has a history of hypertension treated with losartan. She monitors her blood pressure mainly when visiting multiple physicians and reports that it is well controlled. She has no history of heart disease, and denies any chest pain, shortness of breath at rest or with exertion, palpitations, lightheadedness, or edema. In 4/11/2016, she sustained a fall with subsequent severe pain in her mid to upper lumbar region and wrapping around waist. She was treated with meloxicam, tylenol and flexeril, but because of persistent discomfort, she presented to [facility] on 4/15/2016 and lumbosacral spine films showed marked degenerative changes with disc space obliteration throughout lumbar spine and slight anterior spondylolisthesis of L4.  She was evaluated by Dr. Yfn Cali on 4/18/2016 who recommended physical therapy and evaluation by Dr. Pina Gibbons for her degenerative spine changes. She continued to take meloxicam and tylenol and started physical therapy, but noted worsening of her pain. She subsequently was evaluated by Dr. Jada Green, who obtained a lumbar MRI on 4/26/2016, which showed a new subacute compression fracture of T11 vertebral body with diffuse marrow edema and mild paravertebral inflammatory stranding, no retropulsion or central stenosis; more severe degenerative disease along the right side at L4-L5, L5-S1, potential mild compression of right exiting L4 and L5 nerve roots. She was referred to Dr. Chadd Forman for kyphoplasty of the T11 compression fracture given failure of pain control with conservative measures. She underwent the procedure on 9/5/8611 without complications, and G25 vertebral body bone core and aspirate biopsies were performed, which showed only reactive bone changes and no evidence of malignancy. She has a history of osteopenia, with a history of fracture of her sacrum. She reports that she was treated with alendronate in 12/2011 but discontinued it in 4/2013 due to intolerence. Bone density scan (11/2017) was consistent with osteopenia with femoral neck T-scores: left -1.4/ right -1.4 and distal radius -2.2 (lumbar T-score could not be assessed). A repeat bone density scan was obtained (11/2019) showing continued evidence of osteopenia with femoral neck T-scores: left -1.4/ right -1.4 and distal radius -2.4 (lumbar T-score could not be assessed). She was referred to Dr. Efrain Britt given her multiple compression fractures and treatment with letrozole, and was started on Reclast in 3/2021. She continues to take calcium and vitamin D. In 10/2016, she developed left sided low back pain with radiation to her left leg over the last several weeks.  She underwent a lumbar MRI (9/2016) showing scoliosis and advanced multilevel degenerative changes with areas of foraminal stenosis at L3-L4 and L5-S1; mild/moderate central canal stenosis at L4-L5 and L5-S1. She was evaluated by Dr. Luis Alexandre and treated with pregabalin with some improvement. She subsequently received an epidural steroid injection with improvement and discontinued pregabalin. In 4/2017, she noted increasing cervical and upper back discomfort. She has had multiple cervical MRI's,and underwent repeat in 4/2017 which showed multilevel degenerative disc disease and facet arthropathy without change from prior studies with mild central canal stenosis C3-C4 and C5-C6, and moderate central canal stenosis C6-C7. She was treated with Mobic and Flexeril, and gabapentin at bedtime. She complained of right shoulder pain. X-ray of her shoulder (11/2017) was negative, and she had a right shoulder MRI (5/14/2018) which showed deep partial thickness undersurface tear of the distal supraspinatus, likely with full thickness slitlike perforations; no gross tendon retraction, but there is moderate muscle atrophy; partial thickness tearing with longitudinal components involving the biceps tendon at its sulcal turn, some associated tenosynovitis; accompanying short length longitudinal split tear in the distal subscapularis; moderate infraspinatus tendinosis, and mild to moderate subacromial subdeltoid bursitis. She underwent evaluation by Dr. Kathy Elliott on 5/18/2018, and received a glenohumeral cortisone injection. She reported worsening low back pain and right sciatica, and underwent an epidural steroid injection at right L4-5 transforaminal approach by Dr. Luis Alexandre on 3/18/2019. She has noted improvement in her right hip and leg pain.  She underwent a lumbar MRI (6/2020) for worsening right sciatica, showing very advanced multilevel DDD and DJD with extensive bone marrow edema reaction to DDD; chronic compression fractures; single level of degenerative central spinal stenosis, moderate, at L4/L5, worsened compared 2016; multilevel degenerative foraminal narrowing worst right L4/L5, bilateral L5/S1; no evidence of metastatic breast cancer. She underwent left L4/5 and right L5-S1 KAREN in 6/2020 and Left S1 TF KAREN in 8/2020 by Dr. Sumi Hammer with improvement. She continues to take gabapentin 300 mg at night. She has a history of GERD and diverticulosis with recurrent diverticulitis,. She was evaluated by Dr. Alayna Chau in 2/23/2016 and underwent an upper endoscopy, which revealed a Schatzki's ring at the gastroesophageal junction (dilated) with mild distal esophagitis and a small hiatal hernia. A screening colonoscopy was also performed showing moderately severe diverticulosis of the ascending and descending colon and a 2 mm sessile sigmoid polyp (pathology: hyperplastic). Recommendations for follow-up colonoscopy in 10 years. She denies any abdominal pain, nausea, vomiting, melena, hematochezia, or change in bowel movements. She was evaluated in 8/2017 by GERALD Batista with complaints of abdominal pain and was treated with Cipro for presumed diverticulitis. She contacted the office again in 10/2017 and 1/2018 with a recurrence of symptoms, and was treated for a 10 day course with Cipro with improvement. Abdominal CT scan in 2/6/2018 for staging for her breast cancer did show evidence of moderate to severe diverticulosis, but no evidence of diverticulitis. She did present with similar symptoms in 3/2018 and was evaluated by GERALD Vigil and prescribed Cipro. However, her symptoms resolved prior to taking the antibiotics. In 7/2018, she again presented with left lower quadrant pain, and an abdominal CT scan (7/13/2018) which showed evidence of uncomplicated diverticulitis involving the lower left colon and upper sigmoid. She was treated with Cipro and Flagyl initially, but developed a rash due to Flagyl, and was subsequently changed to Augmentin.  She had a follow-up visit with Dr. Mirella Pack and it was his opinion that she also had IBS which mimicked her episodes of diverticulitis. He recommended continuing on a probiotic and prescribed hyoscyamine to use as needed. She also was having more difficulty with burning epigastric pain, and was advised by Dr. Herschell Runner to begin Zantac five days per week (off 2 days). She has noted improvement since doing so. She also has a history of Tourette's syndrome controlled with Haldol. She is followed by Dr. Krystle Sheth. Past Medical History:   Diagnosis Date    Arthritis     Breast cancer metastasized to lung [facility])     Left Breast    Chronic pain     Colon polyps 2/22/16    distal sigmoid, Dr. Vandana HERNANDEZ (degenerative disc disease)     Diverticulitis of colon     Diverticulosis 2/22/16    mild in the ascedning and sigmoid colon, Dr. Ava Pike (hyperlipidemia)     Hypertension     Osteopenia     Tourette syndrome     Vitamin D deficiency      Past Surgical History:   Procedure Laterality Date    HX APPENDECTOMY      HX BREAST BIOPSY  7-30-10    Left Breast w/Nipple Duct exploration and excisional biopsy-left    HX DILATION AND CURETTAGE      x3    HX MODIFIED RADICAL MASTECTOMY  9-3-10    left    HX ORTHOPAEDIC Right 2012    knee replacement    HX TONSILLECTOMY      HYSTEROSCOPY DIAGNOSTIC       Current Outpatient Medications   Medication Sig    haloperidoL (HALDOL) 2 mg tablet Take 1 Tab by mouth two (2) times a day.  meloxicam (MOBIC) 15 mg tablet Take 1 Tab by mouth daily. Take WITH FOOD    losartan (COZAAR) 25 mg tablet Take 1 Tab by mouth daily.  diazePAM (Valium) 5 mg tablet Take 1 Tab by mouth every six (6) hours as needed for Anxiety or Sleep. Max Daily Amount: 20 mg.    letrozole (FEMARA) 2.5 mg tablet TAKE 1 TABLET BY MOUTH EVERY DAY    gabapentin (NEURONTIN) 300 mg capsule Take 1 Cap by mouth two (2) times a day. Max Daily Amount: 600 mg.  cyclobenzaprine (FLEXERIL) 5 mg tablet Take 1 Tab by mouth three (3) times daily as needed for Muscle Spasm(s).     pantoprazole sodium (PROTONIX PO) Take  by mouth.  palbociclib 75 mg cap Take 75 mg by mouth daily. For days 1-21 of each 28 day cycle    CYANOCOBALAMIN, VITAMIN B-12, (VITAMIN B-12 PO) Take  by mouth.  Biotin 2,500 mcg cap Take  by mouth.  calcium-vitamin D (CALCIUM 500+D) 500 mg(1,250mg) -200 unit per tablet Take 1 Tab by mouth two (2) times daily (with meals).  cholecalciferol, vitamin d3, (VITAMIN D) 1,000 unit tablet Take 2,000 Units by mouth daily.  ascorbic acid (VITAMIN C) 500 mg tablet Take  by mouth. No current facility-administered medications for this visit. Allergies and Intolerances: Allergies   Allergen Reactions    Keflex [Cephalexin] Rash    Metronidazole Rash    Other Medication Nausea Only     Anesthesia    Shellfish Containing Products Nausea and Vomiting     More of just eating the actual shellfish.  Sulfa (Sulfonamide Antibiotics) Rash    Ultram [Tramadol] Nausea and Vomiting     Patient states she is allergic to most Narcotics    Vancomycin Rash     Family History:   Family History   Problem Relation Age of Onset    Osteoporosis Sister     Osteoporosis Mother     Stroke Father     Hypertension Father     Cancer Paternal Aunt         breast     Social History:   She  reports that she has quit smoking. She quit after 3.00 years of use. She has never used smokeless tobacco. She stopped smoking over 40 years ago. She is  and lives with . They have one daughter and two grandchildren.   Social History     Substance and Sexual Activity   Alcohol Use Yes    Alcohol/week: 5.8 standard drinks    Types: 7 Glasses of wine per week     Immunization History:  Immunization History   Administered Date(s) Administered    Covid-19, MODERNA, Mrna, Lnp-s, Pf, 100mcg/0.5mL 02/07/2021, 03/07/2021    Influenza High Dose Vaccine PF 10/01/2015, 10/25/2016, 10/05/2017    Influenza Vaccine 09/01/2014    Influenza Vaccine (Tri) Adjuvanted (>65 Yrs FLUAD TRI 87015) 10/05/2018, 10/17/2019    Influenza Vaccine Split 11/01/2011, 10/02/2012    Influenza Vaccine Whole 10/08/2010    Influenza, Quadrivalent, Adjuvanted (>65 Yrs FLUAD QUAD 95473) 09/23/2020    Pneumococcal Conjugate (PCV-13) 10/27/2015    Pneumococcal Polysaccharide (PPSV-23) 04/15/2013    TD Vaccine 05/05/2008    Tdap 09/12/2014    Zoster 09/20/2011       Review of Systems:   As above included in HPI. Otherwise 11 point review of systems negative including constitutional, skin, HENT, eyes, respiratory, cardiovascular, gastrointestinal, genitourinary, musculoskeletal, endo/heme/aller, neurological.    Physical:     Visit Vitals  /76 (BP 1 Location: Left arm, BP Patient Position: Sitting)   Pulse 71   Temp 97.7 °F (36.5 °C) (Temporal)   Resp 16   Ht 4' 11\" (1.499 m)   Wt 144 lb (65.3 kg)   SpO2 100%   BMI 29.08 kg/m²        Patient appears in no apparent distress. Affect is appropriate. HEENT: PERRLA, anicteric, no JVD, adenopathy or thyromegaly. No carotid bruits or radiated murmur. Lungs: clear to auscultation, no wheezes, rhonchi, or rales. Heart: regular rate and rhythm. No murmur, rubs, gallops  Abdomen: soft, nontender, nondistended, normal bowel sounds, no hepatosplenomegaly or masses. Extremities: without edema. Lymphedema of left arm and hand        Review of Data:  Labs:  No visits with results within 1 Month(s) from this visit. Latest known visit with results is:   Hospital Outpatient Visit on 01/04/2021   Component Date Value Ref Range Status    Creatinine, POC 01/04/2021 0.8  0.6 - 1.3 MG/DL Final    GFRAA, POC 01/04/2021 >60  >60 ml/min/1.73m2 Final    GFRNA, POC 01/04/2021 >60  >60 ml/min/1.73m2 Final       EKG (11/24/2020) sinus rhythm at 75 bpm, normal intervals; no ischemic changes. Health Maintenance:  Screening:    Mammogram: negative (12/2020)   PAP smear: negative (9/2013) Dr Pardeep Ac. Pelvic ultrasound negative (9/2015).  No further screening needed. Colorectal: colonoscopy (2/2016) hyperplastic polyp; Dr. Yuan Quiñonez. Due 2/2026. Depression: none   DM (HbA1c/FPG): FPG 95 (11/2020)   Hepatitis C: unknown   Falls: none   DEXA: osteopenia (11/2019) s/p T11 compression fracture in 4/2016. Smoking:distant past   Glaucoma: regular eye exams with Dr. Paolo Moreno (last 3/2021)   Vitamin D: 53.4 (11/2020)   Medicare Wellness: 5/21/2020      Impression:  Patient Active Problem List   Diagnosis Code    Tourette syndrome F95.2    Osteoarthritis, Status post right total knee replacement M19.90    Lumbar spinal stenosis M48.061    HLD (hyperlipidemia) E78.5    Breast cancer ([facility] [address] metastatic to lungs  C50.919    Essential hypertension I10    Diverticulosis K57.90    Osteopenia M85.80    Lymphedema of arm left I89.0    Insomnia G47.00    Fatigue R53.83    Diverticulitis, recurrent K57.92    T11 Vertebral compression fracture (HCC) s/p kyphoplasty M48.50XA    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.812    Spinal stenosis of cervical region M48.02    Right shoulder pain M25.511    Malignant neoplasm metastatic to lung (HCC) C78.00    Nontraumatic incomplete tear of right rotator cuff M75.111    Right sided sciatica M54.31    DDD (degenerative disc disease), lumbar M51.36    Lumbar facet arthropathy M47.816    Immunosuppressed due to chemotherapy ([address] D84.821, T45.1X5A, Z79.899       Plan:  Preoperative risk stratification:  Patient scheduled for left cataract surgery by Dr. Paolo Moreno on 4/14/2021. Surgery to be performed under local anesthesia with possible IV sedation. Scheduled surgery is a low risk procedure and her functional status is fair. Patient is clinically stable and without absolute medical contraindication for surgery. She is low risk for a marj-procedural cardiac event. Surgery may proceed as planned at the discretion of Dr. Paolo Moreno. Other issues:  1. Dyspnea on exertion.  Patient reported developing worsening dyspnea and fatigue with exertion. Denied associated chest pain. EKG unremarkable, chest x-ray negative, and and echocardiogram (12/1/2020) normal LV function (EF 55-60%), trileaflet AV with moderate AI, PAP 30 mmHg. Repeat staging CT scans in 1/2021 without evidence of recurrence or metastases. No further evaluation needed. 2. Hypertension. Well controlled on losartan 25 mg daily. Renal function has been normal with creatinine 0.91/ eGFR >60. Continue to follow. 2. Breast cancer with pulmonary metastasis. Continue current therapy as per Dr. Kari Huffman. Recent CT scans (last 7/2019) with stable small pulmonary nodules. On Ibrance and letrozole. Wishing to continue with treatment despite excessive fatigue. Not using compression sleeve for lymphedema, but receiving massage therapy every three weeks with good results. Recently changed to [facility]'s lymphedema clinic due to pandemic. Follow. 3. Hyperlipidemia. Calculated 10 year ASCVD risk is 18.2%, which places her in one of the four statin benefit groups (primary prevention with risk > 7.5%).  and HDL 58. Given lack of history of ASCVD, no evidence of atherosclerotic disease on chest and abdominal CT scans, and history of metastatic breast cancer, will not recommend treatment with statin at this time. Continue to emphasized importance of lifestyle modifications, including diet and exercise. 4. T11 compression fracture. S/P kyphoplasty with no further difficulties. Follow. 5. Osteopenia. Last bone density scan 11/2019. Using femoral neck T-scores, calculated FRAX score estimates her 10 year risk of a major osteoporetic fracture at 16 % and hip fracture at 2.3%, which alone are not an indication for biphosphonate treatment. However, the development of a vertebral compression fracture in 4/2016 would be considered an indication for treatment. In addition, treatment with an estrogen receptor blocker increases likelihood of progression.  She did not tolerate alendronate in the past. Evaluated by Dr. Aleida Bull, and received first dose of Reclast on 3/29/2020. Continue calcium and Vitamin D. Encouraged to continue exercises. Repeat bone density scheduled for 11/2021.    6. Recurrent diverticulitis. Colonoscopy (2/2016) with moderately severe diverticulosis in the ascending and descending colon. Has had multiple recent episodes of abdominal pain which were treated empirically with Cipro. Episode of symptoms in 3/2018 resolved prior to initiating antibiotics, bringing into question whether her abdominal symptoms were due to diverticulitis or irritable bowel syndrome. Discussed at last visit that she should have abdominal CT scan to confirm the diagnosis next time she had abdominal complaints. Had recurrent episode in 7/2018, and abdominal CT scan confirmed diagnosis of acute diverticulitis. Treated with Augmentin as developed rash on Flagyl. Subsequently evaluated by Dr. Lisseth Sharma, and instructed to continue Probiotics and use hyoscyamine as needed when develop abdominal complaints. It was his opinion that her complaints may also be related to IBS, so wanting to avoid frequent antibiotics if not needed. Reports multiple mild episodes of pain which may reflect IBS since resolved without antibiotics. Not wishing to proceed with repeat abdominal CT scan to assess since has upcoming staging CT scans scheduled for 1/2021. Advised to try hyoscyamine with next episode. Will continue to follow. 7. Low back pain with right sciatica. Known lumbar stenosis and facet arthropathy. Using meloxicam, cyclobenzaprine, gabapentin, and Tylenol. Previously did undergo L4-L5 epidural injection on the right by Dr. Cass Rowland in 2016 for similar symptoms. Recent recurrence of symptoms without sustained improvement despite treatment with steroids, NSAIDS, muscle relaxants and gabapentin.  Underwent repeat lumbar MRI in 5/2020 which showed very advanced multilevel DDD and DJD with chronic compression fractures and worsening since 2016, especially at L4/5. Underwent left L4/5 and right L5-S1 KAREN in 6/2020 and Left S1 TF KAREN in 8/2020 by Dr. Mikayla Smith with improvement. Will continue to follow. 8. Fatigue. Severe and persistent. Continues on Ibrance and letrozole. Describes fatigue especially severe in the evening. However, aware of need to continue with current therapy. Follow. 9. Right shoulder pain. MRI with partial rotator cuff tear. Evaluated by Dr. Elli Connor and received a glenohumeral cortisone injection. Recommended physical therapy, but patient did not proceed. Not a significant complaint today as improved. Followed by Dr. Maik Headley. 10. Tourette's syndrome. Followed by Dr. Sowmay Anderson. On Haldol with plans to switch to Risperdal if Haldol becomes unavailable. Using diazepam occasionally for sleep, but at recent follow-up with Dr. Sowmya Anderson, recommendation to change to temazepam (Restoril). Planning to attempt. Discussed impact on upcoming cataract surgery and reassured that will be sedated with propofol. 11. Right ovarian cyst. No further monitoring needed as per Dr. Devorah Hairston. Patient expressed concern about not following cyst, but surveillance CT scans obtained every 6 months for breast cancer do include imaging of pelvis. Has been stable. Patient reassured. Follow. 12. GERD. Using Protonix daily with good control. Follow. 13. Health maintenance. Already received influenza vaccine. Has not yet received Shingrix vaccine. Completed Moderna COVID 19 vaccine series. Other immunizations up to date. Mammogram up to date. Will perform breast exam every visit. Vitamin D level normal. Continue maintenance dose supplement. Continue regular eye exams with Dr. Giovanna Litten. Medicare wellness visit up to date. Total time: 40 minutes spent with the patient on counseling, answering questions and/or coordination of care. Complex medical review and management performed.     Patient understands recommendations and agrees with plan.  Follow-up as previously scheduled.

## 2021-04-09 NOTE — TELEPHONE ENCOUNTER
Please fax office note for preoperative evaluation to Dr. Aguilera Marking for cataract surgery on 4/14/2021. Thank you.

## 2021-04-10 ENCOUNTER — TRANSCRIBE ORDER (OUTPATIENT)
Dept: SCHEDULING | Age: 76
End: 2021-04-10

## 2021-04-10 DIAGNOSIS — I67.1 CEREBRAL ANEURYSM, NONRUPTURED: Primary | ICD-10-CM

## 2021-05-17 ENCOUNTER — HOSPITAL ENCOUNTER (OUTPATIENT)
Age: 76
Discharge: HOME | End: 2021-05-17
Attending: PSYCHIATRY & NEUROLOGY
Payer: MEDICARE

## 2021-05-17 DIAGNOSIS — I67.1 CEREBRAL ANEURYSM, NONRUPTURED: ICD-10-CM

## 2021-05-17 PROCEDURE — 82565 ASSAY OF CREATININE: CPT

## 2021-05-17 PROCEDURE — 74011636320 HC RX REV CODE- 636/320: Performed by: PSYCHIATRY & NEUROLOGY

## 2021-05-17 PROCEDURE — A9575 INJ GADOTERATE MEGLUMI 0.1ML: HCPCS | Performed by: PSYCHIATRY & NEUROLOGY

## 2021-05-17 PROCEDURE — 70553 MRI BRAIN STEM W/O & W/DYE: CPT

## 2021-05-17 PROCEDURE — 70544 MR ANGIOGRAPHY HEAD W/O DYE: CPT

## 2021-05-17 RX ADMIN — GADOTERATE MEGLUMINE 12 ML: 376.9 INJECTION INTRAVENOUS at 12:37

## 2021-05-19 LAB — CREAT UR-MCNC: 1 MG/DL (ref 0.6–1.3)

## 2021-06-03 ENCOUNTER — OFFICE VISIT (OUTPATIENT)
Dept: INTERNAL MEDICINE CLINIC | Age: 76
End: 2021-06-03
Payer: MEDICARE

## 2021-06-03 ENCOUNTER — HOSPITAL ENCOUNTER (OUTPATIENT)
Dept: LAB | Age: 76
Discharge: HOME | End: 2021-06-03
Payer: MEDICARE

## 2021-06-03 VITALS
DIASTOLIC BLOOD PRESSURE: 78 MMHG | SYSTOLIC BLOOD PRESSURE: 128 MMHG | WEIGHT: 142 LBS | BODY MASS INDEX: 28.63 KG/M2 | RESPIRATION RATE: 16 BRPM | OXYGEN SATURATION: 98 % | HEIGHT: 59 IN | TEMPERATURE: 97.5 F | HEART RATE: 68 BPM

## 2021-06-03 DIAGNOSIS — Z79.899 IMMUNOSUPPRESSED DUE TO CHEMOTHERAPY (HCC): ICD-10-CM

## 2021-06-03 DIAGNOSIS — F95.2 TOURETTE SYNDROME: ICD-10-CM

## 2021-06-03 DIAGNOSIS — Z71.89 ADVANCED DIRECTIVES, COUNSELING/DISCUSSION: ICD-10-CM

## 2021-06-03 DIAGNOSIS — G47.00 INSOMNIA, UNSPECIFIED TYPE: ICD-10-CM

## 2021-06-03 DIAGNOSIS — T45.1X5A IMMUNOSUPPRESSED DUE TO CHEMOTHERAPY (HCC): ICD-10-CM

## 2021-06-03 DIAGNOSIS — Z17.0 MALIGNANT NEOPLASM OF LEFT BREAST IN FEMALE, ESTROGEN RECEPTOR POSITIVE, UNSPECIFIED SITE OF BREAST (HCC): ICD-10-CM

## 2021-06-03 DIAGNOSIS — M85.9 DISORDER OF BONE DENSITY AND STRUCTURE, UNSPECIFIED: ICD-10-CM

## 2021-06-03 DIAGNOSIS — Z00.00 MEDICARE ANNUAL WELLNESS VISIT, SUBSEQUENT: ICD-10-CM

## 2021-06-03 DIAGNOSIS — R53.83 FATIGUE, UNSPECIFIED TYPE: Primary | ICD-10-CM

## 2021-06-03 DIAGNOSIS — S22.080S COMPRESSION FRACTURE OF T11 VERTEBRA, SEQUELA: ICD-10-CM

## 2021-06-03 DIAGNOSIS — I10 ESSENTIAL HYPERTENSION: ICD-10-CM

## 2021-06-03 DIAGNOSIS — R53.83 FATIGUE, UNSPECIFIED TYPE: ICD-10-CM

## 2021-06-03 DIAGNOSIS — I89.0 LYMPHEDEMA OF ARM: ICD-10-CM

## 2021-06-03 DIAGNOSIS — C78.00 MALIGNANT NEOPLASM METASTATIC TO LUNG, UNSPECIFIED LATERALITY (HCC): ICD-10-CM

## 2021-06-03 DIAGNOSIS — C50.912 MALIGNANT NEOPLASM OF LEFT BREAST IN FEMALE, ESTROGEN RECEPTOR POSITIVE, UNSPECIFIED SITE OF BREAST (HCC): ICD-10-CM

## 2021-06-03 DIAGNOSIS — D84.821 IMMUNOSUPPRESSED DUE TO CHEMOTHERAPY (HCC): ICD-10-CM

## 2021-06-03 DIAGNOSIS — M85.89 OSTEOPENIA OF MULTIPLE SITES: ICD-10-CM

## 2021-06-03 DIAGNOSIS — E78.5 HYPERLIPIDEMIA, UNSPECIFIED HYPERLIPIDEMIA TYPE: ICD-10-CM

## 2021-06-03 DIAGNOSIS — Z13.31 SCREENING FOR DEPRESSION: ICD-10-CM

## 2021-06-03 DIAGNOSIS — I67.1 ANEURYSM OF INTRACRANIAL PORTION OF INTERNAL CAROTID ARTERY: ICD-10-CM

## 2021-06-03 LAB
ALBUMIN SERPL-MCNC: 3.8 G/DL (ref 3.4–5)
ALBUMIN/GLOB SERPL: 1.3 {RATIO} (ref 0.8–1.7)
ALP SERPL-CCNC: 54 U/L (ref 45–117)
ALT SERPL-CCNC: 20 U/L (ref 13–56)
ANION GAP SERPL CALC-SCNC: 5 MMOL/L (ref 3–18)
AST SERPL-CCNC: 11 U/L (ref 10–38)
BASOPHILS # BLD: 0.1 K/UL (ref 0–0.1)
BASOPHILS NFR BLD: 1 % (ref 0–2)
BILIRUB SERPL-MCNC: 0.8 MG/DL (ref 0.2–1)
BUN SERPL-MCNC: 15 MG/DL (ref 7–18)
BUN/CREAT SERPL: 19 (ref 12–20)
CALCIUM SERPL-MCNC: 9.3 MG/DL (ref 8.5–10.1)
CHLORIDE SERPL-SCNC: 103 MMOL/L (ref 100–111)
CO2 SERPL-SCNC: 28 MMOL/L (ref 21–32)
CREAT SERPL-MCNC: 0.79 MG/DL (ref 0.6–1.3)
DIFFERENTIAL METHOD BLD: ABNORMAL
EOSINOPHIL # BLD: 0.1 K/UL (ref 0–0.4)
EOSINOPHIL NFR BLD: 2 % (ref 0–5)
ERYTHROCYTE [DISTWIDTH] IN BLOOD BY AUTOMATED COUNT: 14.3 % (ref 11.6–14.5)
GLOBULIN SER CALC-MCNC: 2.9 G/DL (ref 2–4)
GLUCOSE SERPL-MCNC: 90 MG/DL (ref 74–99)
HCT VFR BLD AUTO: 38.9 % (ref 35–45)
HGB BLD-MCNC: 12.9 G/DL (ref 12–16)
LYMPHOCYTES # BLD: 1.5 K/UL (ref 0.9–3.6)
LYMPHOCYTES NFR BLD: 41 % (ref 21–52)
MCH RBC QN AUTO: 34.6 PG (ref 24–34)
MCHC RBC AUTO-ENTMCNC: 33.2 G/DL (ref 31–37)
MCV RBC AUTO: 104.3 FL (ref 74–97)
MONOCYTES # BLD: 0.5 K/UL (ref 0.05–1.2)
MONOCYTES NFR BLD: 14 % (ref 3–10)
NEUTS SEG # BLD: 1.6 K/UL (ref 1.8–8)
NEUTS SEG NFR BLD: 42 % (ref 40–73)
PLATELET # BLD AUTO: 153 K/UL (ref 135–420)
PMV BLD AUTO: 8.8 FL (ref 9.2–11.8)
POTASSIUM SERPL-SCNC: 4.8 MMOL/L (ref 3.5–5.5)
PROT SERPL-MCNC: 6.7 G/DL (ref 6.4–8.2)
RBC # BLD AUTO: 3.73 M/UL (ref 4.2–5.3)
SODIUM SERPL-SCNC: 136 MMOL/L (ref 136–145)
WBC # BLD AUTO: 3.7 K/UL (ref 4.6–13.2)

## 2021-06-03 PROCEDURE — 99497 ADVNCD CARE PLAN 30 MIN: CPT | Performed by: INTERNAL MEDICINE

## 2021-06-03 PROCEDURE — G8754 DIAS BP LESS 90: HCPCS | Performed by: INTERNAL MEDICINE

## 2021-06-03 PROCEDURE — 80053 COMPREHEN METABOLIC PANEL: CPT

## 2021-06-03 PROCEDURE — G8419 CALC BMI OUT NRM PARAM NOF/U: HCPCS | Performed by: INTERNAL MEDICINE

## 2021-06-03 PROCEDURE — G8536 NO DOC ELDER MAL SCRN: HCPCS | Performed by: INTERNAL MEDICINE

## 2021-06-03 PROCEDURE — G8510 SCR DEP NEG, NO PLAN REQD: HCPCS | Performed by: INTERNAL MEDICINE

## 2021-06-03 PROCEDURE — 99215 OFFICE O/P EST HI 40 MIN: CPT | Performed by: INTERNAL MEDICINE

## 2021-06-03 PROCEDURE — G8399 PT W/DXA RESULTS DOCUMENT: HCPCS | Performed by: INTERNAL MEDICINE

## 2021-06-03 PROCEDURE — 85025 COMPLETE CBC W/AUTO DIFF WBC: CPT

## 2021-06-03 PROCEDURE — G9899 SCRN MAM PERF RSLTS DOC: HCPCS | Performed by: INTERNAL MEDICINE

## 2021-06-03 PROCEDURE — G0463 HOSPITAL OUTPT CLINIC VISIT: HCPCS | Performed by: INTERNAL MEDICINE

## 2021-06-03 PROCEDURE — G8427 DOCREV CUR MEDS BY ELIG CLIN: HCPCS | Performed by: INTERNAL MEDICINE

## 2021-06-03 PROCEDURE — G8752 SYS BP LESS 140: HCPCS | Performed by: INTERNAL MEDICINE

## 2021-06-03 PROCEDURE — G0439 PPPS, SUBSEQ VISIT: HCPCS | Performed by: INTERNAL MEDICINE

## 2021-06-03 PROCEDURE — 1101F PT FALLS ASSESS-DOCD LE1/YR: CPT | Performed by: INTERNAL MEDICINE

## 2021-06-03 PROCEDURE — 3017F COLORECTAL CA SCREEN DOC REV: CPT | Performed by: INTERNAL MEDICINE

## 2021-06-03 PROCEDURE — 1090F PRES/ABSN URINE INCON ASSESS: CPT | Performed by: INTERNAL MEDICINE

## 2021-06-03 RX ORDER — TEMAZEPAM 15 MG/1
CAPSULE ORAL AS NEEDED
COMMUNITY
Start: 2021-04-12 | End: 2021-12-09 | Stop reason: SDUPTHER

## 2021-06-03 NOTE — PROGRESS NOTES
1. Have you been to the ER, urgent care clinic or hospitalized since your last visit? NO.     2. Have you seen or consulted any other health care providers outside of the [address] [place] [address] since your last visit (Include any pap smears or colon screening)?  NO

## 2021-06-03 NOTE — ACP (ADVANCE CARE PLANNING)
Advance Care Planning     Advance Care Planning (ACP) Physician/NP/PA Conversation      Date of Conversation: 6/3/2021  Conducted with: Patient with [address] Decision Maker:     Primary Decision Maker: Amadou Kimbrough edgar - [phone]    Secondary Decision Maker: Stevie Issa - Daughter - [phone]  Click here to complete [address] including selection of the Healthcare Decision Maker Relationship (ie \"Primary\")  Today we documented Decision Maker(s) consistent with Legal Next of Kin hierarchy. Care Preferences:    Hospitalization: \"If your health worsens and it becomes clear that your chance of recovery is unlikely, what would be your preference regarding hospitalization? \"  The patient would prefer hospitalization. Ventilation: \"If you were unable to breathe on your own and your chance of recovery was unlikely, what would be your preference about the use of a ventilator (breathing machine) if it was available to you? \"   The patient would desire the use of a ventilator. Resuscitation: \"In the event your heart stopped as a result of an underlying serious health condition, would you want attempts to be made to restart your heart, or would you prefer a natural death? \"   Yes, attempt to resuscitate.     Additional topics discussed: treatment goals, benefit/burden of treatment options, ventilation preferences, hospitalization preferences, resuscitation preferences and end of life care preferences (vegetative state/imminent death)    Conversation Outcomes / Follow-Up Plan:   ACP complete - no further action today  Reviewed DNR/DNI and patient elects Full Code (Attempt Resuscitation)     Length of Voluntary ACP Conversation in minutes:  16 minutes    Jessica Nicolas MD

## 2021-06-03 NOTE — PROGRESS NOTES
This is the Subsequent Medicare Annual Wellness Exam, performed 12 months or more after the Initial AWV or the last Subsequent AWV    I have reviewed the patient's medical history in detail and updated the computerized patient record. Assessment/Plan   Education and counseling provided:  Are appropriate based on today's review and evaluation  End-of-Life planning (with patient's consent)  Influenza Vaccine  Screening Mammography  Colorectal cancer screening tests  Cardiovascular screening blood test  Bone mass measurement (DEXA)  Screening for glaucoma  Diabetes screening test    1. Fatigue, unspecified type  -     CBC WITH AUTOMATED DIFF; Future  -     METABOLIC PANEL, COMPREHENSIVE; Future  -     LIPID PANEL; Future  -     METABOLIC PANEL, COMPREHENSIVE; Future  -     TSH 3RD GENERATION; Future  -     URINALYSIS W/MICROSCOPIC; Future  -     VITAMIN D, 25 HYDROXY; Future  -     MAGNESIUM; Future  2. Malignant neoplasm of left breast in female, estrogen receptor positive, unspecified site of breast ([facility]  -     LIPID PANEL; Future  -     METABOLIC PANEL, COMPREHENSIVE; Future  -     TSH 3RD GENERATION; Future  -     URINALYSIS W/MICROSCOPIC; Future  -     VITAMIN D, 25 HYDROXY; Future  -     MAGNESIUM; Future  3. Malignant neoplasm metastatic to lung, unspecified laterality ([facility]  -     CBC WITH AUTOMATED DIFF; Future  -     METABOLIC PANEL, COMPREHENSIVE; Future  -     LIPID PANEL; Future  -     METABOLIC PANEL, COMPREHENSIVE; Future  -     TSH 3RD GENERATION; Future  -     URINALYSIS W/MICROSCOPIC; Future  -     VITAMIN D, 25 HYDROXY; Future  -     MAGNESIUM; Future  4. Immunosuppressed due to chemotherapy ([facility]  -     CBC WITH AUTOMATED DIFF; Future  -     METABOLIC PANEL, COMPREHENSIVE; Future  -     LIPID PANEL; Future  -     METABOLIC PANEL, COMPREHENSIVE; Future  -     TSH 3RD GENERATION; Future  -     URINALYSIS W/MICROSCOPIC; Future  -     VITAMIN D, 25 HYDROXY; Future  -     MAGNESIUM; Future  5. Essential hypertension  -     CBC WITH AUTOMATED DIFF; Future  -     METABOLIC PANEL, COMPREHENSIVE; Future  -     LIPID PANEL; Future  -     METABOLIC PANEL, COMPREHENSIVE; Future  -     TSH 3RD GENERATION; Future  -     URINALYSIS W/MICROSCOPIC; Future  -     VITAMIN D, 25 HYDROXY; Future  -     MAGNESIUM; Future  6. Aneurysm of intracranial portion of internal carotid artery  -     LIPID PANEL; Future  -     METABOLIC PANEL, COMPREHENSIVE; Future  -     TSH 3RD GENERATION; Future  -     URINALYSIS W/MICROSCOPIC; Future  -     VITAMIN D, 25 HYDROXY; Future  -     MAGNESIUM; Future  7. Hyperlipidemia, unspecified hyperlipidemia type  -     LIPID PANEL; Future  -     METABOLIC PANEL, COMPREHENSIVE; Future  -     TSH 3RD GENERATION; Future  -     URINALYSIS W/MICROSCOPIC; Future  -     VITAMIN D, 25 HYDROXY; Future  -     MAGNESIUM; Future  8. Lymphedema of arm left  -     LIPID PANEL; Future  -     METABOLIC PANEL, COMPREHENSIVE; Future  -     TSH 3RD GENERATION; Future  -     URINALYSIS W/MICROSCOPIC; Future  -     VITAMIN D, 25 HYDROXY; Future  -     MAGNESIUM; Future  9. Osteopenia of multiple sites  -     LIPID PANEL; Future  -     METABOLIC PANEL, COMPREHENSIVE; Future  -     TSH 3RD GENERATION; Future  -     URINALYSIS W/MICROSCOPIC; Future  -     VITAMIN D, 25 HYDROXY; Future  -     MAGNESIUM; Future  10. Compression fracture of T11 vertebra, sequela  -     LIPID PANEL; Future  -     METABOLIC PANEL, COMPREHENSIVE; Future  -     TSH 3RD GENERATION; Future  -     URINALYSIS W/MICROSCOPIC; Future  -     VITAMIN D, 25 HYDROXY; Future  -     MAGNESIUM; Future  11. Tourette syndrome  -     LIPID PANEL; Future  -     METABOLIC PANEL, COMPREHENSIVE; Future  -     TSH 3RD GENERATION; Future  -     URINALYSIS W/MICROSCOPIC; Future  -     VITAMIN D, 25 HYDROXY; Future  -     MAGNESIUM; Future  12. Insomnia, unspecified type  -     LIPID PANEL; Future  -     METABOLIC PANEL, COMPREHENSIVE;  Future  -     TSH 3RD GENERATION; Future  -     URINALYSIS W/MICROSCOPIC; Future  -     VITAMIN D, 25 HYDROXY; Future  -     MAGNESIUM; Future  13. Medicare annual wellness visit, subsequent  -     ADVANCE CARE PLANNING FIRST 30 MINS  14. Advanced directives, counseling/discussion  -     ADVANCE CARE PLANNING FIRST 27 MINS  15. Screening for depression  16. Disorder of bone density and structure, unspecified   -     VITAMIN D, 25 HYDROXY; Future       Depression Risk Factor Screening     3 most recent PHQ Screens 6/3/2021   Little interest or pleasure in doing things Not at all   Feeling down, depressed, irritable, or hopeless Not at all   Total Score PHQ 2 0       Alcohol Risk Screen    Do you average more than 1 drink per night or more than 7 drinks a week:  No    On any one occasion in the past three months have you have had more than 3 drinks containing alcohol:  No        Functional Ability and Level of Safety    Hearing: Hearing is good. Activities of Daily Living: The home contains: no safety equipment. Patient does total self care      Ambulation: with mild difficulty     Fall Risk:  Fall Risk Assessment, last 12 mths 6/3/2021   Able to walk? Yes   Fall in past 12 months? 0   Do you feel unsteady? 0   Are you worried about falling 0   Number of falls in past 12 months -   Fall with injury? -      Abuse Screen:  Patient is not abused       Cognitive Screening    Has your family/caregiver stated any concerns about your memory: no     Cognitive Screening: none performed    Health Maintenance Due   There are no preventive care reminders to display for this patient.     Patient Care Team   Patient Care Team:  Stefani Sheikh MD as PCP - General (Internal Medicine)  Stefani Sheikh MD as PCP - [address] [place] [address] Provider  Vidal Seth MD (Rheumatology)  Mary Butler MD (Neurology)  Maria Luisa Martinez MD (Hematology and Oncology)  Marcos Jacobo MD (Endocrinology)  Catie Riggs MD (Gynecologic Oncology)  Roberto Florentino MD (Dermatology)  Tolu Grimm MD (Ophthalmology)  Zonia Yip DO (Physical Medicine and Rehabilitation)  Dayanara Simons MD (General Surgery)  Cortney Escamilla MD (Gastroenterology)  Cornelia Spencer DO (Orthopedic Surgery)    History     Patient Active Problem List   Diagnosis Code    Tourette syndrome F95.2    Osteoarthritis, Status post right total knee replacement M19.90    Lumbar spinal stenosis M48.061    HLD (hyperlipidemia) E78.5    Breast cancer ([facility] metastatic to lungs  C50.919    Essential hypertension I10    Diverticulosis K57.90    Osteopenia M85.80    Lymphedema of arm left I89.0    Insomnia G47.00    Fatigue R53.83    Diverticulitis, recurrent K57.92    T11 Vertebral compression fracture (HCC) s/p kyphoplasty M48.50XA    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.812    Spinal stenosis of cervical region M48.02    Right shoulder pain M25.511    Malignant neoplasm metastatic to lung ([facility] C78.00    Nontraumatic incomplete tear of right rotator cuff M75.111    Right sided sciatica M54.31    DDD (degenerative disc disease), lumbar M51.36    Lumbar facet arthropathy M47.816    Immunosuppressed due to chemotherapy ([address] D84.821, T45.1X5A, Z79.899    Aneurysm of intracranial portion of internal carotid artery I67.1     Past Medical History:   Diagnosis Date    Arthritis     Breast cancer metastasized to lung [facility])     Left Breast    Chronic pain     Colon polyps 2/22/16    distal sigmoid, Dr. Cain Peñaloza DDD (degenerative disc disease)     Diverticulitis of colon     Diverticulosis 2/22/16    mild in the ascedning and sigmoid colon, Dr. Brittany Pedro (hyperlipidemia)     Hypertension     Osteopenia     Tourette syndrome     Vitamin D deficiency       Past Surgical History:   Procedure Laterality Date    HX APPENDECTOMY      HX BREAST BIOPSY  7-30-10    Left Breast w/Nipple Duct exploration and excisional biopsy-left    HX DILATION AND CURETTAGE      x3    HX MODIFIED RADICAL MASTECTOMY  9-3-10    left    HX ORTHOPAEDIC Right 2012    knee replacement    HX TONSILLECTOMY      HYSTEROSCOPY DIAGNOSTIC       Current Outpatient Medications   Medication Sig Dispense Refill    temazepam (RESTORIL) 15 mg capsule TAKE 1 2 CAPS BY MOUTH EVERY NIGHT AT BEDTIME FOR INSOMNIA. DO NOT TAKE MORE THAN 3 4 TIMES PER WEEK      gabapentin (NEURONTIN) 300 mg capsule TAKE 1 CAP BY MOUTH TWO (2) TIMES A DAY. MAX DAILY AMOUNT: 600 MG. 180 Capsule 1    diazePAM (Valium) 5 mg tablet Take 1 Tab by mouth every six (6) hours as needed for Anxiety. Max Daily Amount: 20 mg. 30 Tab 0    haloperidoL (HALDOL) 2 mg tablet Take 1 Tab by mouth two (2) times a day. 180 Tab 1    meloxicam (MOBIC) 15 mg tablet Take 1 Tab by mouth daily. Take WITH FOOD 90 Tab 2    losartan (COZAAR) 25 mg tablet Take 1 Tab by mouth daily. 90 Tab 3    letrozole (FEMARA) 2.5 mg tablet TAKE 1 TABLET BY MOUTH EVERY DAY      cyclobenzaprine (FLEXERIL) 5 mg tablet Take 1 Tab by mouth three (3) times daily as needed for Muscle Spasm(s). 30 Tab 0    pantoprazole sodium (PROTONIX PO) Take  by mouth.  palbociclib 75 mg cap Take 75 mg by mouth daily. For days 1-21 of each 28 day cycle      CYANOCOBALAMIN, VITAMIN B-12, (VITAMIN B-12 PO) Take  by mouth.  Biotin 2,500 mcg cap Take  by mouth.  calcium-vitamin D (CALCIUM 500+D) 500 mg(1,250mg) -200 unit per tablet Take 1 Tab by mouth two (2) times daily (with meals).  cholecalciferol, vitamin d3, (VITAMIN D) 1,000 unit tablet Take 2,000 Units by mouth daily.  ascorbic acid (VITAMIN C) 500 mg tablet Take  by mouth. Allergies   Allergen Reactions    Keflex [Cephalexin] Rash    Metronidazole Rash    Other Medication Nausea Only     Anesthesia    Shellfish Containing Products Nausea and Vomiting     More of just eating the actual shellfish.     Sulfa (Sulfonamide Antibiotics) Rash    Ultram [Tramadol] Nausea and Vomiting     Patient states she is allergic to most Narcotics    Vancomycin Rash       Family History   Problem Relation Age of Onset    Osteoporosis Sister     Osteoporosis Mother     Stroke Father     Hypertension Father     Cancer Paternal Aunt         breast     Social History     Tobacco Use    Smoking status: Former Smoker     Years: 3.00    Smokeless tobacco: Never Used   Substance Use Topics    Alcohol use:  Yes     Alcohol/week: 5.8 standard drinks     Types: 7 Glasses of wine per week         Sonya Orr MD

## 2021-06-03 NOTE — PATIENT INSTRUCTIONS
Heart-Healthy Diet: Care Instructions Your Care Instructions A heart-healthy diet has lots of vegetables, fruits, nuts, beans, and whole grains, and is low in salt. It limits foods that are high in saturated fat, such as meats, cheeses, and fried foods. It may be hard to change your diet, but even small changes can lower your risk of heart attack and heart disease. Follow-up care is a key part of your treatment and safety. Be sure to make and go to all appointments, and call your doctor if you are having problems. It's also a good idea to know your test results and keep a list of the medicines you take. How can you care for yourself at home? Watch your portions · Learn what a serving is. A \"serving\" and a \"portion\" are not always the same thing. Make sure that you are not eating larger portions than are recommended. For example, a serving of pasta is ½ cup. A serving size of meat is 2 to 3 ounces. A 3-ounce serving is about the size of a deck of cards. Measure serving sizes until you are good at [place]" them. Keep in mind that restaurants often serve portions that are 2 or 3 times the size of one serving. · To keep your energy level up and keep you from feeling hungry, eat often but in smaller portions. · Eat only the number of calories you need to stay at a healthy weight. If you need to lose weight, eat fewer calories than your body burns (through exercise and other physical activity). Eat more fruits and vegetables · Eat a variety of fruit and vegetables every day. Dark green, deep orange, red, or yellow fruits and vegetables are especially good for you. Examples include spinach, carrots, peaches, and berries. · Keep carrots, celery, and other veggies handy for snacks. Buy fruit that is in season and store it where you can see it so that you will be tempted to eat it. · Cook dishes that have a lot of veggies in them, such as stir-fries and soups. Limit saturated and trans fat · Read food labels, and try to avoid saturated and trans fats. They increase your risk of heart disease. · Use olive or canola oil when you cook. · Bake, broil, grill, or steam foods instead of frying them. · Choose lean meats instead of high-fat meats such as hot dogs and sausages. Cut off all visible fat when you prepare meat. · Eat fish, skinless poultry, and meat alternatives such as soy products instead of high-fat meats. Soy products, such as tofu, may be especially good for your heart. · Choose low-fat or fat-free milk and dairy products. Eat foods high in fiber · Eat a variety of grain products every day. Include whole-grain foods that have lots of fiber and nutrients. Examples of whole-grain foods include oats, whole wheat bread, and brown rice. · Buy whole-grain breads and cereals, instead of white bread or pastries. Limit salt and sodium · Limit how much salt and sodium you eat to help lower your blood pressure. · Taste food before you salt it. Add only a little salt when you think you need it. With time, your taste buds will adjust to less salt. · Eat fewer snack items, fast foods, and other high-salt, processed foods. Check food labels for the amount of sodium in packaged foods. · Choose low-sodium versions of canned goods (such as soups, vegetables, and beans). Limit sugar · Limit drinks and foods with added sugar. These include candy, desserts, and soda pop. Limit alcohol · Limit alcohol to no more than 2 drinks a day for men and 1 drink a day for women. Too much alcohol can cause health problems. When should you call for help? Watch closely for changes in your health, and be sure to contact your doctor if: 
  · You would like help planning heart-healthy meals. Where can you learn more? Go to http://www.[employer].com/ Enter V137 in the search box to learn more about \"Heart-Healthy Diet: Care Instructions. \" Current as of: August 22, 2019               Content Version: 12.6 © 2327-2576 [employer]. Care instructions adapted under license by [employer] (which disclaims liability or warranty for this information). If you have questions about a medical condition or this instruction, always ask your healthcare professional. [address] any warranty or liability for your use of this information. Medicare Wellness Visit, Female The best way to improve and maintain good health is to have a healthy lifestyle by eating a well-balanced diet, exercising regularly, limiting alcohol and stopping smoking. Regular visits with your physician or non-physician health care provider also support your good health. Preventive screening tests can find health problems before they become diseases or illnesses. Preventive services such as immunizations prevent serious infections. All people over age 72 should have a Pneumovax and a Prevnar-13 shot to prevent potentially life threatening infections with the pneumococcus bacteria, a common cause of pneumonia. These are once in a lifetime unless you and your provider decide differently. All people over 65 should have a yearly influenza vaccine or \"flu\" shot. This does not prevent infection with cold viruses but has been proven to prevent hospitalization and death from influenza. Although Medicare part B \"regular Medicare\" currently only covers tetanus vaccination in the context of an injury, a tetanus vaccine (Tdap or Td) is recommended every 10 years. A shingles vaccine is recommended once in a lifetime after age 61. The Shingles vaccine is also not covered by Medicare part B. Note, however, that both the Shingles vaccine and Tdap/Td are generally covered by secondary carriers. Please check your coverage and out of pocket expenses. Consider contacting your local health department because it may stock these vaccines for a reasonable charge.  
 
We currently have documentation of the following immunization history for you: 
Immunization History Administered Date(s) Administered  Covid-19, MODERNA, Mrna, Lnp-s, Pf, 100mcg/0.5mL 02/07/2021, 03/07/2021  Influenza High Dose Vaccine PF 10/01/2015, 10/25/2016, 10/05/2017  Influenza Vaccine 09/01/2014  Influenza Vaccine (Tri) Adjuvanted (>65 Yrs FLUAD TRI [id number]) 10/05/2018, 10/17/2019  Influenza Vaccine Split 11/01/2011, 10/02/2012  Influenza Vaccine Whole 10/08/2010  Influenza, Quadrivalent, Adjuvanted (>65 Yrs FLUAD QUAD [id number]) 09/23/2020  Pneumococcal Conjugate (PCV-13) 10/27/2015  Pneumococcal Polysaccharide (PPSV-23) 04/15/2013  TD Vaccine 05/05/2008  Tdap 09/12/2014  Zoster 09/20/2011 Screening for infection with Hepatitis C is recommended for anyone born between [birth year] through [address]. The table at the bottom of this document indicates the status of this and other preventive services. A bone mass density test (DEXA) to screen for osteoporosis or thinning of the bones should be done at least once after age 72 and may be done up to every 2 years as determined by you and your health care provider. The most recent DEXA we have on file for you is: DEXA Results (most recent): 
Results from Hospital Encounter encounter on 11/11/19 DEXA BONE DENSITY STUDY AXIAL Narrative DEXA BONE DENSITOMETRY, CENTRAL 
 
CLINICAL INDICATION/HISTORY: Postmenopausal. History of osteopenia. Metastatic 
breast cancer chemotherapy. T12 compression fracture. Taking calcium and vitamin D. TECHNIQUE: Using GE LUNAR Prodigy densitometer, bone density measurement was 
performed in the lumbar spine the proximal left and right femora and the left 
forearm. T Score refers to standard deviations above or below average compared 
to a young adult of the same sex. Z Score refers to standard deviations above or 
below average compared to a patient of the same sex, age, race and weight.  
 
COMPARISON: April 18, 2005. April 15, 2010. November 13, 2017. FINDINGS: 
 
On PA image of the lumbar spine obtained for localization of vertebral levels (not a diagnostic quality radiograph), there is apparent increased density at 
multiple levels. The density is not well characterized on the basis of this 
image, but likely degenerative. These changes render the lumbar spine invalid 
as a site for densitometry in this patient. Left distal 1/3 Radius BMD:  0.673 g/cm2 T Score: -2.4 Z Score: -0.4 BMD decreased 2.3%, which is not statistically significant within a 95 percent 
confidence interval compared to preceding study. BMD decreased 18.9%, which is statistically significant compared to baseline 
study which at the forearm was the study of 2010. Left Total Proximal Femur BMD: 0.837 g/cm2 T Score:  -1.4 Z Score:  0.3 BMD increased 0.2%, which  is not statistically significant within a 95 percent 
confidence interval compared to preceding study. BMD decreased 7.5%, which is statistically significant compared to baseline 
study. Right Total Proximal Femur BMD: 0.831 g/cm2 T Score:  -1.4 Z Score:  0.2 BMD decreased 0.2%, which is not statistically significant within a 95 percent 
confidence interval compared to preceding study. BMD decreased 6.5%, which is statistically significant compared to baseline 
study. Left Femoral Neck BMD:  0.838 g/cm2 T Score: -1.4 Z Score: 0.4 Right Femoral Neck BMD:  0.844 g/cm2 T Score: -1.4 Z Score: 0.4 Impression IMPRESSION: 
 
1. BMD measures consistent with osteopenia/low bone density. 2.  Compared to the preceding study, BMD is without statistically significant 
interval change. 3.  Compared to the baseline study, BMD has decreased.  
 
Based upon current ISCD guidelines, the patient's overall diagnostic category, 
selected using WHO criteria in postmenopausal women and males aged 48 and above, 
is selected based upon the lowest T Score from among the lumbar spine, total 
femur, femoral neck, (or distal third radius if measured). WHO Definition of Osteoporosis and Osteopenia on DXA (specified for 
post-menopausal  females): 
 
Normal:                     T Score at or above -1 SD Osteopenia:              T Score between -1 and -2.5 SD Osteoporosis:           T Score at or below -2.5 SD The risk of fracture approximately doubles for each 1 SD decrease in T Score. It is important to consider other factors in assessing a patient's risk of 
fracture, including age, risk of falling/injury, history of fragility fracture, 
family history of osteoporosis, smoking, low weight. Various fracture risk tools have been developed for adult patients and are 
available online. For example, the FRAX tool developed by [facility] is widely used. Reference www.iscd.org. It is also important to note that DXA measures bone density but does not 
distinguish among causes of decreased bone density, which include primary versus 
secondary osteoporosis (such as metabolic bone disorders or possible effects of 
medications) and also other conditions (such as osteomalacia). Clinical 
considerations should determine what additional evaluation may be warranted to 
exclude secondary conditions in a patient with low bone density. Please note that reliable, valid comparisons can not be made between studies 
which have been performed on different densitometers. If clinically warranted, 
follow up study performed at this site would best permit assessment of trend for 
possible change in bone mineral density over time in comparison to this study. Thank you for this referral. 
 
 
Screening for diabetes mellitus with a blood sugar test (glucose) should be done at least every 3 years until age 79. You and your health care provider may decide whether to continue screening after age 79. The most recent blood glucose we have on file for you is:  
Lab Results Component Value Date/Time Glucose 95 11/17/2020 10:37 AM  
 
 
 
Glaucoma is a disease of the eye due to increased ocular pressure that can lead to blindness. People with risk factors for glaucoma ( race, diabetes, family history) should be screened at least every 2 years by an eye professional.  
 
Cardiovascular screening tests that check for elevated lipids or cholesterol (fatty part of blood) which can lead to heart disease and strokes should be done every 4-6 years through age 79. You and your health care provider may decide whether to continue screening after age 79. The most recent lipid panel we have on file for you is:  
Lab Results Component Value Date/Time Cholesterol, total 208 (H) 11/17/2020 10:37 AM  
 HDL Cholesterol 58 11/17/2020 10:37 AM  
 LDL, calculated 124.6 (H) 11/17/2020 10:37 AM  
 VLDL, calculated 25.4 11/17/2020 10:37 AM  
 Triglyceride 127 11/17/2020 10:37 AM  
 CHOL/HDL Ratio 3.6 11/17/2020 10:37 AM  
 
 
Colorectal cancer screening that evaluates for blood or polyps in your colon for people with average risk should be done yearly as a stool test, every five years as a flexible sigmoidoscope or every 10 years as a colonoscopy up to age 76. You and your health care provider may decide whether to continue screening after age 76. Breast cancer screening with a mammogram is recommended at least once every 2 years  for women age 54-69. You and your health care provider may decide whether to continue screening after age 76. The most recent mammogram we have on file for you is: [facility] Results (most recent): 
Results from Hospital Encounter encounter on 12/22/20 [facility] 3D PEACE W MAMMO RT SCREENING INCL CAD Narrative STUDY:  Right digital screening mammogram with Tomosynthesis INDICATION:  Screening. Left mastectomy for breast cancer in 2010. COMPARISON:  2019; 2018; 2017 FINDINGS: Right digital screening mammography with tomosynthesis was performed, 
and is interpreted in conjunction with a computer assisted detection (CAD) system. No dominant masses, unexplained architectural distortion or suspicious 
microcalcifications are identified. Impression IMPRESSION: 
No mammographic evidence of malignancy. RECOMMENDATION: 
Routine follow-up advised. Breast Density B:  The breast parenchymal pattern shows scattered fibroglandular 
densities. BIRADS 2:  Benign Screening for cervical cancer with a pap smear is recommended for all women with a cervix until age 72. The frequency of this test is based on the details of her prior pap smear testing. You and your health care provider may decide whether to continue screening after age 72. People who have smoked the equivalent of 1 pack per day for 30 years or more may benefit from screening for lung cancer with a yearly low dose CT scan until they have been non smokers for 15 years or competing health conditions render this unlikely to be beneficial. Our records show: n/a Your Medicare Wellness Exam is recommended annually. Here is a list of your current Health Maintenance items with a due date: 
Health Maintenance Topic Date Due  Shingrix Vaccine Age 50> (1 of 2) 04/22/2022 (Originally 9/28/1997)  Breast Cancer Screen Mammogram  12/22/2021  Medicare Yearly Exam  06/04/2022  DTaP/Tdap/Td series (2 - Td or Tdap) 09/12/2024  Lipid Screen  11/17/2025  Colorectal Cancer Screening Combo  02/22/2026  Bone Densitometry (Dexa) Screening  Completed  Flu Vaccine  Completed  COVID-19 Vaccine  Completed  Pneumococcal 65+ yrs at Risk Vaccine  Completed  Hepatitis C Screening  Addressed

## 2021-06-06 PROBLEM — I67.1 ANEURYSM OF INTRACRANIAL PORTION OF INTERNAL CAROTID ARTERY: Status: ACTIVE | Noted: 2021-06-06

## 2021-06-06 NOTE — PROGRESS NOTES
HPI:  Lennie Loza is a 78y.o. year old female who presents today for preoperative evalluation. She has a history of hypertension, dyslipidemia,  metastatic breast cancer, GERD, diverticulosis with recurrent diverticulitis, osteoarthritis s/p right knee replacement (2012), lumbar degenerative disease, osteopenia, compression fracture, and Tourette's syndrome. She is accompanied by her . She reports that she completed the Moderna COVID-19 vaccine series. She reports that she is doing reasonably well. However, she describes severe fatigue today and states that she is \"not feeling well\". She states that the fatigue is so overwhelming that she is unable to perform her usual daily activities. She describes that she canceled a dinner engagement several nights ago because she felt she was unable to get dressed and prepare to go out due to her severe fatigue. She is currently on the first of two weeks off for Ibrance therapy and reports that her fatigue usually increases during this time. However, she states that it seems more severe than usual.  She denies any other symptoms, and states that her complaint of dyspnea at her last visit seems to actually be severe fatigue. She did undergo repeat staging CT chest abdomen and pelvis in 1/2021 showing no evidence of metastasis or adenopathy. She has an appointment scheduled with Dr. Jonathan Cornejo next week to reassess. She also reports that she continues to have difficulty sleeping, but has been prescribed temazepam by Dr. Isela Martel and is using it approximately 3 days/week. She denies any change in exercise tolerance. She is otherwise without new complaints. Summary of prior hospitalizations and medical history:  She has a history of left breast cancer, which was diagnosed in 7/2010 as invasive ductal adenocarcinoma, s/p left modified radical mastectomy with sentinel node biopsy, performed by Dr. Bethany Bowen.  She had 3 positive lymph nodes and was classified as stage 1B, T1c N1 M0, ER and VA positive, Her-2/sarah negative by FISH. She underwent 6 cycles of chemotherapy with Docetaxel and Cytoxan. She was subsequently unable to tolerate anastrozole, exemestane, tamoxifen, and letrozole due to profound fatigue and arthralgias. Her course was complicated by chronic left arm lymphedema, managed with a compression sleeve. A chest CT scan in 3/2014 noted several small pulmonary nodules which were concerning for metastases but were too small to biopsy. They were followed with sequential CT scans , and in 12/2014, repeat chest CT scan showed enlarging bilateral pulmonary nodules compatible with progression of metastatic disease. A fine needle aspirate was performed on 12/21/2014 which showed benign pulmonary parenchyma with alveolar hemorrhage. Repeat chest CT scan in 3/12/2015 showed mild interval progression of the lung metastases with enlarging nodules and development of new nodules. A CT guided needle biopsy was performed on 3/23/2015, which confirmed metastatic adenocarcinoma consistent with breast primary (stage IV, ER/VA positive, and TTF-1 negative). On 4/13/2015, she was started on therapy with Ibrance and letrozole. Follow-up CT scan (7/15/15) showed partial response with decreasing size of some nodules and resolution of other nodules. Most recent chest, abdomen, and pelvis CT scan was obtained in 6/2017, showing stable or decreased multiple bilateral pulmonary nodules and no suspicious new pulmonary nodules. She continues on palbociclib, but was changed from letrozole to fulvestrant. She describes persistent fatigue which she attributes to monthly treatment with fulvestrant (Faslodex). She underwent restaging chest, abdomen, and pelvis CT scan in 7/2019 which showed that her pulmonary nodules were unchanged, and no other evidence of metastatic disease.  She is followed by Dr. Elma Cheema. She states that she established care with Dr. Sadaf Santoro, but was told that she may have her mammograms and breast exams in our office with referral to her if something arises. She also has a history of a right ovarian cyst, but was released from the care of Dr. Pardeep Ac since it was concluded to be benign. She has a history of hypertension treated with losartan. She monitors her blood pressure mainly when visiting multiple physicians and reports that it is well controlled. She has no history of heart disease, and denies any chest pain, shortness of breath at rest or with exertion, palpitations, lightheadedness, or edema. In 4/11/2016, she sustained a fall with subsequent severe pain in her mid to upper lumbar region and wrapping around waist. She was treated with meloxicam, tylenol and flexeril, but because of persistent discomfort, she presented to [facility] on 4/15/2016 and lumbosacral spine films showed marked degenerative changes with disc space obliteration throughout lumbar spine and slight anterior spondylolisthesis of L4. She was evaluated by Dr. Lucita Spangler on 4/18/2016 who recommended physical therapy and evaluation by Dr. Surendra Marin for her degenerative spine changes. She continued to take meloxicam and tylenol and started physical therapy, but noted worsening of her pain. She subsequently was evaluated by Dr. Osbadlo Constantino, who obtained a lumbar MRI on 4/26/2016, which showed a new subacute compression fracture of T11 vertebral body with diffuse marrow edema and mild paravertebral inflammatory stranding, no retropulsion or central stenosis; more severe degenerative disease along the right side at L4-L5, L5-S1, potential mild compression of right exiting L4 and L5 nerve roots. She was referred to Dr. Amina Mccauley for kyphoplasty of the T11 compression fracture given failure of pain control with conservative measures.  She underwent the procedure on 6/3/6232 without complications, and E44 vertebral body bone core and aspirate biopsies were performed, which showed only reactive bone changes and no evidence of malignancy. She has a history of osteopenia, with a history of fracture of her sacrum. She reports that she was treated with alendronate in 12/2011 but discontinued it in 4/2013 due to intolerence. Bone density scan (11/2017) was consistent with osteopenia with femoral neck T-scores: left -1.4/ right -1.4 and distal radius -2.2 (lumbar T-score could not be assessed). A repeat bone density scan was obtained (11/2019) showing continued evidence of osteopenia with femoral neck T-scores: left -1.4/ right -1.4 and distal radius -2.4 (lumbar T-score could not be assessed). She was referred to Dr. Misa Avelar given her multiple compression fractures and treatment with letrozole, and was started on Reclast in 3/2021. She continues to take calcium and vitamin D. In 10/2016, she developed left sided low back pain with radiation to her left leg over the last several weeks. She underwent a lumbar MRI (9/2016) showing scoliosis and advanced multilevel degenerative changes with areas of foraminal stenosis at L3-L4 and L5-S1; mild/moderate central canal stenosis at L4-L5 and L5-S1. She was evaluated by Dr. Gerry Jurado and treated with pregabalin with some improvement. She subsequently received an epidural steroid injection with improvement and discontinued pregabalin. In 4/2017, she noted increasing cervical and upper back discomfort. She has had multiple cervical MRI's,and underwent repeat in 4/2017 which showed multilevel degenerative disc disease and facet arthropathy without change from prior studies with mild central canal stenosis C3-C4 and C5-C6, and moderate central canal stenosis C6-C7. She was treated with Mobic and Flexeril, and gabapentin at bedtime. She complained of right shoulder pain.  X-ray of her shoulder (11/2017) was negative, and she had a right shoulder MRI (5/14/2018) which showed deep partial thickness undersurface tear of the distal supraspinatus, likely with full thickness slitlike perforations; no gross tendon retraction, but there is moderate muscle atrophy; partial thickness tearing with longitudinal components involving the biceps tendon at its sulcal turn, some associated tenosynovitis; accompanying short length longitudinal split tear in the distal subscapularis; moderate infraspinatus tendinosis, and mild to moderate subacromial subdeltoid bursitis. She underwent evaluation by Dr. Kathy Elliott on 5/18/2018, and received a glenohumeral cortisone injection. She reported worsening low back pain and right sciatica, and underwent an epidural steroid injection at right L4-5 transforaminal approach by Dr. Luis Alexandre on 3/18/2019. She has noted improvement in her right hip and leg pain. She underwent a lumbar MRI (6/2020) for worsening right sciatica, showing very advanced multilevel DDD and DJD with extensive bone marrow edema reaction to DDD; chronic compression fractures; single level of degenerative central spinal stenosis, moderate, at L4/L5, worsened compared 2016; multilevel degenerative foraminal narrowing worst right L4/L5, bilateral L5/S1; no evidence of metastatic breast cancer. She underwent left L4/5 and right L5-S1 KAREN in 6/2020 and Left S1 TF KAREN in 8/2020 by Dr. Luis Alexandre with improvement. She continues to take gabapentin 300 mg at night. She has a history of GERD and diverticulosis with recurrent diverticulitis,. She was evaluated by Dr. Natanael Kaur in 2/23/2016 and underwent an upper endoscopy, which revealed a Schatzki's ring at the gastroesophageal junction (dilated) with mild distal esophagitis and a small hiatal hernia. A screening colonoscopy was also performed showing moderately severe diverticulosis of the ascending and descending colon and a 2 mm sessile sigmoid polyp (pathology: hyperplastic). Recommendations for follow-up colonoscopy in 10 years. She denies any abdominal pain, nausea, vomiting, melena, hematochezia, or change in bowel movements.  She was evaluated in 8/2017 by GERALD Begrman with complaints of abdominal pain and was treated with Cipro for presumed diverticulitis. She contacted the office again in 10/2017 and 1/2018 with a recurrence of symptoms, and was treated for a 10 day course with Cipro with improvement. Abdominal CT scan in 2/6/2018 for staging for her breast cancer did show evidence of moderate to severe diverticulosis, but no evidence of diverticulitis. She did present with similar symptoms in 3/2018 and was evaluated by GERALD Vigil and prescribed Cipro. However, her symptoms resolved prior to taking the antibiotics. In 7/2018, she again presented with left lower quadrant pain, and an abdominal CT scan (7/13/2018) which showed evidence of uncomplicated diverticulitis involving the lower left colon and upper sigmoid. She was treated with Cipro and Flagyl initially, but developed a rash due to Flagyl, and was subsequently changed to Augmentin. She had a follow-up visit with Dr. Samara Cottrell and it was his opinion that she also had IBS which mimicked her episodes of diverticulitis. He recommended continuing on a probiotic and prescribed hyoscyamine to use as needed. She has noted improvement since doing so. She also has a history of Tourette's syndrome controlled with Haldol. She is followed by Dr. Keyshawn Diaz.        Past Medical History:   Diagnosis Date    Arthritis     Breast cancer metastasized to lung [facility])     Left Breast    Chronic pain     Colon polyps 2/22/16    distal sigmoid, Dr. Bryan Torres DDD (degenerative disc disease)     Diverticulitis of colon     Diverticulosis 2/22/16    mild in the ascedning and sigmoid colon, Dr. Colten Paredes (hyperlipidemia)     Hypertension     Osteopenia     Tourette syndrome     Vitamin D deficiency      Past Surgical History:   Procedure Laterality Date    HX APPENDECTOMY      HX BREAST BIOPSY  7-30-10    Left Breast w/Nipple Duct exploration and excisional biopsy-left    HX DILATION AND CURETTAGE      x3    HX MODIFIED RADICAL MASTECTOMY  9-3-10    left    HX ORTHOPAEDIC Right 2012    knee replacement    HX TONSILLECTOMY      HYSTEROSCOPY DIAGNOSTIC       Current Outpatient Medications   Medication Sig    temazepam (RESTORIL) 15 mg capsule TAKE 1 2 CAPS BY MOUTH EVERY NIGHT AT BEDTIME FOR INSOMNIA. DO NOT TAKE MORE THAN 3 4 TIMES PER WEEK    gabapentin (NEURONTIN) 300 mg capsule TAKE 1 CAP BY MOUTH TWO (2) TIMES A DAY. MAX DAILY AMOUNT: 600 MG.  diazePAM (Valium) 5 mg tablet Take 1 Tab by mouth every six (6) hours as needed for Anxiety. Max Daily Amount: 20 mg.    haloperidoL (HALDOL) 2 mg tablet Take 1 Tab by mouth two (2) times a day.  meloxicam (MOBIC) 15 mg tablet Take 1 Tab by mouth daily. Take WITH FOOD    losartan (COZAAR) 25 mg tablet Take 1 Tab by mouth daily.  letrozole (FEMARA) 2.5 mg tablet TAKE 1 TABLET BY MOUTH EVERY DAY    cyclobenzaprine (FLEXERIL) 5 mg tablet Take 1 Tab by mouth three (3) times daily as needed for Muscle Spasm(s).  pantoprazole sodium (PROTONIX PO) Take  by mouth.  palbociclib 75 mg cap Take 75 mg by mouth daily. For days 1-21 of each 28 day cycle    CYANOCOBALAMIN, VITAMIN B-12, (VITAMIN B-12 PO) Take  by mouth.  Biotin 2,500 mcg cap Take  by mouth.  calcium-vitamin D (CALCIUM 500+D) 500 mg(1,250mg) -200 unit per tablet Take 1 Tab by mouth two (2) times daily (with meals).  cholecalciferol, vitamin d3, (VITAMIN D) 1,000 unit tablet Take 2,000 Units by mouth daily.  ascorbic acid (VITAMIN C) 500 mg tablet Take  by mouth. No current facility-administered medications for this visit. Allergies and Intolerances: Allergies   Allergen Reactions    Keflex [Cephalexin] Rash    Metronidazole Rash    Other Medication Nausea Only     Anesthesia    Shellfish Containing Products Nausea and Vomiting     More of just eating the actual shellfish.     Sulfa (Sulfonamide Antibiotics) Rash    Ultram [Tramadol] Nausea and Vomiting     Patient states she is allergic to most Narcotics    Vancomycin Rash     Family History:   Family History   Problem Relation Age of Onset    Osteoporosis Sister     Osteoporosis Mother     Stroke Father     Hypertension Father     Cancer Paternal Aunt         breast     Social History:   She  reports that she has quit smoking. She quit after 3.00 years of use. She has never used smokeless tobacco. She stopped smoking over 40 years ago. She is  and lives with . They have one daughter and two grandchildren. Social History     Substance and Sexual Activity   Alcohol Use Yes    Alcohol/week: 5.8 standard drinks    Types: 7 Glasses of wine per week     Immunization History:  Immunization History   Administered Date(s) Administered    Covid-19, MODERNA, Mrna, Lnp-s, Pf, 100mcg/0.5mL 02/07/2021, 03/07/2021    Influenza High Dose Vaccine PF 10/01/2015, 10/25/2016, 10/05/2017    Influenza Vaccine 09/01/2014    Influenza Vaccine (Tri) Adjuvanted (>65 Yrs FLUAD TRI 82019) 10/05/2018, 10/17/2019    Influenza Vaccine Split 11/01/2011, 10/02/2012    Influenza Vaccine Whole 10/08/2010    Influenza, Quadrivalent, Adjuvanted (>65 Yrs FLUAD QUAD 87808) 09/23/2020    Pneumococcal Conjugate (PCV-13) 10/27/2015    Pneumococcal Polysaccharide (PPSV-23) 04/15/2013    TD Vaccine 05/05/2008    Tdap 09/12/2014    Zoster 09/20/2011       Review of Systems:   As above included in HPI. Otherwise 11 point review of systems negative including constitutional, skin, HENT, eyes, respiratory, cardiovascular, gastrointestinal, genitourinary, musculoskeletal, endo/heme/aller, neurological.    Physical:     Visit Vitals  /78 (BP 1 Location: Right arm, BP Patient Position: Sitting)   Pulse 68   Temp 97.5 °F (36.4 °C) (Temporal)   Resp 16   Ht 4' 11\" (1.499 m)   Wt 142 lb (64.4 kg)   SpO2 98%   BMI 28.68 kg/m²        Patient appears in no apparent distress. Affect is appropriate. HEENT: PERRLA, anicteric, no JVD, adenopathy or thyromegaly.  No carotid bruits or radiated murmur. Lungs: clear to auscultation, no wheezes, rhonchi, or rales. Heart: regular rate and rhythm. No murmur, rubs, gallops  Abdomen: soft, nontender, nondistended, normal bowel sounds, no hepatosplenomegaly or masses. Extremities: without edema. Lymphedema of left arm and hand        Review of Data:  Labs:  Hospital Outpatient Visit on 05/17/2021   Component Date Value Ref Range Status    Creatinine, POC 05/17/2021 1.0  0.6 - 1.3 MG/DL Final    GFRAA, POC 05/17/2021 >60  >60 ml/min/1.73m2 Final    GFRNA, POC 05/17/2021 54* >60 ml/min/1.73m2 Final       EKG (11/24/2020) sinus rhythm at 75 bpm, normal intervals; no ischemic changes. Health Maintenance:  Screening:    Mammogram: negative (12/2020)   PAP smear: negative (9/2013) Dr Cherise Carmona. Pelvic ultrasound negative (9/2015). No further screening needed. Colorectal: colonoscopy (2/2016) hyperplastic polyp; Dr. Chiqui Barrera. Due 2/2026. Depression: none   DM (HbA1c/FPG): FPG 95 (11/2020)   Hepatitis C: unknown   Falls: none   DEXA: osteopenia (11/2019) s/p T11 compression fracture (4/2016). On Reclast.  Dr. Aleida Bull.    Smoking:distant past   Glaucoma: regular eye exams with Dr. Ale Estevez (last 3/2021)   Vitamin D: 53.4 (11/2020)   Medicare Wellness: today      Impression:  Patient Active Problem List   Diagnosis Code    Tourette syndrome F95.2    Osteoarthritis, Status post right total knee replacement M19.90    Lumbar spinal stenosis M48.061    HLD (hyperlipidemia) E78.5    Breast cancer ([facility] [address] metastatic to lungs  C50.919    Essential hypertension I10    Diverticulosis K57.90    Osteopenia M85.80    Lymphedema of arm left I89.0    Insomnia G47.00    Fatigue R53.83    Diverticulitis, recurrent K57.92    T11 Vertebral compression fracture (HCC) s/p kyphoplasty M48.50XA    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.812    Spinal stenosis of cervical region M48.02    Right shoulder pain M25.511    Malignant neoplasm metastatic to lung [facility]) C78.00    Nontraumatic incomplete tear of right rotator cuff M75.111    Right sided sciatica M54.31    DDD (degenerative disc disease), lumbar M51.36    Lumbar facet arthropathy M47.816    Immunosuppressed due to chemotherapy ([facility] [address] D84.821, T45.1X5A, Z79.899    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 Fatigue. Severe and persistent. Continues on Ibrance and letrozole. Described dyspnea in association with fatigue when performing exertional activities in 11/2020. Denied associated chest pain. EKG unremarkable, chest x-ray negative, and and echocardiogram (12/1/2020) showed normal LV function (EF 55-60%), trileaflet AV with moderate AI, PAP 30 mmHg. Repeat staging CT scans in 1/2021 without evidence of recurrence or metastases. Describes worsening fatigue today. Currently her first of two off weeks for Ibrance therapy, but continuing to take letrozole nightly. Likely cause of fatigue is due to therapy with Ibrance and letrozole, but appears to be more severe today than usual. Will obtain CBC and CMP today to assess for any other possible cause. Not wishing to give a urine sample. Aware of need to continue with current therapy, particularly given clinical response. However will discuss further with Dr. Surinder Roberts next week. 2. Breast cancer with pulmonary metastasis. Continues on current therapy with Ibrance and letrozole as per Dr. Surinder Roberts. CT scans in 7/2019 showed stable small pulmonary nodules. However, repeat CT chest abdomen and pelvis in 1/2021 showed no evidence of metastasis or adenopathy. No pulmonary nodules were seen. Despite excessive fatigue, recommendation is to continue with Ibrance and letrozole given clinical response. Not using compression sleeve for lymphedema, but receiving massage therapy every three weeks with good results. Has appointment next week with Dr. Surinder Roberts.  3. Hypertension.  Well controlled on losartan 25 mg daily. Renal function has been normal with creatinine 0.91/ eGFR >60. Brain MRI (5/2021) with evidence of mild to moderate burden nonspecific white matter lesions and a few tiny old lacunar infarcts, likely from longstanding hypertension. Patient has been started on aspirin 81 mg daily by Dr. Saranya France. Advised to continue. 4.  Right ICA terminus aneurysm. MRA brain (4/2019) showed a 2 mm superiorly directed aneurysm at the right ICA terminus. Repeat MRA brain (5/2021) showed no significant interval change. Being monitored by Dr. Saranya France. 5. Hyperlipidemia. Calculated 10 year ASCVD risk is 18.2%, which places her in one of the four statin benefit groups (primary prevention with risk > 7.5%).  and HDL 58 (11/2020). Given lack of history of ASCVD, no evidence of atherosclerotic disease on chest and abdominal CT scans, and history of metastatic breast cancer, will not recommend treatment with statin at this time. Continue to emphasized importance of lifestyle modifications, including diet and exercise. 6. Osteopenia. History of T11 compression fracture in 4/2016, s/p kyphoplasty. Last bone density scan 11/2019. Using femoral neck T-scores, calculated FRAX score estimates her 10 year risk of a major osteoporetic fracture at 16 % and hip fracture at 2.3%, which alone are not an indication for biphosphonate treatment. However, the development of a vertebral compression fracture would be considered an indication for treatment. In addition, treatment with an estrogen receptor blocker increases likelihood of progression. Evaluated by Dr. Sean Albarado, and received first dose of Reclast on 3/29/2021. Continue calcium and Vitamin D. Encouraged to continue exercises. Repeat bone density scheduled for 11/2021.    7. Recurrent diverticulitis. Colonoscopy (2/2016) with moderately severe diverticulosis in the ascending and descending colon.  Has had multiple recent episodes of abdominal pain which were treated empirically with Cipro. Episode of symptoms in 3/2018 resolved prior to initiating antibiotics, bringing into question whether her abdominal symptoms were due to diverticulitis or irritable bowel syndrome. Discussed at last visit that she should have abdominal CT scan to confirm the diagnosis next time she had abdominal complaints. Had recurrent episode in 7/2018, and abdominal CT scan confirmed diagnosis of acute diverticulitis. Treated with Augmentin as developed rash on Flagyl. Subsequently evaluated by Dr. Elizabeth Jeter, and instructed to continue Probiotics and use hyoscyamine as needed when develop abdominal complaints. It was his opinion that her complaints may be related to IBS, so wanting to avoid frequent antibiotics if not needed. Reports of multiple mild episodes of pain may reflect IBS since will resolve without antibiotics. Advised to try hyoscyamine with next episode. Reports no recurrence since last visit. Will continue to follow. 8. Low back pain with right sciatica. Known lumbar stenosis and facet arthropathy. Using meloxicam, cyclobenzaprine, gabapentin, and Tylenol. Previously did undergo L4-L5 epidural injection on the right by Dr. Gerson Arredondo in 2016 for similar symptoms. Recent recurrence of symptoms without sustained improvement despite treatment with steroids, NSAIDS, muscle relaxants and gabapentin. Underwent repeat lumbar MRI in 5/2020 which showed very advanced multilevel DDD and DJD with chronic compression fractures and worsening since 2016, especially at L4/5. Underwent left L4/5 and right L5-S1 KAREN in 6/2020 and Left S1 TF KAREN in 8/2020 by Dr. Gerson Arredondo with improvement. Will continue to follow. 9. Right shoulder pain. MRI with partial rotator cuff tear. Evaluated by Dr. Brandon Pham and received a glenohumeral cortisone injection. Recommended physical therapy, but patient did not proceed. Not a significant complaint today as improved. Followed by Dr. Kj Kaur. 10. Tourette's syndrome. Followed by Dr. Gera Wells. On Haldol with plans to switch to Risperdal if Haldol becomes unavailable. Now using temazepam, as prescribed by Dr. Gera Wells, approximately 3 times a week to help with insomnia. Reports occasionally will need to take diazepam if temazepam not effective. We will continue to monitor. 11. Right ovarian cyst. No further monitoring needed as per Dr. Claudean Douglas. Surveillance CT scans obtained every 6 months for breast cancer do include imaging of pelvis and show cyst to be stable. 12. GERD. Using Protonix daily with good control. Follow. 13. Health maintenance. Completed Moderna COVID 19 vaccine series. Has not yet received Shingrix vaccine and remains hesitant to do so. Other immunizations up to date. Mammogram up to date. Will perform breast exam every visit. Vitamin D level has been normal. Continue maintenance dose supplement. Continue regular eye exams with Dr. Genet Barry. Completed cataract surgery in [id number]/4929 without complications. In addition, an annual Medicare wellness visit was done today. Patient understands recommendations and agrees with plan. Follow-up in 6 months. This visit required high complexity medically necessary decision making and management plans. Time spent in preparing for the visit, including review of history, tests done prior to arrival, additional time reviewing clinical data, imaging, outside records and test results:  10  minutes. Time spent in counseling with patient and/or family members regarding care plan: 30 minutes. Time spent in ordering tests, treatments, and referring patient for further care: 5 minutes. Time spent on visit does not include time for documentation.

## 2021-06-14 ENCOUNTER — TRANSCRIBE ORDER (OUTPATIENT)
Dept: SCHEDULING | Age: 76
End: 2021-06-14

## 2021-06-14 DIAGNOSIS — C79.52 SECONDARY MALIGNANT NEOPLASM OF BONE AND BONE MARROW (HCC): ICD-10-CM

## 2021-06-14 DIAGNOSIS — C78.00 SECONDARY MALIGNANT NEOPLASM OF LUNG (HCC): ICD-10-CM

## 2021-06-14 DIAGNOSIS — C79.51 SECONDARY MALIGNANT NEOPLASM OF BONE AND BONE MARROW (HCC): ICD-10-CM

## 2021-06-14 DIAGNOSIS — C50.911 MALIGNANT NEOPLASM OF RIGHT BREAST (HCC): Primary | ICD-10-CM

## 2021-06-16 ENCOUNTER — TELEPHONE (OUTPATIENT)
Dept: INTERNAL MEDICINE CLINIC | Age: 76
End: 2021-06-16

## 2021-06-16 NOTE — TELEPHONE ENCOUNTER
Patient stated she has some bumps and some itching along the scabby areas of where she had her mastectomy. Oncology told her it could be shingles and advised she have it looked at. No openings til Tuesday, please advise.

## 2021-06-17 ENCOUNTER — OFFICE VISIT (OUTPATIENT)
Dept: INTERNAL MEDICINE CLINIC | Age: 76
End: 2021-06-17
Payer: MEDICARE

## 2021-06-17 VITALS
TEMPERATURE: 97.7 F | BODY MASS INDEX: 28.87 KG/M2 | OXYGEN SATURATION: 97 % | HEIGHT: 59 IN | HEART RATE: 76 BPM | WEIGHT: 143.2 LBS | RESPIRATION RATE: 16 BRPM | DIASTOLIC BLOOD PRESSURE: 84 MMHG | SYSTOLIC BLOOD PRESSURE: 134 MMHG

## 2021-06-17 DIAGNOSIS — Z79.899 IMMUNOSUPPRESSED DUE TO CHEMOTHERAPY (HCC): ICD-10-CM

## 2021-06-17 DIAGNOSIS — C50.912 MALIGNANT NEOPLASM OF LEFT BREAST IN FEMALE, ESTROGEN RECEPTOR POSITIVE, UNSPECIFIED SITE OF BREAST (HCC): ICD-10-CM

## 2021-06-17 DIAGNOSIS — I89.0 LYMPHEDEMA OF ARM: ICD-10-CM

## 2021-06-17 DIAGNOSIS — C78.00 MALIGNANT NEOPLASM METASTATIC TO LUNG, UNSPECIFIED LATERALITY (HCC): ICD-10-CM

## 2021-06-17 DIAGNOSIS — D84.821 IMMUNOSUPPRESSED DUE TO CHEMOTHERAPY (HCC): ICD-10-CM

## 2021-06-17 DIAGNOSIS — T45.1X5A IMMUNOSUPPRESSED DUE TO CHEMOTHERAPY (HCC): ICD-10-CM

## 2021-06-17 DIAGNOSIS — R53.82 CHRONIC FATIGUE: ICD-10-CM

## 2021-06-17 DIAGNOSIS — Z17.0 MALIGNANT NEOPLASM OF LEFT BREAST IN FEMALE, ESTROGEN RECEPTOR POSITIVE, UNSPECIFIED SITE OF BREAST (HCC): ICD-10-CM

## 2021-06-17 DIAGNOSIS — L28.2 PRURITIC RASH: Primary | ICD-10-CM

## 2021-06-17 PROCEDURE — G8752 SYS BP LESS 140: HCPCS | Performed by: INTERNAL MEDICINE

## 2021-06-17 PROCEDURE — 1090F PRES/ABSN URINE INCON ASSESS: CPT | Performed by: INTERNAL MEDICINE

## 2021-06-17 PROCEDURE — G9899 SCRN MAM PERF RSLTS DOC: HCPCS | Performed by: INTERNAL MEDICINE

## 2021-06-17 PROCEDURE — G8399 PT W/DXA RESULTS DOCUMENT: HCPCS | Performed by: INTERNAL MEDICINE

## 2021-06-17 PROCEDURE — 99214 OFFICE O/P EST MOD 30 MIN: CPT | Performed by: INTERNAL MEDICINE

## 2021-06-17 PROCEDURE — G8536 NO DOC ELDER MAL SCRN: HCPCS | Performed by: INTERNAL MEDICINE

## 2021-06-17 PROCEDURE — G0463 HOSPITAL OUTPT CLINIC VISIT: HCPCS | Performed by: INTERNAL MEDICINE

## 2021-06-17 PROCEDURE — G8754 DIAS BP LESS 90: HCPCS | Performed by: INTERNAL MEDICINE

## 2021-06-17 PROCEDURE — 3017F COLORECTAL CA SCREEN DOC REV: CPT | Performed by: INTERNAL MEDICINE

## 2021-06-17 PROCEDURE — G8510 SCR DEP NEG, NO PLAN REQD: HCPCS | Performed by: INTERNAL MEDICINE

## 2021-06-17 PROCEDURE — G8419 CALC BMI OUT NRM PARAM NOF/U: HCPCS | Performed by: INTERNAL MEDICINE

## 2021-06-17 PROCEDURE — G8427 DOCREV CUR MEDS BY ELIG CLIN: HCPCS | Performed by: INTERNAL MEDICINE

## 2021-06-17 PROCEDURE — 1101F PT FALLS ASSESS-DOCD LE1/YR: CPT | Performed by: INTERNAL MEDICINE

## 2021-06-17 RX ORDER — ASPIRIN 81 MG/1
TABLET ORAL DAILY
COMMUNITY
End: 2022-06-25 | Stop reason: ALTCHOICE

## 2021-06-17 NOTE — PATIENT INSTRUCTIONS
Heart-Healthy Diet: Care Instructions Your Care Instructions A heart-healthy diet has lots of vegetables, fruits, nuts, beans, and whole grains, and is low in salt. It limits foods that are high in saturated fat, such as meats, cheeses, and fried foods. It may be hard to change your diet, but even small changes can lower your risk of heart attack and heart disease. Follow-up care is a key part of your treatment and safety. Be sure to make and go to all appointments, and call your doctor if you are having problems. It's also a good idea to know your test results and keep a list of the medicines you take. How can you care for yourself at home? Watch your portions · Learn what a serving is. A \"serving\" and a \"portion\" are not always the same thing. Make sure that you are not eating larger portions than are recommended. For example, a serving of pasta is ½ cup. A serving size of meat is 2 to 3 ounces. A 3-ounce serving is about the size of a deck of cards. Measure serving sizes until you are good at [place]" them. Keep in mind that restaurants often serve portions that are 2 or 3 times the size of one serving. · To keep your energy level up and keep you from feeling hungry, eat often but in smaller portions. · Eat only the number of calories you need to stay at a healthy weight. If you need to lose weight, eat fewer calories than your body burns (through exercise and other physical activity). Eat more fruits and vegetables · Eat a variety of fruit and vegetables every day. Dark green, deep orange, red, or yellow fruits and vegetables are especially good for you. Examples include spinach, carrots, peaches, and berries. · Keep carrots, celery, and other veggies handy for snacks. Buy fruit that is in season and store it where you can see it so that you will be tempted to eat it. · Cook dishes that have a lot of veggies in them, such as stir-fries and soups. Limit saturated and trans fat · Read food labels, and try to avoid saturated and trans fats. They increase your risk of heart disease. · Use olive or canola oil when you cook. · Bake, broil, grill, or steam foods instead of frying them. · Choose lean meats instead of high-fat meats such as hot dogs and sausages. Cut off all visible fat when you prepare meat. · Eat fish, skinless poultry, and meat alternatives such as soy products instead of high-fat meats. Soy products, such as tofu, may be especially good for your heart. · Choose low-fat or fat-free milk and dairy products. Eat foods high in fiber · Eat a variety of grain products every day. Include whole-grain foods that have lots of fiber and nutrients. Examples of whole-grain foods include oats, whole wheat bread, and brown rice. · Buy whole-grain breads and cereals, instead of white bread or pastries. Limit salt and sodium · Limit how much salt and sodium you eat to help lower your blood pressure. · Taste food before you salt it. Add only a little salt when you think you need it. With time, your taste buds will adjust to less salt. · Eat fewer snack items, fast foods, and other high-salt, processed foods. Check food labels for the amount of sodium in packaged foods. · Choose low-sodium versions of canned goods (such as soups, vegetables, and beans). Limit sugar · Limit drinks and foods with added sugar. These include candy, desserts, and soda pop. Limit alcohol · Limit alcohol to no more than 2 drinks a day for men and 1 drink a day for women. Too much alcohol can cause health problems. When should you call for help? Watch closely for changes in your health, and be sure to contact your doctor if: 
  · You would like help planning heart-healthy meals. Where can you learn more? Go to http://www.[employer]com/ Enter V137 in the search box to learn more about \"Heart-Healthy Diet: Care Instructions. \" Current as of: August 22, 2019               Content Version: 12.6 © 5734-8197 [employer]. Care instructions adapted under license by [employer] (which disclaims liability or warranty for this information). If you have questions about a medical condition or this instruction, always ask your healthcare professional. [address] any warranty or liability for your use of this information. Shingles: Care Instructions Your Care Instructions Shingles (herpes zoster) causes pain and a blistered rash. The rash can appear anywhere on the body but will be on only one side of the body, the left or right. It will be in a band, a strip, or a small area. The pain can be very severe. Shingles can also cause tingling or itching in the area of the rash. The blisters scab over after a few days and heal in 2 to 4 weeks. Medicines can help you feel better and may help prevent more serious problems caused by shingles. Shingles is caused by the same virus that causes chickenpox. When you have chickenpox, the virus gets into your nerve roots and stays there (becomes dormant) long after you get over the chickenpox. If the virus becomes active again, it can cause shingles. Follow-up care is a key part of your treatment and safety. Be sure to make and go to all appointments, and call your doctor if you are having problems. It's also a good idea to know your test results and keep a list of the medicines you take. How can you care for yourself at home? · Be safe with medicines. Take your medicines exactly as prescribed. Call your doctor if you think you are having a problem with your medicine. Antiviral medicine helps you get better faster. · Try not to scratch or pick at the blisters. They will crust over and fall off on their own if you leave them alone. · Put cool, wet cloths on the area to relieve pain and itching. You can also use calamine lotion. Try not to use so much lotion that it cakes and is hard to get off.  
· Put cornstarch or baking soda on the sores to help dry them out so they heal faster. · Do not use thick ointment, such as petroleum jelly, on the sores. This will keep them from drying and healing. · To help remove loose crusts, soak them in tap water. This can help decrease oozing, and dry and soothe the skin. · Take an over-the-counter pain medicine, such as acetaminophen (Tylenol), ibuprofen (Advil, Motrin), or naproxen (Aleve). Read and follow all instructions on the label. · Avoid close contact with people until the blisters have healed. It is very important for you to avoid contact with anyone who has never had chickenpox or the chickenpox vaccine. Pregnant women, young babies, and anyone else who has a hard time fighting infection (such as someone with HIV, diabetes, or cancer) is especially at risk. When should you call for help? Call your doctor now or seek immediate medical care if: 
  · You have a new or higher fever.  
  · You have a severe headache and a stiff neck.  
  · You lose the ability to think clearly.  
  · The rash spreads to your forehead, nose, eyes, or eyelids.  
  · You have eye pain, or your vision gets worse.  
  · You have new pain in your face, or you cannot move the muscles in your face.  
  · Blisters spread to new parts of your body. Watch closely for changes in your health, and be sure to contact your doctor if: 
  · The rash has not healed after 2 to 4 weeks.  
  · You still have pain after the rash has healed. Where can you learn more? Go to http://www.[employer]/ [place] in the search box to learn more about \"Shingles: Care Instructions. \" Current as of: September 23, 2020               Content Version: 12.8 © 2006-2021 [employer]. Care instructions adapted under license by [employer] (which disclaims liability or warranty for this information).  If you have questions about a medical condition or this instruction, always ask your healthcare professional. [place] [address] any warranty or liability for your use of this information.

## 2021-06-21 ENCOUNTER — HOSPITAL ENCOUNTER (OUTPATIENT)
Dept: CT IMAGING | Age: 76
Discharge: HOME | End: 2021-06-21
Attending: INTERNAL MEDICINE
Payer: MEDICARE

## 2021-06-21 DIAGNOSIS — C79.52 SECONDARY MALIGNANT NEOPLASM OF BONE AND BONE MARROW (HCC): ICD-10-CM

## 2021-06-21 DIAGNOSIS — C79.51 SECONDARY MALIGNANT NEOPLASM OF BONE AND BONE MARROW (HCC): ICD-10-CM

## 2021-06-21 DIAGNOSIS — C78.00 SECONDARY MALIGNANT NEOPLASM OF LUNG (HCC): ICD-10-CM

## 2021-06-21 DIAGNOSIS — C50.911 MALIGNANT NEOPLASM OF RIGHT BREAST (HCC): ICD-10-CM

## 2021-06-21 PROCEDURE — 74177 CT ABD & PELVIS W/CONTRAST: CPT

## 2021-06-21 PROCEDURE — 74011000636 HC RX REV CODE- 636

## 2021-06-21 RX ADMIN — IOPAMIDOL 100 ML: 612 INJECTION, SOLUTION INTRAVENOUS at 14:27

## 2021-06-21 NOTE — PROGRESS NOTES
HPI:  Ana Villarreal is a 78y.o. year old female who presents today for an acute visit. She has a history of hypertension, dyslipidemia,  metastatic breast cancer, GERD, diverticulosis with recurrent diverticulitis, osteoarthritis s/p right knee replacement (2012), lumbar degenerative disease, osteopenia, compression fracture, and Tourette's syndrome. She is accompanied by her . She reports that she completed the Moderna COVID-19 vaccine series. She reports that she is doing reasonably well. She reports that she is continuing to experience fatigue, although seems to have improved slightly since she has been holding her treatment with Ibrance for 3 weeks and letrozole for 1 week. She states that she will often experience \"waves\" of fatigue with improved energy level interspersed between these episodes. She does report that she developed pruritus and noted a few small lesions on her left anterior chest wall in the area of her prior mastectomy. She states that the rash developed on 6/13/2021. She states that the rash has not worsened and appears to actually be improving over the last 24 hours. She denies any neuralgia, fever, chills, or rash in any other location. She is otherwise without new complaints. Summary of prior hospitalizations and medical history:  She has a history of left breast cancer, which was diagnosed in 7/2010 as invasive ductal adenocarcinoma, s/p left modified radical mastectomy with sentinel node biopsy, performed by Dr. Tye Vang. She had 3 positive lymph nodes and was classified as stage 1B, T1c N1 M0, ER and MT positive, Her-2/sarah negative by FISH. She underwent 6 cycles of chemotherapy with Docetaxel and Cytoxan. She was subsequently unable to tolerate anastrozole, exemestane, tamoxifen, and letrozole due to profound fatigue and arthralgias. Her course was complicated by chronic left arm lymphedema, managed with a compression sleeve.  A chest CT scan in 3/2014 noted several small pulmonary nodules which were concerning for metastases but were too small to biopsy. They were followed with sequential CT scans , and in 12/2014, repeat chest CT scan showed enlarging bilateral pulmonary nodules compatible with progression of metastatic disease. A fine needle aspirate was performed on 12/21/2014 which showed benign pulmonary parenchyma with alveolar hemorrhage. Repeat chest CT scan in 3/12/2015 showed mild interval progression of the lung metastases with enlarging nodules and development of new nodules. A CT guided needle biopsy was performed on 3/23/2015, which confirmed metastatic adenocarcinoma consistent with breast primary (stage IV, ER/TX positive, and TTF-1 negative). On 4/13/2015, she was started on therapy with Ibrance and letrozole. Follow-up CT scan (7/15/15) showed partial response with decreasing size of some nodules and resolution of other nodules. Most recent chest, abdomen, and pelvis CT scan was obtained in 6/2017, showing stable or decreased multiple bilateral pulmonary nodules and no suspicious new pulmonary nodules. She continues on palbociclib, but was changed from letrozole to fulvestrant. She describes persistent fatigue which she attributes to monthly treatment with fulvestrant (Faslodex). She underwent restaging chest, abdomen, and pelvis CT scan in 7/2019 which showed that her pulmonary nodules were unchanged, and no other evidence of metastatic disease. Repeat staging CT chest/ abdomen/ pelvis in 1/2021 showed no evidence of metastasis or adenopathy. She is followed by Dr. Beau Murphy. She states that she established care with Dr. Lisandra Hancock, but was told that she may have her mammograms and breast exams in our office with referral to her if something arises. She also has a history of a right ovarian cyst, but was released from the care of Dr. Ten Trejo since it was concluded to be benign. She has a history of hypertension treated with losartan.  She monitors her blood pressure mainly when visiting multiple physicians and reports that it is well controlled. She has no history of heart disease, and denies any chest pain, shortness of breath at rest or with exertion, palpitations, lightheadedness, or edema. In 4/11/2016, she sustained a fall with subsequent severe pain in her mid to upper lumbar region and wrapping around waist. She was treated with meloxicam, tylenol and flexeril, but because of persistent discomfort, she presented to [facility] on 4/15/2016 and lumbosacral spine films showed marked degenerative changes with disc space obliteration throughout lumbar spine and slight anterior spondylolisthesis of L4. She was evaluated by Dr. Sanaz Vang on 4/18/2016 who recommended physical therapy and evaluation by Dr. Salena Dover for her degenerative spine changes. She continued to take meloxicam and tylenol and started physical therapy, but noted worsening of her pain. She subsequently was evaluated by Dr. Lamonte Rojas, who obtained a lumbar MRI on 4/26/2016, which showed a new subacute compression fracture of T11 vertebral body with diffuse marrow edema and mild paravertebral inflammatory stranding, no retropulsion or central stenosis; more severe degenerative disease along the right side at L4-L5, L5-S1, potential mild compression of right exiting L4 and L5 nerve roots. She was referred to Dr. Christos Macias for kyphoplasty of the T11 compression fracture given failure of pain control with conservative measures. She underwent the procedure on 3/3/9070 without complications, and N12 vertebral body bone core and aspirate biopsies were performed, which showed only reactive bone changes and no evidence of malignancy. She has a history of osteopenia, with a history of fracture of her sacrum. She reports that she was treated with alendronate in 12/2011 but discontinued it in 4/2013 due to intolerence.  Bone density scan (11/2017) was consistent with osteopenia with femoral neck T-scores: left -1.4/ right -1.4 and distal radius -2.2 (lumbar T-score could not be assessed). A repeat bone density scan was obtained (11/2019) showing continued evidence of osteopenia with femoral neck T-scores: left -1.4/ right -1.4 and distal radius -2.4 (lumbar T-score could not be assessed). She was referred to Dr. Lev Gary given her multiple compression fractures and treatment with letrozole, and was started on Reclast in 3/2021. She continues to take calcium and vitamin D. In 10/2016, she developed left sided low back pain with radiation to her left leg over the last several weeks. She underwent a lumbar MRI (9/2016) showing scoliosis and advanced multilevel degenerative changes with areas of foraminal stenosis at L3-L4 and L5-S1; mild/moderate central canal stenosis at L4-L5 and L5-S1. She was evaluated by Dr. Luis Alexandre and treated with pregabalin with some improvement. She subsequently received an epidural steroid injection with improvement and discontinued pregabalin. In 4/2017, she noted increasing cervical and upper back discomfort. She has had multiple cervical MRI's,and underwent repeat in 4/2017 which showed multilevel degenerative disc disease and facet arthropathy without change from prior studies with mild central canal stenosis C3-C4 and C5-C6, and moderate central canal stenosis C6-C7. She was treated with Mobic and Flexeril, and gabapentin at bedtime. She complained of right shoulder pain.  X-ray of her shoulder (11/2017) was negative, and she had a right shoulder MRI (5/14/2018) which showed deep partial thickness undersurface tear of the distal supraspinatus, likely with full thickness slitlike perforations; no gross tendon retraction, but there is moderate muscle atrophy; partial thickness tearing with longitudinal components involving the biceps tendon at its sulcal turn, some associated tenosynovitis; accompanying short length longitudinal split tear in the distal subscapularis; moderate infraspinatus tendinosis, and mild to moderate subacromial subdeltoid bursitis. She underwent evaluation by Dr. Uvaldo Neal on 5/18/2018, and received a glenohumeral cortisone injection. She reported worsening low back pain and right sciatica, and underwent an epidural steroid injection at right L4-5 transforaminal approach by Dr. Isai Coon on 3/18/2019. She has noted improvement in her right hip and leg pain. She underwent a lumbar MRI (6/2020) for worsening right sciatica, showing very advanced multilevel DDD and DJD with extensive bone marrow edema reaction to DDD; chronic compression fractures; single level of degenerative central spinal stenosis, moderate, at L4/L5, worsened compared 2016; multilevel degenerative foraminal narrowing worst right L4/L5, bilateral L5/S1; no evidence of metastatic breast cancer. She underwent left L4/5 and right L5-S1 KAREN in 6/2020 and Left S1 TF KAREN in 8/2020 by Dr. Isai Coon with improvement. She continues to take gabapentin 300 mg at night. She has a history of GERD and diverticulosis with recurrent diverticulitis,. She was evaluated by Dr. Glenny Cleaning in 2/23/2016 and underwent an upper endoscopy, which revealed a Schatzki's ring at the gastroesophageal junction (dilated) with mild distal esophagitis and a small hiatal hernia. A screening colonoscopy was also performed showing moderately severe diverticulosis of the ascending and descending colon and a 2 mm sessile sigmoid polyp (pathology: hyperplastic). Recommendations for follow-up colonoscopy in 10 years. She denies any abdominal pain, nausea, vomiting, melena, hematochezia, or change in bowel movements. She was evaluated in 8/2017 by NP Yesenia Mc with complaints of abdominal pain and was treated with Cipro for presumed diverticulitis. She contacted the office again in 10/2017 and 1/2018 with a recurrence of symptoms, and was treated for a 10 day course with Cipro with improvement.  Abdominal CT scan in 2/6/2018 for staging for her breast cancer did show evidence of moderate to severe diverticulosis, but no evidence of diverticulitis. She did present with similar symptoms in 3/2018 and was evaluated by GERALD Vigil and prescribed Cipro. However, her symptoms resolved prior to taking the antibiotics. In 7/2018, she again presented with left lower quadrant pain, and an abdominal CT scan (7/13/2018) which showed evidence of uncomplicated diverticulitis involving the lower left colon and upper sigmoid. She was treated with Cipro and Flagyl initially, but developed a rash due to Flagyl, and was subsequently changed to Augmentin. She had a follow-up visit with Dr. Mirella Pack and it was his opinion that she also had IBS which mimicked her episodes of diverticulitis. He recommended continuing on a probiotic and prescribed hyoscyamine to use as needed. She has noted improvement since doing so. She also has a history of Tourette's syndrome controlled with Haldol. She is followed by Dr. Ramon Hoover. Past Medical History:   Diagnosis Date    Arthritis     Breast cancer metastasized to lung [facility])     Left Breast    Chronic pain     Colon polyps 2/22/16    distal sigmoid, Dr. Monty Vanegas DDD (degenerative disc disease)     Diverticulitis of colon     Diverticulosis 2/22/16    mild in the ascedning and sigmoid colon, Dr. Tracy Hinds (hyperlipidemia)     Hypertension     Osteopenia     Tourette syndrome     Vitamin D deficiency      Past Surgical History:   Procedure Laterality Date    HX APPENDECTOMY      HX BREAST BIOPSY  7-30-10    Left Breast w/Nipple Duct exploration and excisional biopsy-left    HX DILATION AND CURETTAGE      x3    HX MODIFIED RADICAL MASTECTOMY Left 09/03/2010    HX ORTHOPAEDIC Right 2012    knee replacement    HX TONSILLECTOMY      HYSTEROSCOPY DIAGNOSTIC       Current Outpatient Medications   Medication Sig    aspirin delayed-release 81 mg tablet Take  by mouth daily.     temazepam (RESTORIL) 15 mg capsule TAKE 1 2 CAPS BY MOUTH EVERY NIGHT AT BEDTIME FOR INSOMNIA. DO NOT TAKE MORE THAN 3 4 TIMES PER WEEK    gabapentin (NEURONTIN) 300 mg capsule TAKE 1 CAP BY MOUTH TWO (2) TIMES A DAY. MAX DAILY AMOUNT: 600 MG.  diazePAM (Valium) 5 mg tablet Take 1 Tab by mouth every six (6) hours as needed for Anxiety. Max Daily Amount: 20 mg.    haloperidoL (HALDOL) 2 mg tablet Take 1 Tab by mouth two (2) times a day.  meloxicam (MOBIC) 15 mg tablet Take 1 Tab by mouth daily. Take WITH FOOD    losartan (COZAAR) 25 mg tablet Take 1 Tab by mouth daily.  letrozole (FEMARA) 2.5 mg tablet TAKE 1 TABLET BY MOUTH EVERY DAY    cyclobenzaprine (FLEXERIL) 5 mg tablet Take 1 Tab by mouth three (3) times daily as needed for Muscle Spasm(s).  pantoprazole sodium (PROTONIX PO) Take  by mouth.  palbociclib 75 mg cap Take 75 mg by mouth daily. For days 1-21 of each 28 day cycle    CYANOCOBALAMIN, VITAMIN B-12, (VITAMIN B-12 PO) Take  by mouth.  Biotin 2,500 mcg cap Take  by mouth.  calcium-vitamin D (CALCIUM 500+D) 500 mg(1,250mg) -200 unit per tablet Take 1 Tab by mouth two (2) times daily (with meals).  cholecalciferol, vitamin d3, (VITAMIN D) 1,000 unit tablet Take 2,000 Units by mouth daily.  ascorbic acid (VITAMIN C) 500 mg tablet Take  by mouth. No current facility-administered medications for this visit. Allergies and Intolerances: Allergies   Allergen Reactions    Keflex [Cephalexin] Rash    Metronidazole Rash    Other Medication Nausea Only     Anesthesia    Shellfish Containing Products Nausea and Vomiting     More of just eating the actual shellfish.     Sulfa (Sulfonamide Antibiotics) Rash    Ultram [Tramadol] Nausea and Vomiting     Patient states she is allergic to most Narcotics    Vancomycin Rash     Family History:   Family History   Problem Relation Age of Onset    Osteoporosis Sister     Osteoporosis Mother     Stroke Father     Hypertension Father     Cancer Paternal Aunt breast     Social History:   She  reports that she has quit smoking. She quit after 3.00 years of use. She has never used smokeless tobacco. She stopped smoking over 40 years ago. She is  and lives with . They have one daughter and two grandchildren. Social History     Substance and Sexual Activity   Alcohol Use Yes    Alcohol/week: 5.8 standard drinks    Types: 7 Glasses of wine per week     Immunization History:  Immunization History   Administered Date(s) Administered    Covid-19, MODERNA, Mrna, Lnp-s, Pf, 100mcg/0.5mL 02/07/2021, 03/07/2021    Influenza High Dose Vaccine PF 10/01/2015, 10/25/2016, 10/05/2017    Influenza Vaccine 09/01/2014    Influenza Vaccine (Tri) Adjuvanted (>65 Yrs FLUAD TRI [id number]) 10/05/2018, 10/17/2019    Influenza Vaccine Split 11/01/2011, 10/02/2012    Influenza Vaccine Whole 10/08/2010    Influenza, Quadrivalent, Adjuvanted (>65 Yrs FLUAD QUAD [id number]) 09/23/2020    Pneumococcal Conjugate (PCV-13) 10/27/2015    Pneumococcal Polysaccharide (PPSV-23) 04/15/2013    TD Vaccine 05/05/2008    Tdap 09/12/2014    Zoster 09/20/2011       Review of Systems:   As above included in HPI. Otherwise 11 point review of systems negative including constitutional, skin, HENT, eyes, respiratory, cardiovascular, gastrointestinal, genitourinary, musculoskeletal, endo/heme/aller, neurological.    Physical:     Visit Vitals  /84 (BP 1 Location: Left arm, BP Patient Position: Sitting)   Pulse 76   Temp 97.7 °F (36.5 °C) (Temporal)   Resp 16   Ht 4' 11\" (1.499 m)   Wt 143 lb 3.2 oz (65 kg)   SpO2 97%   BMI 28.92 kg/m²        Patient appears in no apparent distress. Affect is appropriate. HEENT: PERRLA, anicteric, no JVD, adenopathy or thyromegaly. No carotid bruits or radiated murmur. Lungs: clear to auscultation, no wheezes, rhonchi, or rales. Heart: regular rate and rhythm.  No murmur, rubs, gallops  Abdomen: soft, nontender, nondistended, normal bowel sounds, no hepatosplenomegaly or masses. Extremities: without edema. Lymphedema of left arm and hand  Derm: Cluster of 2-3 erythematous macular lesions without vesicles on left anterior chest wall near mastectomy scar. No surrounding erythema or discharge. No palpable nodules or mass throughout chest wall on left. Review of Data:  Labs:  Hospital Outpatient Visit on 06/03/2021   Component Date Value Ref Range Status    WBC 06/03/2021 3.7* 4.6 - 13.2 K/uL Final    RBC 06/03/2021 3.73* 4.20 - 5.30 M/uL Final    HGB 06/03/2021 12.9  12.0 - 16.0 g/dL Final    HCT 06/03/2021 38.9  35.0 - 45.0 % Final    MCV 06/03/2021 104.3* 74.0 - 97.0 FL Final    MCH 06/03/2021 34.6* 24.0 - 34.0 PG Final    MCHC 06/03/2021 33.2  31.0 - 37.0 g/dL Final    RDW 06/03/2021 14.3  11.6 - 14.5 % Final    PLATELET 75/78/7304 603  135 - 420 K/uL Final    MPV 06/03/2021 8.8* 9.2 - 11.8 FL Final    NEUTROPHILS 06/03/2021 42  40 - 73 % Final    LYMPHOCYTES 06/03/2021 41  21 - 52 % Final    MONOCYTES 06/03/2021 14* 3 - 10 % Final    EOSINOPHILS 06/03/2021 2  0 - 5 % Final    BASOPHILS 06/03/2021 1  0 - 2 % Final    ABS. NEUTROPHILS 06/03/2021 1.6* 1.8 - 8.0 K/UL Final    ABS. LYMPHOCYTES 06/03/2021 1.5  0.9 - 3.6 K/UL Final    ABS. MONOCYTES 06/03/2021 0.5  0.05 - 1.2 K/UL Final    ABS. EOSINOPHILS 06/03/2021 0.1  0.0 - 0.4 K/UL Final    ABS.  BASOPHILS 06/03/2021 0.1  0.0 - 0.1 K/UL Final    DF 06/03/2021 AUTOMATED    Final    Sodium 06/03/2021 136  136 - 145 mmol/L Final    Potassium 06/03/2021 4.8  3.5 - 5.5 mmol/L Final    Chloride 06/03/2021 103  100 - 111 mmol/L Final    CO2 06/03/2021 28  21 - 32 mmol/L Final    Anion gap 06/03/2021 5  3.0 - 18 mmol/L Final    Glucose 06/03/2021 90  74 - 99 mg/dL Final    BUN 06/03/2021 15  7.0 - 18 MG/DL Final    Creatinine 06/03/2021 0.79  0.6 - 1.3 MG/DL Final    BUN/Creatinine ratio 06/03/2021 19  12 - 20   Final    GFR est AA 06/03/2021 >60  >60 ml/min/1.73m2 Final    GFR est non-AA 06/03/2021 >60  >60 ml/min/1.73m2 Final    Calcium 06/03/2021 9.3  8.5 - 10.1 MG/DL Final    Bilirubin, total 06/03/2021 0.8  0.2 - 1.0 MG/DL Final    ALT (SGPT) 06/03/2021 20  13 - 56 U/L Final    AST (SGOT) 06/03/2021 11  10 - 38 U/L Final    Alk. phosphatase 06/03/2021 54  45 - 117 U/L Final    Protein, total 06/03/2021 6.7  6.4 - 8.2 g/dL Final    Albumin 06/03/2021 3.8  3.4 - 5.0 g/dL Final    Globulin 06/03/2021 2.9  2.0 - 4.0 g/dL Final    A-G Ratio 06/03/2021 1.3  0.8 - 1.7   Final       EKG (11/24/2020) sinus rhythm at 75 bpm, normal intervals; no ischemic changes. Health Maintenance:  Screening:    Mammogram: negative (12/2020)   PAP smear: negative (9/2013) Dr Ean Huffman. Pelvic ultrasound negative (9/2015). No further screening needed. Colorectal: colonoscopy (2/2016) hyperplastic polyp; Dr. Glenny Cleaning. Due 2/2026. Depression: none   DM (HbA1c/FPG): FPG 95 (11/2020)   Hepatitis C: unknown   Falls: none   DEXA: osteopenia (11/2019) s/p T11 compression fracture (4/2016). On Reclast.  Dr. Tiffany Mehta.    Smoking:distant past   Glaucoma: regular eye exams with Dr. Toma Torrez (last 3/2021)   Vitamin D: 53.4 (11/2020)   Medicare Wellness: 6/3/2021      Impression:  Patient Active Problem List   Diagnosis Code    Tourette syndrome F95.2    Osteoarthritis, Status post right total knee replacement M19.90    Lumbar spinal stenosis M48.061    HLD (hyperlipidemia) E78.5    Breast cancer ([facility] [address] metastatic to lungs  C50.919    Essential hypertension I10    Diverticulosis K57.90    Osteopenia M85.80    Lymphedema of arm left I89.0    Insomnia G47.00    Fatigue R53.83    Diverticulitis, recurrent K57.92    T11 Vertebral compression fracture (HCC) s/p kyphoplasty M48.50XA    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.812    Spinal stenosis of cervical region M48.02    Right shoulder pain M25.511    Malignant neoplasm metastatic to lung (HCC) C78.00    Nontraumatic incomplete tear of right rotator cuff M75.111    Right sided sciatica M54.31    DDD (degenerative disc disease), lumbar M51.36    Lumbar facet arthropathy M47.816    [facility] to 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 Patient presents with a cluster of a few small lesions with pruritus on her anterior chest wall. Reports that pruritus and a few lesions first appeared on 6/13/2021, and have not worsened. She states that they actually appear to be improving with less pruritus over the last 24 hours. Given immunosuppressed status, concern for herpes zoster raised. Appears to be consistent with possible herpes zoster. However given that lesions have been present for more than 72 hours and appear to be resolving, will hold off on treatment with Valtrex currently. Advised patient to continue to observe and if develops any worsening, will initiate antiviral therapy. Patient has not yet obtained the Shingrix vaccine and urged to do so once rash resolves. Persistent fatigue. Patient with chronic fatigue attributed to chronic treatment with Ibrance and letrozole for her metastatic breast cancer. Currently has been off Ibrance for 3 weeks and letrozole for 1 week and noting some general improvement although continuing to have intermittent episodes of fatigue. Does have upcoming repeat staging CT scans next week and will reassess the benefits of chronic chemotherapy versus side effects. Being followed closely by Dr. Yadira Whitman and staff. Other chronic issues:  1. Fatigue. Severe and persistent. Continues on Ibrance and letrozole. Described dyspnea in association with fatigue when performing exertional activities in 11/2020. Denied associated chest pain.  EKG unremarkable, chest x-ray negative, and and echocardiogram (12/1/2020) showed normal LV function (EF 55-60%), trileaflet AV with moderate AI, PAP 30 mmHg. Repeat staging CT scans in 1/2021 without evidence of recurrence or metastases. Aware of benefit of continued current therapy, particularly given clinical response. However due for repeat staging CT scans as discussed. 2. Breast cancer with pulmonary metastasis. Continues on current therapy with Ibrance and letrozole as per Dr. Eddie Guzman. CT scans in 7/2019 showed stable small pulmonary nodules. However, repeat CT chest abdomen and pelvis in 1/2021 showed no evidence of metastasis or adenopathy. No pulmonary nodules were seen. Despite excessive fatigue, recommendation is to continue with Ibrance and letrozole given clinical response. Not using compression sleeve for lymphedema, but receiving massage therapy every three weeks with good results. 3. Hypertension. Well controlled on losartan 25 mg daily. Renal function has been normal with creatinine 0.91/ eGFR >60. Brain MRI (5/2021) with evidence of mild to moderate burden nonspecific white matter lesions and a few tiny old lacunar infarcts, likely from longstanding hypertension. Patient has been started on aspirin 81 mg daily by Dr. Shirlene Ramos. Advised to continue. 4.  Right ICA terminus aneurysm. MRA brain (4/2019) showed a 2 mm superiorly directed aneurysm at the right ICA terminus. Repeat MRA brain (5/2021) showed no significant interval change. Being monitored by Dr. Shirlene Ramos. 5. Hyperlipidemia. Calculated 10 year ASCVD risk is 18.2%, which places her in one of the four statin benefit groups (primary prevention with risk > 7.5%).  and HDL 58 (11/2020). Given lack of history of ASCVD, no evidence of atherosclerotic disease on chest and abdominal CT scans, and history of metastatic breast cancer, will not recommend treatment with statin at this time. Continue to emphasized importance of lifestyle modifications, including diet and exercise. 6. Osteopenia. History of T11 compression fracture in 4/2016, s/p kyphoplasty.  Last bone density scan 11/2019. Using femoral neck T-scores, calculated FRAX score estimates her 10 year risk of a major osteoporetic fracture at 16 % and hip fracture at 2.3%, which alone are not an indication for biphosphonate treatment. However, the development of a vertebral compression fracture would be considered an indication for treatment. In addition, treatment with an estrogen receptor blocker increases likelihood of progression. Evaluated by Dr. Greg Eric, and received first dose of Reclast on 3/29/2021. Continue calcium and Vitamin D. Encouraged to continue exercises. Repeat bone density scheduled for 11/2021.    7. Recurrent diverticulitis. Colonoscopy (2/2016) with moderately severe diverticulosis in the ascending and descending colon. Has had multiple recent episodes of abdominal pain which were treated empirically with Cipro. Episode of symptoms in 3/2018 resolved prior to initiating antibiotics, bringing into question whether her abdominal symptoms were due to diverticulitis or irritable bowel syndrome. Discussed at last visit that she should have abdominal CT scan to confirm the diagnosis next time she had abdominal complaints. Had recurrent episode in 7/2018, and abdominal CT scan confirmed diagnosis of acute diverticulitis. Treated with Augmentin as developed rash on Flagyl. Subsequently evaluated by Dr. Elijah Arvizu, and instructed to continue Probiotics and use hyoscyamine as needed when develop abdominal complaints. It was his opinion that her complaints may be related to IBS, so wanting to avoid frequent antibiotics if not needed. Reports of multiple mild episodes of pain may reflect IBS since will resolve without antibiotics. Advised to try hyoscyamine with next episode. Reports no recurrence since last visit. Will continue to follow. 8. Low back pain with right sciatica. Known lumbar stenosis and facet arthropathy. Using meloxicam, cyclobenzaprine, gabapentin, and Tylenol.  Previously did undergo L4-L5 epidural injection on the right by Dr. Jerome Tariq in 2016 for similar symptoms. Recent recurrence of symptoms without sustained improvement despite treatment with steroids, NSAIDS, muscle relaxants and gabapentin. Underwent repeat lumbar MRI in 5/2020 which showed very advanced multilevel DDD and DJD with chronic compression fractures and worsening since 2016, especially at L4/5. Underwent left L4/5 and right L5-S1 KAREN in 6/2020 and Left S1 TF KAREN in 8/2020 by Dr. Jerome Tariq with improvement. Will continue to follow. 9. Right shoulder pain. MRI with partial rotator cuff tear. Evaluated by Dr. Dorothy Smith and received a glenohumeral cortisone injection. Recommended physical therapy, but patient did not proceed. Not a significant complaint today as improved. Followed by Dr. Maggy Zapata. 10. Tourette's syndrome. Followed by Dr. Nessa Delgado. On Haldol with plans to switch to Risperdal if Haldol becomes unavailable. Now using temazepam, as prescribed by Dr. Nessa Delgado, approximately 3 times a week to help with insomnia. Reports occasionally will need to take diazepam if temazepam not effective. We will continue to monitor. 11. Right ovarian cyst. No further monitoring needed as per Dr. Carly Bejarano. Surveillance CT scans obtained every 6 months for breast cancer do include imaging of pelvis and show cyst to be stable. 12. GERD. Using Protonix daily with good control. Follow. 13. Health maintenance. Completed Moderna COVID 19 vaccine series. Has not yet received Shingrix vaccine and remains hesitant to do so. Other immunizations up to date. Mammogram up to date. Will perform breast exam every visit. Vitamin D level has been normal. Continue maintenance dose supplement. Continue regular eye exams with Dr. Glennon Canavan. Completed cataract surgery in 9/0958 without complications. Medicare wellness visit up to date. Patient understands recommendations and agrees with plan. Follow-up as previously scheduled.

## 2021-06-26 NOTE — TELEPHONE ENCOUNTER
Called to give 's results and patient stated she was having the beginnings of a cold. Started late Friday evening and into Saturday morning, low grade fever- 99.0 and she has a sore throat and slight cough. Wanted to know what to do. Let her know that most of these things are caused by viruses and she should continue with supportive measures for now, increase fluids, try otc medications for symptoms and call if no better by the end of this week. Pt agreed with plan and will let us know if she doesn't start to feel better. RN cannot approve Refill Request    RN can NOT refill this medication PCP messaged that patient is overdue for Labs and Office Visit. Last office visit: 1/15/2020 Mansi Mccracken MD Last Physical: Visit date not found Last MTM visit: Visit date not found Last visit same specialty: 1/15/2020 Mansi Mccracken MD.  Next visit within 3 mo: Visit date not found  Next physical within 3 mo: Visit date not found      Renu Waite, [facility] Triage/Med Refill 6/23/2021    Requested Prescriptions   Pending Prescriptions Disp Refills     venlafaxine (EFFEXOR-XR) 75 MG 24 hr capsule [Pharmacy Med Name: VENLAFAXINE HCL ER 75 MG CAP] 90 capsule 0     Sig: TAKE 1 CAPSULE BY MOUTH EVERY DAY       Venlafaxine/Desvenlafaxine Refill Protocol Failed - 6/23/2021 12:31 AM        Failed - LFT or AST or ALT in last year     Albumin   Date Value Ref Range Status   01/15/2020 4.2 3.5 - 5.0 g/dL Final     Bilirubin, Total   Date Value Ref Range Status   01/15/2020 0.3 0.0 - 1.0 mg/dL Final     Bilirubin, Direct   Date Value Ref Range Status   04/22/2013 <0.1 <0.6 mg/dL Final     Alkaline Phosphatase   Date Value Ref Range Status   01/15/2020 89 45 - 120 U/L Final     AST   Date Value Ref Range Status   01/15/2020 19 0 - 40 U/L Final     ALT   Date Value Ref Range Status   01/15/2020 23 0 - 45 U/L Final     Protein, Total   Date Value Ref Range Status   01/15/2020 7.4 6.0 - 8.0 g/dL Final                Failed - Fasting lipid cascade in last year     Cholesterol   Date Value Ref Range Status   01/15/2020 254 (H) <=199 mg/dL Final     Triglycerides   Date Value Ref Range Status   01/15/2020 168 (H) <=149 mg/dL Final     HDL Cholesterol   Date Value Ref Range Status   01/15/2020 59 >=50 mg/dL Final     LDL Calculated   Date Value Ref Range Status   01/15/2020 161 (H) <=129 mg/dL Final     Patient Fasting > 8hrs?   Date Value Ref Range Status   01/15/2020 Yes  Final             Failed - PCP or prescribing provider visit in  last year     Last office visit with prescriber/PCP: 1/15/2020 Mansi Mccracken MD OR same dept: Visit date not found OR same specialty: 1/15/2020 Mansi Mccracken MD  Last physical: Visit date not found Last MTM visit: Visit date not found   Next visit within 3 mo: Visit date not found  Next physical within 3 mo: Visit date not found  Prescriber OR PCP: Mansi Mccracken MD  Last diagnosis associated with med order: 1. Generalized anxiety disorder  - venlafaxine (EFFEXOR-XR) 75 MG 24 hr capsule [Pharmacy Med Name: VENLAFAXINE HCL ER 75 MG CAP]; TAKE 1 CAPSULE BY MOUTH EVERY DAY  Dispense: 90 capsule; Refill: 0    2. Gastroesophageal reflux disease without esophagitis  - omeprazole (PRILOSEC) 20 MG capsule [Pharmacy Med Name: OMEPRAZOLE DR 20 MG CAPSULE]; TAKE 1 CAPSULE BY MOUTH EVERY DAY  Dispense: 90 capsule; Refill: 0    If protocol passes may refill for 12 months if within 3 months of last provider visit (or a total of 15 months).             Passed - Blood Pressure in last year     BP Readings from Last 1 Encounters:   11/16/20 (!) 161/92                omeprazole (PRILOSEC) 20 MG capsule [Pharmacy Med Name: OMEPRAZOLE DR 20 MG CAPSULE] 90 capsule 0     Sig: TAKE 1 CAPSULE BY MOUTH EVERY DAY       GI Medications Refill Protocol Failed - 6/23/2021 12:31 AM        Failed - PCP or prescribing provider visit in last 12 or next 3 months.     Last office visit with prescriber/PCP: 1/15/2020 Mansi Mccracken MD [place] [facility] dept: Visit date not found OR same specialty: 1/15/2020 Mansi Mccracken MD  Last physical: Visit date not found Last MTM visit: Visit date not found   Next visit within 3 mo: Visit date not found  Next physical within 3 mo: Visit date not found  Prescriber OR PCP: Mansi Mccracken MD  Last diagnosis associated with med order: 1. Generalized anxiety disorder  - venlafaxine (EFFEXOR-XR) 75 MG 24 hr capsule [Pharmacy Med Name: VENLAFAXINE HCL ER 75 MG CAP]; TAKE 1 CAPSULE BY MOUTH EVERY DAY   Dispense: 90 capsule; Refill: 0    2. Gastroesophageal reflux disease without esophagitis  - omeprazole (PRILOSEC) 20 MG capsule [Pharmacy Med Name: OMEPRAZOLE DR 20 MG CAPSULE]; TAKE 1 CAPSULE BY MOUTH EVERY DAY  Dispense: 90 capsule; Refill: 0    If protocol passes may refill for 12 months if within 3 months of last provider visit (or a total of 15 months).

## 2021-06-30 ENCOUNTER — TELEPHONE (OUTPATIENT)
Dept: INTERNAL MEDICINE CLINIC | Age: 76
End: 2021-06-30

## 2021-06-30 NOTE — TELEPHONE ENCOUNTER
Pt calling, says her oncologist called and said her Ferritin Store was 13.1 and her Glucose is very low also. They advised she notify her PCP and also her GI doctor. She has a call into her GI and is waiting return call. She wanted Dr. Dalton Coleman to know also.

## 2021-07-01 NOTE — TELEPHONE ENCOUNTER
Called and spoke with patient. Reports that iron studies were checked at Dr. Jessica English office and showed a low ferritin of 13. She is not anemic and denies noting any bleeding. She has already been in contact with Dr. Jennifer Smith office and has been started on iron supplementation. Discussed ways to avoid constipation, including taking a stool softener and using MiraLAX as needed. Answered all questions. Also reports that her glucose level was 66 when checked on labs in Dr. Jessica English office. She had been fasting for several hours prior to the reading and was without symptoms. Reassured that this would not be of concern.

## 2021-07-01 NOTE — TELEPHONE ENCOUNTER
Patient calling to confirm Dr. Daniel Colon received this message, and she is requesting Sanam Hernandez call her back.

## 2021-07-23 ENCOUNTER — TELEPHONE (OUTPATIENT)
Dept: INTERNAL MEDICINE CLINIC | Age: 76
End: 2021-07-23

## 2021-07-23 NOTE — TELEPHONE ENCOUNTER
Called and spoke with patient. Discussed that agree with GI evaluation by Dr. Shirin Segal with upper endoscopy and colonoscopy to evaluate iron deficiency.

## 2021-07-23 NOTE — TELEPHONE ENCOUNTER
Patient stating she saw Dr. Maty Stein on 6/25/21 and they checked her Iron and found that the ferritin levels are very low. She talked to Dr. Roxy Briseno office and oncology and they both agree she should have a colonoscopy and endoscopy. Wanted to know if Dr. Magdalena Ritchie has any input.

## 2021-07-27 NOTE — TELEPHONE ENCOUNTER
Pt stated she cannot cross legs across her knee and having trouble climbing stairs.  She starts physical therapy for her back next week and will discuss with Therapist.
Reviewed left knee xray result (6/11/2018). Showed no abnormalities with intact joint space. Please let the patient know that her left knee x-ray did not show any abnormalities or joint arthritis to explain her pain. Most likely related to underlying ligament injury. If worsens, could consider MRI to evaluate further. Thanks.
Report given to OR Rn

## 2021-08-04 ENCOUNTER — TELEPHONE (OUTPATIENT)
Dept: INTERNAL MEDICINE CLINIC | Age: 76
End: 2021-08-04

## 2021-08-04 NOTE — TELEPHONE ENCOUNTER
Spoke with patient she reports overall just not feeling well. She reports some loose stools. She was having some LUQ/back pain the other day but she took tylenol and that subsided. Patient reports feeling weak. She reports just having blood work done with oncology and everything looking normal. She has been unsuccessful with reaching anyone at [place] [id number] to discuss her symptoms and to see if she needs to be seen sooner. Advised patient I would try to call and see if I could have someone reach out to her.

## 2021-08-04 NOTE — TELEPHONE ENCOUNTER
Pt calling, says she has been trying to reach Dr. Mare Pepe office for 3 days. She keeps getting put on hold there and no one comes to phone and they are not answering the voicemail. They think she has a gi bleed and have her scheduled for an endoscopy on 9/2. She is not feeling well. She had a bad night last night and is having multiple stools. Wants to know if she needs to get a sooner appt with them. Can you help reach them for her or advise what she should do?

## 2021-08-05 NOTE — TELEPHONE ENCOUNTER
Spoke with [facility] they said they received the patients message and had tried to contact her but did not get an answer. Verified phone numbers. They will reach out again.

## 2021-08-06 ENCOUNTER — TELEPHONE (OUTPATIENT)
Dept: INTERNAL MEDICINE CLINIC | Age: 76
End: 2021-08-06

## 2021-08-06 NOTE — TELEPHONE ENCOUNTER
Pt calling to let office know that she has been scheduled for colonoscopy and endoscopy on Wed August 11th with Dr Abby Ham at [facility].

## 2021-08-09 NOTE — PERIOP NOTES
PRE-SURGICAL INSTRUCTIONS        Patient's Name:  Doralee Soulier      TATQJ'X Date:  8/9/2021              Surgery Date:  8/11/2021                1. Do NOT eat or drink anything, including candy, gum, or ice chips after midnight on 8/11, unless you have specific instructions from your surgeon or anesthesia provider to do so.  2. You may brush your teeth before coming to the hospital.  3. No smoking 24 hours prior to the day of surgery. 4. No alcohol 24 hours prior to the day of surgery. 5. No recreational drugs for one week prior to the day of surgery. 6. Leave all valuables, including money/purse, at home. 7. Remove all jewelry, nail polish, acrylic nails, and makeup (including mascara); no lotions powders, deodorant, or perfume/cologne/after shave on the skin. 8. Glasses/contact lenses and dentures may be worn to the hospital.  They will be removed prior to surgery. 9. Call your doctor if symptoms of a cold or illness develop within 24-48 hours prior to your surgery. 10.  AN ADULT MUST DRIVE YOU HOME AFTER OUTPATIENT SURGERY. 11.  If you are having an outpatient procedure, please make arrangements for a responsible adult to be with you for 24 hours after your surgery. 12.  NO VISITORS in the hospital at this time for outpatient procedures. Exceptions may be made for surgical admissions, per nursing unit guidelines      Special Instructions:      Bring list of CURRENT medications. Bring any pertinent legal medical records. Take these medications the morning of surgery with a sip of water:  Per office    Complete bowel prep per MD instructions. On the day of surgery, come in the main entrance of DR[facility] [place] [facility]". Let the  at the desk know you are there for surgery. A staff member will come escort you to the surgical area on the second floor.     If you have any questions or concerns, please do not hesitate to call:     (Prior to the day of surgery) PAT department: [phone]   (Day of surgery) Pre-Op department:  [phone]    These surgical instructions were reviewed with the patient during the PAT phone call.

## 2021-08-10 ENCOUNTER — ANESTHESIA EVENT (OUTPATIENT)
Dept: ENDOSCOPY | Age: 76
End: 2021-08-10
Payer: MEDICARE

## 2021-08-11 ENCOUNTER — ANESTHESIA (OUTPATIENT)
Dept: ENDOSCOPY | Age: 76
End: 2021-08-11
Payer: MEDICARE

## 2021-08-11 ENCOUNTER — HOSPITAL ENCOUNTER (OUTPATIENT)
Age: 76
Setting detail: OUTPATIENT SURGERY
Discharge: HOME | End: 2021-08-11
Attending: INTERNAL MEDICINE | Admitting: INTERNAL MEDICINE
Payer: MEDICARE

## 2021-08-11 VITALS
HEART RATE: 78 BPM | BODY MASS INDEX: 28.47 KG/M2 | WEIGHT: 141.2 LBS | TEMPERATURE: 97 F | SYSTOLIC BLOOD PRESSURE: 136 MMHG | DIASTOLIC BLOOD PRESSURE: 86 MMHG | OXYGEN SATURATION: 96 % | HEIGHT: 59 IN | RESPIRATION RATE: 20 BRPM

## 2021-08-11 PROCEDURE — 99100 ANES PT EXTEME AGE<1 YR&>70: CPT | Performed by: ANESTHESIOLOGY

## 2021-08-11 PROCEDURE — 77030019988 HC FCPS ENDOSC DISP BSC -B: Performed by: INTERNAL MEDICINE

## 2021-08-11 PROCEDURE — 88305 TISSUE EXAM BY PATHOLOGIST: CPT

## 2021-08-11 PROCEDURE — 99100 ANES PT EXTEME AGE<1 YR&>70: CPT | Performed by: NURSE ANESTHETIST, CERTIFIED REGISTERED

## 2021-08-11 PROCEDURE — 74011000250 HC RX REV CODE- 250: Performed by: NURSE ANESTHETIST, CERTIFIED REGISTERED

## 2021-08-11 PROCEDURE — 76040000019: Performed by: INTERNAL MEDICINE

## 2021-08-11 PROCEDURE — 00813 ANES UPR LWR GI NDSC PX: CPT | Performed by: NURSE ANESTHETIST, CERTIFIED REGISTERED

## 2021-08-11 PROCEDURE — 77030008565 HC TBNG SUC IRR ERBE -B: Performed by: INTERNAL MEDICINE

## 2021-08-11 PROCEDURE — 74011250636 HC RX REV CODE- 250/636: Performed by: NURSE ANESTHETIST, CERTIFIED REGISTERED

## 2021-08-11 PROCEDURE — 2709999900 HC NON-CHARGEABLE SUPPLY: Performed by: INTERNAL MEDICINE

## 2021-08-11 PROCEDURE — 76060000031 HC ANESTHESIA FIRST 0.5 HR: Performed by: INTERNAL MEDICINE

## 2021-08-11 PROCEDURE — 00813 ANES UPR LWR GI NDSC PX: CPT | Performed by: ANESTHESIOLOGY

## 2021-08-11 PROCEDURE — 77030021593 HC FCPS BIOP ENDOSC BSC -A: Performed by: INTERNAL MEDICINE

## 2021-08-11 PROCEDURE — 77030013992 HC SNR POLYP ENDOSC BSC -B: Performed by: INTERNAL MEDICINE

## 2021-08-11 RX ORDER — ONDANSETRON HYDROCHLORIDE 8 MG/1
8 TABLET, FILM COATED ORAL
COMMUNITY
End: 2021-12-21 | Stop reason: SDUPTHER

## 2021-08-11 RX ORDER — PROPOFOL 10 MG/ML
INJECTION, EMULSION INTRAVENOUS AS NEEDED
Status: DISCONTINUED | OUTPATIENT
Start: 2021-08-11 | End: 2021-08-11 | Stop reason: HOSPADM

## 2021-08-11 RX ORDER — ACETAMINOPHEN 500 MG
500 TABLET ORAL
COMMUNITY

## 2021-08-11 RX ORDER — LIDOCAINE HYDROCHLORIDE 20 MG/ML
INJECTION, SOLUTION EPIDURAL; INFILTRATION; INTRACAUDAL; PERINEURAL AS NEEDED
Status: DISCONTINUED | OUTPATIENT
Start: 2021-08-11 | End: 2021-08-11 | Stop reason: HOSPADM

## 2021-08-11 RX ORDER — SODIUM CHLORIDE, SODIUM LACTATE, POTASSIUM CHLORIDE, CALCIUM CHLORIDE 600; 310; 30; 20 MG/100ML; MG/100ML; MG/100ML; MG/100ML
50 INJECTION, SOLUTION INTRAVENOUS CONTINUOUS
Status: DISCONTINUED | OUTPATIENT
Start: 2021-08-11 | End: 2021-08-11 | Stop reason: HOSPADM

## 2021-08-11 RX ORDER — LIDOCAINE HYDROCHLORIDE 10 MG/ML
0.1 INJECTION, SOLUTION EPIDURAL; INFILTRATION; INTRACAUDAL; PERINEURAL AS NEEDED
Status: DISCONTINUED | OUTPATIENT
Start: 2021-08-11 | End: 2021-08-11 | Stop reason: HOSPADM

## 2021-08-11 RX ADMIN — PROPOFOL 50 MG: 10 INJECTION, EMULSION INTRAVENOUS at 14:07

## 2021-08-11 RX ADMIN — SODIUM CHLORIDE, SODIUM LACTATE, POTASSIUM CHLORIDE, AND CALCIUM CHLORIDE 50 ML/HR: 600; 310; 30; 20 INJECTION, SOLUTION INTRAVENOUS at 12:26

## 2021-08-11 RX ADMIN — PROPOFOL 50 MG: 10 INJECTION, EMULSION INTRAVENOUS at 14:03

## 2021-08-11 RX ADMIN — PROPOFOL 50 MG: 10 INJECTION, EMULSION INTRAVENOUS at 14:00

## 2021-08-11 RX ADMIN — LIDOCAINE HYDROCHLORIDE 40 MG: 20 INJECTION, SOLUTION EPIDURAL; INFILTRATION; INTRACAUDAL; PERINEURAL at 13:59

## 2021-08-11 RX ADMIN — PROPOFOL 50 MG: 10 INJECTION, EMULSION INTRAVENOUS at 14:12

## 2021-08-11 NOTE — DISCHARGE INSTRUCTIONS
Upper GI Endoscopy: What to Expect at [address] [place] [address]  After you have an endoscopy, you will stay at the hospital or clinic for 1 to 2 hours. This will allow the medicine to wear off. You will be able to go home after your doctor or nurse checks to make sure you are not having any problems. You may have to stay overnight if you had treatment during the test. You may have a sore throat for a day or two after the test.  This care sheet gives you a general idea about what to expect after the test.  How can you care for yourself at home? Activity  · Rest as much as you need to after you go home. · You should be able to go back to your usual activities the day after the test.  Diet  · Follow your doctor's directions for eating after the test.  · Drink plenty of fluids (unless your doctor has told you not to). Medications  · If you have a sore throat the day after the test, use an over-the-counter spray to numb your throat. Follow-up care is a key part of your treatment and safety. Be sure to make and go to all appointments, and call your doctor if you are having problems. It's also a good idea to know your test results and keep a list of the medicines you take. When should you call for help? Call 911 anytime you think you may need emergency care. For example, call if:  · You passed out (lost consciousness). · You cough up blood. · You vomit blood or what looks like coffee grounds. · You pass maroon or very bloody stools. Call your doctor now or seek immediate medical care if:  · You have trouble swallowing. · You have belly pain. · Your stools are black and tarlike or have streaks of blood. · You are sick to your stomach or cannot keep fluids down. Watch closely for changes in your health, and be sure to contact your doctor if:  · Your throat still hurts after a day or two. · You do not get better as expected. Where can you learn more?    Go to DealExplorer.be  Enter J454 in the search box to learn more about \"Upper GI Endoscopy: What to Expect at Home. \"   © 1671-2305 Healthwise, Incorporated. Care instructions adapted under license by [employer] (which disclaims liability or warranty for this information). This care instruction is for use with your licensed healthcare professional. If you have questions about a medical condition or this instruction, always ask your healthcare professional. [address]  any warranty or liability for your use of this information. Content Version: 51.3.859911; Current as of: November 14, 2014  Patient Education      Hiatal Hernia: Care Instructions  Your Care Instructions  A hiatal hernia occurs when part of the stomach bulges into the chest cavity. A hiatal hernia may allow stomach acid and juices to back up into the esophagus (acid reflux). This can cause a feeling of burning, warmth, heat, or pain behind the breastbone. This feeling may often occur after you eat, soon after you lie down, or when you bend forward, and it may come and go. You also may have a sour taste in your mouth. These symptoms are commonly known as heartburn or reflux. But not all hiatal hernias cause symptoms. Follow-up care is a key part of your treatment and safety. Be sure to make and go to all appointments, and call your doctor if you are having problems. It's also a good idea to know your test results and keep a list of the medicines you take. How can you care for yourself at home? · Take your medicines exactly as prescribed. Call your doctor if you think you are having a problem with your medicine. · Do not take aspirin or other nonsteroidal anti-inflammatory drugs (NSAIDs), such as ibuprofen (Advil, Motrin) or naproxen (Aleve), unless your doctor says it is okay. Ask your doctor what you can take for pain. · Your doctor may recommend over-the-counter medicine.  For mild or occasional indigestion, antacids such as Tums, Gaviscon, Maalox, or Mylanta may help. Your doctor also may recommend over-the-counter acid reducers, such as famotidine (Pepcid AC), cimetidine (Tagamet HB), or omeprazole (Prilosec). Read and follow all instructions on the label. If you use these medicines often, talk with your doctor. · Change your eating habits. ? It's best to eat several small meals instead of two or three large meals. ? After you eat, wait 2 to 3 hours before you lie down. Late-night snacks aren't a good idea. ? Chocolate, mint, and alcohol can make heartburn worse. They relax the valve between the esophagus and the stomach. ? Spicy foods, foods that have a lot of acid (like tomatoes and oranges), and coffee can make heartburn symptoms worse in some people. If your symptoms are worse after you eat a certain food, you may want to stop eating that food to see if your symptoms get better. · Do not smoke or chew tobacco.  · If you get heartburn at night, raise the head of your bed 6 to 8 inches by putting the frame on blocks or placing a foam wedge under the head of your mattress. (Adding extra pillows does not work.)  · Do not wear tight clothing around your middle. · Lose weight if you need to. Losing just 5 to 10 pounds can help. When should you call for help? Call your doctor now or seek immediate medical care if:    · You have new or worse belly pain.     · You are vomiting. Watch closely for changes in your health, and be sure to contact your doctor if:    · You have new or worse symptoms of indigestion.     · You have trouble or pain swallowing.     · You are losing weight.     · You do not get better as expected. Where can you learn more? Go to http://www.smartfundit.com.com/  Enter T074 in the search box to learn more about \"Hiatal Hernia: Care Instructions. \"  Current as of: April 15, 2020               Content Version: 12.8  © 5664-4583 Healthwise, [employer].    Care instructions adapted under license by [employer] (which disclaims liability or warranty for this information). If you have questions about a medical condition or this instruction, always ask your healthcare professional. [address] any warranty or liability for your use of this information. Colonoscopy: What to Expect at [address] [place] [address]  After you have a colonoscopy, you will stay at the clinic for 1 to 2 hours until the medicines wear off. Then you can go home. But you will need to arrange for a ride. Your doctor will tell you when you can eat and do your other usual activities. Your doctor will talk to you about when you will need your next colonoscopy. Your doctor can help you decide how often you need to be checked. This will depend on the results of your test and your risk for colorectal cancer. After the test, you may be bloated or have gas pains. You may need to pass gas. If a biopsy was done or a polyp was removed, you may have streaks of blood in your stool (feces) for a few days. This care sheet gives you a general idea about how long it will take for you to recover. But each person recovers at a different pace. Follow the steps below to get better as quickly as possible. How can you care for yourself at home? Activity  · Rest when you feel tired. · You can do your normal activities when it feels okay to do so. Diet  · Follow your doctor's directions for eating. · Unless your doctor has told you not to, drink plenty of fluids. This helps to replace the fluids that were lost during the colon prep. · Do not drink alcohol. Medicines  · If polyps were removed or a biopsy was done during the test, your doctor may tell you not to take aspirin or other anti-inflammatory medicines for a few days. These include ibuprofen (Advil, Motrin) and naproxen (Aleve). Other instructions  · For your safety, do not drive or operate machinery until the medicine wears off and you can think clearly.  Your doctor may tell you not to drive or operate machinery until the day after your test.  · Do not sign legal documents or make major decisions until the medicine wears off and you can think clearly. The anesthesia can make it hard for you to fully understand what you are agreeing to. Follow-up care is a key part of your treatment and safety. Be sure to make and go to all appointments, and call your doctor if you are having problems. It's also a good idea to know your test results and keep a list of the medicines you take. When should you call for help? Call 911 anytime you think you may need emergency care. For example, call if:  · You passed out (lost consciousness). · You pass maroon or bloody stools. · You have severe belly pain. Call your doctor now or seek immediate medical care if:  · Your stools are black and tarlike. · Your stools have streaks of blood, but you did not have a biopsy or any polyps removed. · You have belly pain, or your belly is swollen and firm. · You vomit. · You have a fever. · You are very dizzy. Watch closely for changes in your health, and be sure to contact your doctor if you have any problems. Where can you learn more? Go to [employer].be  Enter E264 in the search box to learn more about \"Colonoscopy: What to Expect at Home. \"   © 3586-9609 Healthwise, Incorporated. Care instructions adapted under license by [facility] (which disclaims liability or warranty for this information). This care instruction is for use with your licensed healthcare professional. If you have questions about a medical condition or this instruction, always ask your healthcare professional. [place] [address] any warranty or liability for your use of this information. Content Version: 99.3.752970; Current as of: November 14, 2014      DISCHARGE SUMMARY from Nurse     POST-PROCEDURE INSTRUCTIONS:    Call your Physician if you:  ? Observe any excess bleeding. ?  Develop a temperature over 100.5o F.  ? Experience abdominal, shoulder or chest pain. ? Notice any signs of decreased circulation or nerve impairment to an extremity such as a change in color, persistent numbness, tingling, coldness or increase in pain. ? Vomit blood or you have nausea and vomiting lasting longer than 4 hours. ? Are unable to take medications. ? Are unable to urinate within 8 hours after discharge following general anesthesia or intravenous sedation. For the next 24 hours after receiving general anesthesia or intravenous sedation, or while taking prescription Narcotics, limit your activities:  ? Do NOT drive a motor vehicle, operate hazard machinery or power tools, or perform tasks that require coordination. The medication you received during your procedure may have some effect on your mental awareness. ? Do NOT make important personal or business decisions. The medication you received during your procedure may have some effect on your mental awareness. ? Do NOT drink alcoholic beverages. These drinks do not mix well with the medications that have been given to you. ? Upon discharge from the hospital, you must be accompanied by a responsible adult. ? Resume your diet as directed by your physician. ? Resume medications as your physician has prescribed. ? Please give a list of your current medications to your Primary Care Provider. ? Please update this list whenever your medications are discontinued, doses are changed, or new medications (including over-the-counter products) are added. ? Please carry medication information at all times in case of emergency situations. These are general instructions for a healthy lifestyle:    No smoking/ No tobacco products/ Avoid exposure to second hand smoke.  Surgeon General's Warning:  Quitting smoking now greatly reduces serious risk to your health. Obesity, smoking, and a sedentary lifestyle greatly increase your risk for illness.    A healthy diet, regular physical exercise & weight monitoring are important for maintaining a healthy lifestyle   You may be retaining fluid if you have a history of heart failure or if you experience any of the following symptoms:  Weight gain of 3 pounds or more overnight or 5 pounds in a week, increased swelling in our hands or feet or shortness of breath while lying flat in bed. Please call your doctor as soon as you notice any of these symptoms; do not wait until your next office visit. Recognize signs and symptoms of STROKE:  F  -  Face looks uneven  A  -  Arms unable to move or move unevenly  S  -  Speech slurred or non-existent  T  -  Time to call 911 - as soon as signs and symptoms begin - DO NOT go back to bed or wait to see If you get better - TIME IS BRAIN. Colorectal Screening   Colorectal cancer almost always develops from precancerous polyps (abnormal growths) in the colon or rectum. Screening tests can find precancerous polyps, so that they can be removed before they turn into cancer. Screening tests can also find colorectal cancer early, when treatment works best.  [facility] Speak with your physician about when you should begin screening and how often you should be tested. Additional Information    If you have questions, please call [phone]. Remember, [employer] is NOT to be used for urgent needs. For medical emergencies, dial 911. Educational references and/or instructions provided during this visit included:    See attached. APPOINTMENTS:    Per MD Instruction. Discharge information has been reviewed with the patient. The patient verbalized understanding.

## 2021-08-11 NOTE — H&P
Chief Complaint: Anemia and GERD    History of present illness: Rule out gi blood loss. No complaints. PMH:   Past Medical History:   Diagnosis Date    Arthritis     Breast cancer metastasized to lung [facility])     Left Breast    Chronic pain     Colon polyps 2/22/16    distal sigmoid, Dr. Lynda HERNANDEZ (degenerative disc disease)     Diverticulitis of colon     Diverticulosis 2/22/16    mild in the ascedning and sigmoid colon, Dr. Selina Anderson (hyperlipidemia)     Hypertension     Osteopenia     Tourette syndrome     Vitamin D deficiency      Allergies: Allergies   Allergen Reactions    Keflex [Cephalexin] Rash    Metronidazole Rash    Other Medication Nausea Only     Anesthesia    Shellfish Containing Products Nausea and Vomiting     More of just eating the actual shellfish.     Sulfa (Sulfonamide Antibiotics) Rash    Ultram [Tramadol] Nausea and Vomiting     Patient states she is allergic to most Narcotics    Vancomycin Rash     Medications:   Current Facility-Administered Medications:     lidocaine (PF) (XYLOCAINE) 10 mg/mL (1 %) injection 0.1 mL, 0.1 mL, SubCUTAneous, PRN, Estefani Han CRNA    lactated Ringers infusion, 50 mL/hr, IntraVENous, CONTINUOUS, Estefani Han CRNA, Last Rate: 50 mL/hr at 08/11/21 1226, 50 mL/hr at 08/11/21 1226  FH:   Family History   Problem Relation Age of Onset    Osteoporosis Sister     Osteoporosis Mother     Stroke Father     Hypertension Father     Cancer Paternal Aunt         breast     Social:   Social History     Socioeconomic History    Marital status:      Spouse name: Not on file    Number of children: Not on file    Years of education: Not on file    Highest education level: Not on file   Tobacco Use    Smoking status: Former Smoker     Years: 3.00    Smokeless tobacco: Never Used    Tobacco comment: quit in college   Substance and Sexual Activity    Alcohol use: Not Currently     Alcohol/week: 5.8 standard drinks     Types: 7 Glasses of wine per week    Drug use: No    Sexual activity: Not Currently     Partners: Male     Social Determinants of Health     Financial Resource Strain:     Difficulty of Paying Living Expenses:    Food Insecurity:     Worried About Running Out of Food in the Last Year:     Ran Out of Food in the Last Year:    Transportation Needs:     Lack of Transportation (Medical):  Lack of Transportation (Non-Medical):    Physical Activity:     Days of Exercise per Week:     Minutes of Exercise per Session:    Stress:     Feeling of Stress :    Social Connections:     Frequency of Communication with Friends and Family:     Frequency of Social Gatherings with Friends and Family:     Attends Roman [employer] Services:     Active Member of Clubs or Organizations:     Attends Club or Organization Meetings:     Marital Status:      Surgical H:   Past Surgical History:   Procedure Laterality Date    HX APPENDECTOMY      HX BREAST BIOPSY  7-30-10    Left Breast w/Nipple Duct exploration and excisional biopsy-left    HX DILATION AND CURETTAGE      x3    HX MODIFIED RADICAL MASTECTOMY Left 09/03/2010    HX ORTHOPAEDIC Right 2013    knee replacement    HX TONSILLECTOMY      HYSTEROSCOPY DIAGNOSTIC         ROS: negative    Physical Exam:   Visit Vitals  BP (!) 144/78   Pulse 89   Temp 97.9 °F (36.6 °C)   Resp 16   Ht 4' 11\" (1.499 m)   Wt 64 kg (141 lb 3.2 oz)   SpO2 95%   Breastfeeding No   BMI 28.52 kg/m²     General appearance: alert, no distress  Eyes: pupils equal and reactive, extraocular eye movements intact  Nodes: No gross adenopathy in neck.   Skin: no spider angiomata, jaundice, palmar erythema   Respiratory: clear to auscultation bilaterally  Cardiovascular: regular heart rate, no murmurs, no JVD, normal rate and regular rhythm  Abdomen: soft, non-tender, liver not enlarged, spleen not palpable, no obvious ascites  Extremities: no muscle wasting, no gross arthritic changes  Neurologic: alert and oriented, cranial nerves grossly intact, no asterixis    Labs: No results found for this or any previous visit (from the past 24 hour(s)). Imp/ Plan: Will proceed with EGD and COLONOSCOPY as planned. Risk benefits alternative including but not limited to infection, bleeding, perforation of viscous, allergic reaction and resultant morbidity and mortality was discussed. Chance of missing a significant lesion due to various reasons were discussed.       Andrew See MD  [facility], [facility]

## 2021-08-11 NOTE — PROCEDURES
[place]  [facility], [address]      Brief Procedure Note    Yulisa Stephenson  1945  [id number]    Date of Procedure: 8/11/2021    Preoperative diagnosis: History of GI bleed [Z87.19]    Postoperative diagnosis:  EGD: small Hiatal Hernia, esophagus dismobility  COLON: Diverticulosis, r/o microscopic colitis    Type of Anesthesia: MAC (monitered anesthesia care)    Description of Findings: same as post op dx    Procedure: Procedure(s):  UPPER ENDOSCOPY  COLONOSCOPY with Bx    :  Dr. Williams Reynolds MD    Assistant(s): [unfilled]    Type of Anesthesia:MAC     EBL:None, no implants.     Specimens:   ID Type Source Tests Collected by Time Destination   1 : random colon bx Preservative Colon  Nigel Dakins, MD 8/11/2021 1412 Pathology       Findings: See printed and scanned procedure note    Complications: None    Dr. Williams Reynolds MD  8/11/2021  2:28 PM

## 2021-08-11 NOTE — ANESTHESIA PREPROCEDURE EVALUATION
Relevant Problems   No relevant active problems       Anesthetic History   No history of anesthetic complications            Review of Systems / Medical History  Patient summary reviewed, nursing notes reviewed and pertinent labs reviewed    Pulmonary  Within defined limits                 Neuro/Psych   Within defined limits           Cardiovascular    Hypertension                Comments: Moderate AR on TTE   GI/Hepatic/Renal     GERD: well controlled           Endo/Other        Arthritis     Other Findings              Physical Exam    Airway  Mallampati: II  TM Distance: 4 - 6 cm  Neck ROM: normal range of motion   Mouth opening: Normal     Cardiovascular    Rhythm: regular  Rate: normal         Dental      Comments: R upper bridge, upper Left incisor cap   Pulmonary  Breath sounds clear to auscultation               Abdominal  GI exam deferred       Other Findings            Anesthetic Plan    ASA: 2  Anesthesia type: MAC and general - backup          Induction: Intravenous  Anesthetic plan and risks discussed with: Patient

## 2021-08-11 NOTE — ANESTHESIA POSTPROCEDURE EVALUATION
Procedure(s):  UPPER ENDOSCOPY  COLONOSCOPY with Bx.     MAC    Anesthesia Post Evaluation      Multimodal analgesia: multimodal analgesia used between 6 hours prior to anesthesia start to PACU discharge  Patient location during evaluation: bedside  Patient participation: complete - patient participated  Level of consciousness: awake  Pain management: adequate  Airway patency: patent  Anesthetic complications: no  Cardiovascular status: stable  Respiratory status: acceptable  Hydration status: acceptable  Post anesthesia nausea and vomiting:  controlled      INITIAL Post-op Vital signs:   Vitals Value Taken Time   /74 08/11/21 1448   Temp 36.5 °C (97.7 °F) 08/11/21 1418   Pulse 70 08/11/21 1452   Resp 17 08/11/21 1452   SpO2 100 % 08/11/21 1452

## 2021-08-18 ENCOUNTER — TELEPHONE (OUTPATIENT)
Dept: INTERNAL MEDICINE CLINIC | Age: 76
End: 2021-08-18

## 2021-08-18 NOTE — TELEPHONE ENCOUNTER
Pt calling to advise she did have her colonoscopy and endoscopy last week. Waiting on biopsy reports.

## 2021-09-29 DIAGNOSIS — F41.9 ANXIETY: ICD-10-CM

## 2021-09-29 DIAGNOSIS — F95.2 TOURETTE SYNDROME: ICD-10-CM

## 2021-09-29 RX ORDER — DIAZEPAM 5 MG/1
5 TABLET ORAL
Qty: 30 TABLET | Refills: 0 | Status: SHIPPED | OUTPATIENT
Start: 2021-09-29 | End: 2022-03-15 | Stop reason: SDUPTHER

## 2021-09-29 NOTE — TELEPHONE ENCOUNTER
[facility] reports the last fill date for Valium as 4/30/21 for a 8 d/s. Last Visit: 6/17/21 with MD Analy Landrum  Next Appointment: 12/9/21 with MD Analy Landrum  Previous Refill Encounter(s): 4/30/21 #30    Requested Prescriptions     Pending Prescriptions Disp Refills    diazePAM (Valium) 5 mg tablet 30 Tablet 0     Sig: Take 1 Tablet by mouth every six (6) hours as needed for Anxiety. Max Daily Amount: 20 mg.

## 2021-09-30 ENCOUNTER — HOSPITAL ENCOUNTER (OUTPATIENT)
Dept: GENERAL RADIOLOGY | Age: 76
Discharge: HOME | End: 2021-09-30
Payer: MEDICARE

## 2021-09-30 ENCOUNTER — TELEPHONE (OUTPATIENT)
Dept: INTERNAL MEDICINE CLINIC | Age: 76
End: 2021-09-30

## 2021-09-30 DIAGNOSIS — M25.552 LEFT HIP PAIN: Primary | ICD-10-CM

## 2021-09-30 DIAGNOSIS — M25.552 LEFT HIP PAIN: ICD-10-CM

## 2021-09-30 PROCEDURE — 73502 X-RAY EXAM HIP UNI 2-3 VIEWS: CPT

## 2021-09-30 NOTE — TELEPHONE ENCOUNTER
Pt is requesting an order for left hip xray---said the top of her left hip is very painful when she moves only, not constant pain. Ongoing for about 10 days now. She worries due to possible metastases of cancer. She wants to go to [facility] to get it done and wants a call on [phone] once order is entered, would like to get it done later today if possible.

## 2021-10-01 ENCOUNTER — PATIENT MESSAGE (OUTPATIENT)
Dept: INTERNAL MEDICINE CLINIC | Age: 76
End: 2021-10-01

## 2021-10-01 NOTE — TELEPHONE ENCOUNTER
From: Claudeen Setting  To: Adilia Contreras MD  Sent: 10/1/2021 10:35 AM EDT  Subject: Test Results Question    Hi Dr. Mishel Childress,  Just to let you know, I did have the x-ray of my hip yesterday at [facility] that you so kindly and quickly ordered for me. Hoping it will be read by today. My hip has become very painful when walking and moving. It is the upper hip bone near my waist. I don't always receive results in a timely manner with MyChart from tests, so hoping you will hear something soon.    Thank you so much,   Fabiola Adan

## 2021-11-04 ENCOUNTER — PATIENT MESSAGE (OUTPATIENT)
Dept: INTERNAL MEDICINE CLINIC | Age: 76
End: 2021-11-04

## 2021-11-04 DIAGNOSIS — M16.12 OSTEOARTHRITIS OF LEFT HIP, UNSPECIFIED OSTEOARTHRITIS TYPE: ICD-10-CM

## 2021-11-04 DIAGNOSIS — M25.552 LEFT HIP PAIN: Primary | ICD-10-CM

## 2021-11-04 DIAGNOSIS — F95.2 TOURETTE SYNDROME: ICD-10-CM

## 2021-11-04 DIAGNOSIS — C50.912 MALIGNANT NEOPLASM OF LEFT BREAST IN FEMALE, ESTROGEN RECEPTOR POSITIVE, UNSPECIFIED SITE OF BREAST (HCC): ICD-10-CM

## 2021-11-04 DIAGNOSIS — Z17.0 MALIGNANT NEOPLASM OF LEFT BREAST IN FEMALE, ESTROGEN RECEPTOR POSITIVE, UNSPECIFIED SITE OF BREAST (HCC): ICD-10-CM

## 2021-11-04 DIAGNOSIS — C78.00 MALIGNANT NEOPLASM METASTATIC TO LUNG, UNSPECIFIED LATERALITY (HCC): ICD-10-CM

## 2021-11-04 RX ORDER — HALOPERIDOL 2 MG/1
2 TABLET ORAL 2 TIMES DAILY
Qty: 180 TABLET | Refills: 1 | Status: SHIPPED | OUTPATIENT
Start: 2021-11-04 | End: 2022-02-01 | Stop reason: SDUPTHER

## 2021-11-04 NOTE — TELEPHONE ENCOUNTER
Last Visit: 6/17/21 with MD Liban Harris  Next Appointment: 12/9/21 with MD Melgar  Previous Refill Encounter(s): 4/30/21 #180 with 1 refill    Requested Prescriptions     Pending Prescriptions Disp Refills    haloperidoL (HALDOL) 2 mg tablet 180 Tablet 1     Sig: Take 1 Tablet by mouth two (2) times a day.

## 2021-11-05 DIAGNOSIS — C78.00 MALIGNANT NEOPLASM METASTATIC TO LUNG, UNSPECIFIED LATERALITY (HCC): ICD-10-CM

## 2021-11-05 DIAGNOSIS — M16.12 OSTEOARTHRITIS OF LEFT HIP, UNSPECIFIED OSTEOARTHRITIS TYPE: ICD-10-CM

## 2021-11-05 DIAGNOSIS — C50.912 MALIGNANT NEOPLASM OF LEFT BREAST IN FEMALE, ESTROGEN RECEPTOR POSITIVE, UNSPECIFIED SITE OF BREAST (HCC): ICD-10-CM

## 2021-11-05 DIAGNOSIS — M25.552 LEFT HIP PAIN: ICD-10-CM

## 2021-11-05 DIAGNOSIS — Z17.0 MALIGNANT NEOPLASM OF LEFT BREAST IN FEMALE, ESTROGEN RECEPTOR POSITIVE, UNSPECIFIED SITE OF BREAST (HCC): ICD-10-CM

## 2021-11-05 NOTE — TELEPHONE ENCOUNTER
Called and spoke with patient. Reports persistent left sided hip pain and having difficulty standing and ambulating due to the pain. Did not respond to trigger point injections performed by PA at [place]. Left hip x-ray (9/30/2021) showed evidence of moderate left hip osteoarthritis. Given persistent pain and history of metastatic breast cancer, will proceed with left hip MRI to further define. Orders placed.

## 2021-11-06 ENCOUNTER — HOSPITAL ENCOUNTER (OUTPATIENT)
Age: 76
Discharge: HOME | End: 2021-11-06
Attending: INTERNAL MEDICINE
Payer: MEDICARE

## 2021-11-06 PROCEDURE — 73721 MRI JNT OF LWR EXTRE W/O DYE: CPT

## 2021-11-08 ENCOUNTER — PATIENT MESSAGE (OUTPATIENT)
Dept: INTERNAL MEDICINE CLINIC | Age: 76
End: 2021-11-08

## 2021-11-13 RX ORDER — MELOXICAM 15 MG/1
TABLET ORAL
Qty: 90 TABLET | Refills: 2 | Status: SHIPPED | OUTPATIENT
Start: 2021-11-13 | End: 2022-02-14 | Stop reason: SDUPTHER

## 2021-11-16 ENCOUNTER — HOSPITAL ENCOUNTER (OUTPATIENT)
Dept: BONE DENSITY | Age: 76
Discharge: HOME | End: 2021-11-16
Attending: INTERNAL MEDICINE
Payer: MEDICARE

## 2021-11-16 DIAGNOSIS — M81.0 SENILE OSTEOPOROSIS: ICD-10-CM

## 2021-11-16 PROCEDURE — 77080 DXA BONE DENSITY AXIAL: CPT

## 2021-11-18 ENCOUNTER — TRANSCRIBE ORDER (OUTPATIENT)
Dept: SCHEDULING | Age: 76
End: 2021-11-18

## 2021-11-18 DIAGNOSIS — C50.912 MALIGNANT NEOPLASM OF LEFT BREAST (HCC): Primary | ICD-10-CM

## 2021-11-24 ENCOUNTER — TRANSCRIBE ORDER (OUTPATIENT)
Dept: SCHEDULING | Age: 76
End: 2021-11-24

## 2021-11-24 DIAGNOSIS — Z12.31 VISIT FOR SCREENING MAMMOGRAM: Primary | ICD-10-CM

## 2021-12-02 ENCOUNTER — HOSPITAL ENCOUNTER (OUTPATIENT)
Dept: LAB | Age: 76
Discharge: HOME | End: 2021-12-02
Payer: MEDICARE

## 2021-12-02 ENCOUNTER — APPOINTMENT (OUTPATIENT)
Dept: INTERNAL MEDICINE CLINIC | Age: 76
End: 2021-12-02

## 2021-12-02 DIAGNOSIS — C50.912 MALIGNANT NEOPLASM OF LEFT BREAST IN FEMALE, ESTROGEN RECEPTOR POSITIVE, UNSPECIFIED SITE OF BREAST (HCC): ICD-10-CM

## 2021-12-02 DIAGNOSIS — M85.89 OSTEOPENIA OF MULTIPLE SITES: ICD-10-CM

## 2021-12-02 DIAGNOSIS — I10 ESSENTIAL HYPERTENSION: ICD-10-CM

## 2021-12-02 DIAGNOSIS — R53.83 FATIGUE, UNSPECIFIED TYPE: ICD-10-CM

## 2021-12-02 DIAGNOSIS — Z79.899 IMMUNOSUPPRESSED DUE TO CHEMOTHERAPY (HCC): ICD-10-CM

## 2021-12-02 DIAGNOSIS — S22.080S COMPRESSION FRACTURE OF T11 VERTEBRA, SEQUELA: ICD-10-CM

## 2021-12-02 DIAGNOSIS — T45.1X5A IMMUNOSUPPRESSED DUE TO CHEMOTHERAPY (HCC): ICD-10-CM

## 2021-12-02 DIAGNOSIS — D84.821 IMMUNOSUPPRESSED DUE TO CHEMOTHERAPY (HCC): ICD-10-CM

## 2021-12-02 DIAGNOSIS — E78.5 HYPERLIPIDEMIA, UNSPECIFIED HYPERLIPIDEMIA TYPE: ICD-10-CM

## 2021-12-02 DIAGNOSIS — I67.1 ANEURYSM OF INTRACRANIAL PORTION OF INTERNAL CAROTID ARTERY: ICD-10-CM

## 2021-12-02 DIAGNOSIS — C78.00 MALIGNANT NEOPLASM METASTATIC TO LUNG, UNSPECIFIED LATERALITY (HCC): ICD-10-CM

## 2021-12-02 DIAGNOSIS — Z17.0 MALIGNANT NEOPLASM OF LEFT BREAST IN FEMALE, ESTROGEN RECEPTOR POSITIVE, UNSPECIFIED SITE OF BREAST (HCC): ICD-10-CM

## 2021-12-02 DIAGNOSIS — G47.00 INSOMNIA, UNSPECIFIED TYPE: ICD-10-CM

## 2021-12-02 DIAGNOSIS — F95.2 TOURETTE SYNDROME: ICD-10-CM

## 2021-12-02 DIAGNOSIS — M85.9 DISORDER OF BONE DENSITY AND STRUCTURE, UNSPECIFIED: ICD-10-CM

## 2021-12-02 DIAGNOSIS — I89.0 LYMPHEDEMA OF ARM: ICD-10-CM

## 2021-12-02 LAB
25(OH)D3 SERPL-MCNC: 54.5 NG/ML (ref 30–100)
ALBUMIN SERPL-MCNC: 3.8 G/DL (ref 3.4–5)
ALBUMIN/GLOB SERPL: 1.3 {RATIO} (ref 0.8–1.7)
ALP SERPL-CCNC: 57 U/L (ref 45–117)
ALT SERPL-CCNC: 23 U/L (ref 13–56)
ANION GAP SERPL CALC-SCNC: 7 MMOL/L (ref 3–18)
AST SERPL-CCNC: 12 U/L (ref 10–38)
BILIRUB SERPL-MCNC: 0.6 MG/DL (ref 0.2–1)
BUN SERPL-MCNC: 16 MG/DL (ref 7–18)
BUN/CREAT SERPL: 16 (ref 12–20)
CALCIUM SERPL-MCNC: 9.1 MG/DL (ref 8.5–10.1)
CHLORIDE SERPL-SCNC: 104 MMOL/L (ref 100–111)
CHOLEST SERPL-MCNC: 203 MG/DL
CO2 SERPL-SCNC: 30 MMOL/L (ref 21–32)
CREAT SERPL-MCNC: 0.98 MG/DL (ref 0.6–1.3)
GLOBULIN SER CALC-MCNC: 2.9 G/DL (ref 2–4)
GLUCOSE SERPL-MCNC: 86 MG/DL (ref 74–99)
HDLC SERPL-MCNC: 70 MG/DL (ref 40–60)
HDLC SERPL: 2.9 {RATIO} (ref 0–5)
LDLC SERPL CALC-MCNC: 116.8 MG/DL (ref 0–100)
LIPID PROFILE,FLP: ABNORMAL
MAGNESIUM SERPL-MCNC: 2.2 MG/DL (ref 1.6–2.6)
POTASSIUM SERPL-SCNC: 4.3 MMOL/L (ref 3.5–5.5)
PROT SERPL-MCNC: 6.7 G/DL (ref 6.4–8.2)
SODIUM SERPL-SCNC: 141 MMOL/L (ref 136–145)
TRIGL SERPL-MCNC: 81 MG/DL (ref ?–150)
TSH SERPL DL<=0.05 MIU/L-ACNC: 0.86 UIU/ML (ref 0.36–3.74)
VLDLC SERPL CALC-MCNC: 16.2 MG/DL

## 2021-12-02 PROCEDURE — 82306 VITAMIN D 25 HYDROXY: CPT

## 2021-12-02 PROCEDURE — 83735 ASSAY OF MAGNESIUM: CPT

## 2021-12-02 PROCEDURE — 80053 COMPREHEN METABOLIC PANEL: CPT

## 2021-12-02 PROCEDURE — 36415 COLL VENOUS BLD VENIPUNCTURE: CPT

## 2021-12-02 PROCEDURE — 80061 LIPID PANEL: CPT

## 2021-12-02 PROCEDURE — 84443 ASSAY THYROID STIM HORMONE: CPT

## 2021-12-09 ENCOUNTER — OFFICE VISIT (OUTPATIENT)
Dept: INTERNAL MEDICINE CLINIC | Age: 76
End: 2021-12-09
Payer: MEDICARE

## 2021-12-09 VITALS
SYSTOLIC BLOOD PRESSURE: 122 MMHG | TEMPERATURE: 97.8 F | HEIGHT: 55 IN | RESPIRATION RATE: 15 BRPM | WEIGHT: 142 LBS | DIASTOLIC BLOOD PRESSURE: 62 MMHG | BODY MASS INDEX: 32.86 KG/M2 | HEART RATE: 69 BPM | OXYGEN SATURATION: 97 %

## 2021-12-09 DIAGNOSIS — E78.5 HYPERLIPIDEMIA, UNSPECIFIED HYPERLIPIDEMIA TYPE: ICD-10-CM

## 2021-12-09 DIAGNOSIS — Z17.0 MALIGNANT NEOPLASM OF LEFT BREAST IN FEMALE, ESTROGEN RECEPTOR POSITIVE, UNSPECIFIED SITE OF BREAST (HCC): ICD-10-CM

## 2021-12-09 DIAGNOSIS — F95.2 TOURETTE SYNDROME: ICD-10-CM

## 2021-12-09 DIAGNOSIS — M25.552 LEFT HIP PAIN: ICD-10-CM

## 2021-12-09 DIAGNOSIS — C78.00 MALIGNANT NEOPLASM METASTATIC TO LUNG, UNSPECIFIED LATERALITY (HCC): ICD-10-CM

## 2021-12-09 DIAGNOSIS — M85.89 OSTEOPENIA OF MULTIPLE SITES: ICD-10-CM

## 2021-12-09 DIAGNOSIS — G47.00 INSOMNIA, UNSPECIFIED TYPE: ICD-10-CM

## 2021-12-09 DIAGNOSIS — D84.821 IMMUNOSUPPRESSED DUE TO CHEMOTHERAPY (HCC): ICD-10-CM

## 2021-12-09 DIAGNOSIS — Z79.899 IMMUNOSUPPRESSED DUE TO CHEMOTHERAPY (HCC): ICD-10-CM

## 2021-12-09 DIAGNOSIS — C50.912 MALIGNANT NEOPLASM OF LEFT BREAST IN FEMALE, ESTROGEN RECEPTOR POSITIVE, UNSPECIFIED SITE OF BREAST (HCC): ICD-10-CM

## 2021-12-09 DIAGNOSIS — M25.552 PELVIC JOINT PAIN, LEFT: ICD-10-CM

## 2021-12-09 DIAGNOSIS — R53.83 FATIGUE, UNSPECIFIED TYPE: ICD-10-CM

## 2021-12-09 DIAGNOSIS — M47.816 LUMBAR FACET ARTHROPATHY: ICD-10-CM

## 2021-12-09 DIAGNOSIS — T45.1X5A IMMUNOSUPPRESSED DUE TO CHEMOTHERAPY (HCC): ICD-10-CM

## 2021-12-09 DIAGNOSIS — E66.01 OBESITY, CLASS III, BMI 40-49.9 (MORBID OBESITY) (HCC): ICD-10-CM

## 2021-12-09 DIAGNOSIS — I89.0 LYMPHEDEMA OF ARM: ICD-10-CM

## 2021-12-09 DIAGNOSIS — E61.1 IRON DEFICIENCY: ICD-10-CM

## 2021-12-09 DIAGNOSIS — I10 ESSENTIAL HYPERTENSION: Primary | ICD-10-CM

## 2021-12-09 DIAGNOSIS — F41.9 ANXIETY: ICD-10-CM

## 2021-12-09 PROCEDURE — 99214 OFFICE O/P EST MOD 30 MIN: CPT | Performed by: INTERNAL MEDICINE

## 2021-12-09 PROCEDURE — G9899 SCRN MAM PERF RSLTS DOC: HCPCS | Performed by: INTERNAL MEDICINE

## 2021-12-09 PROCEDURE — G8427 DOCREV CUR MEDS BY ELIG CLIN: HCPCS | Performed by: INTERNAL MEDICINE

## 2021-12-09 PROCEDURE — G8510 SCR DEP NEG, NO PLAN REQD: HCPCS | Performed by: INTERNAL MEDICINE

## 2021-12-09 PROCEDURE — G8536 NO DOC ELDER MAL SCRN: HCPCS | Performed by: INTERNAL MEDICINE

## 2021-12-09 PROCEDURE — 3017F COLORECTAL CA SCREEN DOC REV: CPT | Performed by: INTERNAL MEDICINE

## 2021-12-09 PROCEDURE — G8417 CALC BMI ABV UP PARAM F/U: HCPCS | Performed by: INTERNAL MEDICINE

## 2021-12-09 PROCEDURE — G8399 PT W/DXA RESULTS DOCUMENT: HCPCS | Performed by: INTERNAL MEDICINE

## 2021-12-09 PROCEDURE — G0463 HOSPITAL OUTPT CLINIC VISIT: HCPCS | Performed by: INTERNAL MEDICINE

## 2021-12-09 PROCEDURE — G8754 DIAS BP LESS 90: HCPCS | Performed by: INTERNAL MEDICINE

## 2021-12-09 PROCEDURE — 1090F PRES/ABSN URINE INCON ASSESS: CPT | Performed by: INTERNAL MEDICINE

## 2021-12-09 PROCEDURE — 1101F PT FALLS ASSESS-DOCD LE1/YR: CPT | Performed by: INTERNAL MEDICINE

## 2021-12-09 PROCEDURE — G8752 SYS BP LESS 140: HCPCS | Performed by: INTERNAL MEDICINE

## 2021-12-09 RX ORDER — TEMAZEPAM 15 MG/1
15 CAPSULE ORAL
Qty: 30 CAPSULE | Refills: 0 | Status: SHIPPED | OUTPATIENT
Start: 2021-12-09 | End: 2022-04-06 | Stop reason: SDUPTHER

## 2021-12-09 NOTE — PATIENT INSTRUCTIONS
Heart-Healthy Diet: Care Instructions  Your Care Instructions     A heart-healthy diet has lots of vegetables, fruits, nuts, beans, and whole grains, and is low in salt. It limits foods that are high in saturated fat, such as meats, cheeses, and fried foods. It may be hard to change your diet, but even small changes can lower your risk of heart attack and heart disease. Follow-up care is a key part of your treatment and safety. Be sure to make and go to all appointments, and call your doctor if you are having problems. It's also a good idea to know your test results and keep a list of the medicines you take. How can you care for yourself at home? Watch your portions  · Learn what a serving is. A \"serving\" and a \"portion\" are not always the same thing. Make sure that you are not eating larger portions than are recommended. For example, a serving of pasta is ½ cup. A serving size of meat is 2 to 3 ounces. A 3-ounce serving is about the size of a deck of cards. Measure serving sizes until you are good at [place]" them. Keep in mind that restaurants often serve portions that are 2 or 3 times the size of one serving. · To keep your energy level up and keep you from feeling hungry, eat often but in smaller portions. · Eat only the number of calories you need to stay at a healthy weight. If you need to lose weight, eat fewer calories than your body burns (through exercise and other physical activity). Eat more fruits and vegetables  · Eat a variety of fruit and vegetables every day. Dark green, deep orange, red, or yellow fruits and vegetables are especially good for you. Examples include spinach, carrots, peaches, and berries. · Keep carrots, celery, and other veggies handy for snacks. Buy fruit that is in season and store it where you can see it so that you will be tempted to eat it. · Cook dishes that have a lot of veggies in them, such as stir-fries and soups.   Limit saturated and trans fat  · Read food labels, and try to avoid saturated and trans fats. They increase your risk of heart disease. · Use olive or canola oil when you cook. · Bake, broil, grill, or steam foods instead of frying them. · Choose lean meats instead of high-fat meats such as hot dogs and sausages. Cut off all visible fat when you prepare meat. · Eat fish, skinless poultry, and meat alternatives such as soy products instead of high-fat meats. Soy products, such as tofu, may be especially good for your heart. · Choose low-fat or fat-free milk and dairy products. Eat foods high in fiber  · Eat a variety of grain products every day. Include whole-grain foods that have lots of fiber and nutrients. Examples of whole-grain foods include oats, whole wheat bread, and brown rice. · Buy whole-grain breads and cereals, instead of white bread or pastries. Limit salt and sodium  · Limit how much salt and sodium you eat to help lower your blood pressure. · Taste food before you salt it. Add only a little salt when you think you need it. With time, your taste buds will adjust to less salt. · Eat fewer snack items, fast foods, and other high-salt, processed foods. Check food labels for the amount of sodium in packaged foods. · Choose low-sodium versions of canned goods (such as soups, vegetables, and beans). Limit sugar  · Limit drinks and foods with added sugar. These include candy, desserts, and soda pop. Limit alcohol  · Limit alcohol to no more than 2 drinks a day for men and 1 drink a day for women. Too much alcohol can cause health problems. When should you call for help? Watch closely for changes in your health, and be sure to contact your doctor if:    · You would like help planning heart-healthy meals. Where can you learn more? Go to http://www.[employer].com/  Enter V137 in the search box to learn more about \"Heart-Healthy Diet: Care Instructions. \"  Current as of: August 22, 2019               Content Version: 12.6  © 7922-7668 [employer]. Care instructions adapted under license by [employer] (which disclaims liability or warranty for this information). If you have questions about a medical condition or this instruction, always ask your healthcare professional. [address] any warranty or liability for your use of this information. Learning About Low-Sodium Foods  What foods are low in sodium? The foods you eat contain nutrients, such as vitamins and minerals. Sodium is a nutrient. Your body needs the right amount to stay healthy and work as it should. You can use the list below to help you make choices about which foods to eat. Fruits  · Fresh, frozen, canned, or dried fruit  Vegetables  · Fresh or frozen vegetables, with no added salt  · Canned vegetables, low-sodium or with no added salt  Grains  · Bagels without salted tops  · Cereal with no added salt  · Corn tortillas  · Crackers with no added salt  · Oatmeal, cooked without salt  · Popcorn with no salt  · Pasta and noodles, cooked without salt  · Rice, cooked without salt  · Unsalted pretzels  Dairy and dairy alternatives  · Butter, unsalted  · Cream cheese  · Ice cream  · Milk  · Soy milk  Meats and other protein foods  · Beans and peas, canned with no salt  · Eggs  · Fresh fish (not smoked)  · Fresh meats (not smoked or cured)  · Nuts and nut butter, prepared without salt  · Poultry, not packaged with sodium solution  · Tofu, unseasoned  · Tuna, canned without salt  Seasonings  · Garlic  · Herbs and spices  · Lemon juice  · Mustard  · Olive oil  · Salt-free seasoning mixes  · Vinegar  Work with your doctor to find out how much of this nutrient you need. Depending on your health, you may need more or less of it in your diet. Where can you learn more?   Go to http://www.gray.com/  Enter S460 in the search box to learn more about \"Learning About Low-Sodium Foods.\"  Current as of: August 22, 2019               Content Version: 12.6  © 1318-1943 [employer]. Care instructions adapted under license by [employer] (which disclaims liability or warranty for this information). If you have questions about a medical condition or this instruction, always ask your healthcare professional. [address] any warranty or liability for your use of this information. Low Sodium Diet (2,000 Milligram): Care Instructions  Your Care Instructions     Too much sodium causes your body to hold on to extra water. This can raise your blood pressure and force your heart and kidneys to work harder. In very serious cases, this could cause you to be put in the hospital. It might even be life-threatening. By limiting sodium, you will feel better and lower your risk of serious problems. The most common source of sodium is salt. People get most of the salt in their diet from canned, prepared, and packaged foods. Fast food and restaurant meals also are very high in sodium. Your doctor will probably limit your sodium to less than 2,000 milligrams (mg) a day. This limit counts all the sodium in prepared and packaged foods and any salt you add to your food. Follow-up care is a key part of your treatment and safety. Be sure to make and go to all appointments, and call your doctor if you are having problems. It's also a good idea to know your test results and keep a list of the medicines you take. How can you care for yourself at home? Read food labels  · Read labels on cans and food packages. The labels tell you how much sodium is in each serving. Make sure that you look at the serving size. If you eat more than the serving size, you have eaten more sodium. · Food labels also tell you the Percent Daily Value for sodium. Choose products with low Percent Daily Values for sodium.   · Be aware that sodium can come in forms other than salt, including monosodium glutamate (MSG), sodium citrate, and sodium bicarbonate (baking soda). MSG is often added to Asian food. When you eat out, you can sometimes ask for food without MSG or added salt. Buy low-sodium foods  · Buy foods that are labeled \"unsalted\" (no salt added), \"sodium-free\" (less than 5 mg of sodium per serving), or \"low-sodium\" (less than 140 mg of sodium per serving). Foods labeled \"reduced-sodium\" and \"light sodium\" may still have too much sodium. Be sure to read the label to see how much sodium you are getting. · Buy fresh vegetables, or frozen vegetables without added sauces. Buy low-sodium versions of canned vegetables, soups, and other canned goods. Prepare low-sodium meals  · Cut back on the amount of salt you use in cooking. This will help you adjust to the taste. Do not add salt after cooking. One teaspoon of salt has about 2,300 mg of sodium. · Take the salt shaker off the table. · Flavor your food with garlic, lemon juice, onion, vinegar, herbs, and spices. Do not use soy sauce, lite soy sauce, steak sauce, onion salt, garlic salt, celery salt, mustard, or ketchup on your food. · Use low-sodium salad dressings, sauces, and ketchup. Or make your own salad dressings and sauces without adding salt. · Use less salt (or none) when recipes call for it. You can often use half the salt a recipe calls for without losing flavor. Other foods such as rice, pasta, and grains do not need added salt. · Rinse canned vegetables, and cook them in fresh water. This removes somebut not allof the salt. · Avoid water that is naturally high in sodium or that has been treated with water softeners, which add sodium. Call your local water company to find out the sodium content of your water supply. If you buy bottled water, read the label and choose a sodium-free brand. Avoid high-sodium foods  · Avoid eating:  ? Smoked, cured, salted, and canned meat, fish, and poultry.   ? Ham, robins, hot dogs, and luncheon meats.  ? Regular, hard, and processed cheese and regular peanut butter. ? Crackers with salted tops, and other salted snack foods such as pretzels, chips, and salted popcorn. ? Frozen prepared meals, unless labeled low-sodium. ? Canned and dried soups, broths, and bouillon, unless labeled sodium-free or low-sodium. ? Canned vegetables, unless labeled sodium-free or low-sodium. ? [place] fries, pizza, tacos, and other fast foods. ? Pickles, olives, ketchup, and other condiments, especially soy sauce, unless labeled sodium-free or low-sodium. Where can you learn more? Go to http://www.gray.com/  Enter V843 in the search box to learn more about \"Low Sodium Diet (2,000 Milligram): Care Instructions. \"  Current as of: August 22, 2019               Content Version: 12.6  © 1058-5726 [employer]. Care instructions adapted under license by [employer] (which disclaims liability or warranty for this information). If you have questions about a medical condition or this instruction, always ask your healthcare professional. [place] [address] any warranty or liability for your use of this information.

## 2021-12-13 PROBLEM — E61.1 IRON DEFICIENCY: Status: ACTIVE | Noted: 2021-12-13

## 2021-12-13 NOTE — PROGRESS NOTES
HPI:  Aleksandr Aguilar is a 78y.o. year old female who presents today for a physical exam. She has a history of hypertension, dyslipidemia,  metastatic breast cancer, GERD, diverticulosis with recurrent diverticulitis, osteoarthritis s/p right knee replacement (2012), lumbar degenerative disease, osteopenia, compression fracture, and Tourette's syndrome. She is accompanied by her . She reports that she completed the Moderna COVID-19 vaccine series and received a third Moderna dose due to immunosuppression. She reports that she is doing reasonably well. She reports continuing to have persistent difficulty with left-sided buttock and pelvic pain, and she underwent left hip MRI (11/6/2021) which showed mild bilateral hip osteoarthritis (L>R), and asymmetric atrophy of the proximal left tensor fascia will. She reports that she received a ultrasound-guided steroid injection for left trochanteric bursitis on 11/10/2021, which provided some relief transiently, but pain has since recurred. She states that she is now scheduled for KAREN at left L4/5 and L5/S1 on 12/13/2021 by Dr. Didier Dunlap. She reports weakness in her left leg which she feels is affecting her balance. She states that she is willing to consider physical therapy once the epidural injections are completed. She also reports that she underwent evaluation for iron deficiency when she was found to have a ferritin of 13 in 6/2021 which was performed to evaluate her fatigue. On 8/11/2021 she underwent upper endoscopy and colonoscopy by Dr. Bushra Patel. Upper endoscopy showed normal stomach and duodenum, abnormal motility of the esophagus, and a small hiatal hernia. Colonoscopy showed increased vascularity throughout the colon concerning for microscopic colitis, but pathology of multiple random biopsies was negative. No source for iron loss was identified. She reports that she is scheduled for her surveillance mammogram and staging CT scans later this month. She is otherwise without new complaints and overall feeling well. .       Summary of prior hospitalizations and medical history:  She has a history of left breast cancer, which was diagnosed in 7/2010 as invasive ductal adenocarcinoma, s/p left modified radical mastectomy with sentinel node biopsy, performed by Dr. Indira Lopez. She had 3 positive lymph nodes and was classified as stage 1B, T1c N1 M0, ER and NV positive, Her-2/sarah negative by FISH. She underwent 6 cycles of chemotherapy with Docetaxel and Cytoxan. She was subsequently unable to tolerate anastrozole, exemestane, tamoxifen, and letrozole due to profound fatigue and arthralgias. Her course was complicated by chronic left arm lymphedema, managed with a compression sleeve. A chest CT scan in 3/2014 noted several small pulmonary nodules which were concerning for metastases but were too small to biopsy. They were followed with sequential CT scans , and in 12/2014, repeat chest CT scan showed enlarging bilateral pulmonary nodules compatible with progression of metastatic disease. A fine needle aspirate was performed on 12/21/2014 which showed benign pulmonary parenchyma with alveolar hemorrhage. Repeat chest CT scan in 3/12/2015 showed mild interval progression of the lung metastases with enlarging nodules and development of new nodules. A CT guided needle biopsy was performed on 3/23/2015, which confirmed metastatic adenocarcinoma consistent with breast primary (stage IV, ER/NV positive, and TTF-1 negative). On 4/13/2015, she was started on therapy with Ibrance and letrozole. Follow-up CT scan (7/15/15) showed partial response with decreasing size of some nodules and resolution of other nodules. Most recent chest, abdomen, and pelvis CT scan was obtained in 6/2017, showing stable or decreased multiple bilateral pulmonary nodules and no suspicious new pulmonary nodules. She continues on palbociclib, but was changed from letrozole to fulvestrant.  She describes persistent fatigue which she attributes to monthly treatment with fulvestrant (Faslodex). She underwent restaging chest, abdomen, and pelvis CT scan in 7/2019 which showed that her pulmonary nodules were unchanged, and no other evidence of metastatic disease. Repeat staging CT chest/ abdomen/ pelvis in 1/2021 showed no evidence of metastasis or adenopathy. She is followed by Dr. Stephan Mcpherson. She states that she established care with Dr. Stacy Irizarry, but was told that she may have her mammograms and breast exams in our office with referral to her if something arises. She also has a history of a right ovarian cyst, but was released from the care of Dr. Sasha Ortiz since it was concluded to be benign. She has a history of hypertension treated with losartan. She monitors her blood pressure mainly when visiting multiple physicians and reports that it is well controlled. She has no history of heart disease, and denies any chest pain, shortness of breath at rest or with exertion, palpitations, lightheadedness, or edema. In 4/11/2016, she sustained a fall with subsequent severe pain in her mid to upper lumbar region and wrapping around waist. She was treated with meloxicam, tylenol and flexeril, but because of persistent discomfort, she presented to [facility] on 4/15/2016 and lumbosacral spine films showed marked degenerative changes with disc space obliteration throughout lumbar spine and slight anterior spondylolisthesis of L4. She was evaluated by Dr. Ramandeep Mott on 4/18/2016 who recommended physical therapy and evaluation by Dr. Colten Pascual for her degenerative spine changes. She continued to take meloxicam and tylenol and started physical therapy, but noted worsening of her pain.  She subsequently was evaluated by Dr. Prieto Roque, who obtained a lumbar MRI on 4/26/2016, which showed a new subacute compression fracture of T11 vertebral body with diffuse marrow edema and mild paravertebral inflammatory stranding, no retropulsion or central stenosis; more severe degenerative disease along the right side at L4-L5, L5-S1, potential mild compression of right exiting L4 and L5 nerve roots. She was referred to Dr. Bhaskar Wilkerson for kyphoplasty of the T11 compression fracture given failure of pain control with conservative measures. She underwent the procedure on 0/3/6346 without complications, and Y48 vertebral body bone core and aspirate biopsies were performed, which showed only reactive bone changes and no evidence of malignancy. She has a history of osteopenia, with a history of fracture of her sacrum. She reports that she was treated with alendronate in 12/2011 but discontinued it in 4/2013 due to intolerence. Bone density scan (11/2017) was consistent with osteopenia with femoral neck T-scores: left -1.4/ right -1.4 and distal radius -2.2 (lumbar T-score could not be assessed). A repeat bone density scan was obtained (11/2019) showing continued evidence of osteopenia with femoral neck T-scores: left -1.4/ right -1.4 and distal radius -2.4 (lumbar T-score could not be assessed). She was referred to Dr. Husam Lawson given her multiple compression fractures and treatment with letrozole, and was started on Reclast in 3/2021. She continues to take calcium and vitamin D. In 10/2016, she developed left sided low back pain with radiation to her left leg over the last several weeks. She underwent a lumbar MRI (9/2016) showing scoliosis and advanced multilevel degenerative changes with areas of foraminal stenosis at L3-L4 and L5-S1; mild/moderate central canal stenosis at L4-L5 and L5-S1. She was evaluated by Dr. Aurora Walker and treated with pregabalin with some improvement. She subsequently received an epidural steroid injection with improvement and discontinued pregabalin. In 4/2017, she noted increasing cervical and upper back discomfort.  She has had multiple cervical MRI's,and underwent repeat in 4/2017 which showed multilevel degenerative disc disease and facet arthropathy without change from prior studies with mild central canal stenosis C3-C4 and C5-C6, and moderate central canal stenosis C6-C7. She was treated with Mobic and Flexeril, and gabapentin at bedtime. She complained of right shoulder pain. X-ray of her shoulder (11/2017) was negative, and she had a right shoulder MRI (5/14/2018) which showed deep partial thickness undersurface tear of the distal supraspinatus, likely with full thickness slitlike perforations; no gross tendon retraction, but there is moderate muscle atrophy; partial thickness tearing with longitudinal components involving the biceps tendon at its sulcal turn, some associated tenosynovitis; accompanying short length longitudinal split tear in the distal subscapularis; moderate infraspinatus tendinosis, and mild to moderate subacromial subdeltoid bursitis. She underwent evaluation by Dr. Suman Orantes on 5/18/2018, and received a glenohumeral cortisone injection. She reported worsening low back pain and right sciatica, and underwent an epidural steroid injection at right L4-5 transforaminal approach by Dr. Melissa Lundberg on 3/18/2019. She has noted improvement in her right hip and leg pain. She underwent a lumbar MRI (6/2020) for worsening right sciatica, showing very advanced multilevel DDD and DJD with extensive bone marrow edema reaction to DDD; chronic compression fractures; single level of degenerative central spinal stenosis, moderate, at L4/L5, worsened compared 2016; multilevel degenerative foraminal narrowing worst right L4/L5, bilateral L5/S1; no evidence of metastatic breast cancer. She underwent left L4/5 and right L5-S1 KAREN in 6/2020 and Left S1 TF KAREN in 8/2020 by Dr. Melissa Lundberg with improvement. She continues to take gabapentin 300 mg at night. She has a history of GERD and diverticulosis with recurrent diverticulitis,.  She was evaluated by Dr. Daisy Morales in 2/23/2016 and underwent an upper endoscopy, which revealed a Schatzki's ring at the gastroesophageal junction (dilated) with mild distal esophagitis and a small hiatal hernia. A screening colonoscopy was also performed showing moderately severe diverticulosis of the ascending and descending colon and a 2 mm sessile sigmoid polyp (pathology: hyperplastic). Recommendations for follow-up colonoscopy in 10 years. She denies any abdominal pain, nausea, vomiting, melena, hematochezia, or change in bowel movements. She was evaluated in 8/2017 by GERALD Rodriguez with complaints of abdominal pain and was treated with Cipro for presumed diverticulitis. She contacted the office again in 10/2017 and 1/2018 with a recurrence of symptoms, and was treated for a 10 day course with Cipro with improvement. Abdominal CT scan in 2/6/2018 for staging for her breast cancer did show evidence of moderate to severe diverticulosis, but no evidence of diverticulitis. She did present with similar symptoms in 3/2018 and was evaluated by GERALD Vigil and prescribed Cipro. However, her symptoms resolved prior to taking the antibiotics. In 7/2018, she again presented with left lower quadrant pain, and an abdominal CT scan (7/13/2018) which showed evidence of uncomplicated diverticulitis involving the lower left colon and upper sigmoid. She was treated with Cipro and Flagyl initially, but developed a rash due to Flagyl, and was subsequently changed to Augmentin. She had a follow-up visit with Dr. Aron Zuniga and it was his opinion that she also had IBS which mimicked her episodes of diverticulitis. He recommended continuing on a probiotic and prescribed hyoscyamine to use as needed. She has noted improvement since doing so. She also has a history of Tourette's syndrome controlled with Haldol. She is followed by Dr. Sarah Campbell.        Past Medical History:   Diagnosis Date    Arthritis     Breast cancer metastasized to lung [facility])     Left Breast    Chronic pain     Colon polyps 2/22/16    distal sigmoid, Dr. Sydnie Benitez DDD (degenerative disc disease)     Diverticulitis of colon     Diverticulosis 2/22/16    mild in the ascedning and sigmoid colon, Dr. Weathers Falling (hyperlipidemia)     Hypertension     Osteopenia     Tourette syndrome     Vitamin D deficiency      Past Surgical History:   Procedure Laterality Date    COLONOSCOPY N/A 8/11/2021    COLONOSCOPY with Bx performed by Quinton Real MD at [address] HX APPENDECTOMY      HX BREAST BIOPSY  7-30-10    Left Breast w/Nipple Duct exploration and excisional biopsy-left    HX DILATION AND CURETTAGE      x3    HX MODIFIED RADICAL MASTECTOMY Left 09/03/2010    HX ORTHOPAEDIC Right 2013    knee replacement    HX TONSILLECTOMY      HYSTEROSCOPY DIAGNOSTIC       Current Outpatient Medications   Medication Sig    temazepam (RESTORIL) 15 mg capsule Take 1 Capsule by mouth nightly as needed for Sleep. Max Daily Amount: 15 mg.    meloxicam (MOBIC) 15 mg tablet TAKE 1 TABLET BY MOUTH EVERY DAY WITH FOOD    haloperidoL (HALDOL) 2 mg tablet Take 1 Tablet by mouth two (2) times a day.  diazePAM (Valium) 5 mg tablet Take 1 Tablet by mouth every six (6) hours as needed for Anxiety. Max Daily Amount: 20 mg.    ondansetron hcl (Zofran) 8 mg tablet Take 8 mg by mouth every eight (8) hours as needed for Nausea or Vomiting. Take one tablet by mouth every 8 hours as needed.  acetaminophen (Tylenol Extra Strength) 500 mg tablet Take 500 mg by mouth every six (6) hours as needed for Pain. Take 1-2 tablets by mouth every 6 hours as needed.  gabapentin (NEURONTIN) 300 mg capsule TAKE 1 CAP BY MOUTH TWO (2) TIMES A DAY. MAX DAILY AMOUNT: 600 MG. (Patient taking differently: Take 300 mg by mouth two (2) times a day. Takes nightly)    losartan (COZAAR) 25 mg tablet Take 1 Tab by mouth daily.  letrozole (FEMARA) 2.5 mg tablet TAKE 1 TABLET BY MOUTH EVERY DAY    pantoprazole sodium (PROTONIX PO) Take 40 mg by mouth daily.  palbociclib 75 mg cap Take 75 mg by mouth daily.  For days 1-21 of each 28 day cycle    Biotin 2,500 mcg cap Take  by mouth.  calcium-vitamin D (CALCIUM 500+D) 500 mg(1,250mg) -200 unit per tablet Take 1 Tab by mouth two (2) times daily (with meals).  cholecalciferol, vitamin d3, (VITAMIN D) 1,000 unit tablet Take 4,000 Units by mouth daily.  aspirin delayed-release 81 mg tablet Take  by mouth daily. (Patient not taking: Reported on 12/9/2021)     No current facility-administered medications for this visit. Allergies and Intolerances: Allergies   Allergen Reactions    Keflex [Cephalexin] Rash    Metronidazole Rash    Other Medication Nausea Only     Anesthesia    Shellfish Containing Products Nausea and Vomiting     More of just eating the actual shellfish.  Sulfa (Sulfonamide Antibiotics) Rash    Ultram [Tramadol] Nausea and Vomiting     Patient states she is allergic to most Narcotics    Vancomycin Rash     Family History:   Family History   Problem Relation Age of Onset    Osteoporosis Sister     Osteoporosis Mother     Stroke Father     Hypertension Father     Cancer Paternal Aunt         breast     Social History:   She  reports that she quit smoking about 15 years ago. Her smoking use included cigarettes. She has a 3.00 pack-year smoking history. She has never used smokeless tobacco. She stopped smoking over 40 years ago. She is  and lives with . They have one daughter and two grandchildren.     Social History     Substance and Sexual Activity   Alcohol Use Not Currently    Alcohol/week: 5.8 standard drinks    Types: 7 Glasses of wine per week     Immunization History:  Immunization History   Administered Date(s) Administered    COVID-19, Michael Public, Primary or Immunocompromised Series, MRNA, PF, 100mcg/0.5mL 02/07/2021, 03/07/2021, 11/07/2021    Influenza High Dose Vaccine PF 10/01/2015, 10/25/2016, 10/05/2017    Influenza Vaccine 09/01/2014    Influenza Vaccine (Tri) Adjuvanted (>65 Yrs FLUAD TRI [id number]) 10/05/2018, 10/17/2019    Influenza Vaccine Split 11/01/2011, 10/02/2012    Influenza Vaccine Whole 10/08/2010    Influenza, Quadrivalent, Adjuvanted (>65 Yrs FLUAD QUAD [id number]) 09/23/2020, 10/15/2021    Pneumococcal Conjugate (PCV-13) 10/27/2015    Pneumococcal Polysaccharide (PPSV-23) 04/15/2013    TD Vaccine 05/05/2008    Tdap 09/12/2014    Zoster 09/20/2011       Review of Systems:   As above included in HPI. Otherwise 11 point review of systems negative including constitutional, skin, HENT, eyes, respiratory, cardiovascular, gastrointestinal, genitourinary, musculoskeletal, endo/heme/aller, neurological.    Physical:     Visit Vitals  /62 (BP 1 Location: Right arm, BP Patient Position: Sitting, BP Cuff Size: Adult)   Pulse 69   Temp 97.8 °F (36.6 °C) (Temporal)   Resp 15   Ht 4' 1\" (1.245 m)   Wt 142 lb (64.4 kg)   SpO2 97%   BMI 41.58 kg/m²        Patient appears in no apparent distress. Affect is appropriate. HEENT --Anicteric sclerae, PERRL, EOMI, conjunctiva and lids normal.  No cervical lymphadenopathy. No thyromegaly, JVD, or bruits. Carotid pulses 2+ with normal upstroke. Lungs --Clear to auscultation. No wheezing or rales. Heart --Regular rate and rhythm, no murmurs, rubs, gallops, or clicks. Abdomen -- Soft and nontender, no hepatosplenomegaly or masses. Extremities -- Without cyanosis, clubbing, edema. Normal looking digits, ROM intact  Neuro -- CN 2-12 intact, strength 5/5 with intact soft touch in all extremities  Derm  no obvious abnormalities noted, no rash. Lymphedema of left arm and hand.       Review of Data:  Labs:  Hospital Outpatient Visit on 12/02/2021   Component Date Value Ref Range Status    LIPID PROFILE 12/02/2021        Final    Cholesterol, total 12/02/2021 203* <200 MG/DL Final    Triglyceride 12/02/2021 81  <150 MG/DL Final    HDL Cholesterol 12/02/2021 70* 40 - 60 MG/DL Final    LDL, calculated 12/02/2021 116.8* 0 - 100 MG/DL Final    VLDL, calculated 12/02/2021 16.2  MG/DL Final    CHOL/HDL Ratio 12/02/2021 2.9  0 - 5.0   Final    Sodium 12/02/2021 141  136 - 145 mmol/L Final    Potassium 12/02/2021 4.3  3.5 - 5.5 mmol/L Final    Chloride 12/02/2021 104  100 - 111 mmol/L Final    CO2 12/02/2021 30  21 - 32 mmol/L Final    Anion gap 12/02/2021 7  3.0 - 18 mmol/L Final    Glucose 12/02/2021 86  74 - 99 mg/dL Final    BUN 12/02/2021 16  7.0 - 18 MG/DL Final    Creatinine 12/02/2021 0.98  0.6 - 1.3 MG/DL Final    BUN/Creatinine ratio 12/02/2021 16  12 - 20   Final    GFR est AA 12/02/2021 >60  >60 ml/min/1.73m2 Final    GFR est non-AA 12/02/2021 55* >60 ml/min/1.73m2 Final    Calcium 12/02/2021 9.1  8.5 - 10.1 MG/DL Final    Bilirubin, total 12/02/2021 0.6  0.2 - 1.0 MG/DL Final    ALT (SGPT) 12/02/2021 23  13 - 56 U/L Final    AST (SGOT) 12/02/2021 12  10 - 38 U/L Final    Alk. phosphatase 12/02/2021 57  45 - 117 U/L Final    Protein, total 12/02/2021 6.7  6.4 - 8.2 g/dL Final    Albumin 12/02/2021 3.8  3.4 - 5.0 g/dL Final    Globulin 12/02/2021 2.9  2.0 - 4.0 g/dL Final    A-G Ratio 12/02/2021 1.3  0.8 - 1.7   Final    TSH 12/02/2021 0.86  0.36 - 3.74 uIU/mL Final    Vitamin D 25-Hydroxy 12/02/2021 54.5  30 - 100 ng/mL Final    Magnesium 12/02/2021 2.2  1.6 - 2.6 mg/dL Final       EKG (11/24/2020) sinus rhythm at 75 bpm, normal intervals; no ischemic changes. Health Maintenance:  Screening:    Mammogram: negative (12/2020). Scheduled. PAP smear: negative (9/2013) Dr Patti Rosenthal. Pelvic ultrasound negative (9/2015). No further screening needed. Colorectal: colonoscopy (8/2021) negative. Dr. Deyanira Dunbar. Depression: none   DM (HbA1c/FPG): FPG 86 (12/2021)   Hepatitis C: unknown   Falls: none   DEXA: osteopenia (11/2021) s/p T11 compression fracture (4/2016). On Reclast.  Dr. Holland Speaker.    Smoking:distant past   Glaucoma: regular eye exams with Dr. Jennifer Lopez (last 3/2021)   Vitamin D: 54.5 (12/2021)   Medicare Wellness: 6/3/2021      Impression:  Patient Active Problem List   Diagnosis Code    Tourette syndrome F95.2    Osteoarthritis, Status post right total knee replacement M19.90    Lumbar spinal stenosis M48.061    HLD (hyperlipidemia) E78.5    Breast cancer ([facility] [address] metastatic to lungs  C50.919    Essential hypertension I10    Diverticulosis K57.90    Osteopenia M85.80    Lymphedema of arm left I89.0    Insomnia G47.00    Fatigue R53.83    Diverticulitis, recurrent K57.92    T11 Vertebral compression fracture (HCC) s/p kyphoplasty M48.50XA    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.812    Spinal stenosis of cervical region M48.02    Right shoulder pain M25.511    Malignant neoplasm metastatic to lung (HCC) C78.00    Nontraumatic incomplete tear of right rotator cuff M75.111    Right sided sciatica M54.31    DDD (degenerative disc disease), lumbar M51.36    Lumbar facet arthropathy M47.816    Immunosuppressed due to chemotherapy ([facility] [address] D84.821, T45.1X5A, Z79.899    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 Hypertension. Well controlled on losartan 25 mg daily. Renal function has been normal with creatinine 0.91/ eGFR >60, but mildly decreased today with creatinine 0.98/eGFR 55. Advised to increase fluid intake and will continue to monitor. Brain MRI (5/2021) with evidence of mild to moderate burden nonspecific white matter lesions and a few tiny old lacunar infarcts, likely from longstanding hypertension. Now on aspirin 81 mg daily as advised by Dr. Charmayne Pitcher. 2. Breast cancer with pulmonary metastasis. Continues on current therapy with Ibrance and letrozole. Staging CT chest/ abdomen/ and pelvis without evidence of metastatic disease. Continuing with annual mammograms. Scheduled for surveillance CT scan and mammogram later this month. Not using compression sleeve for lymphedema, but receiving massage therapy every three weeks with good results.   Continuing to follow with Dr. Clark Hall. 3. Fatigue. Severe and persistent and likely a result of breast cancer treatment with Ibrance and letrozole. Described dyspnea in association with fatigue when performing exertional activities in 11/2020. Denied associated chest pain. EKG unremarkable, chest x-ray negative, and and echocardiogram (12/1/2020) showed normal LV function (EF 55-60%), trileaflet AV with moderate AI, PAP 30 mmHg. Repeat staging CT scans in 6/2021 without evidence of recurrence or metastases. Iron studies performed by Dr. Mario Portillo showed a ferritin level of 13. No evidence of anemia. Underwent upper endoscopy and colonoscopy by Dr. Brett Sweeney (8/11/2021) without evidence of source of iron loss. Random colon biopsies negative. Aware of benefit of continued current therapy particularly given clinical response. Will continue to monitor. 4.  Right ICA terminus aneurysm. MRA brain (4/2019) showed a 2 mm superiorly directed aneurysm at the right ICA terminus. Repeat MRA brain (5/2021) showed no significant interval change. Reports intermittent tremor with intention (L>R) over last several months, and brain MRI negative in 5/2021. Being monitored by Dr. Matthew Nix. 5. Hyperlipidemia. Calculated 10 year ASCVD risk is 17.4%, which places her in one of the four statin benefit groups (primary prevention with risk > 7.5%). However, LDL improved to 116 and HDL 70. Given lack of history of ASCVD, no evidence of atherosclerotic disease on chest and abdominal CT scans, and history of metastatic breast cancer, will not recommend treatment with statin at this time. Continue to emphasized importance of lifestyle modifications, including diet and exercise. 6. Osteopenia. History of T11 compression fracture in 4/2016, s/p kyphoplasty. Last bone density scan 11/2019.  Using femoral neck T-scores, calculated FRAX score estimates her 10 year risk of a major osteoporetic fracture at 16 % and hip fracture at 2.3%, which alone are not an indication for biphosphonate treatment. However, development of a vertebral compression fracture and treatment with aromatase inhibitor increases likelihood of progression. Evaluated by Dr. Josselin Brown and received first dose of Reclast on 3/29/2021. Continue calcium and Vitamin D. Encouraged to continue exercises. Repeat bone density scan performed in 11/2021 showing stability without statistically significant change with FRAX scores calculated at 14% / 2.2%. Reports scheduled for repeat Reclast infusion in 4/2022.    7. History of recurrent diverticulitis. Colonoscopy (2/2016) with moderately severe diverticulosis in the ascending and descending colon. Has had multiple recent episodes of abdominal pain which were treated empirically with Cipro. Episode of symptoms in 3/2018 resolved prior to initiating antibiotics, bringing into question whether her abdominal symptoms were due to diverticulitis or irritable bowel syndrome. Had recurrent episode in 7/2018, and abdominal CT scan confirmed diagnosis of acute diverticulitis. Treated with Augmentin as developed rash on Flagyl. Subsequently evaluated by Dr. Paulo Will and instructed to continue Probiotics and use hyoscyamine as needed when develop abdominal complaints. It was his opinion that her complaints may be related to IBS, so wanting to avoid frequent antibiotics if not needed. Reports of multiple mild episodes of pain may reflect IBS since will resolve without antibiotics. Advised to try hyoscyamine with next episode. Reports no recurrence since last visit. Will continue to follow. 8. Lumbar radiculopathy. Known lumbar stenosis and facet arthropathy. Using meloxicam, cyclobenzaprine, gabapentin, and Tylenol. Previously did undergo L4-L5 epidural injection on the right by Dr. Melissa Lundberg in 2016 for similar symptoms. Recent recurrence of symptoms without sustained improvement despite treatment with steroids, NSAIDS, muscle relaxants and gabapentin.  Underwent repeat lumbar MRI in 5/2020 which showed very advanced multilevel DDD and DJD with chronic compression fractures and worsening since 2016, especially at L4/5. Underwent left L4/5 and right L5-S1 KAREN in 6/2020 and left S1 TF KAREN in 8/2020 by Dr. Yobany Mcdonald with improvement. However, continuing to have persistent difficulty with left-sided buttock pain and underwent left hip MRI (11/6/2021) which showed mild bilateral hip osteoarthritis (L>R), and asymmetric atrophy of the proximal left tensor fascia will. Reports received a ultrasound-guided steroid injection for left trochanteric bursitis on 11/10/2021, which provided some relief transiently, but reports pain has now recurred. Scheduled for KAREN at left L4/5 and L5/S1 on 12/13/2021 by Dr. Yobany Mcdonald. Reports weakness in her left leg which he feels is affecting her balance, and willing to consider physical therapy following epidural injections. Will continue to follow. 9. Right shoulder pain. MRI with partial rotator cuff tear. Evaluated by Dr. Frank Johnson and received a glenohumeral cortisone injection. Recommended physical therapy, but patient did not proceed. Not a significant complaint today as now improved. Followed by Dr. Ricardo Silver. 10. Tourette's syndrome. Followed by Dr. Cornelio Haddad. On Haldol with plans to switch to Risperdal if Haldol becomes unavailable. Also using temazepam, as prescribed by Dr. Cornelio Haddad, approximately 3 times a week to help with insomnia. Reports occasionally will need to take diazepam if temazepam not effective. Will continue to monitor. 11. Right ovarian cyst. No further monitoring needed as per Dr. Reyes Mohan. Surveillance CT scans obtained every 6 months for breast cancer includes imaging of pelvis and show cyst to be stable. 12. GERD. Using Protonix daily with good control. Underwent upper endoscopy (8/11/2021) by Dr. Salome Dozier to evaluate iron deficiency and noted to have normal stomach and duodenum, abnormal motility of the esophagus and a small hiatal hernia.   13. Obesity. Weight remaining overall stable with BMI 41.5. Emphasized lifestyle modifications including attempts at regular exercise and dietary changes. Will continue to monitor. 14. Health maintenance. Completed Moderna COVID 19 vaccine series and received third Moderna dose given immunosuppression. Will be due for Moderna booster dose in 5/2022. Already received the influenza vaccine. Has not yet received Shingrix vaccine and remains hesitant to do so. Other immunizations up to date. Mammogram scheduled for later this month. Vitamin D level remains normal on maintenance dose supplement. Continue regular eye exams with Dr. Jakob Kelly. Completed cataract surgery in 4/8930 without complications. Medicare wellness visit up to date. Patient understands recommendations and agrees with plan. Follow-up in 6 months. Colonoscopy showed patchy abnormal vascularity suggestive of microscopic colitis, but otherwise normal (pathology: negative).

## 2021-12-21 DIAGNOSIS — Z87.898 H/O MOTION SICKNESS: Primary | ICD-10-CM

## 2021-12-21 DIAGNOSIS — R11.2 MILD NAUSEA AND VOMITING: ICD-10-CM

## 2021-12-21 RX ORDER — ONDANSETRON HYDROCHLORIDE 8 MG/1
8 TABLET, FILM COATED ORAL
Qty: 30 TABLET | Refills: 0 | Status: SHIPPED | OUTPATIENT
Start: 2021-12-21

## 2021-12-21 RX ORDER — MECLIZINE HYDROCHLORIDE 25 MG/1
25 TABLET ORAL
Qty: 30 TABLET | Refills: 0 | Status: SHIPPED | OUTPATIENT
Start: 2021-12-21 | End: 2021-12-31

## 2021-12-21 NOTE — PROGRESS NOTES
ICD-10-CM ICD-9-CM    1. H/O motion sickness  Z87.898 V13.89 meclizine (ANTIVERT) 25 mg tablet      ondansetron hcl (Zofran) 8 mg tablet   2.  Mild nausea and vomiting  R11.2 787.01 ondansetron hcl (Zofran) 8 mg tablet

## 2021-12-27 ENCOUNTER — HOSPITAL ENCOUNTER (OUTPATIENT)
Dept: CT IMAGING | Age: 76
Discharge: HOME | End: 2021-12-27
Attending: NURSE PRACTITIONER
Payer: MEDICARE

## 2021-12-27 DIAGNOSIS — C50.912 MALIGNANT NEOPLASM OF LEFT BREAST (HCC): ICD-10-CM

## 2021-12-27 PROCEDURE — 74011000636 HC RX REV CODE- 636

## 2021-12-27 PROCEDURE — 74177 CT ABD & PELVIS W/CONTRAST: CPT

## 2021-12-27 RX ADMIN — IOPAMIDOL 80 ML: 612 INJECTION, SOLUTION INTRAVENOUS at 14:36

## 2021-12-29 ENCOUNTER — HOSPITAL ENCOUNTER (OUTPATIENT)
Dept: MAMMOGRAPHY | Age: 76
Discharge: HOME | End: 2021-12-29
Attending: INTERNAL MEDICINE
Payer: MEDICARE

## 2021-12-29 DIAGNOSIS — Z12.31 VISIT FOR SCREENING MAMMOGRAM: ICD-10-CM

## 2021-12-29 PROCEDURE — 77063 BREAST TOMOSYNTHESIS BI: CPT

## 2022-01-16 ENCOUNTER — PATIENT MESSAGE (OUTPATIENT)
Dept: INTERNAL MEDICINE CLINIC | Age: 77
End: 2022-01-16

## 2022-01-18 ENCOUNTER — PATIENT MESSAGE (OUTPATIENT)
Dept: INTERNAL MEDICINE CLINIC | Age: 77
End: 2022-01-18

## 2022-01-18 RX ORDER — LOSARTAN POTASSIUM 25 MG/1
25 TABLET ORAL DAILY
Qty: 90 TABLET | Refills: 3 | Status: SHIPPED | OUTPATIENT
Start: 2022-01-18

## 2022-01-18 NOTE — TELEPHONE ENCOUNTER
----- Message from [address] [place] [address]. Carole Lilliamcody sent at 1/18/2022 11:13 AM EST -----  Regarding: RX refill  I would appreciate a refill on my Losartan 25 mg. and submitted to my new preferred pharmacy as I have changed insurances. I am now using [facility] on [address] in [place].   Thank you so much,  Nguyen Michelle

## 2022-01-18 NOTE — TELEPHONE ENCOUNTER
Attempted to call patient.   Spoke with patient but at an appointment and she will call back when available

## 2022-01-18 NOTE — TELEPHONE ENCOUNTER
Med pended to new pharmacy as requested    Last Visit: 12/9/21 with MD Barbara Valencia  Next Appointment: 6/23/22 with MD Melgar  Previous Refill Encounter(s): 1/19/21 #90 with 3 refills    Requested Prescriptions     Pending Prescriptions Disp Refills    losartan (COZAAR) 25 mg tablet 90 Tablet 3     Sig: Take 1 Tablet by mouth daily.

## 2022-01-19 NOTE — TELEPHONE ENCOUNTER
Spoke with patient on 1/18/2022 at 1800. Discussed current issues with back and hip pain and plan outlined for follow-up with Dr. Kaitlin Pinto to address. Also discussed physical therapy and current therapist being positive for COVID 19. Recommendations made for timing of resumption of care.

## 2022-02-01 DIAGNOSIS — F95.2 TOURETTE SYNDROME: ICD-10-CM

## 2022-02-01 RX ORDER — HALOPERIDOL 2 MG/1
2 TABLET ORAL 2 TIMES DAILY
Qty: 180 TABLET | Refills: 1 | Status: SHIPPED | OUTPATIENT
Start: 2022-02-01 | End: 2022-08-22

## 2022-02-01 NOTE — TELEPHONE ENCOUNTER
Med pended to new pharmacy as requested    Requested Prescriptions     Pending Prescriptions Disp Refills    haloperidoL (HALDOL) 2 mg tablet 180 Tablet 1     Sig: Take 1 Tablet by mouth two (2) times a day.

## 2022-02-01 NOTE — TELEPHONE ENCOUNTER
Patient stating she has switched pharmacies and they are having trouble switching it over. Stating it would be easier if the doctor could just send in a new Rx to [facility].

## 2022-02-14 RX ORDER — MELOXICAM 15 MG/1
15 TABLET ORAL DAILY
Qty: 90 TABLET | Refills: 2 | Status: SHIPPED | OUTPATIENT
Start: 2022-02-14

## 2022-02-14 NOTE — TELEPHONE ENCOUNTER
Previous Rx was sent to [facility]    Last Visit: 12/9/21 with MD Brandon Paulino  Next Appointment: 6/23/22 with MD Brandon Paulino    Requested Prescriptions     Pending Prescriptions Disp Refills    meloxicam (MOBIC) 15 mg tablet 90 Tablet 2     Sig: Take 1 Tablet by mouth daily.  Take with food

## 2022-03-01 ENCOUNTER — PATIENT MESSAGE (OUTPATIENT)
Dept: INTERNAL MEDICINE CLINIC | Age: 77
End: 2022-03-01

## 2022-03-02 NOTE — TELEPHONE ENCOUNTER
Please see if patient can come into office tomorrow (3/3/2022) at 1:30 pm. Cancellation so slot open. Thanks.

## 2022-03-03 ENCOUNTER — OFFICE VISIT (OUTPATIENT)
Dept: INTERNAL MEDICINE CLINIC | Age: 77
End: 2022-03-03
Payer: MEDICARE

## 2022-03-03 VITALS
BODY MASS INDEX: 32.63 KG/M2 | HEIGHT: 55 IN | WEIGHT: 141 LBS | HEART RATE: 70 BPM | RESPIRATION RATE: 16 BRPM | SYSTOLIC BLOOD PRESSURE: 134 MMHG | TEMPERATURE: 97.7 F | OXYGEN SATURATION: 98 % | DIASTOLIC BLOOD PRESSURE: 82 MMHG

## 2022-03-03 DIAGNOSIS — I35.1 AORTIC VALVE INSUFFICIENCY, ETIOLOGY OF CARDIAC VALVE DISEASE UNSPECIFIED: ICD-10-CM

## 2022-03-03 DIAGNOSIS — C50.912 MALIGNANT NEOPLASM OF LEFT BREAST IN FEMALE, ESTROGEN RECEPTOR POSITIVE, UNSPECIFIED SITE OF BREAST (HCC): ICD-10-CM

## 2022-03-03 DIAGNOSIS — R06.09 DYSPNEA ON EXERTION: ICD-10-CM

## 2022-03-03 DIAGNOSIS — M48.062 SPINAL STENOSIS OF LUMBAR REGION WITH NEUROGENIC CLAUDICATION: ICD-10-CM

## 2022-03-03 DIAGNOSIS — F32.9 REACTIVE DEPRESSION: ICD-10-CM

## 2022-03-03 DIAGNOSIS — R53.83 FATIGUE, UNSPECIFIED TYPE: ICD-10-CM

## 2022-03-03 DIAGNOSIS — T45.1X5A IMMUNOSUPPRESSED DUE TO CHEMOTHERAPY (HCC): ICD-10-CM

## 2022-03-03 DIAGNOSIS — C78.00 MALIGNANT NEOPLASM METASTATIC TO LUNG, UNSPECIFIED LATERALITY (HCC): ICD-10-CM

## 2022-03-03 DIAGNOSIS — Z17.0 MALIGNANT NEOPLASM OF LEFT BREAST IN FEMALE, ESTROGEN RECEPTOR POSITIVE, UNSPECIFIED SITE OF BREAST (HCC): ICD-10-CM

## 2022-03-03 DIAGNOSIS — R53.83 FATIGUE, UNSPECIFIED TYPE: Primary | ICD-10-CM

## 2022-03-03 DIAGNOSIS — M54.30 SCIATICA, UNSPECIFIED LATERALITY: ICD-10-CM

## 2022-03-03 DIAGNOSIS — D84.821 IMMUNOSUPPRESSED DUE TO CHEMOTHERAPY (HCC): ICD-10-CM

## 2022-03-03 DIAGNOSIS — Z79.899 IMMUNOSUPPRESSED DUE TO CHEMOTHERAPY (HCC): ICD-10-CM

## 2022-03-03 DIAGNOSIS — I10 ESSENTIAL HYPERTENSION: ICD-10-CM

## 2022-03-03 DIAGNOSIS — F95.2 TOURETTE SYNDROME: ICD-10-CM

## 2022-03-03 PROCEDURE — G8427 DOCREV CUR MEDS BY ELIG CLIN: HCPCS | Performed by: INTERNAL MEDICINE

## 2022-03-03 PROCEDURE — G9899 SCRN MAM PERF RSLTS DOC: HCPCS | Performed by: INTERNAL MEDICINE

## 2022-03-03 PROCEDURE — G8752 SYS BP LESS 140: HCPCS | Performed by: INTERNAL MEDICINE

## 2022-03-03 PROCEDURE — G8417 CALC BMI ABV UP PARAM F/U: HCPCS | Performed by: INTERNAL MEDICINE

## 2022-03-03 PROCEDURE — 93010 ELECTROCARDIOGRAM REPORT: CPT | Performed by: INTERNAL MEDICINE

## 2022-03-03 PROCEDURE — 1090F PRES/ABSN URINE INCON ASSESS: CPT | Performed by: INTERNAL MEDICINE

## 2022-03-03 PROCEDURE — 1101F PT FALLS ASSESS-DOCD LE1/YR: CPT | Performed by: INTERNAL MEDICINE

## 2022-03-03 PROCEDURE — 3017F COLORECTAL CA SCREEN DOC REV: CPT | Performed by: INTERNAL MEDICINE

## 2022-03-03 PROCEDURE — G8399 PT W/DXA RESULTS DOCUMENT: HCPCS | Performed by: INTERNAL MEDICINE

## 2022-03-03 PROCEDURE — G8754 DIAS BP LESS 90: HCPCS | Performed by: INTERNAL MEDICINE

## 2022-03-03 PROCEDURE — G8536 NO DOC ELDER MAL SCRN: HCPCS | Performed by: INTERNAL MEDICINE

## 2022-03-03 PROCEDURE — 93005 ELECTROCARDIOGRAM TRACING: CPT | Performed by: INTERNAL MEDICINE

## 2022-03-03 PROCEDURE — G0463 HOSPITAL OUTPT CLINIC VISIT: HCPCS | Performed by: INTERNAL MEDICINE

## 2022-03-03 PROCEDURE — 99215 OFFICE O/P EST HI 40 MIN: CPT | Performed by: INTERNAL MEDICINE

## 2022-03-03 PROCEDURE — G8510 SCR DEP NEG, NO PLAN REQD: HCPCS | Performed by: INTERNAL MEDICINE

## 2022-03-03 RX ORDER — SERTRALINE HYDROCHLORIDE 25 MG/1
25 TABLET, FILM COATED ORAL DAILY
Qty: 90 TABLET | Refills: 1 | Status: SHIPPED
Start: 2022-03-03 | End: 2022-06-25

## 2022-03-03 NOTE — PROGRESS NOTES
Earl Petersen presents today for No chief complaint on file. 1. \"Have you been to the ER, urgent care clinic since your last visit? Hospitalized since your last visit? \" no    2. \"Have you seen or consulted any other health care providers outside of the [address] [place] [address] since your last visit? \" no     3. For patients aged 39-70: Has the patient had a colonoscopy / FIT/ Cologuard? Yes - no Care Gap present      If the patient is female:    4. For patients aged 41-77: Has the patient had a mammogram within the past 2 years? NA - based on age or sex  See top three    5. For patients aged 21-65: Has the patient had a pap smear?  NA - based on age or sex

## 2022-03-03 NOTE — PATIENT INSTRUCTIONS
Heart-Healthy Diet: Care Instructions  Your Care Instructions     A heart-healthy diet has lots of vegetables, fruits, nuts, beans, and whole grains, and is low in salt. It limits foods that are high in saturated fat, such as meats, cheeses, and fried foods. It may be hard to change your diet, but even small changes can lower your risk of heart attack and heart disease. Follow-up care is a key part of your treatment and safety. Be sure to make and go to all appointments, and call your doctor if you are having problems. It's also a good idea to know your test results and keep a list of the medicines you take. How can you care for yourself at home? Watch your portions  · Learn what a serving is. A \"serving\" and a \"portion\" are not always the same thing. Make sure that you are not eating larger portions than are recommended. For example, a serving of pasta is ½ cup. A serving size of meat is 2 to 3 ounces. A 3-ounce serving is about the size of a deck of cards. Measure serving sizes until you are good at [place]" them. Keep in mind that restaurants often serve portions that are 2 or 3 times the size of one serving. · To keep your energy level up and keep you from feeling hungry, eat often but in smaller portions. · Eat only the number of calories you need to stay at a healthy weight. If you need to lose weight, eat fewer calories than your body burns (through exercise and other physical activity). Eat more fruits and vegetables  · Eat a variety of fruit and vegetables every day. Dark green, deep orange, red, or yellow fruits and vegetables are especially good for you. Examples include spinach, carrots, peaches, and berries. · Keep carrots, celery, and other veggies handy for snacks. Buy fruit that is in season and store it where you can see it so that you will be tempted to eat it. · Cook dishes that have a lot of veggies in them, such as stir-fries and soups.   Limit saturated and trans fat  · Read food labels, and try to avoid saturated and trans fats. They increase your risk of heart disease. · Use olive or canola oil when you cook. · Bake, broil, grill, or steam foods instead of frying them. · Choose lean meats instead of high-fat meats such as hot dogs and sausages. Cut off all visible fat when you prepare meat. · Eat fish, skinless poultry, and meat alternatives such as soy products instead of high-fat meats. Soy products, such as tofu, may be especially good for your heart. · Choose low-fat or fat-free milk and dairy products. Eat foods high in fiber  · Eat a variety of grain products every day. Include whole-grain foods that have lots of fiber and nutrients. Examples of whole-grain foods include oats, whole wheat bread, and brown rice. · Buy whole-grain breads and cereals, instead of white bread or pastries. Limit salt and sodium  · Limit how much salt and sodium you eat to help lower your blood pressure. · Taste food before you salt it. Add only a little salt when you think you need it. With time, your taste buds will adjust to less salt. · Eat fewer snack items, fast foods, and other high-salt, processed foods. Check food labels for the amount of sodium in packaged foods. · Choose low-sodium versions of canned goods (such as soups, vegetables, and beans). Limit sugar  · Limit drinks and foods with added sugar. These include candy, desserts, and soda pop. Limit alcohol  · Limit alcohol to no more than 2 drinks a day for men and 1 drink a day for women. Too much alcohol can cause health problems. When should you call for help? Watch closely for changes in your health, and be sure to contact your doctor if:    · You would like help planning heart-healthy meals. Where can you learn more? Go to http://www.[employer].com/  Enter V137 in the search box to learn more about \"Heart-Healthy Diet: Care Instructions. \"  Current as of: August 22, 2019               Content Version: 12.6  © 1435-0654 [employer]. Care instructions adapted under license by [employer] (which disclaims liability or warranty for this information). If you have questions about a medical condition or this instruction, always ask your healthcare professional. [address] any warranty or liability for your use of this information.

## 2022-03-04 PROBLEM — R06.09 DYSPNEA ON EXERTION: Status: ACTIVE | Noted: 2022-03-04

## 2022-03-04 PROBLEM — F32.9 REACTIVE DEPRESSION: Status: ACTIVE | Noted: 2022-03-04

## 2022-03-04 PROBLEM — E61.1 IRON DEFICIENCY: Status: RESOLVED | Noted: 2021-12-13 | Resolved: 2022-03-04

## 2022-03-04 PROBLEM — I35.1 AORTIC VALVE REGURGITATION: Status: ACTIVE | Noted: 2022-03-04

## 2022-03-04 RX ORDER — GABAPENTIN 300 MG/1
300 CAPSULE ORAL
Qty: 1 CAPSULE | Refills: 0
Start: 2022-03-04 | End: 2022-04-06 | Stop reason: SDUPTHER

## 2022-03-05 NOTE — PROGRESS NOTES
HPI:  Damari Goldman is a 78y.o. year old female who presents today for an acute visit. She has a history of hypertension, dyslipidemia,  metastatic breast cancer, GERD, diverticulosis with recurrent diverticulitis, osteoarthritis s/p right knee replacement (2012), lumbar degenerative disease, osteopenia, compression fracture, and Tourette's syndrome. She is accompanied by her . She reports that she completed the Moderna COVID-19 vaccine series and received a third Moderna dose due to immunosuppression. She reports that she is doing reasonably well. She presents today for evaluation of worsening fatigue. She was evaluated in 11/2020 when she described dyspnea in association with fatigue when performing exertional activities. EKG (11/24/2020) was unremarkable, chest x-ray (11/24/2020) was negative, and echocardiogram (12/1/2020) showed normal LV function (EF 55-60%), trileaflet AV with moderate AI, and PAP 30 mmHg. She had repeat staging CT scans in 6/2021 and 12/2021 without evidence of recurrence or metastases. Iron studies performed by Dr. Bonny An showed a ferritin level of 13 although no evidence of anemia. She underwent upper endoscopy and colonoscopy by Dr. Annie Garcia (8/11/2021) without evidence of source of iron loss. She reports today that her fatigue has significantly progressed, describing \"waves of severe fatigue and shortness of breath with minimal exertion\". She states that she has no energy to go out with friends and is having difficulty with her normal ADL activities at home due to fatigue. She underwent a repeat lab evaluation by oncology, which showed normal CBC, CMP, ferritin, and iron studies. She reports that a decision was made by Dr. Bonny An to hold [facility] for a total of 7 weeks before reinitiating to see if may help with symptoms. However, she will continue on letrozole. She is otherwise without new complaints and overall feeling well. .       Summary of prior hospitalizations and medical history:  She has a history of left breast cancer, which was diagnosed in 7/2010 as invasive ductal adenocarcinoma, s/p left modified radical mastectomy with sentinel node biopsy, performed by Dr. Trey Ruiz. She had 3 positive lymph nodes and was classified as stage 1B, T1c N1 M0, ER and NV positive, Her-2/sarah negative by FISH. She underwent 6 cycles of chemotherapy with Docetaxel and Cytoxan. She was subsequently unable to tolerate anastrozole, exemestane, tamoxifen, and letrozole due to profound fatigue and arthralgias. Her course was complicated by chronic left arm lymphedema, managed with a compression sleeve. A chest CT scan in 3/2014 noted several small pulmonary nodules which were concerning for metastases but were too small to biopsy. They were followed with sequential CT scans , and in 12/2014, repeat chest CT scan showed enlarging bilateral pulmonary nodules compatible with progression of metastatic disease. A fine needle aspirate was performed on 12/21/2014 which showed benign pulmonary parenchyma with alveolar hemorrhage. Repeat chest CT scan in 3/12/2015 showed mild interval progression of the lung metastases with enlarging nodules and development of new nodules. A CT guided needle biopsy was performed on 3/23/2015, which confirmed metastatic adenocarcinoma consistent with breast primary (stage IV, ER/NV positive, and TTF-1 negative). On 4/13/2015, she was started on therapy with Ibrance and letrozole. Follow-up CT scan (7/15/15) showed partial response with decreasing size of some nodules and resolution of other nodules. Most recent chest, abdomen, and pelvis CT scan was obtained in 6/2017, showing stable or decreased multiple bilateral pulmonary nodules and no suspicious new pulmonary nodules. She continues on palbociclib, but was changed from letrozole to fulvestrant. She describes persistent fatigue which she attributes to monthly treatment with fulvestrant (Faslodex).  She underwent restaging chest, abdomen, and pelvis CT scan in 7/2019 which showed that her pulmonary nodules were unchanged, and no other evidence of metastatic disease. Repeat staging CT chest/ abdomen/ pelvis in 1/2021 showed no evidence of metastasis or adenopathy. She is followed by Dr. Carrillo Maria. She states that she established care with Dr. Shaneka Braga, but was told that she may have her mammograms and breast exams in our office with referral to her if something arises. She also has a history of a right ovarian cyst, but was released from the care of Dr. Benigno Brower since it was concluded to be benign. She has a history of hypertension treated with losartan. She monitors her blood pressure mainly when visiting multiple physicians and reports that it is well controlled. She has no history of heart disease, and denies any chest pain, shortness of breath at rest or with exertion, palpitations, lightheadedness, or edema. In 4/11/2016, she sustained a fall with subsequent severe pain in her mid to upper lumbar region and wrapping around waist. She was treated with meloxicam, tylenol and flexeril, but because of persistent discomfort, she presented to [facility] on 4/15/2016 and lumbosacral spine films showed marked degenerative changes with disc space obliteration throughout lumbar spine and slight anterior spondylolisthesis of L4. She was evaluated by Dr. Vishal Sarkar on 4/18/2016 who recommended physical therapy and evaluation by Dr. Brady Cortes for her degenerative spine changes. She continued to take meloxicam and tylenol and started physical therapy, but noted worsening of her pain.  She subsequently was evaluated by Dr. Rizwana Jack, who obtained a lumbar MRI on 4/26/2016, which showed a new subacute compression fracture of T11 vertebral body with diffuse marrow edema and mild paravertebral inflammatory stranding, no retropulsion or central stenosis; more severe degenerative disease along the right side at L4-L5, L5-S1, potential mild compression of right exiting L4 and L5 nerve roots. She was referred to Dr. Hebert Francois for kyphoplasty of the T11 compression fracture given failure of pain control with conservative measures. She underwent the procedure on 9/5/7274 without complications, and S15 vertebral body bone core and aspirate biopsies were performed, which showed only reactive bone changes and no evidence of malignancy. She has a history of osteopenia, with a history of fracture of her sacrum. She reports that she was treated with alendronate in 12/2011 but discontinued it in 4/2013 due to intolerence. Bone density scan (11/2017) was consistent with osteopenia with femoral neck T-scores: left -1.4/ right -1.4 and distal radius -2.2 (lumbar T-score could not be assessed). A repeat bone density scan was obtained (11/2019) showing continued evidence of osteopenia with femoral neck T-scores: left -1.4/ right -1.4 and distal radius -2.4 (lumbar T-score could not be assessed). She was referred to Dr. Erma Lockwood given her multiple compression fractures and treatment with letrozole, and was started on Reclast in 3/2021. She continues to take calcium and vitamin D. In 10/2016, she developed left sided low back pain with radiation to her left leg. She underwent a lumbar MRI (9/2016) showing scoliosis and advanced multilevel degenerative changes with areas of foraminal stenosis at L3-L4 and L5-S1; mild/moderate central canal stenosis at L4-L5 and L5-S1. She was evaluated by Dr. Alexi Skaggs and treated with pregabalin with some improvement. She subsequently received an epidural steroid injection with improvement and discontinued pregabalin. She reported worsening low back pain and right sciatica, and underwent an epidural steroid injection at right L4-5 transforaminal approach by Dr. Alexi Skaggs on 3/18/2019. She has noted improvement in her right hip and leg pain.  She underwent a lumbar MRI (6/2020) for worsening right sciatica, showing very advanced multilevel DDD and DJD with extensive bone marrow edema reaction to DDD; chronic compression fractures; single level of degenerative central spinal stenosis, moderate, at L4/L5, worsened compared 2016; multilevel degenerative foraminal narrowing worst right L4/L5, bilateral L5/S1; no evidence of metastatic breast cancer. She underwent left L4/5 and right L5-S1 KAREN in 6/2020 and Left S1 TF KAREN in 8/2020 by Dr. Marc Cervantes with improvement. She reports continuing to have persistent difficulty with left-sided buttock and pelvic pain, and she underwent left hip MRI (11/6/2021) which showed mild bilateral hip osteoarthritis (L>R), and asymmetric atrophy of the proximal left tensor fascia will. She reports that she received a ultrasound-guided steroid injection for left trochanteric bursitis on 11/10/2021, which provided some relief transiently, but pain has since recurred. She underwent an KAREN left L4/5 and L5/S1 on 12/13/2021 by Dr. Marc Cervantes with some improvement. She subsequently underwent physical therapy with significant benefit. In 4/2017, she noted increasing cervical and upper back discomfort. She has had multiple cervical MRI's,and underwent repeat in 4/2017 which showed multilevel degenerative disc disease and facet arthropathy without change from prior studies with mild central canal stenosis C3-C4 and C5-C6, and moderate central canal stenosis C6-C7. She was treated with Mobic and Flexeril, and gabapentin at bedtime. She complained of right shoulder pain.  X-ray of her shoulder (11/2017) was negative, and she had a right shoulder MRI (5/14/2018) which showed deep partial thickness undersurface tear of the distal supraspinatus, likely with full thickness slitlike perforations; no gross tendon retraction, but there is moderate muscle atrophy; partial thickness tearing with longitudinal components involving the biceps tendon at its sulcal turn, some associated tenosynovitis; accompanying short length longitudinal split tear in the distal subscapularis; moderate infraspinatus tendinosis, and mild to moderate subacromial subdeltoid bursitis. She underwent evaluation by Dr. Matilde Sifuentes on 5/18/2018, and received a glenohumeral cortisone injection. She has a history of GERD and diverticulosis with recurrent diverticulitis,. She was evaluated by Dr. Gautam Helton in 2/23/2016 and underwent an upper endoscopy, which revealed a Schatzki's ring at the gastroesophageal junction (dilated) with mild distal esophagitis and a small hiatal hernia. A screening colonoscopy was also performed showing moderately severe diverticulosis of the ascending and descending colon and a 2 mm sessile sigmoid polyp (pathology: hyperplastic). Recommendations for follow-up colonoscopy in 10 years. She denies any abdominal pain, nausea, vomiting, melena, hematochezia, or change in bowel movements. She was evaluated in 8/2017 by GERALD Chi with complaints of abdominal pain and was treated with Cipro for presumed diverticulitis. She contacted the office again in 10/2017 and 1/2018 with a recurrence of symptoms, and was treated for a 10 day course with Cipro with improvement. Abdominal CT scan in 2/6/2018 for staging for her breast cancer did show evidence of moderate to severe diverticulosis, but no evidence of diverticulitis. She did present with similar symptoms in 3/2018 and was evaluated by GERALD Vigil and prescribed Cipro. However, her symptoms resolved prior to taking the antibiotics. In 7/2018, she again presented with left lower quadrant pain, and an abdominal CT scan (7/13/2018) which showed evidence of uncomplicated diverticulitis involving the lower left colon and upper sigmoid. She was treated with Cipro and Flagyl initially, but developed a rash due to Flagyl, and was subsequently changed to Augmentin. She had a follow-up visit with Dr. Patti Arellano and it was his opinion that she also had IBS which mimicked her episodes of diverticulitis.  He recommended continuing on a probiotic and prescribed hyoscyamine to use as needed. She has noted improvement since doing so. She underwent evaluation for iron deficiency when she was found to have a ferritin of 13 in 6/2021 which was performed to evaluate her fatigue. On 8/11/2021 she underwent upper endoscopy and colonoscopy by Dr. Shawn Echeverria. Upper endoscopy showed normal stomach and duodenum, abnormal motility of the esophagus, and a small hiatal hernia. Colonoscopy showed increased vascularity throughout the colon concerning for microscopic colitis, but pathology of multiple random biopsies was negative. No source for iron loss was identified. She also has a history of Tourette's syndrome controlled with Haldol. She is followed by Dr. Anthony Kiran. Past Medical History:   Diagnosis Date    Arthritis     Breast cancer metastasized to lung [facility])     Left Breast    Chronic pain     Colon polyps 2/22/16    distal sigmoid, Dr. Renny Redman DDD (degenerative disc disease)     Diverticulitis of colon     Diverticulosis 2/22/16    mild in the ascedning and sigmoid colon, Dr. Hayden Thorpe (hyperlipidemia)     Hypertension     Osteopenia     Reactive depression 3/4/2022    Tourette syndrome     Vitamin D deficiency      Past Surgical History:   Procedure Laterality Date    COLONOSCOPY N/A 8/11/2021    COLONOSCOPY with Bx performed by Abeba Floyd MD at [address] HX APPENDECTOMY      HX BREAST BIOPSY  7-30-10    Left Breast w/Nipple Duct exploration and excisional biopsy-left    HX DILATION AND CURETTAGE      x3    HX MODIFIED RADICAL MASTECTOMY Left 09/03/2010    HX ORTHOPAEDIC Right 2013    knee replacement    HX TONSILLECTOMY      HYSTEROSCOPY DIAGNOSTIC       Current Outpatient Medications   Medication Sig    sertraline (ZOLOFT) 25 mg tablet Take 1 Tablet by mouth daily.  meloxicam (MOBIC) 15 mg tablet Take 1 Tablet by mouth daily.  Take with food    haloperidoL (HALDOL) 2 mg tablet Take 1 Tablet by mouth two (2) times a day.  losartan (COZAAR) 25 mg tablet Take 1 Tablet by mouth daily.  ondansetron hcl (Zofran) 8 mg tablet Take 1 Tablet by mouth every eight (8) hours as needed for Nausea or Vomiting. Take one tablet by mouth every 8 hours as needed.  temazepam (RESTORIL) 15 mg capsule Take 1 Capsule by mouth nightly as needed for Sleep. Max Daily Amount: 15 mg.    diazePAM (Valium) 5 mg tablet Take 1 Tablet by mouth every six (6) hours as needed for Anxiety. Max Daily Amount: 20 mg.    acetaminophen (Tylenol Extra Strength) 500 mg tablet Take 500 mg by mouth every six (6) hours as needed for Pain. Take 1-2 tablets by mouth every 6 hours as needed.  gabapentin (NEURONTIN) 300 mg capsule TAKE 1 CAP BY MOUTH TWO (2) TIMES A DAY. MAX DAILY AMOUNT: 600 MG. (Patient taking differently: Take 300 mg by mouth two (2) times a day. Takes nightly)    letrozole (FEMARA) 2.5 mg tablet TAKE 1 TABLET BY MOUTH EVERY DAY    pantoprazole sodium (PROTONIX PO) Take 40 mg by mouth daily.  Biotin 2,500 mcg cap Take  by mouth.  calcium-vitamin D (CALCIUM 500+D) 500 mg(1,250mg) -200 unit per tablet Take 1 Tab by mouth two (2) times daily (with meals).  cholecalciferol, vitamin d3, (VITAMIN D) 1,000 unit tablet Take 4,000 Units by mouth daily.  aspirin delayed-release 81 mg tablet Take  by mouth daily. (Patient not taking: Reported on 12/9/2021)    palbociclib 75 mg cap Take 75 mg by mouth daily. For days 1-21 of each 28 day cycle (Patient not taking: Reported on 3/3/2022)     No current facility-administered medications for this visit. Allergies and Intolerances: Allergies   Allergen Reactions    Keflex [Cephalexin] Rash    Metronidazole Rash    Other Medication Nausea Only     Anesthesia    Shellfish Containing Products Nausea and Vomiting     More of just eating the actual shellfish.     Sulfa (Sulfonamide Antibiotics) Rash    Ultram [Tramadol] Nausea and Vomiting     Patient states she is allergic to most Narcotics    Vancomycin Rash     Family History:   Family History   Problem Relation Age of Onset    Osteoporosis Sister     Osteoporosis Mother     Stroke Father     Hypertension Father     Cancer Paternal Aunt         breast     Social History:   She  reports that she quit smoking about 16 years ago. Her smoking use included cigarettes. She has a 3.00 pack-year smoking history. She has never used smokeless tobacco. She stopped smoking over 40 years ago. She is  and lives with . They have one daughter and two grandchildren. Social History     Substance and Sexual Activity   Alcohol Use Not Currently    Alcohol/week: 5.8 standard drinks    Types: 7 Glasses of wine per week     Immunization History:  Immunization History   Administered Date(s) Administered    COVID-19, [place] Batch, Primary or Immunocompromised Series, MRNA, PF, 100mcg/0.5mL 02/07/2021, 03/07/2021, 11/07/2021    Influenza High Dose Vaccine PF 10/01/2015, 10/25/2016, 10/05/2017    Influenza Vaccine 09/01/2014    Influenza Vaccine (Tri) Adjuvanted (>65 Yrs FLUAD TRI 47140) 10/05/2018, 10/17/2019    Influenza Vaccine Split 11/01/2011, 10/02/2012    Influenza Vaccine Whole 10/08/2010    Influenza, Quadrivalent, Adjuvanted (>65 Yrs FLUAD QUAD [id number]) 09/23/2020, 10/15/2021    Pneumococcal Conjugate (PCV-13) 10/27/2015    Pneumococcal Polysaccharide (PPSV-23) 04/15/2013    TD Vaccine 05/05/2008    Tdap 09/12/2014    Zoster 09/20/2011       Review of Systems:   As above included in HPI. Otherwise 11 point review of systems negative including constitutional, skin, HENT, eyes, respiratory, cardiovascular, gastrointestinal, genitourinary, musculoskeletal, endo/heme/aller, neurological.    Physical:     Visit Vitals  /82   Pulse 70   Temp 97.7 °F (36.5 °C) (Temporal)   Resp 16   Ht 4' 1\" (1.245 m)   Wt 141 lb (64 kg)   SpO2 98%   BMI 41.29 kg/m²        Patient appears in no apparent distress.  Affect is appropriate. HEENT: PERRLA, anicteric, no JVD, adenopathy or thyromegaly. No carotid bruits or radiated murmur. Lungs: clear to auscultation, no wheezes, rhonchi, or rales. Heart: regular rate and rhythm. No murmur, rubs, gallops  Abdomen: soft, nontender, nondistended, normal bowel sounds, no hepatosplenomegaly or masses. Extremities: without edema. Review of Data:  Labs:  No visits with results within 1 Month(s) from this visit. Latest known visit with results is:   Hospital Outpatient Visit on 12/02/2021   Component Date Value Ref Range Status    LIPID PROFILE 12/02/2021        Final    Cholesterol, total 12/02/2021 203* <200 MG/DL Final    Triglyceride 12/02/2021 81  <150 MG/DL Final    HDL Cholesterol 12/02/2021 70* 40 - 60 MG/DL Final    LDL, calculated 12/02/2021 116.8* 0 - 100 MG/DL Final    VLDL, calculated 12/02/2021 16.2  MG/DL Final    CHOL/HDL Ratio 12/02/2021 2.9  0 - 5.0   Final    Sodium 12/02/2021 141  136 - 145 mmol/L Final    Potassium 12/02/2021 4.3  3.5 - 5.5 mmol/L Final    Chloride 12/02/2021 104  100 - 111 mmol/L Final    CO2 12/02/2021 30  21 - 32 mmol/L Final    Anion gap 12/02/2021 7  3.0 - 18 mmol/L Final    Glucose 12/02/2021 86  74 - 99 mg/dL Final    BUN 12/02/2021 16  7.0 - 18 MG/DL Final    Creatinine 12/02/2021 0.98  0.6 - 1.3 MG/DL Final    BUN/Creatinine ratio 12/02/2021 16  12 - 20   Final    GFR est AA 12/02/2021 >60  >60 ml/min/1.73m2 Final    GFR est non-AA 12/02/2021 55* >60 ml/min/1.73m2 Final    Calcium 12/02/2021 9.1  8.5 - 10.1 MG/DL Final    Bilirubin, total 12/02/2021 0.6  0.2 - 1.0 MG/DL Final    ALT (SGPT) 12/02/2021 23  13 - 56 U/L Final    AST (SGOT) 12/02/2021 12  10 - 38 U/L Final    Alk.  phosphatase 12/02/2021 57  45 - 117 U/L Final    Protein, total 12/02/2021 6.7  6.4 - 8.2 g/dL Final    Albumin 12/02/2021 3.8  3.4 - 5.0 g/dL Final    Globulin 12/02/2021 2.9  2.0 - 4.0 g/dL Final    A-G Ratio 12/02/2021 1.3  0.8 - 1.7   Final    TSH 12/02/2021 0.86  0.36 - 3.74 uIU/mL Final    Vitamin D 25-Hydroxy 12/02/2021 54.5  30 - 100 ng/mL Final    Magnesium 12/02/2021 2.2  1.6 - 2.6 mg/dL Final       EKG (3/3/2022) sinus rhythm at 71 bpm, normal intervals; no ischemic changes. Health Maintenance:  Screening:    Mammogram: negative (12/2020). Scheduled. PAP smear: negative (9/2013) Dr Kelly Roa. Pelvic ultrasound negative (9/2015). No further screening needed. Colorectal: colonoscopy (8/2021) negative. Dr. Jourdan Truong. Depression: none   DM (HbA1c/FPG): FPG 86 (12/2021)   Hepatitis C: unknown   Falls: none   DEXA: osteopenia (11/2021) s/p T11 compression fracture (4/2016). On Reclast.  Dr. Nate Raza.    Smoking:distant past   Glaucoma: regular eye exams with Dr. Jorge Alberto Gross (last 3/2021)   Vitamin D: 54.5 (12/2021)   Medicare Wellness: 6/3/2021      Impression:  Patient Active Problem List   Diagnosis Code    Tourette syndrome F95.2    Osteoarthritis, Status post right total knee replacement M19.90    Lumbar spinal stenosis M48.061    HLD (hyperlipidemia) E78.5    Breast cancer ([facility] [address] metastatic to lungs  C50.919    Essential hypertension I10    Diverticulosis K57.90    Osteopenia M85.80    Lymphedema of arm left I89.0    Insomnia G47.00    Fatigue R53.83    Diverticulitis, recurrent K57.92    T11 Vertebral compression fracture (HCC) s/p kyphoplasty M48.50XA    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.812    Spinal stenosis of cervical region M48.02    Right shoulder pain M25.511    Malignant neoplasm metastatic to lung (HCC) C78.00    Nontraumatic incomplete tear of right rotator cuff M75.111    Right sided sciatica M54.31    DDD (degenerative disc disease), lumbar M51.36    Lumbar facet arthropathy M47.816    Immunosuppressed due to chemotherapy ([facility] [address] D84.821, T45.1X5A, Z79.899    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 Patient has been reporting significant severe and persistent fatigue and has been felt to likely be a result of breast cancer treatment with Ibrance and letrozole. Evaluated in 11/2020 as also described dyspnea in association with fatigue when performing exertional activities. EKG unremarkable, chest x-ray negative, and and echocardiogram (12/1/2020) showed normal LV function (EF 55-60%), trileaflet AV with moderate AI, and PAP 30 mmHg. Repeat staging CT scans in 6/2021 without evidence of recurrence or metastases. Iron studies performed by Dr. Jenna Mendoza showed a ferritin level of 13 although no evidence of anemia. Underwent upper endoscopy and colonoscopy by Dr. Cecily Chatman (8/11/2021) without evidence of source of iron loss. Random colon biopsies negative. Reports today that fatigue has significantly progressed, describing \"waves of severe fatigue and shortness of breath with minimal exertion\". Repeat lab evaluation by oncology showed normal CBC, CMP, ferritin, and iron studies. Decision made by Dr. Jenna Mendoza to hold [facility] for a total of 7 weeks before reinitiating to see if may help with symptoms, but will continue on letrozole. Repeat EKG obtained today was unchanged. Will proceed with a repeat echocardiogram to reevaluate severity of aortic insufficiency although would expect that dyspnea would be the predominant symptom if severe. We will continue to observe while holding Ibrance therapy to see if fatigue improves. Will reassess in 4 weeks. Depression/anxiety. Patient describes symptoms of anxiety and possible mild depression which may be reactive to current health issues. She does describe decreased appetite and continued difficulty with chronic insomnia. Will begin sertraline 25 mg daily to see if may provide some benefit. Discussed effect of sertraline on liver metabolism of Haldol and possibility of increased in Haldol effect. Discussed that unlikely to happen at low-dose, but will continue to monitor. Other chronic issues  1. Hypertension. Well controlled on losartan 25 mg daily. Renal function mildly decreased  with creatinine 0.98/eGFR 55 at last visit in 12/2021, but normalized with creatinine of 0.80 on 2/25/2022 at [facility]. Advised to increase fluid intake and will continue to monitor. Brain MRI (5/2021) with evidence of mild to moderate burden nonspecific white matter lesions and a few tiny old lacunar infarcts, likely from longstanding hypertension. Now on aspirin 81 mg daily as advised by Dr. Renata Zuniga. 2. Breast cancer with pulmonary metastasis. Continues on current therapy with Ibrance and letrozole. Staging CT chest/ abdomen/ and pelvis without evidence of metastatic disease. Continuing with annual mammograms. Scheduled for surveillance CT scan and mammogram later this month. Not using compression sleeve for lymphedema, but receiving massage therapy every three weeks with good results. Continuing to follow with Dr. Allison Francis.   3. Right ICA terminus aneurysm. MRA brain (4/2019) showed a 2 mm superiorly directed aneurysm at the right ICA terminus. Repeat MRA brain (5/2021) showed no significant interval change. Reports intermittent tremor with intention (L>R) over last several months, and brain MRI negative in 5/2021. Being monitored by Dr. Renata Zuniga. 4. Hyperlipidemia. Calculated 10 year ASCVD risk is 17.4%, which places her in one of the four statin benefit groups (primary prevention with risk > 7.5%). However, LDL improved to 116 and HDL 70 (12/2021). Given lack of history of ASCVD, no evidence of atherosclerotic disease on chest and abdominal CT scans, and history of metastatic breast cancer, will not recommend treatment with statin at this time. Continue to emphasized importance of lifestyle modifications, including diet and exercise. 5. Osteopenia.   History of T11 compression fracture in 4/2016, s/p kyphoplasty. Last bone density scan 11/2019. Using femoral neck T-scores, calculated FRAX score estimates her 10 year risk of a major osteoporetic fracture at 16 % and hip fracture at 2.3%, which alone are not an indication for biphosphonate treatment. However, development of a vertebral compression fracture and treatment with aromatase inhibitor increases likelihood of progression. Evaluated by Dr. Bills Given and received first dose of Reclast on 3/29/2021. Continue calcium and Vitamin D. Encouraged to continue exercises. Repeat bone density scan performed in 11/2021 showing stability without statistically significant change with FRAX scores calculated at 14% / 2.2%. Reports scheduled for Reclast infusion in 4/2022.    6. History of recurrent diverticulitis. Colonoscopy (2/2016) with moderately severe diverticulosis in the ascending and descending colon. Has had multiple recent episodes of abdominal pain which were treated empirically with Cipro. Episode of symptoms in 3/2018 resolved prior to initiating antibiotics, bringing into question whether her abdominal symptoms were due to diverticulitis or irritable bowel syndrome. Had recurrent episode in 7/2018, and abdominal CT scan confirmed diagnosis of acute diverticulitis. Treated with Augmentin as developed rash on Flagyl. Subsequently evaluated by Dr. Jocelynn Bob and instructed to continue Probiotics and use hyoscyamine as needed when develop abdominal complaints as concerned that recurrent symptoms related to IBS. Reports no recurrence since last visit. Will continue to follow. 7. Lumbar radiculopathy. Known lumbar stenosis and facet arthropathy. Using meloxicam, cyclobenzaprine, gabapentin, and Tylenol. Previously underwent L4-L5 epidural injection on the right by Dr. Darnella Curling in 2016 for similar symptoms. Recent recurrence of symptoms without sustained improvement despite treatment with steroids, NSAIDS, muscle relaxants and gabapentin.  Underwent repeat lumbar MRI in 5/2020 which showed very advanced multilevel DDD and DJD with chronic compression fractures and worsening since 2016, especially at L4/5. Underwent left L4/5 and right L5-S1 KAREN in 6/2020 and left S1 TF KAREN in 8/2020 by Dr. Bob Villanueva with improvement. However, continuing to have persistent difficulty with left-sided buttock pain and underwent left hip MRI (11/6/2021) which showed mild bilateral hip osteoarthritis (L>R), and asymmetric atrophy of the proximal left tensor fascia will. Reports received a ultrasound-guided steroid injection for left trochanteric bursitis on 11/10/2021, which provided some relief transiently. Subsequently underwent KAREN at left L4/5 and L5/S1 on 12/13/2021 by Dr. Bob Villanueva with some improvement. Currently receiving physical therapy and reports significant improvement in pain in bilateral hips and lower back. Will continue to follow. 8. Tourette's syndrome. Followed by Dr. No Jerez. On Haldol with plans to switch to Risperdal if Haldol becomes unavailable. Current dose of 2 mg twice daily very effective in controlling symptoms. Will continue to monitor. 9. Right ovarian cyst. No further monitoring needed as per Dr. Chris Cadr. Surveillance CT scans obtained every 6 months for breast cancer includes imaging of pelvis and continue to show cyst to be stable. 10. GERD. Using Protonix daily with good control. Underwent upper endoscopy (8/11/2021) by Dr. Cecily Chatman to evaluate iron deficiency and noted to have normal stomach and duodenum, abnormal motility of the esophagus and a small hiatal hernia. 11. Insomnia. Using melatonin nightly. Will use temazepam alternating with diazepam intermittently when melatonin not effective. Benzodiazepines prescribed by Dr. No Jerez. 12. Obesity. Weight remaining overall stable with BMI 41. Emphasized lifestyle modifications including attempts at regular exercise and dietary changes. Will continue to monitor. 13. Health maintenance.  Completed Moderna COVID 19 vaccine series and received third Moderna dose given immunosuppression. Will be due for Moderna booster dose in 5/2022. Has not yet received Shingrix vaccine and remains hesitant to do so. Other immunizations up to date. Mammogram up-to-date. Vitamin D level has been normal on maintenance dose supplement. Continue regular eye exams with Dr. Bert Trujillo. Completed cataract surgery in 6/4072 without complications. Medicare wellness visit up to date. Patient understands recommendations and agrees with plan. Follow-up in 4 weeks. This visit required high complexity medically necessary decision making and management plans. Time spent in preparing for the visit, including review of history, tests done prior to arrival, additional time reviewing clinical data, imaging, outside records and test results:  5  minutes. Time spent in counseling with patient and/or family members regarding care plan: 40 minutes. Time spent in ordering tests, treatments, and referring patient for further care: 5 minutes. Time spent on visit does not include time for documentation.

## 2022-03-15 DIAGNOSIS — F95.2 TOURETTE SYNDROME: ICD-10-CM

## 2022-03-15 DIAGNOSIS — F41.9 ANXIETY: ICD-10-CM

## 2022-03-15 RX ORDER — DIAZEPAM 5 MG/1
5 TABLET ORAL
Qty: 30 TABLET | Refills: 0 | Status: SHIPPED | OUTPATIENT
Start: 2022-03-15

## 2022-03-15 NOTE — TELEPHONE ENCOUNTER
[facility] last [facility] date for Valium as 9/29/21 for a 8 d/s. Last Visit: 3/3/22 with MD Katiuska Dewitt  Next Appointment: 3/30/22 with MD Melgar  Previous Refill Encounter(s): 9/29/21 #30    Requested Prescriptions     Pending Prescriptions Disp Refills    diazePAM (Valium) 5 mg tablet 30 Tablet 0     Sig: Take 1 Tablet by mouth every six (6) hours as needed for Anxiety. Max Daily Amount: 20 mg.

## 2022-03-16 ENCOUNTER — PATIENT MESSAGE (OUTPATIENT)
Dept: INTERNAL MEDICINE CLINIC | Age: 77
End: 2022-03-16

## 2022-03-17 NOTE — TELEPHONE ENCOUNTER
Called and spoke with patient. Reports having persistent fatigue despite holding Ibrance for 5 weeks. Unclear if fatigue is secondary to treatment with aromatase inhibitor, letrozole, and will be discussing at her follow-up visit with Dr. Hali Stewart in 2 weeks whether this should be changed. Discussed that echocardiogram is being obtained to assess any progression of aortic insufficiency. Scheduled for April 11. Patient discusses that she does not feel that she is depressed so has not started Zoloft. Discussed that if she plans to initiate, side effects would normally occur in the first few days of treatment. Has follow-up appointment on 3/30/2022 and will readdress at that time.

## 2022-03-18 PROBLEM — I35.1 AORTIC VALVE REGURGITATION: Status: ACTIVE | Noted: 2022-03-04

## 2022-03-19 PROBLEM — M54.31 RIGHT SIDED SCIATICA: Status: ACTIVE | Noted: 2020-05-22

## 2022-03-19 PROBLEM — F32.9 REACTIVE DEPRESSION: Status: ACTIVE | Noted: 2022-03-04

## 2022-03-19 PROBLEM — M25.511 RIGHT SHOULDER PAIN: Status: ACTIVE | Noted: 2017-11-04

## 2022-03-19 PROBLEM — I67.1 ANEURYSM OF INTRACRANIAL PORTION OF INTERNAL CAROTID ARTERY: Status: ACTIVE | Noted: 2021-06-06

## 2022-03-19 PROBLEM — R06.09 DYSPNEA ON EXERTION: Status: ACTIVE | Noted: 2022-03-04

## 2022-03-19 PROBLEM — M75.111 NONTRAUMATIC INCOMPLETE TEAR OF RIGHT ROTATOR CUFF: Status: ACTIVE | Noted: 2018-11-10

## 2022-03-19 PROBLEM — M48.02 SPINAL STENOSIS OF CERVICAL REGION: Status: ACTIVE | Noted: 2017-11-04

## 2022-03-20 PROBLEM — M51.369 DDD (DEGENERATIVE DISC DISEASE), LUMBAR: Status: ACTIVE | Noted: 2020-11-27

## 2022-03-20 PROBLEM — Z79.899 IMMUNOSUPPRESSED DUE TO CHEMOTHERAPY (HCC): Status: ACTIVE | Noted: 2021-04-09

## 2022-03-20 PROBLEM — M51.36 DDD (DEGENERATIVE DISC DISEASE), LUMBAR: Status: ACTIVE | Noted: 2020-11-27

## 2022-03-20 PROBLEM — C78.00 MALIGNANT NEOPLASM METASTATIC TO LUNG (HCC): Status: ACTIVE | Noted: 2018-11-10

## 2022-03-20 PROBLEM — Z79.69 IMMUNOSUPPRESSED DUE TO CHEMOTHERAPY: Status: ACTIVE | Noted: 2021-04-09

## 2022-03-20 PROBLEM — M47.816 LUMBAR FACET ARTHROPATHY: Status: ACTIVE | Noted: 2020-11-27

## 2022-03-20 PROBLEM — T45.1X5A IMMUNOSUPPRESSED DUE TO CHEMOTHERAPY (HCC): Status: ACTIVE | Noted: 2021-04-09

## 2022-03-20 PROBLEM — M47.812 DEGENERATIVE JOINT DISEASE OF CERVICAL SPINE: Status: ACTIVE | Noted: 2017-11-04

## 2022-03-20 PROBLEM — D84.821 IMMUNOSUPPRESSED DUE TO CHEMOTHERAPY (HCC): Status: ACTIVE | Noted: 2021-04-09

## 2022-03-30 ENCOUNTER — OFFICE VISIT (OUTPATIENT)
Dept: INTERNAL MEDICINE CLINIC | Age: 77
End: 2022-03-30
Payer: MEDICARE

## 2022-03-30 VITALS
HEIGHT: 59 IN | HEART RATE: 79 BPM | WEIGHT: 142 LBS | DIASTOLIC BLOOD PRESSURE: 76 MMHG | TEMPERATURE: 97.3 F | OXYGEN SATURATION: 96 % | SYSTOLIC BLOOD PRESSURE: 138 MMHG | RESPIRATION RATE: 16 BRPM | BODY MASS INDEX: 28.63 KG/M2

## 2022-03-30 DIAGNOSIS — T45.1X5A IMMUNOSUPPRESSED DUE TO CHEMOTHERAPY (HCC): ICD-10-CM

## 2022-03-30 DIAGNOSIS — M48.062 SPINAL STENOSIS OF LUMBAR REGION WITH NEUROGENIC CLAUDICATION: ICD-10-CM

## 2022-03-30 DIAGNOSIS — Z17.0 MALIGNANT NEOPLASM OF LEFT BREAST IN FEMALE, ESTROGEN RECEPTOR POSITIVE, UNSPECIFIED SITE OF BREAST (HCC): ICD-10-CM

## 2022-03-30 DIAGNOSIS — I35.1 AORTIC VALVE INSUFFICIENCY, ETIOLOGY OF CARDIAC VALVE DISEASE UNSPECIFIED: ICD-10-CM

## 2022-03-30 DIAGNOSIS — I10 ESSENTIAL HYPERTENSION: ICD-10-CM

## 2022-03-30 DIAGNOSIS — C78.00 MALIGNANT NEOPLASM METASTATIC TO LUNG, UNSPECIFIED LATERALITY (HCC): ICD-10-CM

## 2022-03-30 DIAGNOSIS — G47.00 INSOMNIA, UNSPECIFIED TYPE: ICD-10-CM

## 2022-03-30 DIAGNOSIS — D84.821 IMMUNOSUPPRESSED DUE TO CHEMOTHERAPY (HCC): ICD-10-CM

## 2022-03-30 DIAGNOSIS — M85.89 OSTEOPENIA OF MULTIPLE SITES: ICD-10-CM

## 2022-03-30 DIAGNOSIS — R06.09 DYSPNEA ON EXERTION: ICD-10-CM

## 2022-03-30 DIAGNOSIS — Z79.899 IMMUNOSUPPRESSED DUE TO CHEMOTHERAPY (HCC): ICD-10-CM

## 2022-03-30 DIAGNOSIS — R53.83 FATIGUE, UNSPECIFIED TYPE: Primary | ICD-10-CM

## 2022-03-30 DIAGNOSIS — C50.912 MALIGNANT NEOPLASM OF LEFT BREAST IN FEMALE, ESTROGEN RECEPTOR POSITIVE, UNSPECIFIED SITE OF BREAST (HCC): ICD-10-CM

## 2022-03-30 PROCEDURE — G8536 NO DOC ELDER MAL SCRN: HCPCS | Performed by: INTERNAL MEDICINE

## 2022-03-30 PROCEDURE — G9717 DOC PT DX DEP/BP F/U NT REQ: HCPCS | Performed by: INTERNAL MEDICINE

## 2022-03-30 PROCEDURE — 1101F PT FALLS ASSESS-DOCD LE1/YR: CPT | Performed by: INTERNAL MEDICINE

## 2022-03-30 PROCEDURE — G0463 HOSPITAL OUTPT CLINIC VISIT: HCPCS | Performed by: INTERNAL MEDICINE

## 2022-03-30 PROCEDURE — G8417 CALC BMI ABV UP PARAM F/U: HCPCS | Performed by: INTERNAL MEDICINE

## 2022-03-30 PROCEDURE — 1090F PRES/ABSN URINE INCON ASSESS: CPT | Performed by: INTERNAL MEDICINE

## 2022-03-30 PROCEDURE — G8399 PT W/DXA RESULTS DOCUMENT: HCPCS | Performed by: INTERNAL MEDICINE

## 2022-03-30 PROCEDURE — G8752 SYS BP LESS 140: HCPCS | Performed by: INTERNAL MEDICINE

## 2022-03-30 PROCEDURE — G8427 DOCREV CUR MEDS BY ELIG CLIN: HCPCS | Performed by: INTERNAL MEDICINE

## 2022-03-30 PROCEDURE — G9899 SCRN MAM PERF RSLTS DOC: HCPCS | Performed by: INTERNAL MEDICINE

## 2022-03-30 PROCEDURE — 3017F COLORECTAL CA SCREEN DOC REV: CPT | Performed by: INTERNAL MEDICINE

## 2022-03-30 PROCEDURE — G8754 DIAS BP LESS 90: HCPCS | Performed by: INTERNAL MEDICINE

## 2022-03-30 PROCEDURE — 99214 OFFICE O/P EST MOD 30 MIN: CPT | Performed by: INTERNAL MEDICINE

## 2022-03-30 RX ORDER — GABAPENTIN 300 MG/1
300 CAPSULE ORAL
Qty: 1 CAPSULE | Refills: 0 | Status: CANCELLED | OUTPATIENT
Start: 2022-03-30

## 2022-03-30 NOTE — PATIENT INSTRUCTIONS
Heart-Healthy Diet: Care Instructions  Your Care Instructions     A heart-healthy diet has lots of vegetables, fruits, nuts, beans, and whole grains, and is low in salt. It limits foods that are high in saturated fat, such as meats, cheeses, and fried foods. It may be hard to change your diet, but even small changes can lower your risk of heart attack and heart disease. Follow-up care is a key part of your treatment and safety. Be sure to make and go to all appointments, and call your doctor if you are having problems. It's also a good idea to know your test results and keep a list of the medicines you take. How can you care for yourself at home? Watch your portions  · Learn what a serving is. A \"serving\" and a \"portion\" are not always the same thing. Make sure that you are not eating larger portions than are recommended. For example, a serving of pasta is ½ cup. A serving size of meat is 2 to 3 ounces. A 3-ounce serving is about the size of a deck of cards. Measure serving sizes until you are good at Dukes" them. Keep in mind that restaurants often serve portions that are 2 or 3 times the size of one serving. · To keep your energy level up and keep you from feeling hungry, eat often but in smaller portions. · Eat only the number of calories you need to stay at a healthy weight. If you need to lose weight, eat fewer calories than your body burns (through exercise and other physical activity). Eat more fruits and vegetables  · Eat a variety of fruit and vegetables every day. Dark green, deep orange, red, or yellow fruits and vegetables are especially good for you. Examples include spinach, carrots, peaches, and berries. · Keep carrots, celery, and other veggies handy for snacks. Buy fruit that is in season and store it where you can see it so that you will be tempted to eat it. · Cook dishes that have a lot of veggies in them, such as stir-fries and soups.   Limit saturated and trans fat  · Read food labels, and try to avoid saturated and trans fats. They increase your risk of heart disease. · Use olive or canola oil when you cook. · Bake, broil, grill, or steam foods instead of frying them. · Choose lean meats instead of high-fat meats such as hot dogs and sausages. Cut off all visible fat when you prepare meat. · Eat fish, skinless poultry, and meat alternatives such as soy products instead of high-fat meats. Soy products, such as tofu, may be especially good for your heart. · Choose low-fat or fat-free milk and dairy products. Eat foods high in fiber  · Eat a variety of grain products every day. Include whole-grain foods that have lots of fiber and nutrients. Examples of whole-grain foods include oats, whole wheat bread, and brown rice. · Buy whole-grain breads and cereals, instead of white bread or pastries. Limit salt and sodium  · Limit how much salt and sodium you eat to help lower your blood pressure. · Taste food before you salt it. Add only a little salt when you think you need it. With time, your taste buds will adjust to less salt. · Eat fewer snack items, fast foods, and other high-salt, processed foods. Check food labels for the amount of sodium in packaged foods. · Choose low-sodium versions of canned goods (such as soups, vegetables, and beans). Limit sugar  · Limit drinks and foods with added sugar. These include candy, desserts, and soda pop. Limit alcohol  · Limit alcohol to no more than 2 drinks a day for men and 1 drink a day for women. Too much alcohol can cause health problems. When should you call for help? Watch closely for changes in your health, and be sure to contact your doctor if:    · You would like help planning heart-healthy meals. Where can you learn more? Go to http://www.G[employer].com/  Enter V137 in the search box to learn more about \"Heart-Healthy Diet: Care Instructions. \"  Current as of: August 22, 2019               Content Version: 12.6  © 9166-1727 [employer]. Care instructions adapted under license by [employer] (which disclaims liability or warranty for this information). If you have questions about a medical condition or this instruction, always ask your healthcare professional. [address] any warranty or liability for your use of this information. Learning About Low-Sodium Foods  What foods are low in sodium? The foods you eat contain nutrients, such as vitamins and minerals. Sodium is a nutrient. Your body needs the right amount to stay healthy and work as it should. You can use the list below to help you make choices about which foods to eat. Fruits  · Fresh, frozen, canned, or dried fruit  Vegetables  · Fresh or frozen vegetables, with no added salt  · Canned vegetables, low-sodium or with no added salt  Grains  · Bagels without salted tops  · Cereal with no added salt  · Corn tortillas  · Crackers with no added salt  · Oatmeal, cooked without salt  · Popcorn with no salt  · Pasta and noodles, cooked without salt  · Rice, cooked without salt  · Unsalted pretzels  Dairy and dairy alternatives  · Butter, unsalted  · Cream cheese  · Ice cream  · Milk  · Soy milk  Meats and other protein foods  · Beans and peas, canned with no salt  · Eggs  · Fresh fish (not smoked)  · Fresh meats (not smoked or cured)  · Nuts and nut butter, prepared without salt  · Poultry, not packaged with sodium solution  · Tofu, unseasoned  · Tuna, canned without salt  Seasonings  · Garlic  · Herbs and spices  · Lemon juice  · Mustard  · Olive oil  · Salt-free seasoning mixes  · Vinegar  Work with your doctor to find out how much of this nutrient you need. Depending on your health, you may need more or less of it in your diet. Where can you learn more?   Go to http://www.gray.com/  Enter S460 in the search box to learn more about \"Learning About Low-Sodium Foods.\"  Current as of: August 22, 2019               Content Version: 12.6  © 0178-5866 [employer]. Care instructions adapted under license by [employer] (which disclaims liability or warranty for this information). If you have questions about a medical condition or this instruction, always ask your healthcare professional. [address] any warranty or liability for your use of this information. Low Sodium Diet (2,000 Milligram): Care Instructions  Your Care Instructions     Too much sodium causes your body to hold on to extra water. This can raise your blood pressure and force your heart and kidneys to work harder. In very serious cases, this could cause you to be put in the hospital. It might even be life-threatening. By limiting sodium, you will feel better and lower your risk of serious problems. The most common source of sodium is salt. People get most of the salt in their diet from canned, prepared, and packaged foods. Fast food and restaurant meals also are very high in sodium. Your doctor will probably limit your sodium to less than 2,000 milligrams (mg) a day. This limit counts all the sodium in prepared and packaged foods and any salt you add to your food. Follow-up care is a key part of your treatment and safety. Be sure to make and go to all appointments, and call your doctor if you are having problems. It's also a good idea to know your test results and keep a list of the medicines you take. How can you care for yourself at home? Read food labels  · Read labels on cans and food packages. The labels tell you how much sodium is in each serving. Make sure that you look at the serving size. If you eat more than the serving size, you have eaten more sodium. · Food labels also tell you the Percent Daily Value for sodium. Choose products with low Percent Daily Values for sodium.   · Be aware that sodium can come in forms other than salt, including monosodium glutamate (MSG), sodium citrate, and sodium bicarbonate (baking soda). MSG is often added to Asian food. When you eat out, you can sometimes ask for food without MSG or added salt. Buy low-sodium foods  · Buy foods that are labeled \"unsalted\" (no salt added), \"sodium-free\" (less than 5 mg of sodium per serving), or \"low-sodium\" (less than 140 mg of sodium per serving). Foods labeled \"reduced-sodium\" and \"light sodium\" may still have too much sodium. Be sure to read the label to see how much sodium you are getting. · Buy fresh vegetables, or frozen vegetables without added sauces. Buy low-sodium versions of canned vegetables, soups, and other canned goods. Prepare low-sodium meals  · Cut back on the amount of salt you use in cooking. This will help you adjust to the taste. Do not add salt after cooking. One teaspoon of salt has about 2,300 mg of sodium. · Take the salt shaker off the table. · Flavor your food with garlic, lemon juice, onion, vinegar, herbs, and spices. Do not use soy sauce, lite soy sauce, steak sauce, onion salt, garlic salt, celery salt, mustard, or ketchup on your food. · Use low-sodium salad dressings, sauces, and ketchup. Or make your own salad dressings and sauces without adding salt. · Use less salt (or none) when recipes call for it. You can often use half the salt a recipe calls for without losing flavor. Other foods such as rice, pasta, and grains do not need added salt. · Rinse canned vegetables, and cook them in fresh water. This removes somebut not allof the salt. · Avoid water that is naturally high in sodium or that has been treated with water softeners, which add sodium. Call your local water company to find out the sodium content of your water supply. If you buy bottled water, read the label and choose a sodium-free brand. Avoid high-sodium foods  · Avoid eating:  ? Smoked, cured, salted, and canned meat, fish, and poultry.   ? Ham, robins, hot dogs, and luncheon meats.  ? Regular, hard, and processed cheese and regular peanut butter. ? Crackers with salted tops, and other salted snack foods such as pretzels, chips, and salted popcorn. ? Frozen prepared meals, unless labeled low-sodium. ? Canned and dried soups, broths, and bouillon, unless labeled sodium-free or low-sodium. ? Canned vegetables, unless labeled sodium-free or low-sodium. ? [place] fries, pizza, tacos, and other fast foods. ? Pickles, olives, ketchup, and other condiments, especially soy sauce, unless labeled sodium-free or low-sodium. Where can you learn more? Go to http://www.gray.com/  Enter V843 in the search box to learn more about \"Low Sodium Diet (2,000 Milligram): Care Instructions. \"  Current as of: August 22, 2019               Content Version: 12.6  © 0952-5296 [employer]. Care instructions adapted under license by [employer] (which disclaims liability or warranty for this information). If you have questions about a medical condition or this instruction, always ask your healthcare professional. [place] [address] any warranty or liability for your use of this information.

## 2022-03-30 NOTE — PROGRESS NOTES
1. \"Have you been to the ER, urgent care clinic since your last visit? Hospitalized since your last visit? \" No    2. \"Have you seen or consulted any other health care providers outside of the [address] [place] [address] since your last visit? \" No     3. For patients aged 39-70: Has the patient had a colonoscopy / FIT/ Cologuard? Yes - no Care Gap present      If the patient is female:    4. For patients aged 41-77: Has the patient had a mammogram within the past 2 years? Yes - no Care Gap present      5. For patients aged 21-65: Has the patient had a pap smear?  NA - based on age or sex

## 2022-04-04 NOTE — PROGRESS NOTES
HPI:  Cady Marcelo is a 78y.o. year old female who presents today for a follow-up visit. She has a history of hypertension, dyslipidemia,  metastatic breast cancer, GERD, diverticulosis with recurrent diverticulitis, osteoarthritis s/p right knee replacement (2012), lumbar degenerative disease, osteopenia, compression fracture, and Tourette's syndrome. She is accompanied by her . She reports that she completed the Moderna COVID-19 vaccine series and received a third Moderna dose due to immunosuppression. She reports that she is doing reasonably well. She was last seen for an acute visit on 3/3/2022 for evaluation of worsening fatigue. She was evaluated in 11/2020 when she described dyspnea in association with fatigue when performing exertional activities. EKG (11/24/2020) was unremarkable, chest x-ray (11/24/2020) was negative, and echocardiogram (12/1/2020) showed normal LV function (EF 55-60%), trileaflet AV with moderate AI, and PAP 30 mmHg. She had repeat staging CT scans in 6/2021 and 12/2021 without evidence of recurrence or metastases. Iron studies performed by Dr. Katiana Holguin showed a ferritin level of 13 although no evidence of anemia. She underwent upper endoscopy and colonoscopy by Dr. Jyotsna Painting (8/11/2021) without evidence of source of iron loss. At her last visit, she reported that her fatigue had significantly progressed, describing \"waves of severe fatigue and shortness of breath with minimal exertion\". She stated that she had no energy to go out with friends and has been having difficulty with her normal ADL activities at home due to fatigue. She underwent a repeat lab evaluation by oncology, which showed normal CBC, CMP, ferritin, and iron studies. She reported that decision was made by Dr. Katiana Holguin to hold [facility] for a total of 7 weeks before reinitiating to see if may help with symptoms. She continues on letrozole.   She was also scheduled for a repeat echocardiogram on 4/11/2022. She presents today for follow-up and reports that she has noted some improvement since Viveca Moulding has been held. She does continue to have periods of fatigue, but overall feels that she has improved. She has a follow-up visit with Dr. Mary Arias on 4/1/2022 and is planning to restart Ibrance at that time. However, consideration will be made regarding changing her aromatase inhibitor therapy from letrozole to another agent. She also reports some worsening left leg weakness despite physical therapy and has begun using a cane when ambulating. She has scheduled a follow-up visit with RAMESH Canales at [place]. She is otherwise without new complaints. Summary of prior hospitalizations and medical history:  She has a history of left breast cancer, which was diagnosed in 7/2010 as invasive ductal adenocarcinoma, s/p left modified radical mastectomy with sentinel node biopsy, performed by Dr. Silvia Merritt. She had 3 positive lymph nodes and was classified as stage 1B, T1c N1 M0, ER and MD positive, Her-2/sarah negative by FISH. She underwent 6 cycles of chemotherapy with Docetaxel and Cytoxan. She was subsequently unable to tolerate anastrozole, exemestane, tamoxifen, and letrozole due to profound fatigue and arthralgias. Her course was complicated by chronic left arm lymphedema, managed with a compression sleeve. A chest CT scan in 3/2014 noted several small pulmonary nodules which were concerning for metastases but were too small to biopsy. They were followed with sequential CT scans , and in 12/2014, repeat chest CT scan showed enlarging bilateral pulmonary nodules compatible with progression of metastatic disease. A fine needle aspirate was performed on 12/21/2014 which showed benign pulmonary parenchyma with alveolar hemorrhage. Repeat chest CT scan in 3/12/2015 showed mild interval progression of the lung metastases with enlarging nodules and development of new nodules.  A CT guided needle biopsy was performed on 3/23/2015, which confirmed metastatic adenocarcinoma consistent with breast primary (stage IV, ER/IL positive, and TTF-1 negative). On 4/13/2015, she was started on therapy with Ibrance and letrozole. Follow-up CT scan (7/15/15) showed partial response with decreasing size of some nodules and resolution of other nodules. Most recent chest, abdomen, and pelvis CT scan was obtained in 6/2017, showing stable or decreased multiple bilateral pulmonary nodules and no suspicious new pulmonary nodules. She continues on palbociclib, but was changed from letrozole to fulvestrant. She describes persistent fatigue which she attributes to monthly treatment with fulvestrant (Faslodex). She underwent restaging chest, abdomen, and pelvis CT scan in 7/2019 which showed that her pulmonary nodules were unchanged, and no other evidence of metastatic disease. Repeat staging CT chest/ abdomen/ pelvis in 1/2021 showed no evidence of metastasis or adenopathy. She is followed by Dr. Jairo Costello. She states that she established care with Dr. Madalyn Bolivar, but was told that she may have her mammograms and breast exams in our office with referral to her if something arises. She also has a history of a right ovarian cyst, but was released from the care of Dr. Pedro Pablo Hernandez since it was concluded to be benign. She has a history of hypertension treated with losartan. She monitors her blood pressure mainly when visiting multiple physicians and reports that it is well controlled. She has no history of heart disease, and denies any chest pain, shortness of breath at rest or with exertion, palpitations, lightheadedness, or edema.     In 4/11/2016, she sustained a fall with subsequent severe pain in her mid to upper lumbar region and wrapping around waist. She was treated with meloxicam, tylenol and flexeril, but because of persistent discomfort, she presented to [facility] on 4/15/2016 and lumbosacral spine films showed marked degenerative changes with disc space obliteration throughout lumbar spine and slight anterior spondylolisthesis of L4. She was evaluated by Dr. Antoni Berg on 4/18/2016 who recommended physical therapy and evaluation by Dr. Igor Lundy for her degenerative spine changes. She continued to take meloxicam and tylenol and started physical therapy, but noted worsening of her pain. She subsequently was evaluated by Dr. Rishi Scruggs, who obtained a lumbar MRI on 4/26/2016, which showed a new subacute compression fracture of T11 vertebral body with diffuse marrow edema and mild paravertebral inflammatory stranding, no retropulsion or central stenosis; more severe degenerative disease along the right side at L4-L5, L5-S1, potential mild compression of right exiting L4 and L5 nerve roots. She was referred to Dr. Kelsie Chang for kyphoplasty of the T11 compression fracture given failure of pain control with conservative measures. She underwent the procedure on 2/3/3585 without complications, and O44 vertebral body bone core and aspirate biopsies were performed, which showed only reactive bone changes and no evidence of malignancy. She has a history of osteopenia, with a history of fracture of her sacrum. She reports that she was treated with alendronate in 12/2011 but discontinued it in 4/2013 due to intolerence. Bone density scan (11/2017) was consistent with osteopenia with femoral neck T-scores: left -1.4/ right -1.4 and distal radius -2.2 (lumbar T-score could not be assessed). A repeat bone density scan was obtained (11/2019) showing continued evidence of osteopenia with femoral neck T-scores: left -1.4/ right -1.4 and distal radius -2.4 (lumbar T-score could not be assessed). She was referred to Dr. Barrie Carver given her multiple compression fractures and treatment with letrozole, and was started on Reclast in 3/2021. She continues to take calcium and vitamin D.      In 10/2016, she developed left sided low back pain with radiation to her left leg. She underwent a lumbar MRI (9/2016) showing scoliosis and advanced multilevel degenerative changes with areas of foraminal stenosis at L3-L4 and L5-S1; mild/moderate central canal stenosis at L4-L5 and L5-S1. She was evaluated by Dr. Paul Pettit and treated with pregabalin with some improvement. She subsequently received an epidural steroid injection with improvement and discontinued pregabalin. She reported worsening low back pain and right sciatica, and underwent an epidural steroid injection at right L4-5 transforaminal approach by Dr. Paul Pettit on 3/18/2019. She has noted improvement in her right hip and leg pain. She underwent a lumbar MRI (6/2020) for worsening right sciatica, showing very advanced multilevel DDD and DJD with extensive bone marrow edema reaction to DDD; chronic compression fractures; single level of degenerative central spinal stenosis, moderate, at L4/L5, worsened compared 2016; multilevel degenerative foraminal narrowing worst right L4/L5, bilateral L5/S1; no evidence of metastatic breast cancer. She underwent left L4/5 and right L5-S1 KAREN in 6/2020 and Left S1 TF KAREN in 8/2020 by Dr. Paul Pettit with improvement. She reports continuing to have persistent difficulty with left-sided buttock and pelvic pain, and she underwent left hip MRI (11/6/2021) which showed mild bilateral hip osteoarthritis (L>R), and asymmetric atrophy of the proximal left tensor fascia will. She reports that she received a ultrasound-guided steroid injection for left trochanteric bursitis on 11/10/2021, which provided some relief transiently, but pain has since recurred. She underwent an KAREN left L4/5 and L5/S1 on 12/13/2021 by Dr. Paul Pettit with some improvement. She subsequently underwent physical therapy with significant benefit. In 4/2017, she noted increasing cervical and upper back discomfort.  She has had multiple cervical MRI's,and underwent repeat in 4/2017 which showed multilevel degenerative disc disease and facet arthropathy without change from prior studies with mild central canal stenosis C3-C4 and C5-C6, and moderate central canal stenosis C6-C7. She was treated with Mobic and Flexeril, and gabapentin at bedtime. She complained of right shoulder pain. X-ray of her shoulder (11/2017) was negative, and she had a right shoulder MRI (5/14/2018) which showed deep partial thickness undersurface tear of the distal supraspinatus, likely with full thickness slitlike perforations; no gross tendon retraction, but there is moderate muscle atrophy; partial thickness tearing with longitudinal components involving the biceps tendon at its sulcal turn, some associated tenosynovitis; accompanying short length longitudinal split tear in the distal subscapularis; moderate infraspinatus tendinosis, and mild to moderate subacromial subdeltoid bursitis. She underwent evaluation by Dr. Frank Peres on 5/18/2018, and received a glenohumeral cortisone injection. She has a history of GERD and diverticulosis with recurrent diverticulitis,. She was evaluated by Dr. Olu Stanley in 2/23/2016 and underwent an upper endoscopy, which revealed a Schatzki's ring at the gastroesophageal junction (dilated) with mild distal esophagitis and a small hiatal hernia. A screening colonoscopy was also performed showing moderately severe diverticulosis of the ascending and descending colon and a 2 mm sessile sigmoid polyp (pathology: hyperplastic). Recommendations for follow-up colonoscopy in 10 years. She denies any abdominal pain, nausea, vomiting, melena, hematochezia, or change in bowel movements. She was evaluated in 8/2017 by GERALD Jenkins with complaints of abdominal pain and was treated with Cipro for presumed diverticulitis. She contacted the office again in 10/2017 and 1/2018 with a recurrence of symptoms, and was treated for a 10 day course with Cipro with improvement.  Abdominal CT scan in 2/6/2018 for staging for her breast cancer did show evidence of moderate to severe diverticulosis, but no evidence of diverticulitis. She did present with similar symptoms in 3/2018 and was evaluated by GERALD Vigil and prescribed Cipro. However, her symptoms resolved prior to taking the antibiotics. In 7/2018, she again presented with left lower quadrant pain, and an abdominal CT scan (7/13/2018) which showed evidence of uncomplicated diverticulitis involving the lower left colon and upper sigmoid. She was treated with Cipro and Flagyl initially, but developed a rash due to Flagyl, and was subsequently changed to Augmentin. She had a follow-up visit with Dr. Sarah Norman and it was his opinion that she also had IBS which mimicked her episodes of diverticulitis. He recommended continuing on a probiotic and prescribed hyoscyamine to use as needed. She has noted improvement since doing so. She underwent evaluation for iron deficiency when she was found to have a ferritin of 13 in 6/2021 which was performed to evaluate her fatigue. On 8/11/2021 she underwent upper endoscopy and colonoscopy by Dr. Sarah Norman. Upper endoscopy showed normal stomach and duodenum, abnormal motility of the esophagus, and a small hiatal hernia. Colonoscopy showed increased vascularity throughout the colon concerning for microscopic colitis, but pathology of multiple random biopsies was negative. No source for iron loss was identified. She also has a history of Tourette's syndrome controlled with Haldol. She is followed by Dr. Christopher Durand.        Past Medical History:   Diagnosis Date    Arthritis     Breast cancer metastasized to lung [facility])     Left Breast    Chronic pain     Colon polyps 2/22/16    distal sigmoid, Dr. Lisa HERNANDEZ (degenerative disc disease)     Diverticulitis of colon     Diverticulosis 2/22/16    mild in the ascedning and sigmoid colon, Dr. Yanni Carmen (hyperlipidemia)     Hypertension     Osteopenia     Reactive depression 3/4/2022    Tourette syndrome     Vitamin D deficiency      Past Surgical History:   Procedure Laterality Date    COLONOSCOPY N/A 8/11/2021    COLONOSCOPY with Bx performed by Arcelia Rosario MD at [address] HX APPENDECTOMY      HX BREAST BIOPSY  7-30-10    Left Breast w/Nipple Duct exploration and excisional biopsy-left    HX DILATION AND CURETTAGE      x3    HX MODIFIED RADICAL MASTECTOMY Left 09/03/2010    HX ORTHOPAEDIC Right 2013    knee replacement    HX TONSILLECTOMY      HYSTEROSCOPY DIAGNOSTIC       Current Outpatient Medications   Medication Sig    diazePAM (Valium) 5 mg tablet Take 1 Tablet by mouth every six (6) hours as needed for Anxiety. Max Daily Amount: 20 mg.    gabapentin (NEURONTIN) 300 mg capsule Take 1 Capsule by mouth nightly. Max Daily Amount: 300 mg.  sertraline (ZOLOFT) 25 mg tablet Take 1 Tablet by mouth daily.  meloxicam (MOBIC) 15 mg tablet Take 1 Tablet by mouth daily. Take with food    haloperidoL (HALDOL) 2 mg tablet Take 1 Tablet by mouth two (2) times a day.  losartan (COZAAR) 25 mg tablet Take 1 Tablet by mouth daily.  ondansetron hcl (Zofran) 8 mg tablet Take 1 Tablet by mouth every eight (8) hours as needed for Nausea or Vomiting. Take one tablet by mouth every 8 hours as needed.  temazepam (RESTORIL) 15 mg capsule Take 1 Capsule by mouth nightly as needed for Sleep. Max Daily Amount: 15 mg.    acetaminophen (Tylenol Extra Strength) 500 mg tablet Take 500 mg by mouth every six (6) hours as needed for Pain. Take 1-2 tablets by mouth every 6 hours as needed.  aspirin delayed-release 81 mg tablet Take  by mouth daily.  letrozole (FEMARA) 2.5 mg tablet TAKE 1 TABLET BY MOUTH EVERY DAY    pantoprazole sodium (PROTONIX PO) Take 40 mg by mouth daily.  palbociclib 75 mg cap Take 75 mg by mouth daily. For days 1-21 of each 28 day cycle    Biotin 2,500 mcg cap Take  by mouth.     calcium-vitamin D (CALCIUM 500+D) 500 mg(1,250mg) -200 unit per tablet Take 1 Tab by mouth two (2) times daily (with meals).  cholecalciferol, vitamin d3, (VITAMIN D) 1,000 unit tablet Take 4,000 Units by mouth daily. No current facility-administered medications for this visit. Allergies and Intolerances: Allergies   Allergen Reactions    Keflex [Cephalexin] Rash    Metronidazole Rash    Other Medication Nausea Only     Anesthesia    Shellfish Containing Products Nausea and Vomiting     More of just eating the actual shellfish.  Sulfa (Sulfonamide Antibiotics) Rash    Ultram [Tramadol] Nausea and Vomiting     Patient states she is allergic to most Narcotics    Vancomycin Rash     Family History:   Family History   Problem Relation Age of Onset    Osteoporosis Sister     Osteoporosis Mother     Stroke Father     Hypertension Father     Cancer Paternal Aunt         breast     Social History:   She  reports that she quit smoking about 16 years ago. Her smoking use included cigarettes. She has a 3.00 pack-year smoking history. She has never used smokeless tobacco. She stopped smoking over 40 years ago. She is  and lives with . They have one daughter and two grandchildren.     Social History     Substance and Sexual Activity   Alcohol Use Not Currently    Alcohol/week: 5.8 standard drinks    Types: 7 Glasses of wine per week     Immunization History:  Immunization History   Administered Date(s) Administered    COVID-19, Moderna, Primary or Immunocompromised Series, MRNA, PF, 100mcg/0.5mL 02/07/2021, 03/07/2021, 11/07/2021    Influenza High Dose Vaccine PF 10/01/2015, 10/25/2016, 10/05/2017    Influenza Vaccine 09/01/2014    Influenza Vaccine (Tri) Adjuvanted (>65 Yrs FLUAD TRI [id number]) 10/05/2018, 10/17/2019    Influenza Vaccine Split 11/01/2011, 10/02/2012    Influenza Vaccine Whole 10/08/2010    Influenza, Quadrivalent, Adjuvanted (>65 Yrs FLUAD QUAD [id number]) 09/23/2020, 10/15/2021    Pneumococcal Conjugate (PCV-13) 10/27/2015    Pneumococcal Polysaccharide (PPSV-23) 04/15/2013    TD Vaccine 05/05/2008    Tdap 09/12/2014    Zoster 09/20/2011       Review of Systems:   As above included in HPI. Otherwise 11 point review of systems negative including constitutional, skin, HENT, eyes, respiratory, cardiovascular, gastrointestinal, genitourinary, musculoskeletal, endo/heme/aller, neurological.    Physical:     Visit Vitals  /76 (BP 1 Location: Left arm, BP Patient Position: Sitting)   Pulse 79   Temp 97.3 °F (36.3 °C) (Temporal)   Resp 16   Ht 4' 11\" (1.499 m)   Wt 142 lb (64.4 kg)   SpO2 96%   BMI 28.68 kg/m²        Patient appears in no apparent distress. Affect is appropriate. HEENT: PERRLA, anicteric, no JVD, adenopathy or thyromegaly. No carotid bruits or radiated murmur. Lungs: clear to auscultation, no wheezes, rhonchi, or rales. Heart: regular rate and rhythm. No murmur, rubs, gallops  Abdomen: soft, nontender, nondistended, normal bowel sounds, no hepatosplenomegaly or masses. Extremities: without edema. Review of Data:  Labs:  No visits with results within 1 Month(s) from this visit.    Latest known visit with results is:   Hospital Outpatient Visit on 12/02/2021   Component Date Value Ref Range Status    LIPID PROFILE 12/02/2021        Final    Cholesterol, total 12/02/2021 203* <200 MG/DL Final    Triglyceride 12/02/2021 81  <150 MG/DL Final    HDL Cholesterol 12/02/2021 70* 40 - 60 MG/DL Final    LDL, calculated 12/02/2021 116.8* 0 - 100 MG/DL Final    VLDL, calculated 12/02/2021 16.2  MG/DL Final    CHOL/HDL Ratio 12/02/2021 2.9  0 - 5.0   Final    Sodium 12/02/2021 141  136 - 145 mmol/L Final    Potassium 12/02/2021 4.3  3.5 - 5.5 mmol/L Final    Chloride 12/02/2021 104  100 - 111 mmol/L Final    CO2 12/02/2021 30  21 - 32 mmol/L Final    Anion gap 12/02/2021 7  3.0 - 18 mmol/L Final    Glucose 12/02/2021 86  74 - 99 mg/dL Final    BUN 12/02/2021 16  7.0 - 18 MG/DL Final    Creatinine 12/02/2021 0.98  0.6 - 1.3 MG/DL Final    BUN/Creatinine ratio 12/02/2021 16  12 - 20   Final    GFR est AA 12/02/2021 >60  >60 ml/min/1.73m2 Final    GFR est non-AA 12/02/2021 55* >60 ml/min/1.73m2 Final    Calcium 12/02/2021 9.1  8.5 - 10.1 MG/DL Final    Bilirubin, total 12/02/2021 0.6  0.2 - 1.0 MG/DL Final    ALT (SGPT) 12/02/2021 23  13 - 56 U/L Final    AST (SGOT) 12/02/2021 12  10 - 38 U/L Final    Alk. phosphatase 12/02/2021 57  45 - 117 U/L Final    Protein, total 12/02/2021 6.7  6.4 - 8.2 g/dL Final    Albumin 12/02/2021 3.8  3.4 - 5.0 g/dL Final    Globulin 12/02/2021 2.9  2.0 - 4.0 g/dL Final    A-G Ratio 12/02/2021 1.3  0.8 - 1.7   Final    TSH 12/02/2021 0.86  0.36 - 3.74 uIU/mL Final    Vitamin D 25-Hydroxy 12/02/2021 54.5  30 - 100 ng/mL Final    Magnesium 12/02/2021 2.2  1.6 - 2.6 mg/dL Final       EKG (3/3/2022) sinus rhythm at 71 bpm, normal intervals; no ischemic changes. Health Maintenance:  Screening:    Mammogram: negative (12/2021)   PAP smear: negative (9/2013) Dr Maynor Barba. Pelvic ultrasound negative (9/2015). No further screening needed. Colorectal: colonoscopy (8/2021) negative. Dr. Maria R Mendez. Depression: none   DM (HbA1c/FPG): FPG 86 (12/2021)   Hepatitis C: unknown   Falls: none   DEXA: osteopenia (11/2021) s/p T11 compression fracture (4/2016). On Reclast.  Dr. Antwon Cheng.    Smoking:distant past   Glaucoma: regular eye exams with Dr. Deanna Pedro (last 3/2021)   Vitamin D: 54.5 (12/2021)   Medicare Wellness: 6/3/2021      Impression:  Patient Active Problem List   Diagnosis Code    Tourette syndrome F95.2    Osteoarthritis, Status post right total knee replacement M19.90    Lumbar spinal stenosis M48.061    HLD (hyperlipidemia) E78.5    Breast cancer ([facility] metastatic to lungs  C50.919    Essential hypertension I10    Diverticulosis K57.90    Osteopenia M85.80    Lymphedema of arm left I89.0    Insomnia G47.00    Fatigue R53.83    Diverticulitis, recurrent K57.92    T11 Vertebral compression fracture (HCC) s/p kyphoplasty M48.50XA    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.812    Spinal stenosis of cervical region M48.02    Right shoulder pain M25.511    Malignant neoplasm metastatic to lung (HCC) C78.00    Nontraumatic incomplete tear of right rotator cuff M75.111    Right sided sciatica M54.31    DDD (degenerative disc disease), lumbar M51.36    Lumbar facet arthropathy M47.816    Immunosuppressed due to chemotherapy ([facility] [address] D84.821, T45.1X5A, Z79.899    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 Patient has been reporting significant severe and persistent fatigue and has been felt to likely be a result of breast cancer treatment with Ibrance and letrozole. Evaluated in 11/2020 as also described dyspnea in association with fatigue when performing exertional activities. EKG unremarkable, chest x-ray negative, and and echocardiogram (12/1/2020) showed normal LV function (EF 55-60%), trileaflet AV with moderate AI, and PAP 30 mmHg. Repeat staging CT scans in 6/2021 without evidence of recurrence or metastases. Iron studies performed by Dr. Leland Masterson showed a ferritin level of 13 although no evidence of anemia. Underwent upper endoscopy and colonoscopy by Dr. Ceasar Wilson (8/11/2021) without evidence of source of iron loss. Random colon biopsies negative. Reported progression of fatigue at last visit on 3/3/2022 with \"waves of severe fatigue and shortness of breath with minimal exertion\". Repeat lab evaluation by oncology showed normal CBC, CMP, ferritin, and iron studies. Decision made by Dr. Leland Masterson to hold [facility] for a total of 7 weeks before reinitiating to see if may help with symptoms. Remains on letrozole. Repeat EKG (3/3/2022) was unchanged.   Scheduled for a repeat echocardiogram on 4/11/2022 to reevaluate severity of aortic insufficiency although would expect that dyspnea would be the predominant symptom if severe. Does report some improvement today and appears less fatigued since Chester Rule is continuing to be held. Consideration being made as to whether letrozole should be changed at follow-up visit with Dr. Marcella Boston on 4/1/2022. Planning to restart Ibrance. Will continue to monitor. Depression/anxiety. Patient described symptoms of anxiety and possible mild depression felt to likely be reactive to current health issues. She does describe continued difficulty with chronic insomnia. Prescribed sertraline 25 mg daily to see if may provide some benefit, but did not initiate due to concern regarding possible side effects. Will continue to monitor. Other chronic issues  1. Hypertension. Remains well controlled on losartan 25 mg daily. Renal function mildly decreased  with creatinine 0.98/eGFR 55 at last visit in 12/2021, but normalized with creatinine of 0.80 on 2/25/2022 at [facility]. Advised to increase fluid intake and will continue to monitor. Brain MRI (5/2021) with evidence of mild to moderate burden nonspecific white matter lesions and a few tiny old lacunar infarcts, likely from longstanding hypertension. Now on aspirin 81 mg daily as advised by Dr. Anna Medina. 2. Breast cancer with pulmonary metastasis. Continues on current therapy with Ibrance and letrozole. Staging CT chest/ abdomen/ and pelvis without evidence of metastatic disease. Continuing with annual mammograms. Underwent surveillance CT scan and mammogram in 12/2021 without evidence of recurrent disease. Not using compression sleeve for lymphedema, but receiving massage therapy every three weeks with good results. Continuing to follow with Dr. Marcella Boston.   3. Right ICA terminus aneurysm. MRA brain (4/2019) showed a 2 mm superiorly directed aneurysm at the right ICA terminus. Repeat MRA brain (5/2021) showed no significant interval change.   Reports intermittent tremor with intention (L>R) over last several months, and brain MRI negative in 5/2021. Being monitored by Dr. Maldonado Costa. 4. Hyperlipidemia. Calculated 10 year ASCVD risk is 17.4%, which places her in one of the four statin benefit groups (primary prevention with risk > 7.5%). However, LDL improved to 116 and HDL 70 (12/2021). Given lack of history of ASCVD, no evidence of atherosclerotic disease on chest and abdominal CT scans, and history of metastatic breast cancer, will not recommend treatment with statin at this time. Continue to emphasized importance of lifestyle modifications, including diet and exercise. 5. Osteopenia. History of T11 compression fracture in 4/2016, s/p kyphoplasty. Last bone density scan 11/2019. Using femoral neck T-scores, calculated FRAX score estimates her 10 year risk of a major osteoporetic fracture at 16 % and hip fracture at 2.3%, which alone are not an indication for biphosphonate treatment. However, development of a vertebral compression fracture and treatment with aromatase inhibitor increases likelihood of progression. Evaluated by Dr. Surendra Orourke and received first dose of Reclast on 3/29/2021. Continue calcium and Vitamin D. Encouraged to continue exercises. Repeat bone density scan performed in 11/2021 showing stability without statistically significant change with FRAX scores calculated at 14% / 2.2%. Reports scheduled for Reclast infusion on 4/4/2022 with Dr. Surendra Orourke. 6. History of recurrent diverticulitis. Colonoscopy (2/2016) with moderately severe diverticulosis in the ascending and descending colon. Has had multiple recent episodes of abdominal pain which were treated empirically with Cipro. Episode of symptoms in 3/2018 resolved prior to initiating antibiotics, bringing into question whether her abdominal symptoms were due to diverticulitis or irritable bowel syndrome. Had recurrent episode in 7/2018, and abdominal CT scan confirmed diagnosis of acute diverticulitis.  Treated with Augmentin as developed rash on Flagyl. Subsequently evaluated by Dr. Agatha Burns and instructed to continue Probiotics and use hyoscyamine as needed when develop abdominal complaints as concerned that recurrent symptoms related to IBS. Reports no recurrence since last visit. Will continue to follow. 7. Lumbar radiculopathy. Known lumbar stenosis and facet arthropathy. Using meloxicam, cyclobenzaprine, gabapentin, and Tylenol. Previously underwent L4-L5 epidural injection on the right by Dr. Mesha Perdomo in 2016 for similar symptoms. Recent recurrence of symptoms without sustained improvement despite treatment with steroids, NSAIDS, muscle relaxants and gabapentin. Underwent repeat lumbar MRI in 5/2020 which showed very advanced multilevel DDD and DJD with chronic compression fractures and worsening since 2016, especially at L4/5. Underwent left L4/5 and right L5-S1 KAREN in 6/2020 and left S1 TF KAREN in 8/2020 by Dr. Mesha Perdomo with improvement. However, continuing to have persistent difficulty with left-sided buttock pain and underwent left hip MRI (11/6/2021) which showed mild bilateral hip osteoarthritis (L>R), and asymmetric atrophy of the proximal left tensor fascia will. Reports received a ultrasound-guided steroid injection for left trochanteric bursitis on 11/10/2021, which provided some relief transiently. Subsequently underwent KAREN at left L4/5 and L5/S1 on 12/13/2021 by Dr. Mesha Perdomo with some improvement. Currently receiving physical therapy and reports significant improvement in pain in bilateral hips and lower back. However, describes worsening left leg weakness and using a cane for ambulation due to walking with a limp. Has scheduled a follow-up appointment with RAMESH Sweeney at [place]. Will continue to follow. 8. Tourette's syndrome. Followed by Dr. Gerald Self. On Haldol with plans to switch to Risperdal if Haldol becomes unavailable. Current dose of 2 mg twice daily very effective in controlling symptoms.   Will continue to monitor. 9. Right ovarian cyst. No further monitoring needed as per Dr. Lucian Garzon. Surveillance CT scans obtained every 6 months for breast cancer includes imaging of pelvis and continue to show cyst to be stable. 10. GERD. Using Protonix daily with good control. Underwent upper endoscopy (8/11/2021) by Dr. Mare Pepe to evaluate iron deficiency and noted to have normal stomach and duodenum, abnormal motility of the esophagus and a small hiatal hernia. 11. Insomnia. Using melatonin nightly. Will use temazepam alternating with diazepam intermittently when melatonin not effective. Benzodiazepines prescribed by Dr. Shelbie Lal. 12. Obesity. Weight remaining overall stable with BMI 41. Emphasized lifestyle modifications including attempts at regular exercise and dietary changes. Will continue to monitor. 13. Health maintenance. Completed Moderna COVID 19 vaccine series and received third Moderna dose given immunosuppression. Will be due for Moderna booster dose in 5/2022. Has not yet received Shingrix vaccine and remains hesitant to do so. Other immunizations up to date. Mammogram up-to-date. Vitamin D level has been normal on maintenance dose supplement. Continue regular eye exams with Dr. Reilly Justice. Completed cataract surgery in 0/2525 without complications. Medicare wellness visit up to date. Patient understands recommendations and agrees with plan. Follow-up as previously scheduled.

## 2022-04-06 DIAGNOSIS — M48.062 SPINAL STENOSIS OF LUMBAR REGION WITH NEUROGENIC CLAUDICATION: ICD-10-CM

## 2022-04-06 DIAGNOSIS — M54.30 SCIATICA, UNSPECIFIED LATERALITY: ICD-10-CM

## 2022-04-06 DIAGNOSIS — G47.00 INSOMNIA, UNSPECIFIED TYPE: ICD-10-CM

## 2022-04-06 RX ORDER — GABAPENTIN 300 MG/1
300 CAPSULE ORAL
Qty: 90 CAPSULE | Refills: 1 | Status: SHIPPED | OUTPATIENT
Start: 2022-04-06 | End: 2022-10-07 | Stop reason: SDUPTHER

## 2022-04-06 RX ORDER — TEMAZEPAM 15 MG/1
15 CAPSULE ORAL
Qty: 30 CAPSULE | Refills: 0 | Status: SHIPPED | OUTPATIENT
Start: 2022-04-06 | End: 2022-09-22 | Stop reason: SDUPTHER

## 2022-04-06 NOTE — TELEPHONE ENCOUNTER
[facility]  reports the last fill date for Restoril as 12/9/21 for a 30 d/s & Neurontin as 11/13/21 for a 90 d/s. Last Visit: 3/20/22 with MD Ossie Osler  Next Appointment: 6/23/22 with MD Melgar  Previous Refill Encounter(s): 5/27/21 Neurontin #180 with 1 refill, 12/9/21 Restoril #30    Requested Prescriptions     Pending Prescriptions Disp Refills    gabapentin (NEURONTIN) 300 mg capsule 90 Capsule 1     Sig: Take 1 Capsule by mouth nightly. Max Daily Amount: 300 mg.  temazepam (RESTORIL) 15 mg capsule 30 Capsule 0     Sig: Take 1 Capsule by mouth nightly as needed for Sleep. Max Daily Amount: 15 mg.

## 2022-04-08 ENCOUNTER — TELEPHONE (OUTPATIENT)
Dept: CARDIOLOGY CLINIC | Age: 77
End: 2022-04-08

## 2022-04-12 LAB
ECHO AO ROOT DIAM: 3.5 CM
ECHO AO ROOT INDEX: 2.2 CM/M2
ECHO AR MAX VEL PISA: 4.8 M/S
ECHO AV REGURGITANT PHT: 656.4 MILLISECOND
ECHO LA VOL 2C: 46 ML (ref 22–52)
ECHO LA VOL 4C: 23 ML (ref 22–52)
ECHO LA VOLUME AREA LENGTH: 37 ML
ECHO LA VOLUME INDEX A2C: 29 ML/M2 (ref 16–34)
ECHO LA VOLUME INDEX A4C: 14 ML/M2 (ref 16–34)
ECHO LA VOLUME INDEX AREA LENGTH: 23 ML/M2 (ref 16–34)
ECHO LV E' LATERAL VELOCITY: 6 CM/S
ECHO LV E' SEPTAL VELOCITY: 6 CM/S
ECHO LV FRACTIONAL SHORTENING: 32 % (ref 28–44)
ECHO LV INTERNAL DIMENSION DIASTOLE INDEX: 2.58 CM/M2
ECHO LV INTERNAL DIMENSION DIASTOLIC: 4.1 CM (ref 3.9–5.3)
ECHO LV INTERNAL DIMENSION SYSTOLIC INDEX: 1.76 CM/M2
ECHO LV INTERNAL DIMENSION SYSTOLIC: 2.8 CM
ECHO LV IVSD: 1.1 CM (ref 0.6–0.9)
ECHO LV MASS 2D: 151.3 G (ref 67–162)
ECHO LV MASS INDEX 2D: 95.2 G/M2 (ref 43–95)
ECHO LV POSTERIOR WALL DIASTOLIC: 1.1 CM (ref 0.6–0.9)
ECHO LV RELATIVE WALL THICKNESS RATIO: 0.54
ECHO LVOT AREA: 2.5 CM2
ECHO LVOT DIAM: 1.8 CM
ECHO LVOT MEAN GRADIENT: 4 MMHG
ECHO LVOT PEAK GRADIENT: 6 MMHG
ECHO LVOT PEAK VELOCITY: 1.2 M/S
ECHO LVOT STROKE VOLUME INDEX: 41.1 ML/M2
ECHO LVOT SV: 65.4 ML
ECHO LVOT VTI: 25.7 CM
ECHO MV A VELOCITY: 0.7 M/S
ECHO MV E DECELERATION TIME (DT): 291.3 MS
ECHO MV E VELOCITY: 0.5 M/S
ECHO MV E/A RATIO: 0.71
ECHO MV E/E' LATERAL: 8.33
ECHO MV E/E' RATIO (AVERAGED): 8.33
ECHO MV E/E' SEPTAL: 8.33
ECHO PVEIN A DURATION: 106.6 MS
ECHO PVEIN A VELOCITY: 0.3 M/S

## 2022-04-13 ENCOUNTER — TELEPHONE (OUTPATIENT)
Dept: INTERNAL MEDICINE CLINIC | Age: 77
End: 2022-04-13

## 2022-04-13 NOTE — TELEPHONE ENCOUNTER
Called and spoke with patient regarding results of echocardiogram informed that normal LV function and stable moderate AI, unchanged from prior study in 2020. Discussed restarting Ibrance and developing recurrence of severe fatigue after initiating. Planning to discuss medications being used to treat her Tourette's syndrome with Dr. Lynn Chaves to determine if any changes can be made so as not to be contributing to worsening fatigue. Answered all questions.

## 2022-04-26 ENCOUNTER — TELEPHONE (OUTPATIENT)
Dept: INTERNAL MEDICINE CLINIC | Age: 77
End: 2022-04-26

## 2022-04-26 NOTE — TELEPHONE ENCOUNTER
Called and spoke with patient. Reports that noticing worsening fatigue and not feeling well over the last week with 2 days of diarrhea over the weekend. She states that she took a single dose of Imodium with resolution of the diarrhea. Denies any abdominal pain, nausea, vomiting, fever or chills. States that her most predominant symptom continues to be fatigue. Discussed possible COVID testing given complaint of diarrhea, but reporting some mild improvement today and no URI symptoms so will hold off for now. She stated that she was restarted on Ibrance for 3 weeks and is currently in the first week of the 2-week off period. She is continuing on letrozole. She also received an infusion of Reclast 1 week ago. Discussed that continued fatigue most likely a result of restarting Ibrance and combination with letrozole. She has a follow-up scheduled with Dr. Nichola Buerger and plan will be to change her AI treatment since Ibrance needs to be continued to see if this will help with her symptoms. Will continue to monitor.

## 2022-04-26 NOTE — TELEPHONE ENCOUNTER
Patient called and states that since last week, she has fatigue and is not feeling well in general. She also had diarrhea over the weekend. She is a cancer patient and she is currently in the first week of a 2 week reprieve from her medications, is taking the letrozole only. She also states that she did have a reclast infusion with rheumatology a week ago today, but she doesn't believe that is what is causing her to not feel well. No other specific symptoms to report, she states she just has a lot of fatigue and not feeling well in general.     Patient is requesting a return call and can be reached at [phone].   Please advise, thank you

## 2022-05-12 ENCOUNTER — TRANSCRIBE ORDER (OUTPATIENT)
Dept: SCHEDULING | Age: 77
End: 2022-05-12

## 2022-05-12 DIAGNOSIS — C78.00 SECONDARY MALIGNANT NEOPLASM OF LUNG (HCC): ICD-10-CM

## 2022-05-12 DIAGNOSIS — C79.51 BONE METASTASIS (HCC): ICD-10-CM

## 2022-05-12 DIAGNOSIS — C50.919 PRIMARY MALIGNANT NEOPLASM OF FEMALE BREAST (HCC): Primary | ICD-10-CM

## 2022-05-16 ENCOUNTER — HOSPITAL ENCOUNTER (OUTPATIENT)
Age: 77
Discharge: HOME | End: 2022-05-16
Attending: NURSE PRACTITIONER
Payer: MEDICARE

## 2022-05-16 ENCOUNTER — TRANSCRIBE ORDER (OUTPATIENT)
Dept: SCHEDULING | Age: 77
End: 2022-05-16

## 2022-05-16 VITALS — WEIGHT: 142 LBS | BODY MASS INDEX: 28.68 KG/M2

## 2022-05-16 DIAGNOSIS — R51.9 HEAD ACHE: ICD-10-CM

## 2022-05-16 DIAGNOSIS — I67.1 CEREBRAL ANEURYSM, NONRUPTURED: Primary | ICD-10-CM

## 2022-05-16 DIAGNOSIS — H57.10: ICD-10-CM

## 2022-05-16 DIAGNOSIS — C50.912 MALIGNANT NEOPLASM OF LEFT BREAST (HCC): ICD-10-CM

## 2022-05-16 DIAGNOSIS — I67.1 CEREBRAL ANEURYSM, NONRUPTURED: ICD-10-CM

## 2022-05-16 DIAGNOSIS — R51.9 HEAD ACHE: Primary | ICD-10-CM

## 2022-05-16 LAB — CREAT UR-MCNC: 0.9 MG/DL (ref 0.6–1.3)

## 2022-05-16 PROCEDURE — 70546 MR ANGIOGRAPH HEAD W/O&W/DYE: CPT

## 2022-05-16 PROCEDURE — 82565 ASSAY OF CREATININE: CPT

## 2022-05-16 PROCEDURE — 74011250636 HC RX REV CODE- 250/636

## 2022-05-16 PROCEDURE — 70553 MRI BRAIN STEM W/O & W/DYE: CPT

## 2022-05-16 PROCEDURE — A9575 INJ GADOTERATE MEGLUMI 0.1ML: HCPCS

## 2022-05-16 RX ADMIN — GADOTERATE MEGLUMINE 10 ML: 376.9 INJECTION INTRAVENOUS at 16:59

## 2022-05-17 ENCOUNTER — TELEPHONE (OUTPATIENT)
Dept: INTERNAL MEDICINE CLINIC | Age: 77
End: 2022-05-17

## 2022-05-17 NOTE — TELEPHONE ENCOUNTER
Patient is calling to let Dr. Lara Hernandez know that she had an MRI/MRA done yesterday that was ordered by GERALD Pearce.

## 2022-05-25 ENCOUNTER — TELEPHONE (OUTPATIENT)
Dept: INTERNAL MEDICINE CLINIC | Age: 77
End: 2022-05-25

## 2022-05-25 NOTE — TELEPHONE ENCOUNTER
Called and let patient know Elian Martínez is out of the office this week. We will let her know when the form is ready next week. Patient is having increased difficulty walking long distances.

## 2022-06-01 ENCOUNTER — HOSPITAL ENCOUNTER (OUTPATIENT)
Dept: CT IMAGING | Age: 77
Discharge: HOME | End: 2022-06-01
Attending: NURSE PRACTITIONER
Payer: MEDICARE

## 2022-06-01 DIAGNOSIS — C79.51 BONE METASTASIS (HCC): ICD-10-CM

## 2022-06-01 DIAGNOSIS — C78.00 SECONDARY MALIGNANT NEOPLASM OF LUNG (HCC): ICD-10-CM

## 2022-06-01 DIAGNOSIS — C50.919 PRIMARY MALIGNANT NEOPLASM OF FEMALE BREAST (HCC): ICD-10-CM

## 2022-06-01 PROCEDURE — 74011000636 HC RX REV CODE- 636

## 2022-06-01 PROCEDURE — 74177 CT ABD & PELVIS W/CONTRAST: CPT

## 2022-06-01 RX ADMIN — IOPAMIDOL 100 ML: 612 INJECTION, SOLUTION INTRAVENOUS at 15:01

## 2022-06-23 ENCOUNTER — OFFICE VISIT (OUTPATIENT)
Dept: INTERNAL MEDICINE CLINIC | Age: 77
End: 2022-06-23
Payer: MEDICARE

## 2022-06-23 VITALS
SYSTOLIC BLOOD PRESSURE: 122 MMHG | DIASTOLIC BLOOD PRESSURE: 76 MMHG | OXYGEN SATURATION: 98 % | HEART RATE: 72 BPM | WEIGHT: 141 LBS | HEIGHT: 59 IN | RESPIRATION RATE: 16 BRPM | TEMPERATURE: 97 F | BODY MASS INDEX: 28.43 KG/M2

## 2022-06-23 DIAGNOSIS — Z71.89 ADVANCED DIRECTIVES, COUNSELING/DISCUSSION: ICD-10-CM

## 2022-06-23 DIAGNOSIS — Z13.31 SCREENING FOR DEPRESSION: ICD-10-CM

## 2022-06-23 DIAGNOSIS — R53.83 FATIGUE, UNSPECIFIED TYPE: ICD-10-CM

## 2022-06-23 DIAGNOSIS — E55.9 VITAMIN D DEFICIENCY, UNSPECIFIED: ICD-10-CM

## 2022-06-23 DIAGNOSIS — C78.00 MALIGNANT NEOPLASM METASTATIC TO LUNG, UNSPECIFIED LATERALITY (HCC): ICD-10-CM

## 2022-06-23 DIAGNOSIS — Z17.0 MALIGNANT NEOPLASM OF LEFT BREAST IN FEMALE, ESTROGEN RECEPTOR POSITIVE, UNSPECIFIED SITE OF BREAST (HCC): ICD-10-CM

## 2022-06-23 DIAGNOSIS — M47.816 LUMBAR FACET ARTHROPATHY: ICD-10-CM

## 2022-06-23 DIAGNOSIS — D84.821 IMMUNOSUPPRESSED DUE TO CHEMOTHERAPY (HCC): ICD-10-CM

## 2022-06-23 DIAGNOSIS — Z00.00 MEDICARE ANNUAL WELLNESS VISIT, SUBSEQUENT: ICD-10-CM

## 2022-06-23 DIAGNOSIS — I35.1 AORTIC VALVE INSUFFICIENCY, ETIOLOGY OF CARDIAC VALVE DISEASE UNSPECIFIED: ICD-10-CM

## 2022-06-23 DIAGNOSIS — I67.1 ANEURYSM OF INTRACRANIAL PORTION OF INTERNAL CAROTID ARTERY: ICD-10-CM

## 2022-06-23 DIAGNOSIS — T45.1X5A IMMUNOSUPPRESSED DUE TO CHEMOTHERAPY (HCC): ICD-10-CM

## 2022-06-23 DIAGNOSIS — I10 ESSENTIAL HYPERTENSION: Primary | ICD-10-CM

## 2022-06-23 DIAGNOSIS — F95.2 TOURETTE SYNDROME: ICD-10-CM

## 2022-06-23 DIAGNOSIS — C50.912 MALIGNANT NEOPLASM OF LEFT BREAST IN FEMALE, ESTROGEN RECEPTOR POSITIVE, UNSPECIFIED SITE OF BREAST (HCC): ICD-10-CM

## 2022-06-23 DIAGNOSIS — M85.89 OSTEOPENIA OF MULTIPLE SITES: ICD-10-CM

## 2022-06-23 DIAGNOSIS — Z79.899 IMMUNOSUPPRESSED DUE TO CHEMOTHERAPY (HCC): ICD-10-CM

## 2022-06-23 DIAGNOSIS — E78.5 HYPERLIPIDEMIA, UNSPECIFIED HYPERLIPIDEMIA TYPE: ICD-10-CM

## 2022-06-23 PROCEDURE — 1123F ACP DISCUSS/DSCN MKR DOCD: CPT | Performed by: INTERNAL MEDICINE

## 2022-06-23 PROCEDURE — G9899 SCRN MAM PERF RSLTS DOC: HCPCS | Performed by: INTERNAL MEDICINE

## 2022-06-23 PROCEDURE — 99497 ADVNCD CARE PLAN 30 MIN: CPT | Performed by: INTERNAL MEDICINE

## 2022-06-23 PROCEDURE — 1090F PRES/ABSN URINE INCON ASSESS: CPT | Performed by: INTERNAL MEDICINE

## 2022-06-23 PROCEDURE — G8754 DIAS BP LESS 90: HCPCS | Performed by: INTERNAL MEDICINE

## 2022-06-23 PROCEDURE — 99214 OFFICE O/P EST MOD 30 MIN: CPT | Performed by: INTERNAL MEDICINE

## 2022-06-23 PROCEDURE — 3017F COLORECTAL CA SCREEN DOC REV: CPT | Performed by: INTERNAL MEDICINE

## 2022-06-23 PROCEDURE — G8399 PT W/DXA RESULTS DOCUMENT: HCPCS | Performed by: INTERNAL MEDICINE

## 2022-06-23 PROCEDURE — G8417 CALC BMI ABV UP PARAM F/U: HCPCS | Performed by: INTERNAL MEDICINE

## 2022-06-23 PROCEDURE — G8752 SYS BP LESS 140: HCPCS | Performed by: INTERNAL MEDICINE

## 2022-06-23 PROCEDURE — G8536 NO DOC ELDER MAL SCRN: HCPCS | Performed by: INTERNAL MEDICINE

## 2022-06-23 PROCEDURE — G8427 DOCREV CUR MEDS BY ELIG CLIN: HCPCS | Performed by: INTERNAL MEDICINE

## 2022-06-23 PROCEDURE — G0439 PPPS, SUBSEQ VISIT: HCPCS | Performed by: INTERNAL MEDICINE

## 2022-06-23 PROCEDURE — 1101F PT FALLS ASSESS-DOCD LE1/YR: CPT | Performed by: INTERNAL MEDICINE

## 2022-06-23 PROCEDURE — G9717 DOC PT DX DEP/BP F/U NT REQ: HCPCS | Performed by: INTERNAL MEDICINE

## 2022-06-23 PROCEDURE — G0463 HOSPITAL OUTPT CLINIC VISIT: HCPCS | Performed by: INTERNAL MEDICINE

## 2022-06-23 RX ORDER — EXEMESTANE 25 MG/1
25 TABLET ORAL DAILY
COMMUNITY

## 2022-06-23 NOTE — PROGRESS NOTES
1. \"Have you been to the ER, urgent care clinic since your last visit? Hospitalized since your last visit? \" No    2. \"Have you seen or consulted any other health care providers outside of the [address] [place] [address] since your last visit? \" No     3. For patients aged 39-70: Has the patient had a colonoscopy / FIT/ Cologuard? NA - based on age      If the patient is female:    4. For patients aged 41-77: Has the patient had a mammogram within the past 2 years? NA - based on age or sex      11. For patients aged 21-65: Has the patient had a pap smear?  NA - based on age or sex

## 2022-06-23 NOTE — PATIENT INSTRUCTIONS
Heart-Healthy Diet: Care Instructions  Your Care Instructions     A heart-healthy diet has lots of vegetables, fruits, nuts, beans, and whole grains, and is low in salt. It limits foods that are high in saturated fat, such as meats, cheeses, and fried foods. It may be hard to change your diet, but even small changes can lower your risk of heart attack and heart disease. Follow-up care is a key part of your treatment and safety. Be sure to make and go to all appointments, and call your doctor if you are having problems. It's also a good idea to know your test results and keep a list of the medicines you take. How can you care for yourself at home? Watch your portions  · Learn what a serving is. A \"serving\" and a \"portion\" are not always the same thing. Make sure that you are not eating larger portions than are recommended. For example, a serving of pasta is ½ cup. A serving size of meat is 2 to 3 ounces. A 3-ounce serving is about the size of a deck of cards. Measure serving sizes until you are good at [place]" them. Keep in mind that restaurants often serve portions that are 2 or 3 times the size of one serving. · To keep your energy level up and keep you from feeling hungry, eat often but in smaller portions. · Eat only the number of calories you need to stay at a healthy weight. If you need to lose weight, eat fewer calories than your body burns (through exercise and other physical activity). Eat more fruits and vegetables  · Eat a variety of fruit and vegetables every day. Dark green, deep orange, red, or yellow fruits and vegetables are especially good for you. Examples include spinach, carrots, peaches, and berries. · Keep carrots, celery, and other veggies handy for snacks. Buy fruit that is in season and store it where you can see it so that you will be tempted to eat it. · Cook dishes that have a lot of veggies in them, such as stir-fries and soups.   Limit saturated and trans fat  · Read food labels, and try to avoid saturated and trans fats. They increase your risk of heart disease. · Use olive or canola oil when you cook. · Bake, broil, grill, or steam foods instead of frying them. · Choose lean meats instead of high-fat meats such as hot dogs and sausages. Cut off all visible fat when you prepare meat. · Eat fish, skinless poultry, and meat alternatives such as soy products instead of high-fat meats. Soy products, such as tofu, may be especially good for your heart. · Choose low-fat or fat-free milk and dairy products. Eat foods high in fiber  · Eat a variety of grain products every day. Include whole-grain foods that have lots of fiber and nutrients. Examples of whole-grain foods include oats, whole wheat bread, and brown rice. · Buy whole-grain breads and cereals, instead of white bread or pastries. Limit salt and sodium  · Limit how much salt and sodium you eat to help lower your blood pressure. · Taste food before you salt it. Add only a little salt when you think you need it. With time, your taste buds will adjust to less salt. · Eat fewer snack items, fast foods, and other high-salt, processed foods. Check food labels for the amount of sodium in packaged foods. · Choose low-sodium versions of canned goods (such as soups, vegetables, and beans). Limit sugar  · Limit drinks and foods with added sugar. These include candy, desserts, and soda pop. Limit alcohol  · Limit alcohol to no more than 2 drinks a day for men and 1 drink a day for women. Too much alcohol can cause health problems. When should you call for help? Watch closely for changes in your health, and be sure to contact your doctor if:    · You would like help planning heart-healthy meals. Where can you learn more? Go to http://www.[employer].com/  Enter V137 in the search box to learn more about \"Heart-Healthy Diet: Care Instructions. \"  Current as of: August 22, 2019               Content Version: 12.6  © 7487-3840 [employer]. Care instructions adapted under license by [employer] (which disclaims liability or warranty for this information). If you have questions about a medical condition or this instruction, always ask your healthcare professional. [address] any warranty or liability for your use of this information. Medicare Wellness Visit, Female    The best way to improve and maintain good health is to have a healthy lifestyle by eating a well-balanced diet, exercising regularly, limiting alcohol and stopping smoking. Regular visits with your physician or non-physician health care provider also support your good health. Preventive screening tests can find health problems before they become diseases or illnesses. Preventive services such as immunizations prevent serious infections. All people over age 72 should have a Pneumovax and a Prevnar-13 shot to prevent potentially life threatening infections with the pneumococcus bacteria, a common cause of pneumonia. These are once in a lifetime unless you and your provider decide differently. All people over 65 should have a yearly influenza vaccine or \"flu\" shot. This does not prevent infection with cold viruses but has been proven to prevent hospitalization and death from influenza. Although Medicare part B \"regular Medicare\" currently only covers tetanus vaccination in the context of an injury, a tetanus vaccine (Tdap or Td) is recommended every 10 years. A shingles vaccine is recommended once in a lifetime after age 61. The Shingles vaccine is also not covered by Medicare part B. Note, however, that both the Shingles vaccine and Tdap/Td are generally covered by secondary carriers. Please check your coverage and out of pocket expenses. Consider contacting your local health department because it may stock these vaccines for a reasonable charge.     We currently have documentation of the following immunization history for you:  Immunization History   Administered Date(s) Administered    COVID-19, Moderna Booster, PF, 0.25mL Dose 05/19/2022    COVID-19, Olevia Mitts, Primary or Immunocompromised Series, MRNA, PF, 100mcg/0.5mL 02/07/2021, 03/07/2021, 11/07/2021    Influenza High Dose Vaccine PF 10/01/2015, 10/25/2016, 10/05/2017    Influenza Vaccine 09/01/2014    Influenza Vaccine (Tri) Adjuvanted (>65 Yrs FLUAD TRI 33059) 10/05/2018, 10/17/2019    Influenza Vaccine Split 11/01/2011, 10/02/2012    Influenza Vaccine Whole 10/08/2010    Influenza, Quadrivalent, Adjuvanted (>65 Yrs FLUAD QUAD 70767) 09/23/2020, 10/15/2021    Pneumococcal Conjugate (PCV-13) 10/27/2015    Pneumococcal Polysaccharide (PPSV-23) 04/15/2013    TD Vaccine 05/05/2008    Tdap 09/12/2014    Zoster 09/20/2011       Screening for infection with Hepatitis C is recommended for anyone born between [birth year] through [address]. The table at the bottom of this document indicates the status of this and other preventive services. A bone mass density test (DEXA) to screen for osteoporosis or thinning of the bones should be done at least once after age 72 and may be done up to every 2 years as determined by you and your health care provider. The most recent DEXA we have on file for you is:  DEXA Results (most recent):  Results from Hospital Encounter encounter on 11/16/21    DEXA BONE DENSITY STUDY AXIAL    Narrative  DEXA BONE DENSITOMETRY, CENTRAL    INDICATION: Postmenopausal. History of breast cancer and osteopenia. Reclast IV  infusion. TECHNIQUE: Using GE LUNAR Prodigy densitometer, bone density measurement was  performed in the proximal left and right femora and the left forearm. T Score  refers to standard deviations above or below average compared to a young adult  of the same sex.  Z Score refers to standard deviations above or below average  compared to a patient of the same sex, age, race and weight. COMPARISON: Baseline study of April 18, 2005. 2007. 2009. 2010. 2011. 2013.  2015. 2017. Previous study of November 11, 2019. FINDINGS:    Lumbar Spine Levels: As previously noted, the lumbar spine is considered invalid  for bone densitometry in this patient secondary to increased density throughout  the lumbar spine. Left Distal1/3 Radius BMD:  0.688 g/cm2  T Score: -2.2  Z Score: 0.0  BMD increased 2.2%, which is not statistically significant within a 95 percent  confidence interval compared to preceding study. BMD decreased 17.1%, which is statistically significant compared to baseline  study which at the forearm was the study of 2010. Left Total Proximal Femur BMD: 0.842 g/cm2  T Score:  -1.3  Z Score:  0.4  BMD increased 0.6%, which is not statistically significant within a 95 percent  confidence interval compared to preceding study. BMD decreased 7.0%, which is statistically significant compared to baseline  study. Right Total Proximal Femur BMD: 0.842 g/cm2  T Score:  -1.3  Z Score:  0.4  BMD increased 1.3%, which is not statistically significant within a 95 percent  confidence interval compared to preceding study. BMD decreased 5.3%, which is statistically significant compared to baseline  study. Left Femoral Neck BMD:  0.837 g/cm2  T Score:  -1.4  Z Score:  0.4    Right Femoral Neck BMD:  0.847 g/cm2  T Score:  -1.4  Z Score:  0.5    Impression  1. BMD measures consistent with osteopenia/low bone density. 2.  Compared to the preceding study, BMD is without statistically significant  interval change. 3.  Compared to the baseline study, BMD has decreased. Based upon current ISCD guidelines, the patient's overall diagnostic category,  selected using WHO criteria in postmenopausal women and males aged 48 and above,  is selected based upon the lowest T Score from among the lumbar spine, total  femur, femoral neck, (or distal third radius if measured).     WHO Definition of Osteoporosis and Osteopenia on DXA (specified for  post-menopausal  females):    Normal:                     T Score at or above -1 SD  Osteopenia:              T Score between -1 and -2.5 SD  Osteoporosis:           T Score at or below -2.5 SD    The risk of fracture approximately doubles for each 1 SD decrease in T Score. It is important to consider other factors in assessing a patient's risk of  fracture, including age, risk of falling/injury, history of fragility fracture,  family history of osteoporosis, smoking, low weight. Various fracture risk tools have been developed for adult patients and are  available online. For example, the FRAX tool developed by [facility] is widely used. Reference www.iscd.org. It is also important to note that DXA measures bone density but does not  distinguish among causes of decreased bone density, which include primary versus  secondary osteoporosis (such as metabolic bone disorders or possible effects of  medications) and also other conditions (such as osteomalacia). Clinical  considerations should determine what additional evaluation may be warranted to  exclude secondary conditions in a patient with low bone density. Please note that reliable, valid comparisons can not be made between studies  which have been performed on different densitometers. If clinically warranted,  follow up study performed at this site would best permit assessment of trend for  possible change in bone mineral density over time in comparison to this study. Thank you for this referral.      Screening for diabetes mellitus with a blood sugar test (glucose) should be done at least every 3 years until age 79. You and your health care provider may decide whether to continue screening after age 79.  The most recent blood glucose we have on file for you is:   Lab Results   Component Value Date/Time    Glucose 86 12/02/2021 09:42 AM         Glaucoma is a disease of the eye due to increased ocular pressure that can lead to blindness. People with risk factors for glaucoma ( race, diabetes, family history) should be screened at least every 2 years by an eye professional.     Cardiovascular screening tests that check for elevated lipids or cholesterol (fatty part of blood) which can lead to heart disease and strokes should be done every 4-6 years through age 79. You and your health care provider may decide whether to continue screening after age 79. The most recent lipid panel we have on file for you is:   Lab Results   Component Value Date/Time    Cholesterol, total 203 (H) 12/02/2021 09:42 AM    HDL Cholesterol 70 (H) 12/02/2021 09:42 AM    LDL, calculated 116.8 (H) 12/02/2021 09:42 AM    VLDL, calculated 16.2 12/02/2021 09:42 AM    Triglyceride 81 12/02/2021 09:42 AM    CHOL/HDL Ratio 2.9 12/02/2021 09:42 AM       Colorectal cancer screening that evaluates for blood or polyps in your colon for people with average risk should be done yearly as a stool test, every five years as a flexible sigmoidoscope or every 10 years as a colonoscopy up to age 76. You and your health care provider may decide whether to continue screening after age 76. Breast cancer screening with a mammogram is recommended at least once every 2 years  for women age 54-69. You and your health care provider may decide whether to continue screening after age 76. The most recent mammogram we have on file for you is:   [facility] Results (most recent):  Results from [place] encounter on 12/29/21    [facility] 3D PEACE W MAMMO RT SCREENING INCL CAD    Narrative  Digital mammogram right screening with 3-D tomography and CAD    History: Routine screening, left mastectomy for breast cancer, aunt with breast  cancer    Comparison: [id number]    Findings:  2-D and 3-D digital CC and MLO views obtained of the right breast.    No suspicious mass, distortion or suspicious microcalcifications.     Interpretation performed in conjunction with computer assisted detection (CAD). Impression  No mammographic evidence of malignancy. Routine followup recommended. BI-RADS Assessment Category 1: Negative. Density B: There are scattered areas of fibroglandular density. Screening for cervical cancer with a pap smear is recommended for all women with a cervix until age 72. The frequency of this test is based on the details of her prior pap smear testing. You and your health care provider may decide whether to continue screening after age 72. People who have smoked the equivalent of 1 pack per day for 30 years or more may benefit from screening for lung cancer with a yearly low dose CT scan until they have been non smokers for 15 years or competing health conditions render this unlikely to be beneficial. Our records show: n/a    Your Medicare Wellness Exam is recommended annually.     Here is a list of your current Health Maintenance items with a due date:  Health Maintenance   Topic Date Due    Shingrix Vaccine Age 49> (1 of 2) Never done    Depression Monitoring  03/30/2023    Medicare Yearly Exam  06/24/2023    DTaP/Tdap/Td series (2 - Td or Tdap) 09/12/2024    Lipid Screen  12/02/2026    Bone Densitometry (Dexa) Screening  Completed    Flu Vaccine  Completed    COVID-19 Vaccine  Completed    Pneumococcal 65+ years  Completed    Hepatitis C Screening  Addressed

## 2022-06-23 NOTE — ACP (ADVANCE CARE PLANNING)
Advance Care Planning     Advance Care Planning (ACP) Physician/NP/PA Conversation      Date of Conversation: 6/23/2022  Conducted with: Patient with Decision Making Capacity    Healthcare Decision Maker:     Primary Decision Maker: Jordana Perez () - Spouse - [phone]    Secondary Decision Maker: Maribel Coyne - Daughter - [phone]  Click here to complete [place] including selection of the Healthcare Decision Maker Relationship (ie \"Primary\")        Today we documented Decision Maker(s) consistent with Legal Next of Kin hierarchy. Care Preferences:    Hospitalization: \"If your health worsens and it becomes clear that your chance of recovery is unlikely, what would be your preference regarding hospitalization? \"  The patient would prefer hospitalization. Ventilation: \"If you were unable to breathe on your own and your chance of recovery was unlikely, what would be your preference about the use of a ventilator (breathing machine) if it was available to you? \"   The patient would desire the use of a ventilator. Resuscitation: \"In the event your heart stopped as a result of an underlying serious health condition, would you want attempts to be made to restart your heart, or would you prefer a natural death? \"   Yes, attempt to resuscitate.     Additional topics discussed: treatment goals, benefit/burden of treatment options, ventilation preferences, hospitalization preferences and resuscitation preferences    Conversation Outcomes / Follow-Up Plan:   ACP complete - no further action today  Reviewed DNR/DNI and patient elects Full Code (Attempt Resuscitation)     Length of Voluntary ACP Conversation in minutes:  16 minutes    Meg Ma MD

## 2022-06-23 NOTE — PROGRESS NOTES
This is the Subsequent Medicare Annual Wellness Exam, performed 12 months or more after the Initial AWV or the last Subsequent AWV    I have reviewed the patient's medical history in detail and updated the computerized patient record. Assessment/Plan   Education and counseling provided:  Are appropriate based on today's review and evaluation  End-of-Life planning (with patient's consent)  Influenza Vaccine  Colorectal cancer screening tests  Cardiovascular screening blood test  Bone mass measurement (DEXA)  Screening for glaucoma  Diabetes screening test    1. Essential hypertension  -     MAGNESIUM; Future  -     METABOLIC PANEL, COMPREHENSIVE; Future  -     URINALYSIS W/MICROSCOPIC; Future  2. Aortic valve insufficiency, etiology of cardiac valve disease unspecified  -     METABOLIC PANEL, COMPREHENSIVE; Future  3. Hyperlipidemia, unspecified hyperlipidemia type  -     LIPID PANEL; Future  -     METABOLIC PANEL, COMPREHENSIVE; Future  4. Fatigue, unspecified type  5. Malignant neoplasm metastatic to lung, unspecified laterality ([facility] [address]  6. Malignant neoplasm of left breast in female, estrogen receptor positive, unspecified site of breast ([facility] [address]  7. Immunosuppressed due to chemotherapy ([facility] [address]  8. Osteopenia of multiple sites  -     VITAMIN D, 25 HYDROXY; Future  9. Tourette syndrome  10. 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 Lumbar facet arthropathy  12. Medicare annual wellness visit, subsequent  13.  Advanced directives, counseling/discussion  -     ADVANCE CARE PLANNING FIRST 27 MINS  15. Screening for depression  -     ADVANCE CARE PLANNING FIRST 30 MINS  15. Vitamin D deficiency, unspecified   -     VITAMIN D, 25 HYDROXY; Future       Depression Risk Factor Screening     3 most recent PHQ Screens 6/23/2022   Little interest or pleasure in doing things Not at all   Feeling down, depressed, irritable, or hopeless Not at all   Total Score PHQ 2 0       Alcohol & Drug Abuse Risk Screen    Do you average more than 1 drink per night or more than 7 drinks a week:  No    On any one occasion in the past three months have you have had more than 3 drinks containing alcohol:  No          Functional Ability and Level of Safety    Hearing: Hearing is good. Activities of Daily Living: The home contains: no safety equipment. Patient does total self care      Ambulation: with difficulty, uses a cane     Fall Risk:  Fall Risk Assessment, last 12 mths 6/23/2022   Able to walk? Yes   Fall in past 12 months? 0   Do you feel unsteady? 0   Are you worried about falling 0   Number of falls in past 12 months -   Fall with injury? -      Abuse Screen:  Patient is not abused       Cognitive Screening    Has your family/caregiver stated any concerns about your memory: no     Cognitive Screening: None performed today.     Health Maintenance Due     Health Maintenance Due   Topic Date Due    Shingrix Vaccine Age 49> (1 of 2) Never done       Patient Care Team   Patient Care Team:  Carolee Prince MD as PCP - General (Internal Medicine Physician)  Carolee Prince MD as PCP - [address] [place] [address] Provider  Matteo Seth MD (Rheumatology Internal Medicine)  Alexus Paz MD (Neurology)  Amanda Morales MD (Hematology and Oncology)  Dante Paget, MD (Endocrinology Physician)  Geovani Murray MD (Gynecologic Oncology)  Anastasia Canales MD (Dermatology Physician)  Lenny Shipley MD (Ophthalmology)  Woodfin Gaucher, DO (Physical Medicine and Rehabilitation Physician)  Jerad Salamanca MD (General Surgery)  Ludivina Contreras MD (Gastroenterology)  Abel Judge DO (Orthopedic Surgery)    History     Patient Active Problem List   Diagnosis Code    Tourette syndrome F95.2    Osteoarthritis, Status post right total knee replacement M19.90    Lumbar spinal stenosis M48.061    HLD (hyperlipidemia) E78.5    Breast cancer ([facility] [address] metastatic to lungs  C50.919    Essential hypertension I10    Diverticulosis K57.90    Osteopenia M85.80    Lymphedema of arm left I89.0    Insomnia G47.00    Fatigue R53.83    History of diverticulitis Z87.19    T11 Vertebral compression fracture (HCC) s/p kyphoplasty M48.50XA    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.812    Spinal stenosis of cervical region M48.02    Malignant neoplasm metastatic to lung (HCC) C78.00    Nontraumatic incomplete tear of right rotator cuff M75.111    Right sided sciatica M54.31    DDD (degenerative disc disease), lumbar M51.36    Lumbar facet arthropathy M47.816    Immunosuppressed due to chemotherapy ([facility] [address] D84.821, T45.1X5A, Z79.899    Aneurysm of intracranial portion of internal carotid artery I67.1    Aortic valve regurgitation I35.1     Past Medical History:   Diagnosis Date    Arthritis     Breast cancer metastasized to lung [facility])     Left Breast    Chronic pain     Colon polyps 2/22/16    distal sigmoid, Dr. Carla Arauz DDD (degenerative disc disease)     Diverticulitis of colon     Diverticulosis 2/22/16    mild in the ascedning and sigmoid colon, Dr. Amandeep Ac (hyperlipidemia)     Hypertension     Osteopenia     Reactive depression 3/4/2022    Tourette syndrome     Vitamin D deficiency       Past Surgical History:   Procedure Laterality Date    COLONOSCOPY N/A 8/11/2021    COLONOSCOPY with Bx performed by Carmelo Solis MD at [address] HX APPENDECTOMY      HX BREAST BIOPSY  7-30-10    Left Breast w/Nipple Duct exploration and excisional biopsy-left    HX DILATION AND CURETTAGE      x3    HX MODIFIED RADICAL MASTECTOMY Left 09/03/2010    HX ORTHOPAEDIC Right 2013    knee replacement    HX TONSILLECTOMY      HYSTEROSCOPY DIAGNOSTIC       Current Outpatient Medications   Medication Sig Dispense Refill    exemestane (Aromasin) 25 mg tablet Take 25 mg by mouth daily.  gabapentin (NEURONTIN) 300 mg capsule Take 1 Capsule by mouth nightly.  Max Daily Amount: 300 mg. 90 Capsule 1    temazepam (RESTORIL) 15 mg capsule Take 1 Capsule by mouth nightly as needed for Sleep. Max Daily Amount: 15 mg. 30 Capsule 0    diazePAM (Valium) 5 mg tablet Take 1 Tablet by mouth every six (6) hours as needed for Anxiety. Max Daily Amount: 20 mg. 30 Tablet 0    meloxicam (MOBIC) 15 mg tablet Take 1 Tablet by mouth daily. Take with food 90 Tablet 2    haloperidoL (HALDOL) 2 mg tablet Take 1 Tablet by mouth two (2) times a day. 180 Tablet 1    losartan (COZAAR) 25 mg tablet Take 1 Tablet by mouth daily. 90 Tablet 3    ondansetron hcl (Zofran) 8 mg tablet Take 1 Tablet by mouth every eight (8) hours as needed for Nausea or Vomiting. Take one tablet by mouth every 8 hours as needed. 30 Tablet 0    acetaminophen (Tylenol Extra Strength) 500 mg tablet Take 500 mg by mouth every six (6) hours as needed for Pain. Take 1-2 tablets by mouth every 6 hours as needed.  pantoprazole sodium (PROTONIX PO) Take 40 mg by mouth daily.  palbociclib 75 mg cap Take 75 mg by mouth daily. For days 1-21 of each 28 day cycle      Biotin 2,500 mcg cap Take  by mouth.  calcium-vitamin D (CALCIUM 500+D) 500 mg(1,250mg) -200 unit per tablet Take 1 Tab by mouth two (2) times daily (with meals).  cholecalciferol, vitamin d3, (VITAMIN D) 1,000 unit tablet Take 4,000 Units by mouth daily. Allergies   Allergen Reactions    Keflex [Cephalexin] Rash    Metronidazole Rash    Other Medication Nausea Only     Anesthesia    Shellfish Containing Products Nausea and Vomiting     More of just eating the actual shellfish.     Sulfa (Sulfonamide Antibiotics) Rash    Ultram [Tramadol] Nausea and Vomiting     Patient states she is allergic to most Narcotics    Vancomycin Rash       Family History   Problem Relation Age of Onset    Osteoporosis Sister     Osteoporosis Mother     Stroke Father     Hypertension Father     Cancer Paternal Aunt         breast     Social History     Tobacco Use    Smoking status: Former Smoker     Packs/day: 1.00     Years: 3.00     Pack years: 3.00     Types: Cigarettes     Quit date:      Years since quittin.4    Smokeless tobacco: Never Used    Tobacco comment: quit in college   Substance Use Topics    Alcohol use: Not Currently     Alcohol/week: 5.8 standard drinks     Types: 7 Glasses of wine per week         Arnel Vyas MD

## 2022-06-25 PROBLEM — F32.9 REACTIVE DEPRESSION: Status: RESOLVED | Noted: 2022-03-04 | Resolved: 2022-06-25

## 2022-06-25 PROBLEM — R06.09 DYSPNEA ON EXERTION: Status: RESOLVED | Noted: 2022-03-04 | Resolved: 2022-06-25

## 2022-06-25 PROBLEM — M25.511 RIGHT SHOULDER PAIN: Status: RESOLVED | Noted: 2017-11-04 | Resolved: 2022-06-25

## 2022-06-25 NOTE — PROGRESS NOTES
HPI:  Mary Harris is a 78y.o. year old female who presents today for a routine visit. She has a history of hypertension, dyslipidemia,  metastatic breast cancer, GERD, diverticulosis with recurrent diverticulitis, osteoarthritis s/p right knee replacement (2012), lumbar degenerative disease, osteopenia, compression fracture, and Tourette's syndrome. She is accompanied by her . She has completed the four dose Moderna COVID-19 vaccine series given immunosuppressed status. She reports that she is doing reasonably well. She reports that she continues to experience fatigue and states that it was felt that the most likely cause is treatment with letrozole. She states that she is being transitioned to Aromasin and will be getting her first dose tomorrow. She is hopeful that this will result in some improvement in her quality of life. She reports that Dr. Natali Smith recommended decreasing her dose of Haldol to 1 mg twice daily given her new onset tremors and she states that she did notice resolution of the tremors at this lower dose. However, she experienced significant worsening of her Tourette's symptoms and has now resumed the 2 mg twice daily dosing. She states that she has not noticed a resumption of the tremors since doing so. She does report worsening neck pain particularly at night and continued left leg weakness requiring ambulation with a cane outside her home. She underwent evaluation by RAMESH Canales at [place] and it was recommended that she resume physical therapy for both issues. She is otherwise without new complaints. On 3/3/2022, she presented for evaluation of worsening fatigue. She was evaluated in 11/2020 when she described dyspnea in association with fatigue when performing exertional activities.   EKG (11/24/2020) was unremarkable, chest x-ray (11/24/2020) was negative, and echocardiogram (12/1/2020) showed normal LV function (EF 55-60%), trileaflet AV with moderate AI, and PAP 30 mmHg. She had repeat staging CT scans in 6/2021 and 12/2021 without evidence of recurrence or metastases. Iron studies performed by Dr. Dustin Orellana showed a ferritin level of 13 although no evidence of anemia. She underwent upper endoscopy and colonoscopy by Dr. Talon Amin (8/11/2021) without evidence of source of iron loss. At her last visit, she reported that her fatigue had significantly progressed, describing \"waves of severe fatigue and shortness of breath with minimal exertion\". She stated that she had no energy to go out with friends and has been having difficulty with her normal ADL activities at home due to fatigue. She underwent a repeat lab evaluation by oncology, which showed normal CBC, CMP, ferritin, and iron studies. She reported that decision was made by Dr. Dustin Orellana to hold [facility] for a total of 7 weeks before reinitiating to see if may help with symptoms. She continued on letrozole. She also underwent a repeat echocardiogram on 4/11/2022 which continue to show normal LV function and stable moderate AI. She noted improvement while [facility] was being held, but decided to proceed with resumption of therapy on 4/1/2022. Decision made to change her aromatase inhibitor therapy from letrozole to Aromasin. Summary of prior hospitalizations and medical history:  She has a history of left breast cancer, which was diagnosed in 7/2010 as invasive ductal adenocarcinoma, s/p left modified radical mastectomy with sentinel node biopsy, performed by Dr. Nhi Schofield. She had 3 positive lymph nodes and was classified as stage 1B, T1c N1 M0, ER and FL positive, Her-2/sarah negative by FISH. She underwent 6 cycles of chemotherapy with Docetaxel and Cytoxan. She was subsequently unable to tolerate anastrozole, exemestane, tamoxifen, and letrozole due to profound fatigue and arthralgias. Her course was complicated by chronic left arm lymphedema, managed with a compression sleeve.  A chest CT scan in 3/2014 noted several small pulmonary nodules which were concerning for metastases but were too small to biopsy. They were followed with sequential CT scans , and in 12/2014, repeat chest CT scan showed enlarging bilateral pulmonary nodules compatible with progression of metastatic disease. A fine needle aspirate was performed on 12/21/2014 which showed benign pulmonary parenchyma with alveolar hemorrhage. Repeat chest CT scan in 3/12/2015 showed mild interval progression of the lung metastases with enlarging nodules and development of new nodules. A CT guided needle biopsy was performed on 3/23/2015, which confirmed metastatic adenocarcinoma consistent with breast primary (stage IV, ER/MD positive, and TTF-1 negative). On 4/13/2015, she was started on therapy with Ibrance and letrozole. Follow-up CT scan (7/15/15) showed partial response with decreasing size of some nodules and resolution of other nodules. Most recent chest, abdomen, and pelvis CT scan was obtained in 6/2017, showing stable or decreased multiple bilateral pulmonary nodules and no suspicious new pulmonary nodules. She continues on palbociclib, but was changed from letrozole to fulvestrant. She describes persistent fatigue which she attributes to monthly treatment with fulvestrant (Faslodex). She underwent restaging chest, abdomen, and pelvis CT scan in 7/2019 which showed that her pulmonary nodules were unchanged, and no other evidence of metastatic disease. Repeat staging CT chest/ abdomen/ pelvis in 1/2021 showed no evidence of metastasis or adenopathy. She is followed by Dr. Martell Hickman. She states that she established care with Dr. Mona Hayden, but was told that she may have her mammograms and breast exams in our office with referral to her if something arises. She also has a history of a right ovarian cyst, but was released from the care of Dr. Clay Andrade since it was concluded to be benign. She has a history of hypertension treated with losartan.  She monitors her blood pressure mainly when visiting multiple physicians and reports that it is well controlled. She has no history of heart disease, and denies any chest pain, shortness of breath at rest or with exertion, palpitations, lightheadedness, or edema. In 4/11/2016, she sustained a fall with subsequent severe pain in her mid to upper lumbar region and wrapping around waist. She was treated with meloxicam, tylenol and flexeril, but because of persistent discomfort, she presented to [facility] on 4/15/2016 and lumbosacral spine films showed marked degenerative changes with disc space obliteration throughout lumbar spine and slight anterior spondylolisthesis of L4. She was evaluated by Dr. Thais Phan on 4/18/2016 who recommended physical therapy and evaluation by Dr. Delfin Gould for her degenerative spine changes. She continued to take meloxicam and tylenol and started physical therapy, but noted worsening of her pain. She subsequently was evaluated by Dr. Aristides Mao, who obtained a lumbar MRI on 4/26/2016, which showed a new subacute compression fracture of T11 vertebral body with diffuse marrow edema and mild paravertebral inflammatory stranding, no retropulsion or central stenosis; more severe degenerative disease along the right side at L4-L5, L5-S1, potential mild compression of right exiting L4 and L5 nerve roots. She was referred to Dr. Ben Sicard for kyphoplasty of the T11 compression fracture given failure of pain control with conservative measures. She underwent the procedure on 1/5/5576 without complications, and C23 vertebral body bone core and aspirate biopsies were performed, which showed only reactive bone changes and no evidence of malignancy. She has a history of osteopenia, with a history of fracture of her sacrum. She reports that she was treated with alendronate in 12/2011 but discontinued it in 4/2013 due to intolerence.  Bone density scan (11/2017) was consistent with osteopenia with femoral neck T-scores: left -1.4/ right -1.4 and distal radius -2.2 (lumbar T-score could not be assessed). A repeat bone density scan was obtained (11/2019) showing continued evidence of osteopenia with femoral neck T-scores: left -1.4/ right -1.4 and distal radius -2.4 (lumbar T-score could not be assessed). She was referred to Dr. Mohamud Poole given her multiple compression fractures and treatment with letrozole, and was started on Reclast in 3/2021. She continues to take calcium and vitamin D. In 10/2016, she developed left sided low back pain with radiation to her left leg. She underwent a lumbar MRI (9/2016) showing scoliosis and advanced multilevel degenerative changes with areas of foraminal stenosis at L3-L4 and L5-S1; mild/moderate central canal stenosis at L4-L5 and L5-S1. She was evaluated by Dr. Dilcia Glez and treated with pregabalin with some improvement. She subsequently received an epidural steroid injection with improvement and discontinued pregabalin. She reported worsening low back pain and right sciatica, and underwent an epidural steroid injection at right L4-5 transforaminal approach by Dr. Dilcia Glez on 3/18/2019. She has noted improvement in her right hip and leg pain. She underwent a lumbar MRI (6/2020) for worsening right sciatica, showing very advanced multilevel DDD and DJD with extensive bone marrow edema reaction to DDD; chronic compression fractures; single level of degenerative central spinal stenosis, moderate, at L4/L5, worsened compared 2016; multilevel degenerative foraminal narrowing worst right L4/L5, bilateral L5/S1; no evidence of metastatic breast cancer. She underwent left L4/5 and right L5-S1 KAREN in 6/2020 and Left S1 TF KAREN in 8/2020 by Dr. Dilcia Glez with improvement.  She reports continuing to have persistent difficulty with left-sided buttock and pelvic pain, and she underwent left hip MRI (11/6/2021) which showed mild bilateral hip osteoarthritis (L>R), and asymmetric atrophy of the proximal left tensor fascia will. She reports that she received a ultrasound-guided steroid injection for left trochanteric bursitis on 11/10/2021, which provided some relief transiently, but pain has since recurred. She underwent an KAREN left L4/5 and L5/S1 on 12/13/2021 by Dr. Gladys Blanco with some improvement. She subsequently underwent physical therapy with significant benefit. In 4/2017, she noted increasing cervical and upper back discomfort. She has had multiple cervical MRI's,and underwent repeat in 4/2017 which showed multilevel degenerative disc disease and facet arthropathy without change from prior studies with mild central canal stenosis C3-C4 and C5-C6, and moderate central canal stenosis C6-C7. She was treated with Mobic and Flexeril, and gabapentin at bedtime. She complained of right shoulder pain. X-ray of her shoulder (11/2017) was negative, and she had a right shoulder MRI (5/14/2018) which showed deep partial thickness undersurface tear of the distal supraspinatus, likely with full thickness slitlike perforations; no gross tendon retraction, but there is moderate muscle atrophy; partial thickness tearing with longitudinal components involving the biceps tendon at its sulcal turn, some associated tenosynovitis; accompanying short length longitudinal split tear in the distal subscapularis; moderate infraspinatus tendinosis, and mild to moderate subacromial subdeltoid bursitis. She underwent evaluation by Dr. Kayleen Main on 5/18/2018, and received a glenohumeral cortisone injection. She has a history of GERD and diverticulosis with recurrent diverticulitis,. She was evaluated by Dr. Tamica Klein in 2/23/2016 and underwent an upper endoscopy, which revealed a Schatzki's ring at the gastroesophageal junction (dilated) with mild distal esophagitis and a small hiatal hernia.  A screening colonoscopy was also performed showing moderately severe diverticulosis of the ascending and descending colon and a 2 mm sessile sigmoid polyp (pathology: hyperplastic). Recommendations for follow-up colonoscopy in 10 years. She denies any abdominal pain, nausea, vomiting, melena, hematochezia, or change in bowel movements. She was evaluated in 8/2017 by GERALD Vazquez with complaints of abdominal pain and was treated with Cipro for presumed diverticulitis. She contacted the office again in 10/2017 and 1/2018 with a recurrence of symptoms, and was treated for a 10 day course with Cipro with improvement. Abdominal CT scan in 2/6/2018 for staging for her breast cancer did show evidence of moderate to severe diverticulosis, but no evidence of diverticulitis. She did present with similar symptoms in 3/2018 and was evaluated by GERALD Vigil and prescribed Cipro. However, her symptoms resolved prior to taking the antibiotics. In 7/2018, she again presented with left lower quadrant pain, and an abdominal CT scan (7/13/2018) which showed evidence of uncomplicated diverticulitis involving the lower left colon and upper sigmoid. She was treated with Cipro and Flagyl initially, but developed a rash due to Flagyl, and was subsequently changed to Augmentin. She had a follow-up visit with Dr. Minerva Gallardo and it was his opinion that she also had IBS which mimicked her episodes of diverticulitis. He recommended continuing on a probiotic and prescribed hyoscyamine to use as needed. She has noted improvement since doing so. She underwent evaluation for iron deficiency when she was found to have a ferritin of 13 in 6/2021 which was performed to evaluate her fatigue. On 8/11/2021 she underwent upper endoscopy and colonoscopy by Dr. Minerva Gallardo. Upper endoscopy showed normal stomach and duodenum, abnormal motility of the esophagus, and a small hiatal hernia. Colonoscopy showed increased vascularity throughout the colon concerning for microscopic colitis, but pathology of multiple random biopsies was negative. No source for iron loss was identified.       She also has a history of Tourette's syndrome controlled with Haldol. She is followed by Dr. Yina Gao. Past Medical History:   Diagnosis Date    Arthritis     Breast cancer metastasized to lung [facility])     Left Breast    Chronic pain     Colon polyps 2/22/16    distal sigmoid, Dr. Carla Arauz DDD (degenerative disc disease)     Diverticulitis of colon     Diverticulosis 2/22/16    mild in the ascedning and sigmoid colon, Dr. Amandeep Ac (hyperlipidemia)     Hypertension     Osteopenia     Reactive depression 3/4/2022    Tourette syndrome     Vitamin D deficiency      Past Surgical History:   Procedure Laterality Date    COLONOSCOPY N/A 8/11/2021    COLONOSCOPY with Bx performed by Carmelo Solis MD at [address] HX APPENDECTOMY      HX BREAST BIOPSY  7-30-10    Left Breast w/Nipple Duct exploration and excisional biopsy-left    HX DILATION AND CURETTAGE      x3    HX MODIFIED RADICAL MASTECTOMY Left 09/03/2010    HX ORTHOPAEDIC Right 2013    knee replacement    HX TONSILLECTOMY      HYSTEROSCOPY DIAGNOSTIC       Current Outpatient Medications   Medication Sig    exemestane (Aromasin) 25 mg tablet Take 25 mg by mouth daily.  gabapentin (NEURONTIN) 300 mg capsule Take 1 Capsule by mouth nightly. Max Daily Amount: 300 mg.  temazepam (RESTORIL) 15 mg capsule Take 1 Capsule by mouth nightly as needed for Sleep. Max Daily Amount: 15 mg.    diazePAM (Valium) 5 mg tablet Take 1 Tablet by mouth every six (6) hours as needed for Anxiety. Max Daily Amount: 20 mg.    meloxicam (MOBIC) 15 mg tablet Take 1 Tablet by mouth daily. Take with food    haloperidoL (HALDOL) 2 mg tablet Take 1 Tablet by mouth two (2) times a day.  losartan (COZAAR) 25 mg tablet Take 1 Tablet by mouth daily.  ondansetron hcl (Zofran) 8 mg tablet Take 1 Tablet by mouth every eight (8) hours as needed for Nausea or Vomiting. Take one tablet by mouth every 8 hours as needed.     acetaminophen (Tylenol Extra Strength) 500 mg tablet Take 500 mg by mouth every six (6) hours as needed for Pain. Take 1-2 tablets by mouth every 6 hours as needed.  pantoprazole sodium (PROTONIX PO) Take 40 mg by mouth daily.  palbociclib 75 mg cap Take 75 mg by mouth daily. For days 1-21 of each 28 day cycle    Biotin 2,500 mcg cap Take  by mouth.  calcium-vitamin D (CALCIUM 500+D) 500 mg(1,250mg) -200 unit per tablet Take 1 Tab by mouth two (2) times daily (with meals).  cholecalciferol, vitamin d3, (VITAMIN D) 1,000 unit tablet Take 4,000 Units by mouth daily. No current facility-administered medications for this visit. Allergies and Intolerances: Allergies   Allergen Reactions    Keflex [Cephalexin] Rash    Metronidazole Rash    Other Medication Nausea Only     Anesthesia    Shellfish Containing Products Nausea and Vomiting     More of just eating the actual shellfish.  Sulfa (Sulfonamide Antibiotics) Rash    Ultram [Tramadol] Nausea and Vomiting     Patient states she is allergic to most Narcotics    Vancomycin Rash     Family History:   Family History   Problem Relation Age of Onset    Osteoporosis Sister     Osteoporosis Mother     Stroke Father     Hypertension Father     Cancer Paternal Aunt         breast     Social History:   She  reports that she quit smoking about 16 years ago. Her smoking use included cigarettes. She has a 3.00 pack-year smoking history. She has never used smokeless tobacco. She stopped smoking over 40 years ago. She is  and lives with . They have one daughter and two grandchildren.     Social History     Substance and Sexual Activity   Alcohol Use Not Currently    Alcohol/week: 5.8 standard drinks    Types: 7 Glasses of wine per week     Immunization History:  Immunization History   Administered Date(s) Administered    COVID-19, Moderna Booster, PF, 0.25mL Dose 05/19/2022    COVID-19, Ronak Olivo, Primary or Immunocompromised Series, MRNA, PF, 100mcg/0.5mL 02/07/2021, 03/07/2021, 11/07/2021    Influenza High Dose Vaccine PF 10/01/2015, 10/25/2016, 10/05/2017    Influenza Vaccine 09/01/2014    Influenza Vaccine (Tri) Adjuvanted (>65 Yrs FLUAD TRI [id number]) 10/05/2018, 10/17/2019    Influenza Vaccine Split 11/01/2011, 10/02/2012    Influenza Vaccine Whole 10/08/2010    Influenza, Quadrivalent, Adjuvanted (>65 Yrs FLUAD QUAD [id number]) 09/23/2020, 10/15/2021    Pneumococcal Conjugate (PCV-13) 10/27/2015    Pneumococcal Polysaccharide (PPSV-23) 04/15/2013    TD Vaccine 05/05/2008    Tdap 09/12/2014    Zoster 09/20/2011       Review of Systems:   As above included in HPI. Otherwise 11 point review of systems negative including constitutional, skin, HENT, eyes, respiratory, cardiovascular, gastrointestinal, genitourinary, musculoskeletal, endo/heme/aller, neurological.    Physical:     Visit Vitals  /76 (BP 1 Location: Left arm, BP Patient Position: Sitting)   Pulse 72   Temp 97 °F (36.1 °C) (Temporal)   Resp 16   Ht 4' 11\" (1.499 m)   Wt 141 lb (64 kg)   SpO2 98%   BMI 28.48 kg/m²        Patient appears in no apparent distress. Affect is appropriate. HEENT: PERRLA, anicteric, no JVD, adenopathy or thyromegaly. No carotid bruits or radiated murmur. Lungs: clear to auscultation, no wheezes, rhonchi, or rales. Heart: regular rate and rhythm. No murmur, rubs, gallops  Abdomen: soft, nontender, nondistended, normal bowel sounds, no hepatosplenomegaly or masses. Extremities: without edema. Review of Data:  Labs:  No visits with results within 1 Month(s) from this visit. Latest known visit with results is:   Hospital Outpatient Visit on 05/16/2022   Component Date Value Ref Range Status    Creatinine, POC 05/16/2022 0.9  0.6 - 1.3 MG/DL Final    GFRAA, POC 05/16/2022 >60  >60 ml/min/1.73m2 Final    GFRNA, POC 05/16/2022 >60  >60 ml/min/1.73m2 Final       EKG (3/3/2022) sinus rhythm at 71 bpm, normal intervals; no ischemic changes.     Health Maintenance:  Screening: Mammogram: negative (12/2021)   PAP smear: negative (9/2013) Dr Mari Sharma. Pelvic ultrasound negative (9/2015). No further screening needed. Colorectal: colonoscopy (8/2021) negative. Dr. Cooper Ruiz. Depression: none   DM (HbA1c/FPG): FPG 86 (12/2021)   Hepatitis C: unknown   Falls: none   DEXA: osteopenia (11/2021) s/p T11 compression fracture (4/2016). On Reclast.  Dr. Ludmila Murphy. Smoking:distant past   Glaucoma: regular eye exams with Dr. Judi Barboza (last 3/2022)   Vitamin D: 54.5 (12/2021)   Medicare Wellness: today      Impression:  Patient Active Problem List   Diagnosis Code    Tourette syndrome F95.2    Osteoarthritis, Status post right total knee replacement M19.90    Lumbar spinal stenosis M48.061    HLD (hyperlipidemia) E78.5    Breast cancer ([facility] [address] metastatic to lungs  C50.919    Essential hypertension I10    Diverticulosis K57.90    Osteopenia M85.80    Lymphedema of arm left I89.0    Insomnia G47.00    Fatigue R53.83    History of diverticulitis Z87.19    T11 Vertebral compression fracture (HCC) s/p kyphoplasty M48.50XA    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.812    Spinal stenosis of cervical region M48.02    Malignant neoplasm metastatic to lung (HCC) C78.00    Nontraumatic incomplete tear of right rotator cuff M75.111    Right sided sciatica M54.31    DDD (degenerative disc disease), lumbar M51.36    Lumbar facet arthropathy M47.816    Immunosuppressed due to chemotherapy ([facility] [address] D84.821, T45.1X5A, Z79.899    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 Fatigue. Reporting significant severe/persistent fatigue felt to likely be a result of breast cancer treatment with Ibrance and letrozole. Evaluated in 11/2020 as also described dyspnea in association with fatigue when performing exertional activities.   EKG unremarkable, chest x-ray negative, and and echocardiogram (12/1/2020) showed normal LV function (EF 55-60%), trileaflet AV with moderate AI, and PAP 30 mmHg. Repeat staging CT scans in 6/2021 without evidence of recurrence or metastases. Iron studies performed by Dr. Krishna Hopkins showed a ferritin level of 13 although no evidence of anemia. Underwent upper endoscopy and colonoscopy by Dr. Stepan Aguilar (8/11/2021) without evidence of source of iron loss. Random colon biopsies negative. Reported progression of fatigue at last visit on 3/3/2022 with \"waves of severe fatigue and shortness of breath with minimal exertion\". Repeat lab evaluation by oncology showed normal CBC, CMP, ferritin, and iron studies. Decision made by Dr. Krishna Hopkins to hold [facility] for a total of 7 weeks before reinitiating to see if may help with symptoms. Remained on letrozole. Repeat EKG (3/3/2022) was unchanged and repeat echocardiogram (4/11/2022) showed no change. Did note improvement with holding of Ibrance but has now resumed. Felt that letrozole likely culprit for symptoms of fatigue and now being changed to Aromasin. Discussed weighing benefits of continued therapy with Ibrance and aromatase inhibitor versus maintaining quality of life. Will continue to address. 2. Hypertension. Remains well controlled on losartan 25 mg daily. Renal function mildly decreased  with creatinine 0.98/eGFR 55 in 12/2021, but normalized with creatinine of 0.80 on 2/25/2022 at [facility]. Advised to increase fluid intake and will continue to monitor. Brain MRI (5/2021) with evidence of mild to moderate burden nonspecific white matter lesions and a few tiny old lacunar infarcts, likely from longstanding hypertension. Patient had been started on aspirin 81 mg daily as advised by Dr. Jodi Banda, but reports that she is no longer taking. 3. Breast cancer with pulmonary metastasis. Continues on current therapy with Ibrance and transitioning from letrozole to Aromasin as discussed to see if fatigue may improve.   Staging CT chest/ abdomen/ and pelvis (6/2022) without evidence of recurrent or metastatic disease. Continuing with annual mammograms, last 12/2021, without evidence of recurrent disease. Not using compression sleeve for lymphedema regularly, but receiving massage therapy every three weeks with good results. Continuing to follow with Dr. Sergei Haro.   4. Right ICA terminus aneurysm. MRA brain (4/2019) showed a 2 mm superiorly directed aneurysm at the right ICA terminus. Repeat MRA brain (5/2021) showed no significant interval change. Reported intermittent tremor with intention (L>R) at last visit, and underwent repeat brain MRI/MRA (5/2022) without change. Being monitored by Dr. Mary Acuna. 5. Hyperlipidemia. Calculated 10 year ASCVD risk is 17.4%, which places her in one of the four statin benefit groups (primary prevention with risk > 7.5%). However, LDL improved to 116 and HDL 70 (12/2021). Given lack of history of ASCVD, no evidence of atherosclerotic disease on chest and abdominal CT scans, and history of metastatic breast cancer, will not recommend treatment with statin at this time. Continue to emphasized importance of lifestyle modifications, including heart healthy diet and exercise. 6. Osteopenia. History of T11 compression fracture in 4/2016, s/p kyphoplasty. Last bone density scan 11/2019. Using femoral neck T-scores, calculated FRAX score estimates her 10 year risk of a major osteoporetic fracture at 16 % and hip fracture at 2.3%, which alone are not an indication for biphosphonate treatment. However, development of a vertebral compression fracture and treatment with aromatase inhibitor increases likelihood of progression. Evaluated by Dr. Tristen Nj and received first dose of Reclast on 3/29/2021. Continue calcium and Vitamin D. Encouraged to continue exercises. Repeat bone density scan performed in 11/2021 showing stability without statistically significant change with FRAX scores calculated at 14% / 2.2%.   Advised to continue Reclast infusions, last 4/4/2022. Continuing to follow with Dr. Trent Moran. 7. Lumbar radiculopathy. Known lumbar stenosis and facet arthropathy. Using meloxicam, cyclobenzaprine, gabapentin, and Tylenol. Previously underwent L4-L5 epidural injection on the right by Dr. Mesha Perdomo in 2016 for similar symptoms. Recent recurrence of symptoms without sustained improvement despite treatment with steroids, NSAIDS, muscle relaxants and gabapentin. Underwent repeat lumbar MRI in 5/2020 which showed very advanced multilevel DDD and DJD with chronic compression fractures and worsening since 2016, especially at L4/5. Underwent left L4/5 and right L5-S1 KAREN in 6/2020 and left S1 TF KAREN in 8/2020 by Dr. Mesha Perdomo with improvement. However, continuing to have persistent difficulty with left-sided buttock pain and underwent left hip MRI (11/6/2021) which showed mild bilateral hip osteoarthritis (L>R), and asymmetric atrophy of the proximal left tensor fascia will. Reports received a ultrasound-guided steroid injection for left trochanteric bursitis on 11/10/2021, which provided some relief transiently. Subsequently underwent KAREN at left L4/5 and L5/S1 on 12/13/2021 by Dr. Mesha Perdomo with some improvement. Completed physical therapy with improvement in pain in bilateral hips and lower back. However, noted worsening left leg weakness and using a cane for ambulation due to walking with a limp. Advised resumption of physical therapy given benefit in the past.  Also noting some increased neck pain and to be addressed at physical therapy as well. Will continue to follow. 8. Tourette's syndrome. Followed by Dr. Gerald Self. On Haldol with plans to switch to Risperdal if Haldol becomes unavailable. Current dose of 2 mg twice daily very effective in controlling symptoms. Attempted to decrease dose of Haldol to 1 mg daily given new tremor, and did note resolution of tremor.   However, Tourette's symptoms significantly worsened requiring resumption of 2 mg dose. Will continue to monitor. 9. History of recurrent diverticulitis. Colonoscopy (2/2016) with moderately severe diverticulosis in the ascending and descending colon. Has had multiple recent episodes of abdominal pain which were treated empirically with Cipro. Episode of symptoms in 3/2018 resolved prior to initiating antibiotics, bringing into question whether her abdominal symptoms were due to diverticulitis or irritable bowel syndrome. Had recurrent episode in 7/2018, and abdominal CT scan confirmed diagnosis of acute diverticulitis. Treated with Augmentin as developed rash on Flagyl. Subsequently evaluated by Dr. Leonor Mendoza and instructed to continue Probiotics and use hyoscyamine as needed when develop abdominal complaints as concerned that recurrent symptoms related to IBS. Reports no recent recurrence. Will continue to follow. 10. GERD. Using Protonix 20 mg daily with reasonable control. Reports may increase to 40 mg occasionally for increased symptoms. Underwent upper endoscopy (8/11/2021) by Dr. Leonor Mendoza to evaluate iron deficiency and noted to have normal stomach and duodenum, abnormal motility of the esophagus and a small hiatal hernia. Will continue to monitor. 11. Insomnia. Using melatonin nightly. Will also use temazepam alternating with diazepam intermittently when melatonin not effective. Benzodiazepines prescribed by Dr. Handy Uriarte. 12. Anxiety. Patient described symptoms of anxiety and possible mild depression felt to likely be reactive to current health issues. She does describe continued difficulty with chronic insomnia. Prescribed sertraline 25 mg daily to see if may provide some benefit, but did not initiate due to concern regarding possible side effects. Will continue to monitor. 13. Overweight. Weight remaining overall stable with BMI 28. Emphasized continued lifestyle modifications including attempts at regular exercise and heart healthy diet. Will continue to monitor.   14. Health maintenance. Completed four dose series of the [address] 19 vaccine given immunosuppressed status. Has not yet received Shingrix vaccine and remains hesitant to do so. Other immunizations up to date. Mammogram up-to-date. Vitamin D level has been normal on maintenance dose supplement. Continue regular eye exams with Dr. Leopoldo Ferry. Completed cataract surgery in 6/6208 without complications. In addition, an annual Medicare wellness visit was done today. Patient understands recommendations and agrees with plan. Follow-up in 6 months.

## 2022-08-09 ENCOUNTER — HOSPITAL ENCOUNTER (EMERGENCY)
Age: 77
Discharge: HOME | End: 2022-08-09
Attending: EMERGENCY MEDICINE
Payer: MEDICARE

## 2022-08-09 ENCOUNTER — NURSE TRIAGE (OUTPATIENT)
Dept: OTHER | Facility: CLINIC | Age: 77
End: 2022-08-09

## 2022-08-09 ENCOUNTER — APPOINTMENT (OUTPATIENT)
Dept: CT IMAGING | Age: 77
End: 2022-08-09
Attending: EMERGENCY MEDICINE
Payer: MEDICARE

## 2022-08-09 VITALS
HEART RATE: 81 BPM | HEIGHT: 59 IN | SYSTOLIC BLOOD PRESSURE: 160 MMHG | WEIGHT: 138 LBS | RESPIRATION RATE: 20 BRPM | BODY MASS INDEX: 27.82 KG/M2 | DIASTOLIC BLOOD PRESSURE: 84 MMHG | TEMPERATURE: 98.3 F | OXYGEN SATURATION: 99 %

## 2022-08-09 DIAGNOSIS — K57.92 ACUTE DIVERTICULITIS: Primary | ICD-10-CM

## 2022-08-09 DIAGNOSIS — D72.829 LEUKOCYTOSIS, UNSPECIFIED TYPE: ICD-10-CM

## 2022-08-09 LAB
ALBUMIN SERPL-MCNC: 3.7 G/DL (ref 3.4–5)
ALBUMIN/GLOB SERPL: 1.1 {RATIO} (ref 0.8–1.7)
ALP SERPL-CCNC: 66 U/L (ref 45–117)
ALT SERPL-CCNC: 23 U/L (ref 13–56)
ANION GAP SERPL CALC-SCNC: 4 MMOL/L (ref 3–18)
APPEARANCE UR: CLEAR
AST SERPL-CCNC: 11 U/L (ref 10–38)
BASOPHILS # BLD: 0 K/UL (ref 0–0.1)
BASOPHILS NFR BLD: 0 % (ref 0–2)
BILIRUB SERPL-MCNC: 0.6 MG/DL (ref 0.2–1)
BILIRUB UR QL: NEGATIVE
BUN SERPL-MCNC: 12 MG/DL (ref 7–18)
BUN/CREAT SERPL: 16 (ref 12–20)
CALCIUM SERPL-MCNC: 9.5 MG/DL (ref 8.5–10.1)
CHLORIDE SERPL-SCNC: 102 MMOL/L (ref 100–111)
CO2 SERPL-SCNC: 30 MMOL/L (ref 21–32)
COLOR UR: YELLOW
CREAT SERPL-MCNC: 0.76 MG/DL (ref 0.6–1.3)
DIFFERENTIAL METHOD BLD: ABNORMAL
EOSINOPHIL # BLD: 0.2 K/UL (ref 0–0.4)
EOSINOPHIL NFR BLD: 1 % (ref 0–5)
ERYTHROCYTE [DISTWIDTH] IN BLOOD BY AUTOMATED COUNT: 12.3 % (ref 11.6–14.5)
GLOBULIN SER CALC-MCNC: 3.4 G/DL (ref 2–4)
GLUCOSE SERPL-MCNC: 112 MG/DL (ref 74–99)
GLUCOSE UR STRIP.AUTO-MCNC: NEGATIVE MG/DL
HCT VFR BLD AUTO: 41.7 % (ref 35–45)
HGB BLD-MCNC: 13.9 G/DL (ref 12–16)
HGB UR QL STRIP: NEGATIVE
IMM GRANULOCYTES # BLD AUTO: 0.1 K/UL (ref 0–0.04)
IMM GRANULOCYTES NFR BLD AUTO: 0 % (ref 0–0.5)
KETONES UR QL STRIP.AUTO: NEGATIVE MG/DL
LEUKOCYTE ESTERASE UR QL STRIP.AUTO: NEGATIVE
LIPASE SERPL-CCNC: 172 U/L (ref 73–393)
LYMPHOCYTES # BLD: 1.7 K/UL (ref 0.9–3.6)
LYMPHOCYTES NFR BLD: 12 % (ref 21–52)
MCH RBC QN AUTO: 33.4 PG (ref 24–34)
MCHC RBC AUTO-ENTMCNC: 33.3 G/DL (ref 31–37)
MCV RBC AUTO: 100.2 FL (ref 78–100)
MONOCYTES # BLD: 1.2 K/UL (ref 0.05–1.2)
MONOCYTES NFR BLD: 9 % (ref 3–10)
NEUTS SEG # BLD: 10.8 K/UL (ref 1.8–8)
NEUTS SEG NFR BLD: 77 % (ref 40–73)
NITRITE UR QL STRIP.AUTO: NEGATIVE
NRBC # BLD: 0 K/UL (ref 0–0.01)
NRBC BLD-RTO: 0 PER 100 WBC
PH UR STRIP: 7 [PH] (ref 5–8)
PLATELET # BLD AUTO: 258 K/UL (ref 135–420)
PMV BLD AUTO: 8.6 FL (ref 9.2–11.8)
POTASSIUM SERPL-SCNC: 4.5 MMOL/L (ref 3.5–5.5)
PROT SERPL-MCNC: 7.1 G/DL (ref 6.4–8.2)
PROT UR STRIP-MCNC: NEGATIVE MG/DL
RBC # BLD AUTO: 4.16 M/UL (ref 4.2–5.3)
SODIUM SERPL-SCNC: 136 MMOL/L (ref 136–145)
SP GR UR REFRACTOMETRY: 1.02 (ref 1–1.03)
UROBILINOGEN UR QL STRIP.AUTO: 0.2 EU/DL (ref 0.2–1)
WBC # BLD AUTO: 13.9 K/UL (ref 4.6–13.2)

## 2022-08-09 PROCEDURE — 74011250636 HC RX REV CODE- 250/636: Performed by: EMERGENCY MEDICINE

## 2022-08-09 PROCEDURE — 74011000636 HC RX REV CODE- 636: Performed by: EMERGENCY MEDICINE

## 2022-08-09 PROCEDURE — 83690 ASSAY OF LIPASE: CPT

## 2022-08-09 PROCEDURE — 81003 URINALYSIS AUTO W/O SCOPE: CPT

## 2022-08-09 PROCEDURE — 80053 COMPREHEN METABOLIC PANEL: CPT

## 2022-08-09 PROCEDURE — 74177 CT ABD & PELVIS W/CONTRAST: CPT

## 2022-08-09 PROCEDURE — 99285 EMERGENCY DEPT VISIT HI MDM: CPT

## 2022-08-09 PROCEDURE — 85025 COMPLETE CBC W/AUTO DIFF WBC: CPT

## 2022-08-09 RX ORDER — AMOXICILLIN AND CLAVULANATE POTASSIUM 875; 125 MG/1; MG/1
1 TABLET, FILM COATED ORAL 2 TIMES DAILY
Qty: 20 TABLET | Refills: 0 | Status: SHIPPED | OUTPATIENT
Start: 2022-08-09 | End: 2022-08-19

## 2022-08-09 RX ORDER — AMOXICILLIN AND CLAVULANATE POTASSIUM 875; 125 MG/1; MG/1
1 TABLET, FILM COATED ORAL
Status: DISCONTINUED | OUTPATIENT
Start: 2022-08-09 | End: 2022-08-09 | Stop reason: HOSPADM

## 2022-08-09 RX ORDER — LANOLIN ALCOHOL/MO/W.PET/CERES
1000 CREAM (GRAM) TOPICAL DAILY
COMMUNITY

## 2022-08-09 RX ORDER — SODIUM CHLORIDE 9 MG/ML
125 INJECTION, SOLUTION INTRAVENOUS CONTINUOUS
Status: DISCONTINUED | OUTPATIENT
Start: 2022-08-09 | End: 2022-08-09 | Stop reason: HOSPADM

## 2022-08-09 RX ADMIN — SODIUM CHLORIDE 500 ML: 900 INJECTION, SOLUTION INTRAVENOUS at 11:37

## 2022-08-09 RX ADMIN — IOPAMIDOL 100 ML: 612 INJECTION, SOLUTION INTRAVENOUS at 11:54

## 2022-08-09 NOTE — ED TRIAGE NOTES
Patient c/o hx of diverticulitis for most of her life. She c/o diverticulitis exacerbation x 1 week. She denies vomiting, constipation or diarrhea. She states hx of IBS which produces \"multiple stools in a day\".

## 2022-08-09 NOTE — ED PROVIDER NOTES
EMERGENCY DEPARTMENT HISTORY AND PHYSICAL EXAM    12:35 PM      Date: 8/9/2022  Patient Name: Nicanor Franklin    History of Presenting Illness     Chief Complaint   Patient presents with    Abdominal Pain    Diverticulitis         History Provided By: Patient  Location/Duration/Severity/Modifying factors   The patient is a 82-year-old female with a history of metastatic breast cancer currently on chemotherapy holiday due to fatigue, recurrent diverticulitis, irritable bowel syndrome,Vitamin D fish IBS, hyperlipidemia, Tourette syndrome, osteoarthritis, the presents emergency department with complaint of abdominal pain that is increasing for the last 48 hours. The patient has a history of recurrent diverticulitis and knows the symptoms associated with her diverticulitis and often can decrease her diet take supportive medications and will feel better. The patient symptoms continue to worsen and the patient was concerned that she may be progressing to a more serious case of diverticulitis. Patient called her primary doctor and her primary doctor sent her in for evaluation. The patient denies any fevers, chills, or shortness of breath. Patient is currently not a smoker, drinker, nor drug user. Patient is retired from [facility]. The patient denies any blood in her stool or diarrhea. PCP: Jamila Daly MD    Current Facility-Administered Medications   Medication Dose Route Frequency Provider Last Rate Last Admin    0.9% sodium chloride infusion  125 mL/hr IntraVENous CONTINUOUS Bud Silver MD        amoxicillin-clavulanate (AUGMENTIN) 875-125 mg per tablet 1 Tablet  1 Tablet Oral NOW Bud Silver MD         Current Outpatient Medications   Medication Sig Dispense Refill    cyanocobalamin (Vitamin B-12) 1,000 mcg tablet Take 1,000 mcg by mouth in the morning.       amoxicillin-clavulanate (Augmentin) 875-125 mg per tablet Take 1 Tablet by mouth two (2) times a day for 10 days. 20 Tablet 0    gabapentin (NEURONTIN) 300 mg capsule Take 1 Capsule by mouth nightly. Max Daily Amount: 300 mg. 90 Capsule 1    temazepam (RESTORIL) 15 mg capsule Take 1 Capsule by mouth nightly as needed for Sleep. Max Daily Amount: 15 mg. 30 Capsule 0    diazePAM (Valium) 5 mg tablet Take 1 Tablet by mouth every six (6) hours as needed for Anxiety. Max Daily Amount: 20 mg. 30 Tablet 0    meloxicam (MOBIC) 15 mg tablet Take 1 Tablet by mouth daily. Take with food 90 Tablet 2    haloperidoL (HALDOL) 2 mg tablet Take 1 Tablet by mouth two (2) times a day. 180 Tablet 1    losartan (COZAAR) 25 mg tablet Take 1 Tablet by mouth daily. 90 Tablet 3    ondansetron hcl (Zofran) 8 mg tablet Take 1 Tablet by mouth every eight (8) hours as needed for Nausea or Vomiting. Take one tablet by mouth every 8 hours as needed. 30 Tablet 0    acetaminophen (TYLENOL) 500 mg tablet Take 500 mg by mouth every six (6) hours as needed for Pain. Take 1-2 tablets by mouth every 6 hours as needed. pantoprazole sodium (PROTONIX PO) Take 40 mg by mouth daily. Biotin 2,500 mcg cap Take  by mouth.      calcium-vitamin D (OS-ENOCH +D3) 500 mg-200 unit per tablet Take 1 Tab by mouth two (2) times daily (with meals). cholecalciferol (VITAMIN D3) (1000 Units /25 mcg) tablet Take 4,000 Units by mouth daily. exemestane (AROMASIN) 25 mg tablet Take 25 mg by mouth daily. (Patient not taking: Reported on 8/9/2022)      palbociclib 75 mg cap Take 75 mg by mouth daily.  For days 1-21 of each 28 day cycle (Patient not taking: Reported on 8/9/2022)         Past History     Past Medical History:  Past Medical History:   Diagnosis Date    Arthritis     Breast cancer metastasized to lung [facility])     Left Breast    Chronic pain     Colon polyps 2/22/16    distal sigmoid, Dr. Stephany HERNANDEZ (degenerative disc disease)     Diverticulitis of colon     Diverticulosis 2/22/16    mild in the ascedning and sigmoid colon,  jet    HLD (hyperlipidemia)     Hypertension     Osteopenia     Reactive depression 3/4/2022    Tourette syndrome     Vitamin D deficiency        Past Surgical History:  Past Surgical History:   Procedure Laterality Date    COLONOSCOPY N/A 2021    COLONOSCOPY with Bx performed by Irina Chowdhury MD at [facility] ENDOSCOPY    HX APPENDECTOMY      HX BREAST BIOPSY  7-30-10    Left Breast w/Nipple Duct exploration and excisional biopsy-left    HX DILATION AND CURETTAGE      x3    HX MODIFIED RADICAL MASTECTOMY Left 2010    HX ORTHOPAEDIC Right 2013    knee replacement    HX TONSILLECTOMY      HYSTEROSCOPY DIAGNOSTIC         Family History:  Family History   Problem Relation Age of Onset    Osteoporosis Sister     Osteoporosis Mother     Stroke Father     Hypertension Father     Cancer Paternal Aunt         breast       Social History:  Social History     Tobacco Use    Smoking status: Former     Packs/day: 1.00     Years: 3.00     Pack years: 3.00     Types: Cigarettes     Quit date:      Years since quittin.6    Smokeless tobacco: Never    Tobacco comments:     quit in college   Substance Use Topics    Alcohol use: Not Currently     Alcohol/week: 5.8 standard drinks     Types: 7 Glasses of wine per week    Drug use: No       Allergies: Allergies   Allergen Reactions    Keflex [Cephalexin] Rash    Metronidazole Rash    Other Medication Nausea Only     Anesthesia    Shellfish Containing Products Nausea and Vomiting     More of just eating the actual shellfish. Sulfa (Sulfonamide Antibiotics) Rash    Ultram [Tramadol] Nausea and Vomiting     Patient states she is allergic to most Narcotics    Vancomycin Rash         Review of Systems       Review of Systems   Constitutional:  Positive for fatigue. Negative for activity change and fever. HENT:  Negative for congestion and rhinorrhea. Eyes:  Negative for visual disturbance. Respiratory:  Negative for shortness of breath.     Cardiovascular: Negative for chest pain and palpitations. Gastrointestinal:  Positive for abdominal pain. Negative for diarrhea, nausea and vomiting. Genitourinary:  Negative for dysuria and hematuria. Musculoskeletal:  Negative for back pain. Skin:  Negative for rash. Neurological:  Negative for dizziness, weakness and light-headedness. All other systems reviewed and are negative. Physical Exam   Visit Vitals  BP (!) 160/84 (BP 1 Location: Right upper arm, BP Patient Position: At rest)   Pulse 81   Temp 98.3 °F (36.8 °C)   Resp 20   Ht 4' 11\" (1.499 m)   Wt 62.6 kg (138 lb)   SpO2 99%   BMI 27.87 kg/m²         Physical Exam  Vitals and nursing note reviewed. Constitutional:       General: She is not in acute distress. Appearance: She is well-developed. HENT:      Head: Normocephalic and atraumatic. Right Ear: External ear normal.      Left Ear: External ear normal.      Nose: Nose normal.   Eyes:      General: No scleral icterus. Conjunctiva/sclera: Conjunctivae normal.      Pupils: Pupils are equal, round, and reactive to light. Neck:      Thyroid: No thyromegaly. Vascular: No JVD. Trachea: No tracheal deviation. Cardiovascular:      Rate and Rhythm: Normal rate and regular rhythm. Heart sounds: Normal heart sounds. No murmur heard. No friction rub. No gallop. Pulmonary:      Effort: Pulmonary effort is normal.      Breath sounds: Normal breath sounds. Chest:      Chest wall: No tenderness. Abdominal:      General: Bowel sounds are normal. There is no distension. Palpations: Abdomen is soft. Tenderness: There is abdominal tenderness in the suprapubic area and left lower quadrant. There is no guarding or rebound. Musculoskeletal:         General: No tenderness. Normal range of motion. Cervical back: Normal range of motion and neck supple. Lymphadenopathy:      Cervical: No cervical adenopathy. Skin:     General: Skin is warm and dry.    Neurological: Mental Status: She is alert and oriented to person, place, and time. Cranial Nerves: No cranial nerve deficit. Coordination: Coordination normal.      Comments: No sensory loss, Gait normal, Motor 5/5   Psychiatric:         Behavior: Behavior normal.         Thought Content: Thought content normal.         Judgment: Judgment normal.         Diagnostic Study Results     Labs -  Recent Results (from the past 12 hour(s))   CBC WITH AUTOMATED DIFF    Collection Time: 08/09/22 10:10 AM   Result Value Ref Range    WBC 13.9 (H) 4.6 - 13.2 K/uL    RBC 4.16 (L) 4.20 - 5.30 M/uL    HGB 13.9 12.0 - 16.0 g/dL    HCT 41.7 35.0 - 45.0 %    .2 (H) 78.0 - 100.0 FL    MCH 33.4 24.0 - 34.0 PG    MCHC 33.3 31.0 - 37.0 g/dL    RDW 12.3 11.6 - 14.5 %    PLATELET 077 508 - 459 K/uL    MPV 8.6 (L) 9.2 - 11.8 FL    NRBC 0.0 0  WBC    ABSOLUTE NRBC 0.00 0.00 - 0.01 K/uL    NEUTROPHILS 77 (H) 40 - 73 %    LYMPHOCYTES 12 (L) 21 - 52 %    MONOCYTES 9 3 - 10 %    EOSINOPHILS 1 0 - 5 %    BASOPHILS 0 0 - 2 %    IMMATURE GRANULOCYTES 0 0.0 - 0.5 %    ABS. NEUTROPHILS 10.8 (H) 1.8 - 8.0 K/UL    ABS. LYMPHOCYTES 1.7 0.9 - 3.6 K/UL    ABS. MONOCYTES 1.2 0.05 - 1.2 K/UL    ABS. EOSINOPHILS 0.2 0.0 - 0.4 K/UL    ABS. BASOPHILS 0.0 0.0 - 0.1 K/UL    ABS. IMM. GRANS. 0.1 (H) 0.00 - 0.04 K/UL    DF AUTOMATED     METABOLIC PANEL, COMPREHENSIVE    Collection Time: 08/09/22 10:10 AM   Result Value Ref Range    Sodium 136 136 - 145 mmol/L    Potassium 4.5 3.5 - 5.5 mmol/L    Chloride 102 100 - 111 mmol/L    CO2 30 21 - 32 mmol/L    Anion gap 4 3.0 - 18 mmol/L    Glucose 112 (H) 74 - 99 mg/dL    BUN 12 7.0 - 18 MG/DL    Creatinine 0.76 0.6 - 1.3 MG/DL    BUN/Creatinine ratio 16 12 - 20      GFR est AA >60 >60 ml/min/1.73m2    GFR est non-AA >60 >60 ml/min/1.73m2    Calcium 9.5 8.5 - 10.1 MG/DL    Bilirubin, total 0.6 0.2 - 1.0 MG/DL    ALT (SGPT) 23 13 - 56 U/L    AST (SGOT) 11 10 - 38 U/L    Alk.  phosphatase 66 45 - 117 U/L Protein, total 7.1 6.4 - 8.2 g/dL    Albumin 3.7 3.4 - 5.0 g/dL    Globulin 3.4 2.0 - 4.0 g/dL    A-G Ratio 1.1 0.8 - 1.7     LIPASE    Collection Time: 08/09/22 10:10 AM   Result Value Ref Range    Lipase 172 73 - 393 U/L   URINALYSIS W/ RFLX MICROSCOPIC    Collection Time: 08/09/22 12:05 PM   Result Value Ref Range    Color YELLOW      Appearance CLEAR      Specific gravity 1.019 1.005 - 1.030      pH (UA) 7.0 5.0 - 8.0      Protein Negative NEG mg/dL    Glucose Negative NEG mg/dL    Ketone Negative NEG mg/dL    Bilirubin Negative NEG      Blood Negative NEG      Urobilinogen 0.2 0.2 - 1.0 EU/dL    Nitrites Negative NEG      Leukocyte Esterase Negative NEG         Radiologic Studies -   CT ABD PELV W CONT   Final Result      Proximal sigmoid diverticulitis. No complication of bowel obstruction, free air   or abscess. Hepatic cysts. Medical Decision Making   I am the first provider for this patient. I reviewed the vital signs, available nursing notes, past medical history, past surgical history, family history and social history. Vital Signs-Reviewed the patient's vital signs. Records Reviewed: Nursing Notes, Old Medical Records, Previous Radiology Studies, and Previous Laboratory Studies (Time of Review: 12:35 PM)    ED Course: Progress Notes, Reevaluation, and Consults: Workup and recommendations were reviewed with the patient and all questions were answered. The patient understands the plan and will proceed with close outpatient care. I have encouraged the patient to return if at all worsened or concerned.    Ivette Daniel DO 12:56 PM      Provider Notes (Medical Decision Making):   MDM  Number of Diagnoses or Management Options  Acute diverticulitis  Leukocytosis, unspecified type  Diagnosis management comments: Patient is a 70-year-old female with a history of IBS, metastatic breast cancer following closely with oncology currently off her suppressive medications, recurrent diverticulitis, who presents emergency department with increasing suprapubic and left lower quadrant pain for the last 48 hours which is worse than her mild symptoms that she is had in the past.  The patient CT scan shows she has acute uncomplicated diverticulitis and no evidence of abscess, and mild leukocytosis. Patient has had poor tolerance of Cipro and Flagyl in the past and likely related to a Flagyl allergy according to her primary doctor. The patient has tolerated Augmentin in the past without reaction. We will start the patient on Augmentin, have discussed with her to increase her diet as tolerated at home, and follow closely with her primary doctor. Procedures          Diagnosis     Clinical Impression:   1. Acute diverticulitis    2. Leukocytosis, unspecified type        Disposition: DC    Follow-up Information    None          Patient's Medications   Start Taking    AMOXICILLIN-CLAVULANATE (AUGMENTIN) 875-125 MG PER TABLET    Take 1 Tablet by mouth two (2) times a day for 10 days. Continue Taking    ACETAMINOPHEN (TYLENOL) 500 MG TABLET    Take 500 mg by mouth every six (6) hours as needed for Pain. Take 1-2 tablets by mouth every 6 hours as needed. BIOTIN 2,500 MCG CAP    Take  by mouth. CALCIUM-VITAMIN D (OS-ENOCH +D3) 500 MG-200 UNIT PER TABLET    Take 1 Tab by mouth two (2) times daily (with meals). CHOLECALCIFEROL (VITAMIN D3) (1000 UNITS /25 MCG) TABLET    Take 4,000 Units by mouth daily. CYANOCOBALAMIN (VITAMIN B-12) 1,000 MCG TABLET    Take 1,000 mcg by mouth in the morning. DIAZEPAM (VALIUM) 5 MG TABLET    Take 1 Tablet by mouth every six (6) hours as needed for Anxiety. Max Daily Amount: 20 mg. EXEMESTANE (AROMASIN) 25 MG TABLET    Take 25 mg by mouth daily. GABAPENTIN (NEURONTIN) 300 MG CAPSULE    Take 1 Capsule by mouth nightly. Max Daily Amount: 300 mg. HALOPERIDOL (HALDOL) 2 MG TABLET    Take 1 Tablet by mouth two (2) times a day.     LOSARTAN (COZAAR) 25 MG TABLET    Take 1 Tablet by mouth daily. MELOXICAM (MOBIC) 15 MG TABLET    Take 1 Tablet by mouth daily. Take with food    ONDANSETRON HCL (ZOFRAN) 8 MG TABLET    Take 1 Tablet by mouth every eight (8) hours as needed for Nausea or Vomiting. Take one tablet by mouth every 8 hours as needed. PALBOCICLIB 75 MG CAP    Take 75 mg by mouth daily. For days 1-21 of each 28 day cycle    PANTOPRAZOLE SODIUM (PROTONIX PO)    Take 40 mg by mouth daily. TEMAZEPAM (RESTORIL) 15 MG CAPSULE    Take 1 Capsule by mouth nightly as needed for Sleep. Max Daily Amount: 15 mg. These Medications have changed    No medications on file   Stop Taking    No medications on file     Disclaimer: Sections of this note are dictated using utilizing voice recognition software. Minor typographical errors may be present. If questions arise, please do not hesitate to contact me or call our department.

## 2022-08-09 NOTE — TELEPHONE ENCOUNTER
Received call from Lise Zhu at [address] [facility] with [employer] Complaint. Subjective: Caller states \"I've had some twinges of the diverticulitis for about a week and yesterday the pain came on early in the morning. I've been dealing with diverticulosis for 30 years. I've spoken with Dr. Linda Pratt this morning and she said to come on in for an appt tomorrow at 9:30.\"       Pain Severity: 7/10; sharp; intermittent    Temperature: no fever     What has been tried: tylenol      Recommended disposition: See PCP within 24 Hours    Care advice provided, patient verbalizes understanding; denies any other questions or concerns; instructed to call back for any new or worsening symptoms. Sean, Service Expert, to make an appt for pt. Attention Provider: Thank you for allowing me to participate in the care of your patient. The patient was connected to triage in response to information provided to the ECC. Please do not respond through this encounter as the response is not directed to a shared pool.       Reason for Disposition   [1] MODERATE pain (e.g., interferes with normal activities) AND [2] pain comes and goes (cramps) AND [3] present > 24 hours  (Exception: pain with Vomiting or Diarrhea - see that Guideline)    Protocols used: Abdominal Pain - Female-ADULT-

## 2022-08-10 ENCOUNTER — PATIENT MESSAGE (OUTPATIENT)
Dept: INTERNAL MEDICINE CLINIC | Age: 77
End: 2022-08-10

## 2022-08-10 NOTE — TELEPHONE ENCOUNTER
Called and spoke with patient. Reports development of low-grade fever today and did not take her dose of Augmentin this morning as concerned that the headache she experienced overnight may have been secondary to the antibiotic. Advised that would recommend taking Tylenol with her dose of Augmentin to see if she can avoid developing a headache. Discussed that given elevated WBC and if unable to tolerate oral antibiotics and developing fever, may need to consider reevaluation in the ED for possible admission for IV antibiotics. Patient states that she will attempt another dose of Augmentin with Tylenol. Discussed the need to follow a clear liquid diet. Answered all questions.

## 2022-08-22 ENCOUNTER — PATIENT MESSAGE (OUTPATIENT)
Dept: INTERNAL MEDICINE CLINIC | Age: 77
End: 2022-08-22

## 2022-08-22 DIAGNOSIS — F95.2 TOURETTE SYNDROME: ICD-10-CM

## 2022-08-22 RX ORDER — HALOPERIDOL 2 MG/1
TABLET ORAL
Qty: 180 TABLET | Refills: 1 | Status: SHIPPED | OUTPATIENT
Start: 2022-08-22

## 2022-08-22 RX ORDER — HALOPERIDOL 2 MG/1
2 TABLET ORAL 2 TIMES DAILY
Qty: 180 TABLET | Refills: 1 | OUTPATIENT
Start: 2022-08-22

## 2022-08-22 RX ORDER — HYOSCYAMINE SULFATE 0.12 MG/1
0.12 TABLET, ORALLY DISINTEGRATING ORAL
Qty: 30 TABLET | Refills: 1 | Status: SHIPPED | OUTPATIENT
Start: 2022-08-22

## 2022-08-22 NOTE — TELEPHONE ENCOUNTER
Haldol was already sent to the pharmacy today. Bassem Myers in place:   Recommendation Provided To:    Intervention Detail: New Rx: 1, reason: Patient Preference  Gap Closed?:   Intervention Accepted By:   Time Spent (min): 5

## 2022-08-22 NOTE — TELEPHONE ENCOUNTER
Called and spoke with patient. Addressed message and advised that may take time to recover completely from her diverticulitis episode. Advised symptomatic treatment with restarting probiotic and increasing fiber in diet. Will also prescribe refill of hyoscyamine to help with irritable bowel symptoms. Advised to call back for any worsening symptoms. Answered all questions.

## 2022-09-22 DIAGNOSIS — G47.00 INSOMNIA, UNSPECIFIED TYPE: ICD-10-CM

## 2022-09-22 RX ORDER — TEMAZEPAM 15 MG/1
15 CAPSULE ORAL
Qty: 30 CAPSULE | Refills: 0 | Status: SHIPPED | OUTPATIENT
Start: 2022-09-22

## 2022-09-22 NOTE — TELEPHONE ENCOUNTER
[facility] for Restoril as 4/6/22 for a 30 d/s. Last Visit: 6/23/22 with MD Ping Acosta  Next Appointment: 12/27/22 with MD Ping Acosta  Previous Refill Encounter(s): 4/6/22 #30    Requested Prescriptions     Pending Prescriptions Disp Refills    temazepam (RESTORIL) 15 mg capsule 30 Capsule 0     Sig: Take 1 Capsule by mouth nightly as needed for Sleep. Max Daily Amount: 15 mg. For [facility] in place:   Recommendation Provided To:    Intervention Detail: New Rx: 1, reason: Patient Preference  Gap Closed?:   Intervention Accepted By:   Time Spent (min): 5

## 2022-09-23 ENCOUNTER — TELEPHONE (OUTPATIENT)
Dept: INTERNAL MEDICINE CLINIC | Age: 77
End: 2022-09-23

## 2022-10-07 DIAGNOSIS — M48.062 SPINAL STENOSIS OF LUMBAR REGION WITH NEUROGENIC CLAUDICATION: ICD-10-CM

## 2022-10-07 DIAGNOSIS — M54.30 SCIATICA, UNSPECIFIED LATERALITY: ICD-10-CM

## 2022-10-07 RX ORDER — GABAPENTIN 300 MG/1
300 CAPSULE ORAL
Qty: 90 CAPSULE | Refills: 1 | Status: SHIPPED | OUTPATIENT
Start: 2022-10-07

## 2022-10-07 NOTE — TELEPHONE ENCOUNTER
[facility] for Neurontin as 7/17/22 for a 90 d/s. Last Visit: 6/23/22 with MD Vel Currie  Next Appointment: 12/27/22 with MD Melgar  Previous Refill Encounter(s): 4/6/22 #90 with 1 refill    Requested Prescriptions     Pending Prescriptions Disp Refills    gabapentin (NEURONTIN) 300 mg capsule 90 Capsule 1     Sig: Take 1 Capsule by mouth nightly. Max Daily Amount: 300 mg. For [address] [facility] in place:   Recommendation Provided To:    Intervention Detail: New Rx: 1, reason: Patient Preference  Gap Closed?:   Intervention Accepted By:   Time Spent (min): 5

## 2022-10-27 ENCOUNTER — TELEPHONE (OUTPATIENT)
Dept: INTERNAL MEDICINE CLINIC | Age: 77
End: 2022-10-27

## 2022-10-27 NOTE — TELEPHONE ENCOUNTER
Patient called, states that she has been having pain in her lower left stomach that stretches around to her back. No fever and no other symptoms to report at this time. This started on Tuesday. She states that she was in the ED on 8/9/22 with Diverticulitis and was on antibiotics, and thinks this might be what the pain is from again. She is calling to see what she should do. She can be reached at [phone].   Sending to provider on call, please advise, thank you

## 2022-10-27 NOTE — TELEPHONE ENCOUNTER
Pt aware of message below and verbalized understanding. Patient states after resting today she is feeling slightly better. She is not running a fever at this time. Patient is going to \"play it by ear\" but will proceed to the ED if her current symptoms worsen. No further questions or concerns from pt at this time.

## 2022-10-27 NOTE — TELEPHONE ENCOUNTER
Would suggest going back to ER for quick evaluation  We have no openings in the office for the next few days

## 2022-10-31 NOTE — TELEPHONE ENCOUNTER
Called and spoke with patient she reports she is feeling better she states it took until yesterday but her symptoms did improve. Patient states she called [facility] ED on thursday and found out the wait was 6 hours so she did not go to the ED because she couldn't handle that wait time. She states she ran a low grade fever most the time but that subsided yesterday. Patient did want to report on Friday she had an episode of blurry vision and her right eye went left and her left eye went sideways she reports it lasted a few seconds she rested for a few and then was back to normal she wasn't sure what happened or caused it or if she had a possible TIA. She did report having frequent episodes of chills over the week but again that has now subsided.

## 2022-10-31 NOTE — TELEPHONE ENCOUNTER
Please advise that episode with vision not likely to have been a TIA given that it lasted only a few seconds with subsequent resolution. Please advise her to call back if symptoms recur and will consider further evaluation.

## 2022-11-01 ENCOUNTER — TRANSCRIBE ORDER (OUTPATIENT)
Dept: SCHEDULING | Age: 77
End: 2022-11-01

## 2022-11-01 DIAGNOSIS — M54.50 LUMBAR PAIN: Primary | ICD-10-CM

## 2022-11-01 NOTE — TELEPHONE ENCOUNTER
Patient aware of message below and verbalized understanding. Patient reports having an appointment with Enma Au next Friday- she felt like she was over due for an appointment with  any ways so she said she will mention this episode at that appointment as well.

## 2022-11-07 ENCOUNTER — HOSPITAL ENCOUNTER (OUTPATIENT)
Age: 77
Discharge: HOME | End: 2022-11-07
Attending: PHYSICAL MEDICINE & REHABILITATION
Payer: MEDICARE

## 2022-11-07 ENCOUNTER — TRANSCRIBE ORDER (OUTPATIENT)
Dept: SCHEDULING | Age: 77
End: 2022-11-07

## 2022-11-07 DIAGNOSIS — C78.00: ICD-10-CM

## 2022-11-07 DIAGNOSIS — C50.919 PRIMARY MALIGNANT NEOPLASM OF FEMALE BREAST (HCC): Primary | ICD-10-CM

## 2022-11-07 DIAGNOSIS — M54.50 LUMBAR PAIN: ICD-10-CM

## 2022-11-07 DIAGNOSIS — C79.51 BONE METASTASIS (HCC): ICD-10-CM

## 2022-11-07 PROCEDURE — 72148 MRI LUMBAR SPINE W/O DYE: CPT

## 2022-11-08 ENCOUNTER — TELEPHONE (OUTPATIENT)
Dept: INTERNAL MEDICINE CLINIC | Age: 77
End: 2022-11-08

## 2022-11-08 NOTE — TELEPHONE ENCOUNTER
Patient called to let Dr Kimberly Messer know that she had a MRI of lumbar spine done yesterday at [facility] that had been ordered by  [facility].

## 2022-11-14 NOTE — TELEPHONE ENCOUNTER
MRI Results (most recent):  Results from [place] encounter on 22    MRI LUMB SPINE WO CONT    Narrative  EXAM: MRI LUMB SPINE WO CONT    CLINICAL INDICATION/HISTORY: 78 years Female. Chronic back pain for 20 years,  with progression. .  Additional History: None    COMPARISON: MRI lumbar spine 2020    TECHNIQUE: Multiplanar multi-sequential imaging of the lumbar spine without  contrast.    FINDINGS:    VERTEBRAL NUMBERIN lumbar type vertebral bodies are assumed for the purposes  of this dictation. The disc space at axial T2 image 7 will be referred to as  L5-S1. VERTEBRAL ALIGNMENT: Exaggerated kyphosis at the T12 level. Levoscoliosis with  apex at L3-4. Grade 1 retrolisthesis of L1 on L2 and L2 on L3. Grade 1  anterolisthesis of L4 on L5. Grade 1 retrolisthesis of L5 on S1. Lateral  listhesis seen on prior CT is not well evaluated on the present exam.    VERTEBRAL BODY HEIGHTS AND MARROW SIGNAL: Acute transversely oriented fracture  through the L4 vertebral body involving the superior endplate, extending from  the anterior to posterior wall, with associated approximately 25% height loss. There is only mild surrounding marrow edema suggesting this is likely subacute  in age. Preserved and fracture involving the inferior aspect of the L2 vertebral  body involving the anterior vertebral body wall and posterior third of the  vertebral body with moderate surrounding marrow edema, likely subacute in age. No significant new height loss compared to prior MRI. Additional linear T1 hypointense line at the right superior L1 endplate appears  chronic and is likely related to endplate degenerative changes versus less  likely chronic fracture. Cement augmentation at T11, with extension of cement into the T10-11 disc space,  chronic. T1 hyperintense intraosseous hemangioma within the T10 vertebral body.  Multiple  T1 hyperintense foci throughout the visualized spine, which may reflect  intraosseous hemangiomas and/or foci of fatty marrow. Mild endplate edema at F4-3, T12/L1, L1-2, T10-11 and T11-12, likely discogenic. No suspicious marrow replacing lesion detected. CONUS MEDULLARIS: Terminates at the L1 level, with normal signal and morphology. VISUALIZED SOFT TISSUE/OTHER: Colonic diverticulosis. Disc levels:    T8-9: Sagittal plane only. Disc desiccation. Small disc osteophyte complex. Bilateral facet joint hypertrophy. No significant spinal canal stenosis. Mild  right and no significant left foraminal stenosis. T9-10: The sagittal plane only. Disc desiccation. Left foraminal perineural  versus nerve root sleeve cyst, similar in appearance to prior. No significant  spinal canal or foraminal stenosis. T10-11: T11 cement augmentation with extension of cement into the disc space. Moderate right paracentral through foraminal protrusion. Posterior ligamentous  buckling. Bilateral facet joint hypertrophy. Mild right and no significant left  foraminal stenosis. No significant spinal canal stenosis. T11-12: Anterior osteophyte. No significant posterior disc herniation. Bilateral  facet joint hypertrophy. No significant spinal canal or neural foraminal  stenosis. T12-L1: Moderate-sized disc osteophyte complex. Bilateral facet joint  hypertrophy, left greater than right. L1-2: Disc desiccation. Moderate-sized endplate osteophytes. Moderate to large  asymmetric disc bulge. Small superimposed central protrusion. Spinal canal: No significant stenosis. Right foramen: Mild stenosis. Disc osteophyte complex extends close to the  right L1 nerve root but there is no clear evidence of nerve root compression. Left foramen: No significant stenosis. L2-3: Moderate-sized disc osteophyte complex, asymmetric. Moderate left and mild  right facet and posterior ligamentous hypertrophy.   Spinal canal: Partial effacement of the thecal sac without significant spinal  canal stenosis. Right foramen: No significant stenosis. Left foramen: Mild stenosis    L3-4: Moderate-sized disc osteophyte complex with a lateral listhesis. Bilateral  facet and posterior ligamentous hypertrophy. Spinal canal: Mild right lateral recess narrowing without evidence of  crossing nerve root compression. Right foramen: Mild to moderate stenosis  Left foramen: Mild stenosis    L4-5: Anterolisthesis with uncovering of the intervertebral disc. Moderate-sized  disc bulge with endplate spurring. Advanced right and moderate left facet joint  hypertrophy with posterior ligamentous buckling. Spinal canal: Moderate central spinal canal stenosis and severe right lateral  recess stenosis with possible compression of the crossing right L5 nerve root. Right foramen: Severe stenosis with exiting right L4 nerve root compression  Left foramen: Mild stenosis    L5-S1: Disc desiccation. Small moderate-sized disc osteophyte complex. Left  greater than right bilateral facet joint hypertrophy. Spinal canal: No significant stenosis. Right foramen: Moderate stenosis with likely compression of the exiting L5  nerve root  Left foramen: Moderate stenosis with possible compression of the exiting L5  nerve root    Impression  1. Acute/subacute fracture of the L2 inferior endplate without significant  height loss. Subacute appearing fracture of the L4 superior endplate with  approximately 25% height loss. No evidence of posterior longitudinal ligament  disruption, fracture extension to the posterior elements, or epidural  hemorrhage.     2. Scoliosis, multilevel spondylolisthesis and advanced multilevel degenerative  disc disease and facet arthritis, without significant interval change compared  to prior MRI from 5/27/2020.  -Severe right lateral recess and moderate spinal canal stenosis at L4-5.  -Severe right L4-5 and moderate bilateral L5-S1 foraminal stenosis.  -Suspected compression of multiple nerve roots.  -Additional details above. Called and discussed MRI findings with patient. Acute/subacute compression fracture of L2 and subacute compression fracture of L4 noted. Patient does not note particular incident/trauma, but does recall episode of pain upon sitting down approximately 4 weeks ago. Currently receiving IV Reclast and under the care of Dr. Lisa Lewis for her osteopenia. Reports that she saw Dr. Gisela Bass today and given lack of severe pain associated with fractures, no further treatment recommended. Advised to hold physical therapy for approximately 4 weeks in order to give time for them to heal.  Answered all questions. Please fax copy of lumbar MRI to Dr. Lisa Lewis for review.

## 2022-11-18 ENCOUNTER — PATIENT MESSAGE (OUTPATIENT)
Dept: INTERNAL MEDICINE CLINIC | Age: 77
End: 2022-11-18

## 2022-11-21 NOTE — TELEPHONE ENCOUNTER
Called and spoke with patient. Reports now feeling better from side effects of steroid injections. Again discussed lumbar MRI (11/72022) with new finding of compression fractures. Answered all questions.

## 2022-11-22 RX ORDER — MELOXICAM 15 MG/1
15 TABLET ORAL DAILY
Qty: 90 TABLET | Refills: 2 | Status: SHIPPED | OUTPATIENT
Start: 2022-11-22

## 2022-11-30 ENCOUNTER — HOSPITAL ENCOUNTER (OUTPATIENT)
Dept: CT IMAGING | Age: 77
Discharge: HOME | End: 2022-11-30
Attending: NURSE PRACTITIONER
Payer: MEDICARE

## 2022-11-30 DIAGNOSIS — C79.51 BONE METASTASIS (HCC): ICD-10-CM

## 2022-11-30 DIAGNOSIS — C50.919 PRIMARY MALIGNANT NEOPLASM OF FEMALE BREAST (HCC): ICD-10-CM

## 2022-11-30 DIAGNOSIS — C78.00: ICD-10-CM

## 2022-11-30 PROCEDURE — 82565 ASSAY OF CREATININE: CPT

## 2022-11-30 PROCEDURE — 74177 CT ABD & PELVIS W/CONTRAST: CPT

## 2022-11-30 PROCEDURE — 74011000636 HC RX REV CODE- 636

## 2022-11-30 RX ADMIN — IOPAMIDOL 100 ML: 612 INJECTION, SOLUTION INTRAVENOUS at 14:09

## 2022-12-01 ENCOUNTER — TELEPHONE (OUTPATIENT)
Dept: INTERNAL MEDICINE CLINIC | Age: 77
End: 2022-12-01

## 2022-12-01 NOTE — TELEPHONE ENCOUNTER
----- Message from Maday Haro sent at 12/1/2022  1:48 PM EST -----  Subject: Message to Provider    QUESTIONS  Information for Provider? Pt wanted to inform Dr. Kimberly Corley that she had her   6 month CT Scan on 11/30/22 at [facility]. ---------------------------------------------------------------------------  --------------  Ky Fraction [facility]  [phone]; OK to leave message on voicemail, OK to respond with   electronic message via [place] portal (only for patients who have   registered [place] account)  ---------------------------------------------------------------------------  --------------  SCRIPT ANSWERS  Relationship to Patient?  Self

## 2022-12-02 LAB — CREAT UR-MCNC: 0.8 MG/DL (ref 0.6–1.3)

## 2022-12-05 NOTE — TELEPHONE ENCOUNTER
Reviewed CT chest/abdomen/pelvis (11/30/2022): No evidence of metastases and otherwise stable findings.   Patient following with Dr. Penny Cruz.

## 2022-12-06 ENCOUNTER — TRANSCRIBE ORDER (OUTPATIENT)
Dept: SCHEDULING | Age: 77
End: 2022-12-06

## 2022-12-06 DIAGNOSIS — Z12.31 SCREENING MAMMOGRAM FOR HIGH-RISK PATIENT: Primary | ICD-10-CM

## 2022-12-20 ENCOUNTER — APPOINTMENT (OUTPATIENT)
Dept: INTERNAL MEDICINE CLINIC | Age: 77
End: 2022-12-20

## 2022-12-20 ENCOUNTER — HOSPITAL ENCOUNTER (OUTPATIENT)
Dept: LAB | Age: 77
Discharge: HOME | End: 2022-12-20
Payer: MEDICARE

## 2022-12-20 DIAGNOSIS — M85.89 OSTEOPENIA OF MULTIPLE SITES: ICD-10-CM

## 2022-12-20 DIAGNOSIS — E78.5 HYPERLIPIDEMIA, UNSPECIFIED HYPERLIPIDEMIA TYPE: ICD-10-CM

## 2022-12-20 DIAGNOSIS — E55.9 VITAMIN D DEFICIENCY, UNSPECIFIED: ICD-10-CM

## 2022-12-20 DIAGNOSIS — I35.1 AORTIC VALVE INSUFFICIENCY, ETIOLOGY OF CARDIAC VALVE DISEASE UNSPECIFIED: ICD-10-CM

## 2022-12-20 DIAGNOSIS — I10 ESSENTIAL HYPERTENSION: ICD-10-CM

## 2022-12-20 LAB
25(OH)D3 SERPL-MCNC: 54.2 NG/ML (ref 30–100)
ALBUMIN SERPL-MCNC: 4.1 G/DL (ref 3.4–5)
ALBUMIN/GLOB SERPL: 1.4 {RATIO} (ref 0.8–1.7)
ALP SERPL-CCNC: 58 U/L (ref 45–117)
ALT SERPL-CCNC: 18 U/L (ref 13–56)
ANION GAP SERPL CALC-SCNC: 5 MMOL/L (ref 3–18)
APPEARANCE UR: CLEAR
AST SERPL-CCNC: 11 U/L (ref 10–38)
BILIRUB SERPL-MCNC: 0.7 MG/DL (ref 0.2–1)
BILIRUB UR QL: NEGATIVE
BUN SERPL-MCNC: 17 MG/DL (ref 7–18)
BUN/CREAT SERPL: 20 (ref 12–20)
CALCIUM SERPL-MCNC: 9.9 MG/DL (ref 8.5–10.1)
CHLORIDE SERPL-SCNC: 99 MMOL/L (ref 100–111)
CHOLEST SERPL-MCNC: 204 MG/DL
CO2 SERPL-SCNC: 33 MMOL/L (ref 21–32)
COLOR UR: YELLOW
CREAT SERPL-MCNC: 0.84 MG/DL (ref 0.6–1.3)
GLOBULIN SER CALC-MCNC: 3 G/DL (ref 2–4)
GLUCOSE SERPL-MCNC: 93 MG/DL (ref 74–99)
GLUCOSE UR STRIP.AUTO-MCNC: NEGATIVE MG/DL
HDLC SERPL-MCNC: 62 MG/DL (ref 40–60)
HDLC SERPL: 3.3 {RATIO} (ref 0–5)
HGB UR QL STRIP: NEGATIVE
KETONES UR QL STRIP.AUTO: NEGATIVE MG/DL
LDLC SERPL CALC-MCNC: 121 MG/DL (ref 0–100)
LEUKOCYTE ESTERASE UR QL STRIP.AUTO: NEGATIVE
LIPID PROFILE,FLP: ABNORMAL
MAGNESIUM SERPL-MCNC: 2 MG/DL (ref 1.6–2.6)
NITRITE UR QL STRIP.AUTO: NEGATIVE
PH UR STRIP: 8.5 [PH] (ref 5–8)
POTASSIUM SERPL-SCNC: 4.3 MMOL/L (ref 3.5–5.5)
PROT SERPL-MCNC: 7.1 G/DL (ref 6.4–8.2)
PROT UR STRIP-MCNC: NEGATIVE MG/DL
SODIUM SERPL-SCNC: 137 MMOL/L (ref 136–145)
SP GR UR REFRACTOMETRY: 1.01 (ref 1–1.03)
TRIGL SERPL-MCNC: 105 MG/DL (ref ?–150)
UROBILINOGEN UR QL STRIP.AUTO: 0.2 EU/DL (ref 0.2–1)
VLDLC SERPL CALC-MCNC: 21 MG/DL

## 2022-12-20 PROCEDURE — 80053 COMPREHEN METABOLIC PANEL: CPT

## 2022-12-20 PROCEDURE — 83735 ASSAY OF MAGNESIUM: CPT

## 2022-12-20 PROCEDURE — 80061 LIPID PANEL: CPT

## 2022-12-20 PROCEDURE — 81003 URINALYSIS AUTO W/O SCOPE: CPT

## 2022-12-20 PROCEDURE — 82306 VITAMIN D 25 HYDROXY: CPT

## 2022-12-20 PROCEDURE — 36415 COLL VENOUS BLD VENIPUNCTURE: CPT

## 2022-12-27 ENCOUNTER — OFFICE VISIT (OUTPATIENT)
Dept: INTERNAL MEDICINE CLINIC | Age: 77
End: 2022-12-27
Payer: MEDICARE

## 2022-12-27 VITALS
DIASTOLIC BLOOD PRESSURE: 68 MMHG | SYSTOLIC BLOOD PRESSURE: 122 MMHG | RESPIRATION RATE: 16 BRPM | TEMPERATURE: 97.2 F | BODY MASS INDEX: 27.82 KG/M2 | WEIGHT: 138 LBS | HEIGHT: 59 IN | OXYGEN SATURATION: 96 % | HEART RATE: 77 BPM

## 2022-12-27 DIAGNOSIS — Z79.899 IMMUNOSUPPRESSED DUE TO CHEMOTHERAPY (HCC): ICD-10-CM

## 2022-12-27 DIAGNOSIS — R53.83 FATIGUE, UNSPECIFIED TYPE: ICD-10-CM

## 2022-12-27 DIAGNOSIS — I10 ESSENTIAL HYPERTENSION: Primary | ICD-10-CM

## 2022-12-27 DIAGNOSIS — C50.912 MALIGNANT NEOPLASM OF LEFT BREAST IN FEMALE, ESTROGEN RECEPTOR POSITIVE, UNSPECIFIED SITE OF BREAST (HCC): ICD-10-CM

## 2022-12-27 DIAGNOSIS — G47.00 INSOMNIA, UNSPECIFIED TYPE: ICD-10-CM

## 2022-12-27 DIAGNOSIS — S32.020D COMPRESSION FRACTURE OF L2 VERTEBRA WITH ROUTINE HEALING, SUBSEQUENT ENCOUNTER: ICD-10-CM

## 2022-12-27 DIAGNOSIS — Z87.19 HISTORY OF DIVERTICULITIS: ICD-10-CM

## 2022-12-27 DIAGNOSIS — M48.062 SPINAL STENOSIS OF LUMBAR REGION WITH NEUROGENIC CLAUDICATION: ICD-10-CM

## 2022-12-27 DIAGNOSIS — Z17.0 MALIGNANT NEOPLASM OF LEFT BREAST IN FEMALE, ESTROGEN RECEPTOR POSITIVE, UNSPECIFIED SITE OF BREAST (HCC): ICD-10-CM

## 2022-12-27 DIAGNOSIS — M85.89 OSTEOPENIA OF MULTIPLE SITES: ICD-10-CM

## 2022-12-27 DIAGNOSIS — I35.1 AORTIC VALVE INSUFFICIENCY, ETIOLOGY OF CARDIAC VALVE DISEASE UNSPECIFIED: ICD-10-CM

## 2022-12-27 DIAGNOSIS — D84.821 IMMUNOSUPPRESSED DUE TO CHEMOTHERAPY (HCC): ICD-10-CM

## 2022-12-27 DIAGNOSIS — T45.1X5A IMMUNOSUPPRESSED DUE TO CHEMOTHERAPY (HCC): ICD-10-CM

## 2022-12-27 DIAGNOSIS — S32.040D COMPRESSION FRACTURE OF L4 VERTEBRA WITH ROUTINE HEALING, SUBSEQUENT ENCOUNTER: ICD-10-CM

## 2022-12-27 DIAGNOSIS — C78.00 MALIGNANT NEOPLASM METASTATIC TO LUNG, UNSPECIFIED LATERALITY (HCC): ICD-10-CM

## 2022-12-27 DIAGNOSIS — E78.5 HYPERLIPIDEMIA, UNSPECIFIED HYPERLIPIDEMIA TYPE: ICD-10-CM

## 2022-12-27 PROCEDURE — 1123F ACP DISCUSS/DSCN MKR DOCD: CPT | Performed by: INTERNAL MEDICINE

## 2022-12-27 PROCEDURE — 3017F COLORECTAL CA SCREEN DOC REV: CPT | Performed by: INTERNAL MEDICINE

## 2022-12-27 PROCEDURE — G8427 DOCREV CUR MEDS BY ELIG CLIN: HCPCS | Performed by: INTERNAL MEDICINE

## 2022-12-27 PROCEDURE — G8536 NO DOC ELDER MAL SCRN: HCPCS | Performed by: INTERNAL MEDICINE

## 2022-12-27 PROCEDURE — 1101F PT FALLS ASSESS-DOCD LE1/YR: CPT | Performed by: INTERNAL MEDICINE

## 2022-12-27 PROCEDURE — G8510 SCR DEP NEG, NO PLAN REQD: HCPCS | Performed by: INTERNAL MEDICINE

## 2022-12-27 PROCEDURE — 3074F SYST BP LT 130 MM HG: CPT | Performed by: INTERNAL MEDICINE

## 2022-12-27 PROCEDURE — G8417 CALC BMI ABV UP PARAM F/U: HCPCS | Performed by: INTERNAL MEDICINE

## 2022-12-27 PROCEDURE — G8399 PT W/DXA RESULTS DOCUMENT: HCPCS | Performed by: INTERNAL MEDICINE

## 2022-12-27 PROCEDURE — 1090F PRES/ABSN URINE INCON ASSESS: CPT | Performed by: INTERNAL MEDICINE

## 2022-12-27 PROCEDURE — G0463 HOSPITAL OUTPT CLINIC VISIT: HCPCS | Performed by: INTERNAL MEDICINE

## 2022-12-27 PROCEDURE — 99214 OFFICE O/P EST MOD 30 MIN: CPT | Performed by: INTERNAL MEDICINE

## 2022-12-27 PROCEDURE — 3078F DIAST BP <80 MM HG: CPT | Performed by: INTERNAL MEDICINE

## 2022-12-27 NOTE — PROGRESS NOTES
1. \"Have you been to the ER, urgent care clinic since your last visit? Hospitalized since your last visit? \" No    2. \"Have you seen or consulted any other health care providers outside of the [address] [place] [address] since your last visit? \" No     3. For patients aged 39-70: Has the patient had a colonoscopy / FIT/ Cologuard? NA - based on age      If the patient is female:    4. For patients aged 41-77: Has the patient had a mammogram within the past 2 years? NA - based on age or sex      11. For patients aged 21-65: Has the patient had a pap smear?  NA - based on age or sex

## 2022-12-27 NOTE — PATIENT INSTRUCTIONS
Heart-Healthy Diet: Care Instructions  Your Care Instructions     A heart-healthy diet has lots of vegetables, fruits, nuts, beans, and whole grains, and is low in salt. It limits foods that are high in saturated fat, such as meats, cheeses, and fried foods. It may be hard to change your diet, but even small changes can lower your risk of heart attack and heart disease. Follow-up care is a key part of your treatment and safety. Be sure to make and go to all appointments, and call your doctor if you are having problems. It's also a good idea to know your test results and keep a list of the medicines you take. How can you care for yourself at home? Watch your portions  Learn what a serving is. A \"serving\" and a \"portion\" are not always the same thing. Make sure that you are not eating larger portions than are recommended. For example, a serving of pasta is ½ cup. A serving size of meat is 2 to 3 ounces. A 3-ounce serving is about the size of a deck of cards. Measure serving sizes until you are good at [place]" them. Keep in mind that restaurants often serve portions that are 2 or 3 times the size of one serving. To keep your energy level up and keep you from feeling hungry, eat often but in smaller portions. Eat only the number of calories you need to stay at a healthy weight. If you need to lose weight, eat fewer calories than your body burns (through exercise and other physical activity). Eat more fruits and vegetables  Eat a variety of fruit and vegetables every day. Dark green, deep orange, red, or yellow fruits and vegetables are especially good for you. Examples include spinach, carrots, peaches, and berries. Keep carrots, celery, and other veggies handy for snacks. Buy fruit that is in season and store it where you can see it so that you will be tempted to eat it. Cook dishes that have a lot of veggies in them, such as stir-fries and soups.   Limit saturated and trans fat  Read food labels, and try to avoid saturated and trans fats. They increase your risk of heart disease. Use olive or canola oil when you cook. Bake, broil, grill, or steam foods instead of frying them. Choose lean meats instead of high-fat meats such as hot dogs and sausages. Cut off all visible fat when you prepare meat. Eat fish, skinless poultry, and meat alternatives such as soy products instead of high-fat meats. Soy products, such as tofu, may be especially good for your heart. Choose low-fat or fat-free milk and dairy products. Eat foods high in fiber  Eat a variety of grain products every day. Include whole-grain foods that have lots of fiber and nutrients. Examples of whole-grain foods include oats, whole wheat bread, and brown rice. Buy whole-grain breads and cereals, instead of white bread or pastries. Limit salt and sodium  Limit how much salt and sodium you eat to help lower your blood pressure. Taste food before you salt it. Add only a little salt when you think you need it. With time, your taste buds will adjust to less salt. Eat fewer snack items, fast foods, and other high-salt, processed foods. Check food labels for the amount of sodium in packaged foods. Choose low-sodium versions of canned goods (such as soups, vegetables, and beans). Limit sugar  Limit drinks and foods with added sugar. These include candy, desserts, and soda pop. Limit alcohol  Limit alcohol to no more than 2 drinks a day for men and 1 drink a day for women. Too much alcohol can cause health problems. When should you call for help? Watch closely for changes in your health, and be sure to contact your doctor if:    You would like help planning heart-healthy meals. Where can you learn more? Go to http://www.bernabe.com/  Enter V137 in the search box to learn more about \"Heart-Healthy Diet: Care Instructions. \"  Current as of: August 22, 2019               Content Version: 12.6  © 4356-3967 Healthwise, Incorporated. Care instructions adapted under license by [employer] (which disclaims liability or warranty for this information). If you have questions about a medical condition or this instruction, always ask your healthcare professional. [address] any warranty or liability for your use of this information. High Cholesterol: Care Instructions  Your Care Instructions     Cholesterol is a type of fat in your blood. It is needed for many body functions, such as making new cells. Cholesterol is made by your body. It also comes from food you eat. High cholesterol means that you have too much of the fat in your blood. This raises your risk of a heart attack and stroke. LDL and HDL are part of your total cholesterol. LDL is the \"bad\" cholesterol. High LDL can raise your risk for heart disease, heart attack, and stroke. HDL is the \"good\" cholesterol. It helps clear bad cholesterol from the body. High HDL is linked with a lower risk of heart disease, heart attack, and stroke. Your cholesterol levels help your doctor find out your risk for having a heart attack or stroke. You and your doctor can talk about whether you need to lower your risk and what treatment is best for you. A heart-healthy lifestyle along with medicines can help lower your cholesterol and your risk. The way you choose to lower your risk will depend on how high your risk is for heart attack and stroke. It will also depend on how you feel about taking medicines. Follow-up care is a key part of your treatment and safety. Be sure to make and go to all appointments, and call your doctor if you are having problems. It's also a good idea to know your test results and keep a list of the medicines you take. How can you care for yourself at home? Eat a variety of foods every day.  Good choices include fruits, vegetables, whole grains (like oatmeal), dried beans and peas, nuts and seeds, soy products (like tofu), and fat-free or low-fat dairy products. Replace butter, margarine, and hydrogenated or partially hydrogenated oils with olive and canola oils. (Canola oil margarine without trans fat is fine.)  Replace red meat with fish, poultry, and soy protein (like tofu). Limit processed and packaged foods like chips, crackers, and cookies. Bake, broil, or steam foods. Don't grey them. Be physically active. Get at least 30 minutes of exercise on most days of the week. Walking is a good choice. You also may want to do other activities, such as running, swimming, cycling, or playing tennis or team sports. Stay at a healthy weight or lose weight by making the changes in eating and physical activity listed above. Losing just a small amount of weight, even 5 to 10 pounds, can reduce your risk for having a heart attack or stroke. Do not smoke. When should you call for help? Watch closely for changes in your health, and be sure to contact your doctor if:    You need help making lifestyle changes. You have questions about your medicine. Where can you learn more? Go to http://fatouFlip Flop ShopsÂ®olive.info/  Enter Y591 in the search box to learn more about \"High Cholesterol: Care Instructions. \"  Current as of: December 16, 2019               Content Version: 12.6  © 0739-0880 [employer]. Care instructions adapted under license by [employer] (which disclaims liability or warranty for this information). If you have questions about a medical condition or this instruction, always ask your healthcare professional. [place] [address] any warranty or liability for your use of this information.

## 2023-01-02 PROBLEM — M54.31 RIGHT SIDED SCIATICA: Status: RESOLVED | Noted: 2020-05-22 | Resolved: 2023-01-02

## 2023-01-02 NOTE — PROGRESS NOTES
HPI:  Femi Diaz is a 78y.o. year old female who presents today for a routine visit. She has a history of hypertension, dyslipidemia,  metastatic breast cancer, GERD, diverticulosis with recurrent diverticulitis, osteoarthritis s/p right knee replacement (2012), lumbar degenerative disease, osteopenia, compression fracture, and Tourette's syndrome. She is accompanied by her . She has completed the four dose Moderna COVID-19 vaccine series given immunosuppressed status and received the updated bivalent Pfizer booster dose. She reports that she is doing reasonably well. She reports that she continues to experience fatigue but has resumed treatment with letrozole since she did not note any improvement when changed to Aromasin. On 8/9/2022, she presented to the [facility] ED with abdominal pain and lab evaluation showed WBC 13.9, Hb 13.9/HCT 41.7, creatinine 0.76/eGFR >60, lipase normal, urinalysis negative; CT abdomen/pelvis showed evidence of proximal sigmoid diverticulitis without complication. She was treated with Augmentin with clinical improvement. She reports persistent difficulty with low back pain and left leg pain and weakness, and remains under the care of of RAMESH Canales at [facility]. She received bilateral SI joint injections in 9/2022 with some relief. She also underwent repeat lumbar MRI (11/2022) which showed an acute/subacute fracture of the L2 inferior endplate and subacute fracture of L4 superior endplate; and multilevel degenerative changes without change. She underwent bilateral SI transforaminal KAREN on 11/14/2022 with minimal improvement. She did not feel pain from compression fractures significant enough to proceed with kyphoplasty since she has not been experiencing significant increase in pain. She was advised to continue to hold off on physical therapy until compression fractures healed.   She is continuing to ambulate with a cane and does note that she needs to sit down frequently when performing activities due to low back pain. She is otherwise without new complaints. Summary of prior hospitalizations and medical history:  On 3/3/2022, she presented for evaluation of worsening fatigue. She was evaluated in 11/2020 when she described dyspnea in association with fatigue when performing exertional activities. EKG (11/24/2020) was unremarkable, chest x-ray (11/24/2020) was negative, and echocardiogram (12/1/2020) showed normal LV function (EF 55-60%), trileaflet AV with moderate AI, and PAP 30 mmHg. She had repeat staging CT scans in 6/2021 and 12/2021 without evidence of recurrence or metastases. Iron studies performed by Dr. Shira Oquendo showed a ferritin level of 13 although no evidence of anemia. She underwent upper endoscopy and colonoscopy by Dr. Deyvi Ash (8/11/2021) without evidence of source of iron loss. At her last visit, she reported that her fatigue had significantly progressed, describing \"waves of severe fatigue and shortness of breath with minimal exertion\". She stated that she had no energy to go out with friends and has been having difficulty with her normal ADL activities at home due to fatigue. She underwent a repeat lab evaluation by oncology, which showed normal CBC, CMP, ferritin, and iron studies. She reported that decision was made by Dr. Shira Oquendo to hold [facility] for a total of 7 weeks before reinitiating to see if may help with symptoms. She continued on letrozole. She also underwent a repeat echocardiogram on 4/11/2022 which continue to show normal LV function and stable moderate AI. She noted improvement while [facility] was being held, but decided to proceed with resumption of therapy on 4/1/2022. Decision made to change her aromatase inhibitor therapy from letrozole to Aromasin.       She has a history of left breast cancer, which was diagnosed in 7/2010 as invasive ductal adenocarcinoma, s/p left modified radical mastectomy with sentinel node biopsy, performed by Dr. Jose Maria Mae. She had 3 positive lymph nodes and was classified as stage 1B, T1c N1 M0, ER and DE positive, Her-2/sarah negative by FISH. She underwent 6 cycles of chemotherapy with Docetaxel and Cytoxan. She was subsequently unable to tolerate anastrozole, exemestane, tamoxifen, and letrozole due to profound fatigue and arthralgias. Her course was complicated by chronic left arm lymphedema, managed with a compression sleeve. A chest CT scan in 3/2014 noted several small pulmonary nodules which were concerning for metastases but were too small to biopsy. They were followed with sequential CT scans , and in 12/2014, repeat chest CT scan showed enlarging bilateral pulmonary nodules compatible with progression of metastatic disease. A fine needle aspirate was performed on 12/21/2014 which showed benign pulmonary parenchyma with alveolar hemorrhage. Repeat chest CT scan in 3/12/2015 showed mild interval progression of the lung metastases with enlarging nodules and development of new nodules. A CT guided needle biopsy was performed on 3/23/2015, which confirmed metastatic adenocarcinoma consistent with breast primary (stage IV, ER/DE positive, and TTF-1 negative). On 4/13/2015, she was started on therapy with Ibrance and letrozole. Follow-up CT scan (7/15/15) showed partial response with decreasing size of some nodules and resolution of other nodules. Most recent chest, abdomen, and pelvis CT scan was obtained in 6/2017, showing stable or decreased multiple bilateral pulmonary nodules and no suspicious new pulmonary nodules. She continues on palbociclib, but was changed from letrozole to fulvestrant. She describes persistent fatigue which she attributes to monthly treatment with fulvestrant (Faslodex). She underwent restaging chest, abdomen, and pelvis CT scan in 7/2019 which showed that her pulmonary nodules were unchanged, and no other evidence of metastatic disease.  Repeat staging CT chest/ abdomen/ pelvis in 1/2021 showed no evidence of metastasis or adenopathy. She is followed by Dr. Cedric Desouza. She states that she established care with Dr. Oriana Sadler, but was told that she may have her mammograms and breast exams in our office with referral to her if something arises. She also has a history of a right ovarian cyst, but was released from the care of Dr. Zoie Escalona since it was concluded to be benign. She has a history of hypertension treated with losartan. She monitors her blood pressure mainly when visiting multiple physicians and reports that it is well controlled. She has no history of heart disease, and denies any chest pain, shortness of breath at rest or with exertion, palpitations, lightheadedness, or edema. In 4/11/2016, she sustained a fall with subsequent severe pain in her mid to upper lumbar region and wrapping around waist. She was treated with meloxicam, tylenol and flexeril, but because of persistent discomfort, she presented to [facility] on 4/15/2016 and lumbosacral spine films showed marked degenerative changes with disc space obliteration throughout lumbar spine and slight anterior spondylolisthesis of L4. She was evaluated by Dr. Alexus Arguelles on 4/18/2016 who recommended physical therapy and evaluation by Dr. Jonh Barros for her degenerative spine changes. She continued to take meloxicam and tylenol and started physical therapy, but noted worsening of her pain. She subsequently was evaluated by Dr. Jose Dong, who obtained a lumbar MRI on 4/26/2016, which showed a new subacute compression fracture of T11 vertebral body with diffuse marrow edema and mild paravertebral inflammatory stranding, no retropulsion or central stenosis; more severe degenerative disease along the right side at L4-L5, L5-S1, potential mild compression of right exiting L4 and L5 nerve roots. She was referred to Dr. Liz Penny for kyphoplasty of the T11 compression fracture given failure of pain control with conservative measures. She underwent the procedure on 4/8/2572 without complications, and C45 vertebral body bone core and aspirate biopsies were performed, which showed only reactive bone changes and no evidence of malignancy. She has a history of osteopenia, with a history of fracture of her sacrum. She reports that she was treated with alendronate in 12/2011 but discontinued it in 4/2013 due to intolerence. Bone density scan (11/2017) was consistent with osteopenia with femoral neck T-scores: left -1.4/ right -1.4 and distal radius -2.2 (lumbar T-score could not be assessed). A repeat bone density scan was obtained (11/2019) showing continued evidence of osteopenia with femoral neck T-scores: left -1.4/ right -1.4 and distal radius -2.4 (lumbar T-score could not be assessed). She was referred to Dr. Aubree Delvalle given her multiple compression fractures and treatment with letrozole, and was started on Reclast in 3/2021. She continues to take calcium and vitamin D. In 10/2016, she developed left sided low back pain with radiation to her left leg. She underwent a lumbar MRI (9/2016) showing scoliosis and advanced multilevel degenerative changes with areas of foraminal stenosis at L3-L4 and L5-S1; mild/moderate central canal stenosis at L4-L5 and L5-S1. She was evaluated by Dr. Zoie Nicholson and treated with pregabalin with some improvement. She subsequently received an epidural steroid injection with improvement and discontinued pregabalin. She reported worsening low back pain and right sciatica, and underwent an epidural steroid injection at right L4-5 transforaminal approach by Dr. Zoie Nicholson on 3/18/2019. She has noted improvement in her right hip and leg pain.  She underwent a lumbar MRI (6/2020) for worsening right sciatica, showing very advanced multilevel DDD and DJD with extensive bone marrow edema reaction to DDD; chronic compression fractures; single level of degenerative central spinal stenosis, moderate, at L4/L5, worsened compared 2016; multilevel degenerative foraminal narrowing worst right L4/L5, bilateral L5/S1; no evidence of metastatic breast cancer. She underwent left L4/5 and right L5-S1 KAREN in 6/2020 and Left S1 TF KAREN in 8/2020 by Dr. Karan Delcid with improvement. She reports continuing to have persistent difficulty with left-sided buttock and pelvic pain, and she underwent left hip MRI (11/6/2021) which showed mild bilateral hip osteoarthritis (L>R), and asymmetric atrophy of the proximal left tensor fascia will. She reports that she received a ultrasound-guided steroid injection for left trochanteric bursitis on 11/10/2021, which provided some relief transiently, but pain has since recurred. She underwent an KAREN left L4/5 and L5/S1 on 12/13/2021 by Dr. Karan Delcid with some improvement. She subsequently underwent physical therapy with significant benefit. In 4/2017, she noted increasing cervical and upper back discomfort. She has had multiple cervical MRI's,and underwent repeat in 4/2017 which showed multilevel degenerative disc disease and facet arthropathy without change from prior studies with mild central canal stenosis C3-C4 and C5-C6, and moderate central canal stenosis C6-C7. She was treated with Mobic and Flexeril, and gabapentin at bedtime. She complained of right shoulder pain. X-ray of her shoulder (11/2017) was negative, and she had a right shoulder MRI (5/14/2018) which showed deep partial thickness undersurface tear of the distal supraspinatus, likely with full thickness slitlike perforations; no gross tendon retraction, but there is moderate muscle atrophy; partial thickness tearing with longitudinal components involving the biceps tendon at its sulcal turn, some associated tenosynovitis; accompanying short length longitudinal split tear in the distal subscapularis; moderate infraspinatus tendinosis, and mild to moderate subacromial subdeltoid bursitis.  She underwent evaluation by Dr. Ervin Hernandez on 5/18/2018, and received a glenohumeral cortisone injection. She has a history of GERD and diverticulosis with recurrent diverticulitis,. She was evaluated by Dr. Dion Mccauley in 2/23/2016 and underwent an upper endoscopy, which revealed a Schatzki's ring at the gastroesophageal junction (dilated) with mild distal esophagitis and a small hiatal hernia. A screening colonoscopy was also performed showing moderately severe diverticulosis of the ascending and descending colon and a 2 mm sessile sigmoid polyp (pathology: hyperplastic). Recommendations for follow-up colonoscopy in 10 years. She denies any abdominal pain, nausea, vomiting, melena, hematochezia, or change in bowel movements. She was evaluated in 8/2017 by GERALD Jon with complaints of abdominal pain and was treated with Cipro for presumed diverticulitis. She contacted the office again in 10/2017 and 1/2018 with a recurrence of symptoms, and was treated for a 10 day course with Cipro with improvement. Abdominal CT scan in 2/6/2018 for staging for her breast cancer did show evidence of moderate to severe diverticulosis, but no evidence of diverticulitis. She did present with similar symptoms in 3/2018 and was evaluated by GERALD Vigil and prescribed Cipro. However, her symptoms resolved prior to taking the antibiotics. In 7/2018, she again presented with left lower quadrant pain, and an abdominal CT scan (7/13/2018) which showed evidence of uncomplicated diverticulitis involving the lower left colon and upper sigmoid. She was treated with Cipro and Flagyl initially, but developed a rash due to Flagyl, and was subsequently changed to Augmentin. She had a follow-up visit with Dr. Adam Huffman and it was his opinion that she also had IBS which mimicked her episodes of diverticulitis. He recommended continuing on a probiotic and prescribed hyoscyamine to use as needed. She has noted improvement since doing so.  She underwent evaluation for iron deficiency when she was found to have a ferritin of 13 in 6/2021 which was performed to evaluate her fatigue. On 8/11/2021 she underwent upper endoscopy and colonoscopy by Dr. Malik Fuentes. Upper endoscopy showed normal stomach and duodenum, abnormal motility of the esophagus, and a small hiatal hernia. Colonoscopy showed increased vascularity throughout the colon concerning for microscopic colitis, but pathology of multiple random biopsies was negative. No source for iron loss was identified. She also has a history of Tourette's syndrome controlled with Haldol. She is followed by Dr. Demetra Briones. Past Medical History:   Diagnosis Date    Arthritis     Breast cancer metastasized to lung [facility])     Left Breast    Chronic pain     Colon polyps 2/22/16    distal sigmoid, Dr. Govind HERNANDEZ (degenerative disc disease)     Diverticulitis of colon     Diverticulosis 2/22/16    mild in the ascedning and sigmoid colon, Dr. Noa Nixon (hyperlipidemia)     Hypertension     Osteopenia     Reactive depression 3/4/2022    Tourette syndrome     Vitamin D deficiency      Past Surgical History:   Procedure Laterality Date    COLONOSCOPY N/A 8/11/2021    COLONOSCOPY with Bx performed by Elaine Morgan MD at [address] [place] [address] [address]      HX BREAST BIOPSY  7-30-10    Left Breast w/Nipple Duct exploration and excisional biopsy-left    HX DILATION AND CURETTAGE      x3    HX MODIFIED RADICAL MASTECTOMY Left 09/03/2010    HX ORTHOPAEDIC Right 2013    knee replacement    HX TONSILLECTOMY      HYSTEROSCOPY DIAGNOSTIC       Current Outpatient Medications   Medication Sig    meloxicam (MOBIC) 15 mg tablet Take 1 Tablet by mouth daily. Take with food    gabapentin (NEURONTIN) 300 mg capsule Take 1 Capsule by mouth nightly. Max Daily Amount: 300 mg.    temazepam (RESTORIL) 15 mg capsule Take 1 Capsule by mouth nightly as needed for Sleep.  Max Daily Amount: 15 mg.    haloperidoL (HALDOL) 2 mg tablet TAKE ONE TABLET BY MOUTH TWICE A DAY    hyoscyamine sulfate (CYSTOSPAZ) 0.125 mg rapid dissolve tablet Take 1 Tablet by mouth every four (4) hours as needed for Diarrhea. cyanocobalamin 1,000 mcg tablet Take 1,000 mcg by mouth in the morning. diazePAM (Valium) 5 mg tablet Take 1 Tablet by mouth every six (6) hours as needed for Anxiety. Max Daily Amount: 20 mg.    losartan (COZAAR) 25 mg tablet Take 1 Tablet by mouth daily. ondansetron hcl (Zofran) 8 mg tablet Take 1 Tablet by mouth every eight (8) hours as needed for Nausea or Vomiting. Take one tablet by mouth every 8 hours as needed. acetaminophen (TYLENOL) 500 mg tablet Take 500 mg by mouth every six (6) hours as needed for Pain. Take 1-2 tablets by mouth every 6 hours as needed. pantoprazole sodium (PROTONIX PO) Take 40 mg by mouth daily. palbociclib 75 mg cap Take 75 mg by mouth daily. For days 1-21 of each 28 day cycle    Biotin 2,500 mcg cap Take  by mouth.    calcium-vitamin D (OS-ENOCH +D3) 500 mg-200 unit per tablet Take 1 Tab by mouth two (2) times daily (with meals). cholecalciferol (VITAMIN D3) (1000 Units /25 mcg) tablet Take 4,000 Units by mouth daily. No current facility-administered medications for this visit. Allergies and Intolerances: Allergies   Allergen Reactions    Keflex [Cephalexin] Rash    Metronidazole Rash    Other Medication Nausea Only     Anesthesia    Shellfish Containing Products Nausea and Vomiting     More of just eating the actual shellfish. Sulfa (Sulfonamide Antibiotics) Rash    Ultram [Tramadol] Nausea and Vomiting     Patient states she is allergic to most Narcotics    Vancomycin Rash     Family History:   Family History   Problem Relation Age of Onset    Osteoporosis Sister     Osteoporosis Mother     Stroke Father     Hypertension Father     Cancer Paternal Aunt         breast     Social History:   She  reports that she quit smoking about 17 years ago. Her smoking use included cigarettes. She has a 3.00 pack-year smoking history.  She has never used smokeless tobacco. She stopped smoking over 40 years ago. She is  and lives with . They have one daughter and two grandchildren. Social History     Substance and Sexual Activity   Alcohol Use Not Currently    Alcohol/week: 5.8 standard drinks    Types: 7 Glasses of wine per week     Immunization History:  Immunization History   Administered Date(s) Administered     Influenza, FLUZONE (age 72 y+), High Dose 09/20/2022    COVID-19, MODERNA BLUE border, Primary or Immunocompromised, (age 18y+), IM, 100 mcg/0.5mL 02/07/2021, 03/07/2021, 11/07/2021    COVID-19, MODERNA Booster BLUE border, (age 18y+), IM, 50mcg/0.25mL 05/19/2022    COVID-19, PFIZER Bivalent BOOSTER, (age 12y+), IM, 30 mcg/0.3 mL dose 10/14/2022    Influenza High Dose Vaccine PF 10/01/2015, 10/25/2016, 10/05/2017    Influenza Vaccine 09/01/2014    Influenza Vaccine (Tri) Adjuvanted (>65 Yrs FLUAD TRI 07502) 10/05/2018, 10/17/2019    Influenza Vaccine Split 11/01/2011, 10/02/2012    Influenza Vaccine Whole 10/08/2010    Influenza, FLUAD, (age 72 y+), Adjuvanted 09/23/2020, 10/15/2021    Pneumococcal Conjugate (PCV-13) 10/27/2015    Pneumococcal Polysaccharide (PPSV-23) 04/15/2013    TD Vaccine 05/05/2008    Tdap 09/12/2014    Zoster 09/20/2011       Review of Systems:   As above included in HPI. Otherwise 11 point review of systems negative including constitutional, skin, HENT, eyes, respiratory, cardiovascular, gastrointestinal, genitourinary, musculoskeletal, endo/heme/aller, neurological.    Physical:     Visit Vitals  /68 (BP 1 Location: Left upper arm, BP Patient Position: Sitting)   Pulse 77   Temp 97.2 °F (36.2 °C) (Temporal)   Resp 16   Ht 4' 11\" (1.499 m)   Wt 138 lb (62.6 kg)   SpO2 96%   BMI 27.87 kg/m²        Patient appears in no apparent distress. Affect is appropriate. HEENT: PERRLA, anicteric, no JVD, adenopathy or thyromegaly. No carotid bruits or radiated murmur. Lungs: clear to auscultation, no wheezes, rhonchi, or rales.   Heart: regular rate and rhythm. No murmur, rubs, gallops  Abdomen: soft, nontender, nondistended, normal bowel sounds, no hepatosplenomegaly or masses. Extremities: without edema. Review of Data:  Labs:  Hospital Outpatient Visit on 12/20/2022   Component Date Value Ref Range Status    LIPID PROFILE 12/20/2022        Final    Cholesterol, total 12/20/2022 204 (A)  <200 MG/DL Final    Triglyceride 12/20/2022 105  <150 MG/DL Final    HDL Cholesterol 12/20/2022 62 (A)  40 - 60 MG/DL Final    LDL, calculated 12/20/2022 121 (A)  0 - 100 MG/DL Final    VLDL, calculated 12/20/2022 21  MG/DL Final    CHOL/HDL Ratio 12/20/2022 3.3  0 - 5.0   Final    Magnesium 12/20/2022 2.0  1.6 - 2.6 mg/dL Final    Sodium 12/20/2022 137  136 - 145 mmol/L Final    Potassium 12/20/2022 4.3  3.5 - 5.5 mmol/L Final    Chloride 12/20/2022 99 (A)  100 - 111 mmol/L Final    CO2 12/20/2022 33 (A)  21 - 32 mmol/L Final    Anion gap 12/20/2022 5  3.0 - 18 mmol/L Final    Glucose 12/20/2022 93  74 - 99 mg/dL Final    BUN 12/20/2022 17  7.0 - 18 MG/DL Final    Creatinine 12/20/2022 0.84  0.6 - 1.3 MG/DL Final    BUN/Creatinine ratio 12/20/2022 20  12 - 20   Final    eGFR 12/20/2022 >60  >60 ml/min/1.73m2 Final    Calcium 12/20/2022 9.9  8.5 - 10.1 MG/DL Final    Bilirubin, total 12/20/2022 0.7  0.2 - 1.0 MG/DL Final    ALT (SGPT) 12/20/2022 18  13 - 56 U/L Final    AST (SGOT) 12/20/2022 11  10 - 38 U/L Final    Alk.  phosphatase 12/20/2022 58  45 - 117 U/L Final    Protein, total 12/20/2022 7.1  6.4 - 8.2 g/dL Final    Albumin 12/20/2022 4.1  3.4 - 5.0 g/dL Final    Globulin 12/20/2022 3.0  2.0 - 4.0 g/dL Final    A-G Ratio 12/20/2022 1.4  0.8 - 1.7   Final    Vitamin D 25-Hydroxy 12/20/2022 54.2  30 - 100 ng/mL Final    Color 12/20/2022 YELLOW    Final    Appearance 12/20/2022 CLEAR    Final    Specific gravity 12/20/2022 1.011  1.005 - 1.030   Final    pH (UA) 12/20/2022 8.5 (A)  5.0 - 8.0   Final    Protein 12/20/2022 Negative  NEG mg/dL Final Glucose 12/20/2022 Negative  NEG mg/dL Final    Ketone 12/20/2022 Negative  NEG mg/dL Final    Bilirubin 12/20/2022 Negative  NEG   Final    Blood 12/20/2022 Negative  NEG   Final    Urobilinogen 12/20/2022 0.2  0.2 - 1.0 EU/dL Final    Nitrites 12/20/2022 Negative  NEG   Final    Leukocyte Esterase 12/20/2022 Negative  NEG   Final   Hospital Outpatient Visit on 11/30/2022   Component Date Value Ref Range Status    Creatinine, POC 11/30/2022 0.8  0.6 - 1.3 MG/DL Final    eGFR (POC) 11/30/2022 >60  >60 ml/min/1.73m2 Final       EKG (3/3/2022) sinus rhythm at 71 bpm, normal intervals; no ischemic changes. Health Maintenance:  Screening:    Mammogram: negative (12/2021). Scheduled 1/4/2023   PAP smear: negative (9/2013) Dr Sandra Curiel. Pelvic ultrasound negative (9/2015). No further screening needed. Colorectal: colonoscopy (8/2021) normal.  Dr. Rosita Veliz. Depression: none   DM (HbA1c/FPG): FPG 93 (12/2022)   Hepatitis C: unknown   Falls: none   DEXA: osteopenia (11/2021) lumbar compression fractures (11/2022). On Reclast.  Dr. Lizzeth Head.    Smoking:distant past   Glaucoma: regular eye exams with Dr. Yaritza Fountain (last 3/2022)   Vitamin D: 54.2 (12/2022)   Medicare Wellness: 6/23/2022      Impression:  Patient Active Problem List   Diagnosis Code    Tourette syndrome F95.2    Osteoarthritis, Status post right total knee replacement M19.90    Lumbar spinal stenosis M48.061    HLD (hyperlipidemia) E78.5    Breast cancer ([facility] [address] metastatic to lungs  C50.919    Essential hypertension I10    Diverticulosis K57.90    Osteopenia M85.80    Lymphedema of arm left I89.0    Insomnia G47.00    Fatigue R53.83    History of diverticulitis Z87.19    T11 Vertebral compression fracture (HCC) s/p kyphoplasty M48.50XA    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.812    Spinal stenosis of cervical region M48.02    Malignant neoplasm metastatic to lung [facility]) C78.00    Nontraumatic incomplete tear of right rotator cuff M75.111    DDD (degenerative disc disease), lumbar M51.36    Lumbar facet arthropathy M47.816    Immunosuppressed due to chemotherapy ([facility] [address] D84.821, T45.1X5A, Z79.899    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 Fatigue. Reporting significant severe/persistent fatigue felt to likely be a result of breast cancer treatment with Ibrance and letrozole. Evaluated in 11/2020 as also described dyspnea in association with fatigue when performing exertional activities. EKG unremarkable, chest x-ray negative, and and echocardiogram (12/1/2020) showed normal LV function (EF 55-60%), trileaflet AV with moderate AI, and PAP 30 mmHg. Repeat staging CT scans in 6/2021 without evidence of recurrence or metastases. Iron studies performed by Dr. Luis Cheema showed a ferritin level of 13 although no evidence of anemia. Underwent upper endoscopy and colonoscopy by Dr. Byron Gonzalez (8/11/2021) without evidence of source of iron loss. Random colon biopsies negative. Reported progression of fatigue at last visit on 3/3/2022 with \"waves of severe fatigue and shortness of breath with minimal exertion\". Repeat lab evaluation by oncology showed normal CBC, CMP, ferritin, and iron studies. Decision made by Dr. Luis Cheema to hold [facility] for a total of 7 weeks before reinitiating to see if may help with symptoms. Remained on letrozole. Repeat EKG (3/3/2022) was unchanged and repeat echocardiogram (4/11/2022) showed no change. Did note improvement with holding of Ibrance but has now resumed therapy felt that letrozole likely culprit for symptoms of fatigue and changed to Aromasin, but did not note improvement so resumes letrozole. Wishing to continue with therapy with Ibrance and aromatase inhibitor given effectiveness thus far. Will continue to address. 2. Hypertension. Remains well controlled on losartan 25 mg daily. Renal function remains normal today with creatinine 0.8/eGFR >60. Brain MRI (5/2021) with evidence of mild to moderate burden nonspecific white matter lesions and a few tiny old lacunar infarcts, likely from longstanding hypertension. Patient had been started on aspirin 81 mg daily as advised by Dr. Mehran Rios, but reports that she is no longer taking. 3. Breast cancer with pulmonary metastasis. Continues on current therapy with Ibrance and letrozole. Reports no difference noted when changed to Aromasin. Most recent staging CT chest/ abdomen/ and pelvis (11/2022) without evidence of recurrent or metastatic disease. Continuing with annual mammograms, last 12/2021, without evidence of recurrent disease. Scheduled for 1/4/2023. Not using compression sleeve for lymphedema regularly, but receiving massage therapy every three weeks with good results. Continuing to follow with Dr. Seth Mukherjee.   4. Right ICA terminus aneurysm. MRA brain (4/2019) showed a 2 mm superiorly directed aneurysm at the right ICA terminus. Repeat MRA brain (5/2021) showed no significant interval change. Reported intermittent tremor with intention (L>R) at last visit, and underwent repeat brain MRI/MRA (5/2022) without change. Being monitored by Dr. Mehran Rios. 5. Hyperlipidemia. Calculated 10 year ASCVD risk is 19.4 %, which places her in one of the four statin benefit groups (primary prevention with risk > 7.5%). LDL overall stable today at 121 and HDL 62. Given lack of history of ASCVD, no evidence of atherosclerotic disease on chest and abdominal CT scans, and history of metastatic breast cancer, will not recommend treatment with statin at this time. Continue to emphasized importance of lifestyle modifications, including heart healthy diet and exercise. 6. Osteopenia. History of T11 compression fracture in 4/2016, s/p kyphoplasty. Last bone density scan 11/2019.  Using femoral neck T-scores, calculated FRAX score estimates her 10 year risk of a major osteoporetic fracture at 16 % and hip fracture at 2.3%, which alone are not an indication for biphosphonate treatment. However, development of a vertebral compression fracture and treatment with aromatase inhibitor increases likelihood of progression. Evaluated by Dr. Nemesio Umanzor and received first dose of Reclast on 3/29/2021. Repeat bone density scan performed in 11/2021 showed stability without statistically significant change with FRAX scores calculated at 14% / 2.2%, and advised to continue Reclast infusions, last 4/4/2022. Advised to discuss with Dr. Nemesio Umanzor the finding of new lumbar compression fractures noted on lumbar MRI in 11/2022. Continuing to follow with Dr. Nemesio Umanzor with next visit scheduled in 4/2023.  7. Lumbar radiculopathy. Known lumbar stenosis and facet arthropathy. Using meloxicam, cyclobenzaprine, gabapentin, and Tylenol. Previously underwent L4-L5 epidural injection on the right by Dr. Harsh Trejo in 2016 for similar symptoms. Recent recurrence of symptoms without sustained improvement despite treatment with steroids, NSAIDS, muscle relaxants and gabapentin. Underwent repeat lumbar MRI in 5/2020 which showed very advanced multilevel DDD and DJD with chronic compression fractures and worsening since 2016, especially at L4/5. Underwent left L4/5 and right L5-S1 KAREN in 6/2020 and left S1 TF KAREN in 8/2020 by Dr. Harsh Trejo with improvement. However, continuing to have persistent difficulty with left-sided buttock pain and underwent left hip MRI (11/6/2021) which showed mild bilateral hip osteoarthritis (L>R), and asymmetric atrophy of the proximal left tensor fascia will. Reports received a ultrasound-guided steroid injection for left trochanteric bursitis on 11/10/2021, which provided some relief transiently. Subsequently underwent KAREN at left L4/5 and L5/S1 on 12/13/2021 by Dr. Harsh Trejo with some improvement. Completed physical therapy with improvement in pain in bilateral hips and lower back.   However, noted worsening back pain and left leg weakness, and using a cane for ambulation due to walking with a limp. Received bilateral SI joint injections in 9/2022 with some relief. Underwent repeat lumbar MRI (11/2022) which showed an acute/subacute fracture of the L2 inferior endplate and subacute fracture of L4 superior endplate; multilevel degenerative changes without change. Underwent bilateral SI transforaminal KAREN on 11/14/2022 with minimal improvement. Patient did not feel pain from compression fractures significant enough to proceed with kyphoplasty. Advised to continue to hold off on physical therapy until compression fractures healed. Continuing to follow with RAMESH Canales and Dr. Zoie Nicholson at [place]. 8. Tourette's syndrome. Followed by Dr. Nelida Rudolph. On Haldol with plans to switch to Risperdal if Haldol becomes unavailable. Current dose of 2 mg twice daily very effective in controlling symptoms. Attempted to decrease dose of Haldol to 1 mg daily given new tremor, and did note resolution of tremor. However, Tourette's symptoms significantly worsened requiring resumption of 2 mg dose. Will continue to monitor. 9. History of recurrent diverticulitis. Colonoscopy (2/2016) with moderately severe diverticulosis in the ascending and descending colon. Has had multiple recent episodes of abdominal pain which were treated empirically with Cipro. Episode of symptoms in 3/2018 resolved prior to initiating antibiotics, bringing into question whether her abdominal symptoms were due to diverticulitis or irritable bowel syndrome. Had recurrent episode in 7/2018, and abdominal CT scan confirmed diagnosis of acute diverticulitis. Treated with Augmentin as developed rash on Flagyl. Subsequently evaluated by Dr. Deyvi Ash and instructed to continue Probiotics and use hyoscyamine as needed when develop abdominal complaints as concerned that recurrent symptoms related to IBS. Diagnosed with recurrent diverticulitis after presenting to [facility] ED on 8/9/2022.   CT abdomen/pelvis showed proximal sigmoid diverticulitis without complication and she was treated with Augmentin with improvement. Reports recurrence of abdominal discomfort last week which appears to have improved with hyoscyamine. Will continue to follow. 10. GERD. Using Protonix 20 mg daily with reasonable control. Reports may increase to 40 mg occasionally for increased symptoms. Underwent upper endoscopy (8/11/2021) by Dr. Hallie Keita to evaluate iron deficiency and noted to have normal stomach and duodenum, abnormal motility of the esophagus and a small hiatal hernia. Will continue to monitor. 11. Insomnia. Using melatonin nightly. Also using temazepam alternating with diazepam intermittently when melatonin not effective. Benzodiazepines prescribed by Dr. Axel Poe and advised to minimize. 12. Anxiety. Patient described symptoms of anxiety and possible mild depression felt to likely be reactive to current health issues. Prescribed sertraline 25 mg daily but did not initiate. Reports overall stable symptoms today and not wishing to proceed with treatment. She does have continued difficulty with chronic insomnia. Will continue to monitor. 13. Overweight. Weight decreased 3 pounds since last visit with BMI 27.87. Emphasized continued lifestyle modifications including attempts at regular exercise and heart healthy diet. Will continue to monitor. 14. Health maintenance. Completed four dose series of the [address] 19 vaccine given immunosuppressed status and has received the updated bivalent Pfizer booster dose. Has not yet received Shingrix vaccine and remains hesitant to do so. Other immunizations up to date. Mammogram up-to-date. Vitamin D level remains normal on maintenance dose supplement. Continue regular eye exams with Dr. Yari Dean. Completed cataract surgery in 8/6736 without complications. Medicare wellness visit up-to-date. Patient understands recommendations and agrees with plan.   Follow-up in 6 months. Future orders:    ICD-10-CM ICD-9-CM    1. Essential hypertension  I10 401.9       2. Hyperlipidemia, unspecified hyperlipidemia type  E78.5 272.4       3. Immunosuppressed due to chemotherapy ([facility]  D84.821 V58.69     T45. 1X5A      Z79.899        4. Fatigue, unspecified type  R53.83 780.79       5. Malignant neoplasm metastatic to lung, unspecified laterality (HCC)  C78.00 197.0       6. Malignant neoplasm of left breast in female, estrogen receptor positive, unspecified site of breast ([facility]  C50.912 174.9     Z17.0 V86.0       7. Osteopenia of multiple sites  M85.89 733.90       8. Compression fracture of L4 vertebra with routine healing, subsequent encounter  S32.040D V54.17       9. Compression fracture of L2 vertebra with routine healing, subsequent encounter  S32.020D V54.17       10. Spinal stenosis of lumbar region with neurogenic claudication  M48.062 724.03       11. Aortic valve insufficiency, etiology of cardiac valve disease unspecified  I35.1 424.1       12. History of diverticulitis  Z87.19 V12.70       13.  Insomnia, unspecified type  G47.00 780.52

## 2023-01-04 ENCOUNTER — HOSPITAL ENCOUNTER (OUTPATIENT)
Dept: MAMMOGRAPHY | Age: 78
Discharge: HOME | End: 2023-01-04
Attending: INTERNAL MEDICINE
Payer: MEDICARE

## 2023-01-04 DIAGNOSIS — Z12.31 SCREENING MAMMOGRAM FOR HIGH-RISK PATIENT: ICD-10-CM

## 2023-01-04 PROCEDURE — 77063 BREAST TOMOSYNTHESIS BI: CPT

## 2023-01-13 DIAGNOSIS — M48.062 SPINAL STENOSIS OF LUMBAR REGION WITH NEUROGENIC CLAUDICATION: ICD-10-CM

## 2023-01-13 DIAGNOSIS — Z51.81 MEDICATION MONITORING ENCOUNTER: Primary | ICD-10-CM

## 2023-01-13 DIAGNOSIS — G47.00 INSOMNIA, UNSPECIFIED TYPE: ICD-10-CM

## 2023-01-13 DIAGNOSIS — M54.30 SCIATICA, UNSPECIFIED LATERALITY: ICD-10-CM

## 2023-01-13 RX ORDER — GABAPENTIN 300 MG/1
300 CAPSULE ORAL
Qty: 90 CAPSULE | Refills: 1 | Status: SHIPPED | OUTPATIENT
Start: 2023-01-13

## 2023-01-13 RX ORDER — LOSARTAN POTASSIUM 25 MG/1
25 TABLET ORAL DAILY
Qty: 90 TABLET | Refills: 3 | Status: SHIPPED | OUTPATIENT
Start: 2023-01-13

## 2023-01-13 RX ORDER — TEMAZEPAM 15 MG/1
15 CAPSULE ORAL
Qty: 30 CAPSULE | Refills: 0 | Status: SHIPPED | OUTPATIENT
Start: 2023-01-13

## 2023-01-13 NOTE — TELEPHONE ENCOUNTER
Last Fill per chart:   Temazapam- 9/22/22 90 tabs  Gabapentin- 10/7/22 90 tabs  Losartan- 1/8/22 90 tabs 3 refills    CSA Signed: none on file  Last UDS: none on file

## 2023-01-13 NOTE — TELEPHONE ENCOUNTER
Reviewed report generated by the [facility]. Does not demonstrate aberrancies or inconsistencies with regard to the prescribing of controlled medications to this patient by other providers. Last filled gabapentin 10/15/2022 and temazepam 9/23/2022 per . Last UDS never performed. Will obtain at next visit. Order placed. Will need to sign an updated controlled substance agreement at next visit.

## 2023-01-13 NOTE — TELEPHONE ENCOUNTER
New pharmacy    Requested Prescriptions     Pending Prescriptions Disp Refills    temazepam (RESTORIL) 15 mg capsule 30 Capsule 0     Sig: Take 1 Capsule by mouth nightly as needed for Sleep. Max Daily Amount: 15 mg.    gabapentin (NEURONTIN) 300 mg capsule 90 Capsule 1     Sig: Take 1 Capsule by mouth nightly. Max Daily Amount: 300 mg.    losartan (COZAAR) 25 mg tablet 90 Tablet 3     Sig: Take 1 Tablet by mouth daily.

## 2023-01-31 ENCOUNTER — TELEPHONE (OUTPATIENT)
Dept: INTERNAL MEDICINE CLINIC | Age: 78
End: 2023-01-31

## 2023-01-31 RX ORDER — PANTOPRAZOLE SODIUM 40 MG/1
40 TABLET, DELAYED RELEASE ORAL DAILY
Qty: 90 TABLET | Refills: 3 | Status: SHIPPED | OUTPATIENT
Start: 2023-01-31

## 2023-01-31 NOTE — PROGRESS NOTES
1. \"Have you been to the ER, urgent care clinic since your last visit? Hospitalized since your last visit? \" No    2. \"Have you seen or consulted any other health care providers outside of the [address] [place] [address] since your last visit? \" No     3. For patients aged 39-70: Has the patient had a colonoscopy / FIT/ Cologuard? Yes - no Care Gap present      If the patient is female:    4. For patients aged 41-77: Has the patient had a mammogram within the past 2 years? NA - based on age or sex      11. For patients aged 21-65: Has the patient had a pap smear?  NA - based on age or sex

## 2023-01-31 NOTE — TELEPHONE ENCOUNTER
Patient called in wanting to know if the Dr could refill her pantoprazole sodium 40mg. She said her GI dr usually refill it for her but they so so hard to reached. Please Advise

## 2023-02-01 ENCOUNTER — VIRTUAL VISIT (OUTPATIENT)
Dept: INTERNAL MEDICINE CLINIC | Age: 78
End: 2023-02-01
Payer: MEDICARE

## 2023-02-01 ENCOUNTER — TELEPHONE (OUTPATIENT)
Dept: INTERNAL MEDICINE CLINIC | Age: 78
End: 2023-02-01

## 2023-02-01 DIAGNOSIS — C78.00 MALIGNANT NEOPLASM METASTATIC TO LUNG, UNSPECIFIED LATERALITY (HCC): ICD-10-CM

## 2023-02-01 DIAGNOSIS — F95.2 TOURETTE SYNDROME: ICD-10-CM

## 2023-02-01 DIAGNOSIS — Z79.899 IMMUNOSUPPRESSED DUE TO CHEMOTHERAPY (HCC): ICD-10-CM

## 2023-02-01 DIAGNOSIS — M54.32 SCIATICA OF LEFT SIDE: ICD-10-CM

## 2023-02-01 DIAGNOSIS — C50.912 MALIGNANT NEOPLASM OF LEFT BREAST IN FEMALE, ESTROGEN RECEPTOR POSITIVE, UNSPECIFIED SITE OF BREAST (HCC): ICD-10-CM

## 2023-02-01 DIAGNOSIS — Z17.0 MALIGNANT NEOPLASM OF LEFT BREAST IN FEMALE, ESTROGEN RECEPTOR POSITIVE, UNSPECIFIED SITE OF BREAST (HCC): ICD-10-CM

## 2023-02-01 DIAGNOSIS — R53.83 FATIGUE, UNSPECIFIED TYPE: Primary | ICD-10-CM

## 2023-02-01 DIAGNOSIS — D84.821 IMMUNOSUPPRESSED DUE TO CHEMOTHERAPY (HCC): ICD-10-CM

## 2023-02-01 DIAGNOSIS — F51.04 CHRONIC INSOMNIA: ICD-10-CM

## 2023-02-01 DIAGNOSIS — T45.1X5A IMMUNOSUPPRESSED DUE TO CHEMOTHERAPY (HCC): ICD-10-CM

## 2023-02-01 DIAGNOSIS — M48.062 SPINAL STENOSIS OF LUMBAR REGION WITH NEUROGENIC CLAUDICATION: ICD-10-CM

## 2023-02-01 RX ORDER — GABAPENTIN 300 MG/1
CAPSULE ORAL
Qty: 270 CAPSULE | Refills: 1 | Status: SHIPPED | OUTPATIENT
Start: 2023-02-01

## 2023-02-01 NOTE — PATIENT INSTRUCTIONS
Heart-Healthy Diet: Care Instructions  Your Care Instructions     A heart-healthy diet has lots of vegetables, fruits, nuts, beans, and whole grains, and is low in salt. It limits foods that are high in saturated fat, such as meats, cheeses, and fried foods. It may be hard to change your diet, but even small changes can lower your risk of heart attack and heart disease. Follow-up care is a key part of your treatment and safety. Be sure to make and go to all appointments, and call your doctor if you are having problems. It's also a good idea to know your test results and keep a list of the medicines you take. How can you care for yourself at home? Watch your portions  Learn what a serving is. A \"serving\" and a \"portion\" are not always the same thing. Make sure that you are not eating larger portions than are recommended. For example, a serving of pasta is ½ cup. A serving size of meat is 2 to 3 ounces. A 3-ounce serving is about the size of a deck of cards. Measure serving sizes until you are good at [place]" them. Keep in mind that restaurants often serve portions that are 2 or 3 times the size of one serving. To keep your energy level up and keep you from feeling hungry, eat often but in smaller portions. Eat only the number of calories you need to stay at a healthy weight. If you need to lose weight, eat fewer calories than your body burns (through exercise and other physical activity). Eat more fruits and vegetables  Eat a variety of fruit and vegetables every day. Dark green, deep orange, red, or yellow fruits and vegetables are especially good for you. Examples include spinach, carrots, peaches, and berries. Keep carrots, celery, and other veggies handy for snacks. Buy fruit that is in season and store it where you can see it so that you will be tempted to eat it. Cook dishes that have a lot of veggies in them, such as stir-fries and soups.   Limit saturated and trans fat  Read food labels, and try to avoid saturated and trans fats. They increase your risk of heart disease. Use olive or canola oil when you cook. Bake, broil, grill, or steam foods instead of frying them. Choose lean meats instead of high-fat meats such as hot dogs and sausages. Cut off all visible fat when you prepare meat. Eat fish, skinless poultry, and meat alternatives such as soy products instead of high-fat meats. Soy products, such as tofu, may be especially good for your heart. Choose low-fat or fat-free milk and dairy products. Eat foods high in fiber  Eat a variety of grain products every day. Include whole-grain foods that have lots of fiber and nutrients. Examples of whole-grain foods include oats, whole wheat bread, and brown rice. Buy whole-grain breads and cereals, instead of white bread or pastries. Limit salt and sodium  Limit how much salt and sodium you eat to help lower your blood pressure. Taste food before you salt it. Add only a little salt when you think you need it. With time, your taste buds will adjust to less salt. Eat fewer snack items, fast foods, and other high-salt, processed foods. Check food labels for the amount of sodium in packaged foods. Choose low-sodium versions of canned goods (such as soups, vegetables, and beans). Limit sugar  Limit drinks and foods with added sugar. These include candy, desserts, and soda pop. Limit alcohol  Limit alcohol to no more than 2 drinks a day for men and 1 drink a day for women. Too much alcohol can cause health problems. When should you call for help? Watch closely for changes in your health, and be sure to contact your doctor if:    You would like help planning heart-healthy meals. Where can you learn more? Go to http://www.bernabe.com/  Enter V137 in the search box to learn more about \"Heart-Healthy Diet: Care Instructions. \"  Current as of: August 22, 2019               Content Version: 12.6  © 8369-0778 [employer]. Care instructions adapted under license by [employer] (which disclaims liability or warranty for this information). If you have questions about a medical condition or this instruction, always ask your healthcare professional. [address] any warranty or liability for your use of this information.

## 2023-02-01 NOTE — PROGRESS NOTES
Eduin Grande is a 78 y.o. female, evaluated via audio-only technology on 2/1/2023 for Follow-up (Discuss general health concerns with provider.)      HPI:  Lorrie Altamirano is a 78y.o. year old female who presents today for an acute visit. She has a history of hypertension, dyslipidemia,  metastatic breast cancer, GERD, diverticulosis with recurrent diverticulitis, osteoarthritis s/p right knee replacement (2012), lumbar degenerative disease, osteopenia, compression fracture, and Tourette's syndrome. She is accompanied by her . She has completed the four dose Moderna COVID-19 vaccine series given immunosuppressed status and received the updated bivalent Pfizer booster dose. She reports that she is continuing to experience ongoing fatigue while on Ibrance and letrozole. She states that she feels that her fatigue is impacting her quality of life and she does report that her activities of daily living are limited due to her ongoing issues with fatigue. She also reports ongoing back discomfort and pain which makes performing any activities at home difficult. She states that she is not sleeping well at night due to difficulty falling asleep and due to her back pain and she feels that this is contributing to her fatigue. She is currently taking 2 extra strength Tylenol and gabapentin 300 mg nightly. She does intermittently take temazepam 15 mg when she is unable to fall asleep but tries to avoid taking this every night. She states that diazepam 5 mg is no longer effective in helping with her insomnia. She does note worsening of her Tourette's and intermittently will take an additional Haldol 2 mg as needed to help control. She requests recommendations to address her sleep issues, back discomfort, and ongoing fatigue. She continues to report difficulty with low back pain and left leg pain and weakness. She has been under the care of of RAMESH Canales at [place].   She received bilateral SI joint injections in 9/2022 with some relief, but due to persistent symptoms she underwent a repeat lumbar MRI (11/2022) which showed an acute/subacute fracture of the L2 inferior endplate and subacute fracture of L4 superior endplate; and multilevel degenerative changes without change. She underwent bilateral SI transforaminal KAREN on 11/14/2022 with minimal improvement. She did not feel pain from compression fractures significant enough to proceed with kyphoplasty since she had not been experiencing a significant increase in her pain. She discontinued physical therapy due to the new compression fractures and due to lack of efficacy. She had a follow-up visit with RAMESH Canales on 1/24/2023 and no further interventions were recommended due to lack of benefit. Summary of prior hospitalizations and medical history:  On 3/3/2022, she presented for evaluation of worsening fatigue. She was evaluated in 11/2020 when she described dyspnea in association with fatigue when performing exertional activities. EKG (11/24/2020) was unremarkable, chest x-ray (11/24/2020) was negative, and echocardiogram (12/1/2020) showed normal LV function (EF 55-60%), trileaflet AV with moderate AI, and PAP 30 mmHg. She had repeat staging CT scans in 6/2021 and 12/2021 without evidence of recurrence or metastases. Iron studies performed by Dr. Chris Lynn showed a ferritin level of 13 although no evidence of anemia. She underwent upper endoscopy and colonoscopy by Dr. Bartolome Arana (8/11/2021) without evidence of source of iron loss. At her last visit, she reported that her fatigue had significantly progressed, describing \"waves of severe fatigue and shortness of breath with minimal exertion\". She stated that she had no energy to go out with friends and has been having difficulty with her normal ADL activities at home due to fatigue. She underwent a repeat lab evaluation by oncology, which showed normal CBC, CMP, ferritin, and iron studies.   She reported that decision was made by Dr. Theodora Hogan to hold [facility] for a total of 7 weeks before reinitiating to see if may help with symptoms. She continued on letrozole. She also underwent a repeat echocardiogram on 4/11/2022 which continue to show normal LV function and stable moderate AI. She noted improvement while [facility] was being held, but decided to proceed with resumption of therapy on 4/1/2022. Decision made to change her aromatase inhibitor therapy from letrozole to Aromasin. She has a history of left breast cancer, which was diagnosed in 7/2010 as invasive ductal adenocarcinoma, s/p left modified radical mastectomy with sentinel node biopsy, performed by Dr. Sal Brown. She had 3 positive lymph nodes and was classified as stage 1B, T1c N1 M0, ER and KY positive, Her-2/sarah negative by FISH. She underwent 6 cycles of chemotherapy with Docetaxel and Cytoxan. She was subsequently unable to tolerate anastrozole, exemestane, tamoxifen, and letrozole due to profound fatigue and arthralgias. Her course was complicated by chronic left arm lymphedema, managed with a compression sleeve. A chest CT scan in 3/2014 noted several small pulmonary nodules which were concerning for metastases but were too small to biopsy. They were followed with sequential CT scans , and in 12/2014, repeat chest CT scan showed enlarging bilateral pulmonary nodules compatible with progression of metastatic disease. A fine needle aspirate was performed on 12/21/2014 which showed benign pulmonary parenchyma with alveolar hemorrhage. Repeat chest CT scan in 3/12/2015 showed mild interval progression of the lung metastases with enlarging nodules and development of new nodules. A CT guided needle biopsy was performed on 3/23/2015, which confirmed metastatic adenocarcinoma consistent with breast primary (stage IV, ER/KY positive, and TTF-1 negative). On 4/13/2015, she was started on therapy with Ibrance and letrozole.  Follow-up CT scan (7/15/15) showed partial response with decreasing size of some nodules and resolution of other nodules. Most recent chest, abdomen, and pelvis CT scan was obtained in 6/2017, showing stable or decreased multiple bilateral pulmonary nodules and no suspicious new pulmonary nodules. She continues on palbociclib, but was changed from letrozole to fulvestrant. She describes persistent fatigue which she attributes to monthly treatment with fulvestrant (Faslodex). She underwent restaging chest, abdomen, and pelvis CT scan in 7/2019 which showed that her pulmonary nodules were unchanged, and no other evidence of metastatic disease. Repeat staging CT chest/ abdomen/ pelvis in 1/2021 showed no evidence of metastasis or adenopathy. She is followed by Dr. Bonny An. She states that she established care with Dr. Grace Velasco, but was told that she may have her mammograms and breast exams in our office with referral to her if something arises. She also has a history of a right ovarian cyst, but was released from the care of Dr. Tanna Gallardo since it was concluded to be benign. She has a history of hypertension treated with losartan. She monitors her blood pressure mainly when visiting multiple physicians and reports that it is well controlled. She has no history of heart disease, and denies any chest pain, shortness of breath at rest or with exertion, palpitations, lightheadedness, or edema. In 4/11/2016, she sustained a fall with subsequent severe pain in her mid to upper lumbar region and wrapping around waist. She was treated with meloxicam, tylenol and flexeril, but because of persistent discomfort, she presented to [facility] on 4/15/2016 and lumbosacral spine films showed marked degenerative changes with disc space obliteration throughout lumbar spine and slight anterior spondylolisthesis of L4.  She was evaluated by Dr. Mariluz Hamm on 4/18/2016 who recommended physical therapy and evaluation by Dr. Miles Lugo for her degenerative spine changes. She continued to take meloxicam and tylenol and started physical therapy, but noted worsening of her pain. She subsequently was evaluated by Dr. Barrie Babcock, who obtained a lumbar MRI on 4/26/2016, which showed a new subacute compression fracture of T11 vertebral body with diffuse marrow edema and mild paravertebral inflammatory stranding, no retropulsion or central stenosis; more severe degenerative disease along the right side at L4-L5, L5-S1, potential mild compression of right exiting L4 and L5 nerve roots. She was referred to Dr. Sienna Castaneda for kyphoplasty of the T11 compression fracture given failure of pain control with conservative measures. She underwent the procedure on 1/7/1254 without complications, and F98 vertebral body bone core and aspirate biopsies were performed, which showed only reactive bone changes and no evidence of malignancy. She has a history of osteopenia, with a history of fracture of her sacrum. She reports that she was treated with alendronate in 12/2011 but discontinued it in 4/2013 due to intolerence. Bone density scan (11/2017) was consistent with osteopenia with femoral neck T-scores: left -1.4/ right -1.4 and distal radius -2.2 (lumbar T-score could not be assessed). A repeat bone density scan was obtained (11/2019) showing continued evidence of osteopenia with femoral neck T-scores: left -1.4/ right -1.4 and distal radius -2.4 (lumbar T-score could not be assessed). She was referred to Dr. Lourdes Gaucher given her multiple compression fractures and treatment with letrozole, and was started on Reclast in 3/2021. She continues to take calcium and vitamin D. In 10/2016, she developed left sided low back pain with radiation to her left leg. She underwent a lumbar MRI (9/2016) showing scoliosis and advanced multilevel degenerative changes with areas of foraminal stenosis at L3-L4 and L5-S1; mild/moderate central canal stenosis at L4-L5 and L5-S1.  She was evaluated by Dr. Grace Moffett and treated with pregabalin with some improvement. She subsequently received an epidural steroid injection with improvement and discontinued pregabalin. She reported worsening low back pain and right sciatica, and underwent an epidural steroid injection at right L4-5 transforaminal approach by Dr. Grace Moffett on 3/18/2019. She has noted improvement in her right hip and leg pain. She underwent a lumbar MRI (6/2020) for worsening right sciatica, showing very advanced multilevel DDD and DJD with extensive bone marrow edema reaction to DDD; chronic compression fractures; single level of degenerative central spinal stenosis, moderate, at L4/L5, worsened compared 2016; multilevel degenerative foraminal narrowing worst right L4/L5, bilateral L5/S1; no evidence of metastatic breast cancer. She underwent left L4/5 and right L5-S1 KAREN in 6/2020 and Left S1 TF KAREN in 8/2020 by Dr. Grace Moffett with improvement. She reports continuing to have persistent difficulty with left-sided buttock and pelvic pain, and she underwent left hip MRI (11/6/2021) which showed mild bilateral hip osteoarthritis (L>R), and asymmetric atrophy of the proximal left tensor fascia will. She reports that she received a ultrasound-guided steroid injection for left trochanteric bursitis on 11/10/2021, which provided some relief transiently, but pain has since recurred. She underwent an KAREN left L4/5 and L5/S1 on 12/13/2021 by Dr. Grace Moffett with some improvement. She subsequently underwent physical therapy with significant benefit. In 4/2017, she noted increasing cervical and upper back discomfort. She has had multiple cervical MRI's,and underwent repeat in 4/2017 which showed multilevel degenerative disc disease and facet arthropathy without change from prior studies with mild central canal stenosis C3-C4 and C5-C6, and moderate central canal stenosis C6-C7. She was treated with Mobic and Flexeril, and gabapentin at bedtime.  She complained of right shoulder pain. X-ray of her shoulder (11/2017) was negative, and she had a right shoulder MRI (5/14/2018) which showed deep partial thickness undersurface tear of the distal supraspinatus, likely with full thickness slitlike perforations; no gross tendon retraction, but there is moderate muscle atrophy; partial thickness tearing with longitudinal components involving the biceps tendon at its sulcal turn, some associated tenosynovitis; accompanying short length longitudinal split tear in the distal subscapularis; moderate infraspinatus tendinosis, and mild to moderate subacromial subdeltoid bursitis. She underwent evaluation by Dr. Demarcus Reyna on 5/18/2018, and received a glenohumeral cortisone injection. She has a history of GERD and diverticulosis with recurrent diverticulitis,. She was evaluated by Dr. Lisha Jolley in 2/23/2016 and underwent an upper endoscopy, which revealed a Schatzki's ring at the gastroesophageal junction (dilated) with mild distal esophagitis and a small hiatal hernia. A screening colonoscopy was also performed showing moderately severe diverticulosis of the ascending and descending colon and a 2 mm sessile sigmoid polyp (pathology: hyperplastic). Recommendations for follow-up colonoscopy in 10 years. She denies any abdominal pain, nausea, vomiting, melena, hematochezia, or change in bowel movements. She was evaluated in 8/2017 by GERALD Hare with complaints of abdominal pain and was treated with Cipro for presumed diverticulitis. She contacted the office again in 10/2017 and 1/2018 with a recurrence of symptoms, and was treated for a 10 day course with Cipro with improvement. Abdominal CT scan in 2/6/2018 for staging for her breast cancer did show evidence of moderate to severe diverticulosis, but no evidence of diverticulitis. She did present with similar symptoms in 3/2018 and was evaluated by GERALD Vigil and prescribed Cipro. However, her symptoms resolved prior to taking the antibiotics.  In 7/2018, she again presented with left lower quadrant pain, and an abdominal CT scan (7/13/2018) which showed evidence of uncomplicated diverticulitis involving the lower left colon and upper sigmoid. She was treated with Cipro and Flagyl initially, but developed a rash due to Flagyl, and was subsequently changed to Augmentin. She had a follow-up visit with Dr. Jack Yi and it was his opinion that she also had IBS which mimicked her episodes of diverticulitis. He recommended continuing on a probiotic and prescribed hyoscyamine to use as needed. She has noted improvement since doing so. She underwent evaluation for iron deficiency when she was found to have a ferritin of 13 in 6/2021 which was performed to evaluate her fatigue. On 8/11/2021 she underwent upper endoscopy and colonoscopy by Dr. Jack Yi. Upper endoscopy showed normal stomach and duodenum, abnormal motility of the esophagus, and a small hiatal hernia. Colonoscopy showed increased vascularity throughout the colon concerning for microscopic colitis, but pathology of multiple random biopsies was negative. No source for iron loss was identified. On 8/9/2022, she presented to the [facility] ED with abdominal pain and lab evaluation showed WBC 13.9, Hb 13.9/HCT 41.7, creatinine 0.76/eGFR >60, lipase normal, urinalysis negative; CT abdomen/pelvis showed evidence of proximal sigmoid diverticulitis without complication. She was treated with Augmentin with clinical improvement. She also has a history of Tourette's syndrome controlled with Haldol. She is followed by Dr. Santy Self.        Past Medical History:   Diagnosis Date    Arthritis     Breast cancer metastasized to lung [facility])     Left Breast    Chronic pain     Colon polyps 2/22/16    distal sigmoid, Dr. Levon Morales    DDD (degenerative disc disease)     Diverticulitis of colon     Diverticulosis 2/22/16    mild in the ascedning and sigmoid colon, Dr. Carlton Huynh (hyperlipidemia)     Hypertension     Osteopenia Reactive depression 3/4/2022    Tourette syndrome     Vitamin D deficiency      Past Surgical History:   Procedure Laterality Date    COLONOSCOPY N/A 8/11/2021    COLONOSCOPY with Bx performed by Shell Das MD at [facility] [id number]      HX BREAST BIOPSY  7-30-10    Left Breast w/Nipple Duct exploration and excisional biopsy-left    HX DILATION AND CURETTAGE      x3    HX MODIFIED RADICAL MASTECTOMY Left 09/03/2010    HX ORTHOPAEDIC Right 2013    knee replacement    HX TONSILLECTOMY      HYSTEROSCOPY DIAGNOSTIC       Current Outpatient Medications   Medication Sig    gabapentin (NEURONTIN) 300 mg capsule Take 1 Capsule by mouth Every morning AND 2 Capsules nightly. Max Daily Amount: 900 mg.    pantoprazole (Protonix) 40 mg tablet Take 1 Tablet by mouth daily. temazepam (RESTORIL) 15 mg capsule Take 1 Capsule by mouth nightly as needed for Sleep. Max Daily Amount: 15 mg.    losartan (COZAAR) 25 mg tablet Take 1 Tablet by mouth daily. meloxicam (MOBIC) 15 mg tablet Take 1 Tablet by mouth daily. Take with food    haloperidoL (HALDOL) 2 mg tablet TAKE ONE TABLET BY MOUTH TWICE A DAY (Patient taking differently: As well as 3rd dose daily as needed)    hyoscyamine sulfate (CYSTOSPAZ) 0.125 mg rapid dissolve tablet Take 1 Tablet by mouth every four (4) hours as needed for Diarrhea. cyanocobalamin 1,000 mcg tablet Take 1,000 mcg by mouth in the morning. diazePAM (Valium) 5 mg tablet Take 1 Tablet by mouth every six (6) hours as needed for Anxiety. Max Daily Amount: 20 mg.    ondansetron hcl (Zofran) 8 mg tablet Take 1 Tablet by mouth every eight (8) hours as needed for Nausea or Vomiting. Take one tablet by mouth every 8 hours as needed. acetaminophen (TYLENOL) 500 mg tablet Take 500 mg by mouth every six (6) hours as needed for Pain. Take 1-2 tablets by mouth every 6 hours as needed. palbociclib 75 mg cap Take 75 mg by mouth daily.  For days 1-21 of each 28 day cycle    Biotin 2,500 mcg cap Take  by mouth.    calcium-vitamin D (OS-ENOCH +D3) 500 mg-200 unit per tablet Take 1 Tab by mouth two (2) times daily (with meals). cholecalciferol (VITAMIN D3) (1000 Units /25 mcg) tablet Take 4,000 Units by mouth daily. amoxicillin-clavulanate (AUGMENTIN) 875-125 mg per tablet Take 1 Tablet by mouth every twelve (12) hours for 10 days. No current facility-administered medications for this visit. Allergies and Intolerances: Allergies   Allergen Reactions    Keflex [Cephalexin] Rash    Metronidazole Rash    Other Medication Nausea Only     Anesthesia    Shellfish Containing Products Nausea and Vomiting     More of just eating the actual shellfish. Sulfa (Sulfonamide Antibiotics) Rash    Ultram [Tramadol] Nausea and Vomiting     Patient states she is allergic to most Narcotics    Vancomycin Rash     Family History:   Family History   Problem Relation Age of Onset    Osteoporosis Sister     Osteoporosis Mother     Stroke Father     Hypertension Father     Cancer Paternal Aunt         breast     Social History:   She  reports that she quit smoking about 17 years ago. Her smoking use included cigarettes. She has a 3.00 pack-year smoking history. She has never used smokeless tobacco. She stopped smoking over 40 years ago. She is  and lives with . They have one daughter and two grandchildren.     Social History     Substance and Sexual Activity   Alcohol Use Not Currently    Alcohol/week: 5.8 standard drinks    Types: 7 Glasses of wine per week     Immunization History:  Immunization History   Administered Date(s) Administered     Influenza, FLUZONE (age 72 y+), High Dose 09/20/2022    COVID-19, MODERNA BLUE border, Primary or Immunocompromised, (age 18y+), IM, 100 mcg/0.5mL 02/07/2021, 03/07/2021, 11/07/2021    COVID-19, MODERNA Booster BLUE border, (age 18y+), IM, 50mcg/0.25mL 05/19/2022    COVID-19, PFIZER Bivalent BOOSTER, (age 12y+), IM, 30 mcg/0.3 mL dose 10/14/2022    Influenza High Dose Vaccine PF 10/01/2015, 10/25/2016, 10/05/2017    Influenza Vaccine 09/01/2014    Influenza Vaccine (Tri) Adjuvanted (>65 Yrs FLUAD TRI [id number]) 10/05/2018, 10/17/2019    Influenza Vaccine Split 11/01/2011, 10/02/2012    Influenza Vaccine Whole 10/08/2010    Influenza, FLUAD, (age 72 y+), Adjuvanted 09/23/2020, 10/15/2021    Pneumococcal Conjugate (PCV-13) 10/27/2015    Pneumococcal Polysaccharide (PPSV-23) 04/15/2013    TD Vaccine 05/05/2008    Tdap 09/12/2014    Zoster 09/20/2011       Review of Systems:   As above included in HPI. Otherwise 11 point review of systems negative including constitutional, skin, HENT, eyes, respiratory, cardiovascular, gastrointestinal, genitourinary, musculoskeletal, endo/heme/aller, neurological.    Physical:     There were no vitals taken for this visit. None performed due to audio only visit. Review of Data:  Labs:  No visits with results within 1 Month(s) from this visit.    Latest known visit with results is:   Hospital Outpatient Visit on 12/20/2022   Component Date Value Ref Range Status    LIPID PROFILE 12/20/2022        Final    Cholesterol, total 12/20/2022 204 (A)  <200 MG/DL Final    Triglyceride 12/20/2022 105  <150 MG/DL Final    HDL Cholesterol 12/20/2022 62 (A)  40 - 60 MG/DL Final    LDL, calculated 12/20/2022 121 (A)  0 - 100 MG/DL Final    VLDL, calculated 12/20/2022 21  MG/DL Final    CHOL/HDL Ratio 12/20/2022 3.3  0 - 5.0   Final    Magnesium 12/20/2022 2.0  1.6 - 2.6 mg/dL Final    Sodium 12/20/2022 137  136 - 145 mmol/L Final    Potassium 12/20/2022 4.3  3.5 - 5.5 mmol/L Final    Chloride 12/20/2022 99 (A)  100 - 111 mmol/L Final    CO2 12/20/2022 33 (A)  21 - 32 mmol/L Final    Anion gap 12/20/2022 5  3.0 - 18 mmol/L Final    Glucose 12/20/2022 93  74 - 99 mg/dL Final    BUN 12/20/2022 17  7.0 - 18 MG/DL Final    Creatinine 12/20/2022 0.84  0.6 - 1.3 MG/DL Final    BUN/Creatinine ratio 12/20/2022 20  12 - 20   Final    eGFR 12/20/2022 >60 >60 ml/min/1.73m2 Final    Calcium 12/20/2022 9.9  8.5 - 10.1 MG/DL Final    Bilirubin, total 12/20/2022 0.7  0.2 - 1.0 MG/DL Final    ALT (SGPT) 12/20/2022 18  13 - 56 U/L Final    AST (SGOT) 12/20/2022 11  10 - 38 U/L Final    Alk. phosphatase 12/20/2022 58  45 - 117 U/L Final    Protein, total 12/20/2022 7.1  6.4 - 8.2 g/dL Final    Albumin 12/20/2022 4.1  3.4 - 5.0 g/dL Final    Globulin 12/20/2022 3.0  2.0 - 4.0 g/dL Final    A-G Ratio 12/20/2022 1.4  0.8 - 1.7   Final    Vitamin D 25-Hydroxy 12/20/2022 54.2  30 - 100 ng/mL Final    Color 12/20/2022 YELLOW    Final    Appearance 12/20/2022 CLEAR    Final    Specific gravity 12/20/2022 1.011  1.005 - 1.030   Final    pH (UA) 12/20/2022 8.5 (A)  5.0 - 8.0   Final    Protein 12/20/2022 Negative  NEG mg/dL Final    Glucose 12/20/2022 Negative  NEG mg/dL Final    Ketone 12/20/2022 Negative  NEG mg/dL Final    Bilirubin 12/20/2022 Negative  NEG   Final    Blood 12/20/2022 Negative  NEG   Final    Urobilinogen 12/20/2022 0.2  0.2 - 1.0 EU/dL Final    Nitrites 12/20/2022 Negative  NEG   Final    Leukocyte Esterase 12/20/2022 Negative  NEG   Final       Health Maintenance:   Screening:    Mammogram: negative (1/2023)   PAP smear: negative (9/2013) Dr Benigno Brower. Pelvic ultrasound negative (9/2015). No further screening needed. Colorectal: colonoscopy (8/2021) normal.  Dr. Chai Everett. No further screening needed given age. Depression: none   DM (HbA1c/FPG): FPG 93 (12/2022)   Hepatitis C: unknown   Falls: none   DEXA: osteopenia (11/2021) lumbar compression fractures (11/2022). On Reclast.  Dr. Meredith Mccarthy.    Smoking:distant past   Glaucoma: regular eye exams with Dr. Virginia Carbone (last 3/2022)   Vitamin D: 54.2 (12/2022)   Medicare Wellness: 6/23/2022      Impression:  Patient Active Problem List   Diagnosis Code    Tourette syndrome F95.2    Osteoarthritis, Status post right total knee replacement M19.90    Lumbar spinal stenosis M48.061    HLD (hyperlipidemia) E78.5    Breast cancer [facility]), metastatic to lungs  C50.919    Essential hypertension I10    Diverticulosis K57.90    Osteopenia M85.80    Lymphedema of arm left I89.0    Insomnia G47.00    Fatigue R53.83    History of diverticulitis Z87.19    T11 Vertebral compression fracture (HCC) s/p kyphoplasty M48.50XA    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.812    Spinal stenosis of cervical region M48.02    Malignant neoplasm metastatic to lung [facility]) C78.00    Nontraumatic incomplete tear of right rotator cuff M75.111    DDD (degenerative disc disease), lumbar M51.36    Lumbar facet arthropathy M47.816    Immunosuppressed due to chemotherapy ([facility] [address] D84.821, T45.1X5A, Z79.899    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  Has been a chronic issue for several years and attributed to treatment with Ibrance and letrozole for her metastatic breast cancer. Has undergone evaluation for other causes and negative. Likely also contributed to by poor sleep with chronic insomnia and daily treatment with Haldol for for Tourette's syndrome. Discussed at length that unable to alleviate fatigue which is caused by her breast cancer treatment, but could attempt to address other contributors to her fatigue in an attempt to lessen severity. During discussion, patient revealed that chronic back pain and restless leg symptoms appear to be impacting her sleep at night. Also with difficulty with sleep onset insomnia and denies any difficulty with sleep maintenance. Will address the following:    Back pain due to lumbar radiculopathy and compression fractures. Chronic low back pain due to degenerative lumbar disease as well as new lumbar compression fractures at L2 and L4. On gabapentin 300 mg nightly and will occasionally take Tylenol ES.   Advised that would recommend attempts at better control of low back pain with increase in dose of gabapentin to 300 mg every morning and 600 mg nightly. Advised to also take two Tylenol ES twice daily with gabapentin. Review of lumbar MRI from 11/2022 with evidence of likely compression of the L5 nerve root which may be the source of her left leg weakness. If not improved with medication adjustment, will discuss obtaining EMG/NCS to further clarify if left weakness radicular in etiology. Chronic insomnia. Describes sleep onset insomnia but no difficulty with sleep maintenance. Using melatonin 5 mg nightly and required to use temazepam +/- diazepam intermittently. Reports no longer finding diazepam to be helpful but will use temazepam intermittently although attempting to minimize due to concern for chronic dependence. Describes difficulty with back pain and leg pain when attempting to fall asleep at night which appears to contribute to insomnia. Will increase dose of gabapentin to 600 mg nightly as discussed and advised to take with two extra strength Tylenol to see if may help with back and leg discomfort and alleviate some of the sleep onset difficulty. Will consider other options at next visit if not improved. Chronic issues:  1. Fatigue. Significant ongoing persistent fatigue felt to likely be a result of breast cancer treatment with Ibrance and letrozole. Completed evaluation in 11/2020 with EKG unremarkable, chest x-ray negative, and and echocardiogram (12/1/2020) showed normal LV function (EF 55-60%), trileaflet AV with moderate AI, and PAP 30 mmHg. Repeat staging CT scans in 6/2021 without evidence of recurrence or metastases. Iron studies performed by Dr. Hali Stewart showed a ferritin level of 13 although no evidence of anemia. Underwent upper endoscopy and colonoscopy by Dr. Jack Yi (8/11/2021) without evidence of source of iron loss. Random colon biopsies negative.   Reported progression of fatigue at last visit on 3/3/2022 with \"waves of severe fatigue and shortness of breath with minimal exertion\". Repeat lab evaluation by oncology showed normal CBC, CMP, ferritin, and iron studies. Repeat EKG (3/3/2022) was unchanged and repeat echocardiogram (4/11/2022) showed no change. Repeat EKG (3/3/2022) was unchanged and repeat echocardiogram (4/11/2022) showed no change. Trial of holding Ibrance in 3/2022 for 7 weeks with minimal improvement and felt that letrozole likely culprit and changed to Aromasin without improvement so resumed letrozole. Wishing to continue with therapy with Ibrance and aromatase inhibitor given effectiveness. Will continue to address. 2. Hypertension. Remains well controlled on losartan 25 mg daily. Renal function remains normal (12/2022) with creatinine 0.8/eGFR >60. Brain MRI (5/2021) with evidence of mild to moderate burden nonspecific white matter lesions and a few tiny old lacunar infarcts, likely from longstanding hypertension. Patient had been started on aspirin 81 mg daily as advised by Dr. Chastity Rosario, but reports that she is no longer taking. 3. Breast cancer with pulmonary metastasis. Continues on current therapy with Ibrance and letrozole. Reports no difference noted when changed to Aromasin. Most recent staging CT chest/ abdomen/ and pelvis (11/2022) without evidence of recurrent or metastatic disease. Continuing with annual mammograms, last 1/2023, without evidence of recurrent disease. Not using compression sleeve for lymphedema regularly, but receiving massage therapy every three weeks with good results. Continuing to follow with Dr. Cleotha Landau.   4. Right ICA terminus aneurysm. MRA brain (4/2019) showed a 2 mm superiorly directed aneurysm at the right ICA terminus. Repeat MRA brain (5/2021) showed no significant interval change. Reported intermittent tremor with intention (L>R) at last visit, and underwent repeat brain MRI/MRA (5/2022) without change. Being monitored by Dr. Chastity Rosario. 5. Hyperlipidemia.  Calculated 10 year ASCVD risk is 19.4 %, which places her in one of the four statin benefit groups (primary prevention with risk > 7.5%). LDL overall stable at 121 and HDL 62 (1/2023). Given lack of history of ASCVD, no evidence of atherosclerotic disease on chest and abdominal CT scans, and history of metastatic breast cancer, decision made not to treat with statin at this time. Continue to emphasized importance of lifestyle modifications, including heart healthy diet and exercise. 6. Osteopenia. History of T11 compression fracture in 4/2016, s/p kyphoplasty. Last bone density scan 11/2019. Using femoral neck T-scores, calculated FRAX score estimates her 10 year risk of a major osteoporetic fracture at 16 % and hip fracture at 2.3%, which alone are not an indication for biphosphonate treatment. However, development of a vertebral compression fracture and treatment with aromatase inhibitor increases likelihood of progression. Evaluated by Dr. Evin Varner and received first dose of Reclast on 3/29/2021. Repeat bone density scan performed in 11/2021 showed stability without statistically significant change with FRAX scores calculated at 14% / 2.2%, and advised to continue Reclast infusions, last 4/4/2022. Now with new lumbar compression fractures noted on lumbar MRI in 11/2022. Continuing to follow with Dr. Evin Varner with next visit scheduled in 4/2023.  7. Chronic low back pain with left leg weakness. Known lumbar stenosis and facet arthropathy. Using meloxicam, cyclobenzaprine, gabapentin, and Tylenol. Recent recurrence of symptoms without sustained improvement despite treatment with steroids, NSAIDS, muscle relaxants and gabapentin. Continuing to have persistent difficulty with left-sided buttock pain and underwent left hip MRI (11/6/2021) which showed mild bilateral hip osteoarthritis (L>R), and asymmetric atrophy of the proximal left tensor fascia will.  Received a ultrasound-guided steroid injection for left trochanteric bursitis on 11/10/2021, which provided some transient relief. Underwent KAREN at left L4/5 and L5/S1 on 12/13/2021 by Dr. Ansley Morris with some improvement. Completed physical therapy with improvement in pain in bilateral hips and lower back. However, noted worsening back pain and left leg weakness requiring use of a cane for ambulation due to walking with a limp. Received bilateral SI joint injections in 9/2022 with some relief. Repeat lumbar MRI (11/2022) showed an acute/subacute fracture of the L2 inferior endplate and subacute fracture of L4 superior endplate; multilevel degenerative changes without change. Underwent bilateral SI transforaminal KAREN (11/14/2022) with minimal improvement. Physical therapy discontinued due to the new compression fractures and lack of efficacy. Had follow-up visit with RAMESH Canales on 1/24/2023 and no further interventions recommended due to lack of benefit. 8. Tourette's syndrome. Followed by Dr. Whitley Boss. On Haldol with current dose of 2 mg twice daily previously very effective in controlling symptoms. However, noting worsening Tourette's symptoms and advised that may take an additional 2 mg as needed to control. Also noting some increasing tremor in the evening when fatigued and thought to be a side effect of Haldol. Will continue to monitor. 9. History of recurrent diverticulitis. Colonoscopy (2/2016) with moderately severe diverticulosis in the ascending and descending colon. Has had multiple recent episodes of abdominal pain which were treated empirically with Cipro. Episode of symptoms in 3/2018 resolved prior to initiating antibiotics, bringing into question whether her abdominal symptoms were due to diverticulitis or irritable bowel syndrome. Had recurrent episode in 7/2018, and abdominal CT scan confirmed diagnosis of acute diverticulitis. Treated with Augmentin as developed rash on Flagyl.  Subsequently evaluated by Dr. Amanda Kelley and instructed to continue Probiotics and use hyoscyamine as needed when develop abdominal complaints as concerned that recurrent symptoms related to IBS. Diagnosed with recurrent diverticulitis after presenting to [facility] ED on 8/9/2022. CT abdomen/pelvis showed proximal sigmoid diverticulitis without complication and she was treated with Augmentin with improvement. Reports no recurrence since last episode and using hyoscyamine intermittently for abdominal discomfort with success. Will continue to follow. 10. GERD. Using Protonix 20 mg daily with reasonable control. Reports may increase to 40 mg occasionally for increased symptoms. Underwent upper endoscopy (8/11/2021) by Dr. Michael Harris to evaluate iron deficiency and noted to have normal stomach and duodenum, abnormal motility of the esophagus and a small hiatal hernia. Will continue to monitor. 11. Insomnia. Using melatonin nightly and temazepam alternating with diazepam intermittently when needed. Benzodiazepines prescribed by Dr. Thais Tovar. Experiencing increasing difficulty as discussed with plan as outlined. 12. Anxiety. Patient described symptoms of anxiety and possible mild depression felt to likely be reactive to current health issues. Prescribed sertraline 25 mg daily but did not initiate and not wishing to proceed with treatment. She does have continued difficulty with chronic insomnia as discussed. Will continue to monitor. 13. Overweight. Weight decreased 3 pounds at last visit with BMI 27.87. Emphasized continued lifestyle modifications including attempts at regular exercise and heart healthy diet. Will continue to monitor. 14. Health maintenance. Completed four dose series of the [address] 19 vaccine given immunosuppressed status and has received the updated bivalent Pfizer booster dose. Has not yet received Shingrix vaccine and remains hesitant to do so. Other immunizations up to date. Mammogram up-to-date. Vitamin D level remains normal on maintenance dose supplement. Continue regular eye exams with Dr. Butch Newton.  Completed cataract surgery in 7/0353 without complications. Medicare wellness visit up-to-date. Patient understands recommendations and agrees with plan. Follow-up in 4 weeks. Future orders:    ICD-10-CM ICD-9-CM    1. Fatigue, unspecified type  R53.83 780.79       2. Chronic insomnia  F51.04 780.52       3. Spinal stenosis of lumbar region with neurogenic claudication  M48.062 724.03 gabapentin (NEURONTIN) 300 mg capsule      4. Sciatica of left side  M54.32 724.3 gabapentin (NEURONTIN) 300 mg capsule      5. Malignant neoplasm of left breast in female, estrogen receptor positive, unspecified site of breast ([facility]  C50.912 174.9     Z17.0 V86.0       6. Malignant neoplasm metastatic to lung, unspecified laterality (HCC)  C78.00 197.0       7. Tourette syndrome  F95.2 307.23       8. Immunosuppressed due to chemotherapy ([facility]  D84.821 V58.69     T45. 1X5A      Y92.520                          Magdy Bradley was evaluated through a patient-initiated, synchronous (real-time) audio only encounter. She (or guardian if applicable) is aware that it is a billable service, which includes applicable co-pays, with coverage as determined by her insurance carrier. This visit was conducted with the patient's (and/or [facility] guardian's) verbal consent. She has not had a related appointment within my department in the past 7 days or scheduled within the next 24 hours. The patient was located in a state where the provider was licensed to provide care. The patient was located at: Home: [address] [place] [address] [address] [facility] [address]  The provider was located at:  Facility (Appt Department): [address] [place] [address] [address]  [id number]  [address] [facility] [address]    Total Time: minutes: 21-30 minutes    Tracey Blul MD

## 2023-02-13 ENCOUNTER — TELEPHONE (OUTPATIENT)
Age: 78
End: 2023-02-13

## 2023-02-13 NOTE — TELEPHONE ENCOUNTER
Called and spoke with patient. Reports that diverticulitis now improved after completion of Augmentin last night. Experienced diarrhea and cramping pain and took an Imodium this morning. Able to tolerate a normal diet today and denying any ongoing abdominal pain. Also discusses a 2-week history of pruritus of the medial axilla and lateral aspect of the right breast.  Reports pruritus intermittent and severe at times. Did note improvement with hydrocortisone cream but then recurred. Has also been applying [employer] powder. Reports pruritus improved today but now noting tenderness over the same area for the last several hours. Concerned given her history of breast cancer. Advised that would recommend observation overnight to see if discomfort improves. Instructed to call back in the morning if not improved and will need evaluation in office.

## 2023-02-13 NOTE — TELEPHONE ENCOUNTER
Patient stating she had diverticulitis starting Feb 1. She just finished the abx and she is finally doing better. Now another issue has once up. She has had itching near her armpit into the side of the upper right breast. As of today the itching has stopped but does have tenderness. Breast that she is a breast cancer survivor she is concerned. Please advise.

## 2023-02-14 NOTE — TELEPHONE ENCOUNTER
Pt calling to let Dr Kavin Zuniga know her right breast is much improved this morning.      She said lets Cora [place] it a couple of days\" and if it does not continue to improve she will call back

## 2023-02-15 NOTE — TELEPHONE ENCOUNTER
Patient stating she is still having tenderness in the breast. She has reached out to [address] twice but has not heard back from them.

## 2023-02-16 ENCOUNTER — OFFICE VISIT (OUTPATIENT)
Age: 78
End: 2023-02-16
Payer: MEDICARE

## 2023-02-16 VITALS
HEART RATE: 83 BPM | BODY MASS INDEX: 27.82 KG/M2 | DIASTOLIC BLOOD PRESSURE: 89 MMHG | HEIGHT: 59 IN | TEMPERATURE: 97.5 F | OXYGEN SATURATION: 98 % | RESPIRATION RATE: 16 BRPM | WEIGHT: 138 LBS | SYSTOLIC BLOOD PRESSURE: 144 MMHG

## 2023-02-16 DIAGNOSIS — M51.36 DDD (DEGENERATIVE DISC DISEASE), LUMBAR: ICD-10-CM

## 2023-02-16 DIAGNOSIS — C78.01 MALIGNANT NEOPLASM METASTATIC TO BOTH LUNGS (HCC): ICD-10-CM

## 2023-02-16 DIAGNOSIS — D84.821 IMMUNOSUPPRESSED DUE TO CHEMOTHERAPY (HCC): ICD-10-CM

## 2023-02-16 DIAGNOSIS — C50.912 MALIGNANT NEOPLASM OF LEFT BREAST IN FEMALE, ESTROGEN RECEPTOR POSITIVE, UNSPECIFIED SITE OF BREAST (HCC): ICD-10-CM

## 2023-02-16 DIAGNOSIS — G47.00 INSOMNIA, UNSPECIFIED TYPE: ICD-10-CM

## 2023-02-16 DIAGNOSIS — Z17.0 MALIGNANT NEOPLASM OF LEFT BREAST IN FEMALE, ESTROGEN RECEPTOR POSITIVE, UNSPECIFIED SITE OF BREAST (HCC): ICD-10-CM

## 2023-02-16 DIAGNOSIS — Z79.899 IMMUNOSUPPRESSED DUE TO CHEMOTHERAPY (HCC): ICD-10-CM

## 2023-02-16 DIAGNOSIS — R53.82 CHRONIC FATIGUE: ICD-10-CM

## 2023-02-16 DIAGNOSIS — N64.4 BREAST TENDERNESS IN FEMALE: Primary | ICD-10-CM

## 2023-02-16 DIAGNOSIS — T45.1X5A IMMUNOSUPPRESSED DUE TO CHEMOTHERAPY (HCC): ICD-10-CM

## 2023-02-16 DIAGNOSIS — S20.01XA TRAUMATIC ECCHYMOSIS OF RIGHT FEMALE BREAST, INITIAL ENCOUNTER: ICD-10-CM

## 2023-02-16 DIAGNOSIS — C78.02 MALIGNANT NEOPLASM METASTATIC TO BOTH LUNGS (HCC): ICD-10-CM

## 2023-02-16 PROCEDURE — 3017F COLORECTAL CA SCREEN DOC REV: CPT | Performed by: INTERNAL MEDICINE

## 2023-02-16 PROCEDURE — 99214 OFFICE O/P EST MOD 30 MIN: CPT | Performed by: INTERNAL MEDICINE

## 2023-02-16 PROCEDURE — 1090F PRES/ABSN URINE INCON ASSESS: CPT | Performed by: INTERNAL MEDICINE

## 2023-02-16 PROCEDURE — 3078F DIAST BP <80 MM HG: CPT | Performed by: INTERNAL MEDICINE

## 2023-02-16 PROCEDURE — G8399 PT W/DXA RESULTS DOCUMENT: HCPCS | Performed by: INTERNAL MEDICINE

## 2023-02-16 PROCEDURE — 3074F SYST BP LT 130 MM HG: CPT | Performed by: INTERNAL MEDICINE

## 2023-02-16 PROCEDURE — G8484 FLU IMMUNIZE NO ADMIN: HCPCS | Performed by: INTERNAL MEDICINE

## 2023-02-16 PROCEDURE — 1123F ACP DISCUSS/DSCN MKR DOCD: CPT | Performed by: INTERNAL MEDICINE

## 2023-02-16 PROCEDURE — G8427 DOCREV CUR MEDS BY ELIG CLIN: HCPCS | Performed by: INTERNAL MEDICINE

## 2023-02-16 PROCEDURE — 1036F TOBACCO NON-USER: CPT | Performed by: INTERNAL MEDICINE

## 2023-02-16 PROCEDURE — G8419 CALC BMI OUT NRM PARAM NOF/U: HCPCS | Performed by: INTERNAL MEDICINE

## 2023-02-16 RX ORDER — LETROZOLE 2.5 MG/1
2.5 TABLET, FILM COATED ORAL DAILY
COMMUNITY

## 2023-02-16 ASSESSMENT — PATIENT HEALTH QUESTIONNAIRE - PHQ9
SUM OF ALL RESPONSES TO PHQ9 QUESTIONS 1 & 2: 0
2. FEELING DOWN, DEPRESSED OR HOPELESS: 0
SUM OF ALL RESPONSES TO PHQ QUESTIONS 1-9: 0
SUM OF ALL RESPONSES TO PHQ QUESTIONS 1-9: 0
1. LITTLE INTEREST OR PLEASURE IN DOING THINGS: 0
SUM OF ALL RESPONSES TO PHQ QUESTIONS 1-9: 0
SUM OF ALL RESPONSES TO PHQ QUESTIONS 1-9: 0

## 2023-02-16 NOTE — PROGRESS NOTES
Carmelo Ro presents today for   Chief Complaint   Patient presents with    Breast Pain     Breast tenderness         1. \"Have you been to the ER, urgent care clinic since your last visit? Hospitalized since your last visit? \" no    2. \"Have you seen or consulted any other health care providers outside of the [address] [place] [address] since your last visit? \" no     3. For patients aged 39-70: Has the patient had a colonoscopy / FIT/ Cologuard? Yes - no Care Gap present      If the patient is female:    4. For patients aged 41-77: Has the patient had a mammogram within the past 2 years? NA - based on age or sex      11. For patients aged 21-65: Has the patient had a pap smear?  NA - based on age or sex

## 2023-02-16 NOTE — PROGRESS NOTES
HPI:   Richa Lamra is a 78y.o. year old female who presents today for an acute visit. She has a history of hypertension, dyslipidemia,  metastatic breast cancer, GERD, diverticulosis with recurrent diverticulitis, osteoarthritis s/p right knee replacement (2012), lumbar degenerative disease, osteopenia, compression fracture, and Tourette's syndrome. She is accompanied by her . She has completed the four dose Moderna COVID-19 vaccine series given immunosuppressed status and received the updated bivalent Pfizer booster dose. She presents today for evaluation of right breast tenderness. She reports that for the last 2 weeks, she has been experiencing pruritus involving the medial axilla and lateral aspect of her right breast.  She stated that she noted improvement when applying topical hydrocortisone but then pruritus would recur. She has also been applying [employer] powder. She states that the pruritus has now resolved but on 2/13/2023, she noted onset tenderness to palpation in the area of the prior rash. She states that she does not notice any pain with movement or persistent pain, but is concerned that pain is reproducible on palpation. She denies any known injury. She states that given her history of metastatic breast cancer, she became concerned and presents today for evaluation. She was last seen for a virtual visit on 2/1/2023 and reported that she was continuing to experience ongoing fatigue while on Ibrance and letrozole. She described her fatigue as impacting her quality of life by limiting her activities of daily living. She also reported ongoing back discomfort and pain which made performing any activities at home difficult. She also stated that she was not sleeping well at night due to difficulty falling asleep and due to her back pain, and she felt that her lack of sleep was also contributing to her fatigue.   She reported currently taking 2 extra strength Tylenol and gabapentin 300 mg nightly. She also reported intermittently taking temazepam 15 mg when she was unable to fall asleep but tried to avoid taking this every night. She stated that diazepam 5 mg was no longer effective in helping with her insomnia. She did note worsening of her Tourette's and intermittently would take an additional Haldol 2 mg as needed to help control. She requested recommendations to address her sleep issues, back discomfort, and ongoing fatigue, and was advised to increase her dose of gabapentin to 300 mg each morning and 600 mg nightly and take with Tylenol ES twice daily. Goal was to see if controlling back pain as well as increasing dose of gabapentin at night could help manage insomnia. She reports today that she has noted improvement in her restless leg symptoms at night on the higher dose of gabapentin. She continues to have difficulty falling asleep and states that last night she did take two temazepam in order to do so. She states that she continues to not have any difficulty with sleep maintenance. At her last visit, she continued to report difficulty with low back pain and left leg pain and weakness. She has been under the care of of JOSE Turpin at [place]. She received bilateral SI joint injections in 9/2022 with some relief, but due to persistent symptoms she underwent a repeat lumbar MRI (11/2022) which showed an acute/subacute fracture of the L2 inferior endplate and subacute fracture of L4 superior endplate; and multilevel degenerative changes without change. She underwent bilateral SI transforaminal DANIEL on 11/14/2022 with minimal improvement. She did not feel pain from compression fractures significant enough to proceed with kyphoplasty since she had not been experiencing a significant increase in her pain. She discontinued physical therapy due to the new compression fractures and due to lack of efficacy.   She had a follow-up visit with JOSE Turpin on 1/24/2023 and no further interventions were recommended due to lack of benefit. Summary of prior hospitalizations and medical history:  On 3/3/2022, she presented for evaluation of worsening fatigue. She was evaluated in 11/2020 when she described dyspnea in association with fatigue when performing exertional activities. EKG (11/24/2020) was unremarkable, chest x-ray (11/24/2020) was negative, and echocardiogram (12/1/2020) showed normal LV function (EF 55-60%), trileaflet AV with moderate AI, and PAP 30 mmHg. She had repeat staging CT scans in 6/2021 and 12/2021 without evidence of recurrence or metastases. Iron studies performed by Dr. Heike Petit showed a ferritin level of 13 although no evidence of anemia. She underwent upper endoscopy and colonoscopy by Dr. Maribel Munson (8/11/2021) without evidence of source of iron loss. At her last visit, she reported that her fatigue had significantly progressed, describing \"waves of severe fatigue and shortness of breath with minimal exertion\". She stated that she had no energy to go out with friends and has been having difficulty with her normal ADL activities at home due to fatigue. She underwent a repeat lab evaluation by oncology, which showed normal CBC, CMP, ferritin, and iron studies. She reported that decision was made by Dr. Heike Petit to hold [facility] for a total of 7 weeks before reinitiating to see if may help with symptoms. She continued on letrozole. She also underwent a repeat echocardiogram on 4/11/2022 which continue to show normal LV function and stable moderate AI. She noted improvement while [facility] was being held, but decided to proceed with resumption of therapy on 4/1/2022. Decision made to change her aromatase inhibitor therapy from letrozole to Aromasin. She has a history of left breast cancer, which was diagnosed in 7/2010 as invasive ductal adenocarcinoma, s/p left modified radical mastectomy with sentinel node biopsy, performed by Dr. Odalys Jain.  She had 3 positive lymph nodes and was classified as stage 1B, T1c N1 M0, ER and KS positive, Her-2/rosa negative by FISH. She underwent 6 cycles of chemotherapy with Docetaxel and Cytoxan. She was subsequently unable to tolerate anastrozole, exemestane, tamoxifen, and letrozole due to profound fatigue and arthralgias. Her course was complicated by chronic left arm lymphedema, managed with a compression sleeve. A chest CT scan in 3/2014 noted several small pulmonary nodules which were concerning for metastases but were too small to biopsy. They were followed with sequential CT scans , and in 12/2014, repeat chest CT scan showed enlarging bilateral pulmonary nodules compatible with progression of metastatic disease. A fine needle aspirate was performed on 12/21/2014 which showed benign pulmonary parenchyma with alveolar hemorrhage. Repeat chest CT scan in 3/12/2015 showed mild interval progression of the lung metastases with enlarging nodules and development of new nodules. A CT guided needle biopsy was performed on 3/23/2015, which confirmed metastatic adenocarcinoma consistent with breast primary (stage IV, ER/KS positive, and TTF-1 negative). On 4/13/2015, she was started on therapy with Ibrance and letrozole. Follow-up CT scan (7/15/15) showed partial response with decreasing size of some nodules and resolution of other nodules. Most recent chest, abdomen, and pelvis CT scan was obtained in 6/2017, showing stable or decreased multiple bilateral pulmonary nodules and no suspicious new pulmonary nodules. She continues on palbociclib, but was changed from letrozole to fulvestrant. She describes persistent fatigue which she attributes to monthly treatment with fulvestrant (Faslodex). She underwent restaging chest, abdomen, and pelvis CT scan in 7/2019 which showed that her pulmonary nodules were unchanged, and no other evidence of metastatic disease.  Repeat staging CT chest/ abdomen/ pelvis in 1/2021 showed no evidence of metastasis or adenopathy. She is followed by Dr. Kenia Wakefield. She states that she established care with Dr. Ulises Navarro, but was told that she may have her mammograms and breast exams in our office with referral to her if something arises. She also has a history of a right ovarian cyst, but was released from the care of Dr. Renita Barr since it was concluded to be benign. She has a history of hypertension treated with losartan. She monitors her blood pressure mainly when visiting multiple physicians and reports that it is well controlled. She has no history of heart disease, and denies any chest pain, shortness of breath at rest or with exertion, palpitations, lightheadedness, or edema. In 4/11/2016, she sustained a fall with subsequent severe pain in her mid to upper lumbar region and wrapping around waist. She was treated with meloxicam, tylenol and flexeril, but because of persistent discomfort, she presented to [facility] on 4/15/2016 and lumbosacral spine films showed marked degenerative changes with disc space obliteration throughout lumbar spine and slight anterior spondylolisthesis of L4. She was evaluated by Dr. Galen Schwartz on 4/18/2016 who recommended physical therapy and evaluation by Dr. Deon Sanchez for her degenerative spine changes. She continued to take meloxicam and tylenol and started physical therapy, but noted worsening of her pain. She subsequently was evaluated by Dr. Darnella Curling, who obtained a lumbar MRI on 4/26/2016, which showed a new subacute compression fracture of T11 vertebral body with diffuse marrow edema and mild paravertebral inflammatory stranding, no retropulsion or central stenosis; more severe degenerative disease along the right side at L4-L5, L5-S1, potential mild compression of right exiting L4 and L5 nerve roots. She was referred to Dr. Chiqui Hebert for kyphoplasty of the T11 compression fracture given failure of pain control with conservative measures.  She underwent the procedure on 5/5/2016 without complications, and T11 vertebral body bone core and aspirate biopsies were performed, which showed only reactive bone changes and no evidence of malignancy.      She has a history of osteopenia, with a history of fracture of her sacrum. She reports that she was treated with alendronate in 12/2011 but discontinued it in 4/2013 due to intolerence. Bone density scan (11/2017) was consistent with osteopenia with femoral neck T-scores: left -1.4/ right -1.4 and distal radius -2.2 (lumbar T-score could not be assessed). A repeat bone density scan was obtained (11/2019) showing continued evidence of osteopenia with femoral neck T-scores: left -1.4/ right -1.4 and distal radius -2.4 (lumbar T-score could not be assessed). She was referred to Dr. Murcia given her multiple compression fractures and treatment with letrozole, and was started on Reclast in 3/2021. She continues to take calcium and vitamin D.      In 10/2016, she developed left sided low back pain with radiation to her left leg. She underwent a lumbar MRI (9/2016) showing scoliosis and advanced multilevel degenerative changes with areas of foraminal stenosis at L3-L4 and L5-S1; mild/moderate central canal stenosis at L4-L5 and L5-S1. She was evaluated by Dr. Power and treated with pregabalin with some improvement. She subsequently received an epidural steroid injection with improvement and discontinued pregabalin. She reported worsening low back pain and right sciatica, and underwent an epidural steroid injection at right L4-5 transforaminal approach by Dr. Powre on 3/18/2019. She has noted improvement in her right hip and leg pain. She underwent a lumbar MRI (6/2020) for worsening right sciatica, showing very advanced multilevel DDD and DJD with extensive bone marrow edema reaction to DDD; chronic compression fractures; single level of degenerative central spinal stenosis, moderate, at L4/L5, worsened compared 2016; multilevel degenerative  foraminal narrowing worst right L4/L5, bilateral L5/S1; no evidence of metastatic breast cancer. She underwent left L4/5 and right L5-S1 DANIEL in 6/2020 and Left S1 TF DANIEL in 8/2020 by Dr. Mini Youssef with improvement. She reports continuing to have persistent difficulty with left-sided buttock and pelvic pain, and she underwent left hip MRI (11/6/2021) which showed mild bilateral hip osteoarthritis (L>R), and asymmetric atrophy of the proximal left tensor fascia wayne. She reports that she received a ultrasound-guided steroid injection for left trochanteric bursitis on 11/10/2021, which provided some relief transiently, but pain has since recurred. She underwent an DANIEL left L4/5 and L5/S1 on 12/13/2021 by Dr. Mini Youssef with some improvement. She subsequently underwent physical therapy with significant benefit. In 4/2017, she noted increasing cervical and upper back discomfort. She has had multiple cervical MRI's,and underwent repeat in 4/2017 which showed multilevel degenerative disc disease and facet arthropathy without change from prior studies with mild central canal stenosis C3-C4 and C5-C6, and moderate central canal stenosis C6-C7. She was treated with Mobic and Flexeril, and gabapentin at bedtime. She complained of right shoulder pain. X-ray of her shoulder (11/2017) was negative, and she had a right shoulder MRI (5/14/2018) which showed deep partial thickness undersurface tear of the distal supraspinatus, likely with full thickness slitlike perforations; no gross tendon retraction, but there is moderate muscle atrophy; partial thickness tearing with longitudinal components involving the biceps tendon at its sulcal turn, some associated tenosynovitis; accompanying short length longitudinal split tear in the distal subscapularis; moderate infraspinatus tendinosis, and mild to moderate subacromial subdeltoid bursitis.  She underwent evaluation by Dr. Dixie Machuca on 5/18/2018, and received a glenohumeral cortisone injection. She has a history of GERD and diverticulosis with recurrent diverticulitis,. She was evaluated by Dr. Skye Angel in 2/23/2016 and underwent an upper endoscopy, which revealed a Schatzki's ring at the gastroesophageal junction (dilated) with mild distal esophagitis and a small hiatal hernia. A screening colonoscopy was also performed showing moderately severe diverticulosis of the ascending and descending colon and a 2 mm sessile sigmoid polyp (pathology: hyperplastic). Recommendations for follow-up colonoscopy in 10 years. She denies any abdominal pain, nausea, vomiting, melena, hematochezia, or change in bowel movements. She was evaluated in 8/2017 by DAVIDSON Sanchez with complaints of abdominal pain and was treated with Cipro for presumed diverticulitis. She contacted the office again in 10/2017 and 1/2018 with a recurrence of symptoms, and was treated for a 10 day course with Cipro with improvement. Abdominal CT scan in 2/6/2018 for staging for her breast cancer did show evidence of moderate to severe diverticulosis, but no evidence of diverticulitis. She did present with similar symptoms in 3/2018 and was evaluated by DAVIDSON Hughes and prescribed Cipro. However, her symptoms resolved prior to taking the antibiotics. In 7/2018, she again presented with left lower quadrant pain, and an abdominal CT scan (7/13/2018) which showed evidence of uncomplicated diverticulitis involving the lower left colon and upper sigmoid. She was treated with Cipro and Flagyl initially, but developed a rash due to Flagyl, and was subsequently changed to Augmentin. She had a follow-up visit with Dr. Jesus Bazan and it was his opinion that she also had IBS which mimicked her episodes of diverticulitis. He recommended continuing on a probiotic and prescribed hyoscyamine to use as needed. She has noted improvement since doing so.  She underwent evaluation for iron deficiency when she was found to have a ferritin of 13 in 6/2021 which was performed to evaluate her fatigue. On 8/11/2021 she underwent upper endoscopy and colonoscopy by Dr. Andreia Chong. Upper endoscopy showed normal stomach and duodenum, abnormal motility of the esophagus, and a small hiatal hernia. Colonoscopy showed increased vascularity throughout the colon concerning for microscopic colitis, but pathology of multiple random biopsies was negative. No source for iron loss was identified. On 8/9/2022, she presented to the [facility] ED with abdominal pain and lab evaluation showed WBC 13.9, Hb 13.9/HCT 41.7, creatinine 0.76/eGFR >60, lipase normal, urinalysis negative; CT abdomen/pelvis showed evidence of proximal sigmoid diverticulitis without complication. She was treated with Augmentin with clinical improvement. She also has a history of Tourette's syndrome controlled with Haldol. She is followed by Dr. Nanette Davey.        Past Medical History:   Diagnosis Date    Arthritis     Breast cancer metastasized to lung [facility])     Left Breast    Chronic pain     Colon polyps 2/22/16    distal sigmoid, Dr. Fidelia Camargo    DDD (degenerative disc disease)     Diverticulitis of colon     Diverticulosis 2/22/16    mild in the ascedning and sigmoid colon, Dr. Elo Patten (hyperlipidemia)     Hypertension     Osteopenia     Reactive depression 3/4/2022    Tourette syndrome     Vitamin D deficiency      Past Surgical History:   Procedure Laterality Date    APPENDECTOMY      BREAST BIOPSY  7-30-10    Left Breast w/Nipple Duct exploration and excisional biopsy-left    DILATION AND CURETTAGE OF UTERUS      x3    HYSTEROSCOPY DIAGNOSTIC      MASTECTOMY, MODIFIED RADICAL Left 09/03/2010    ORTHOPEDIC SURGERY Right 2013    knee replacement    TONSILLECTOMY       Current Outpatient Medications   Medication Sig    acetaminophen (TYLENOL) 500 MG tablet Take 500 mg by mouth every 6 hours as needed    Biotin 2.5 MG CAPS Take by mouth    Calcium Carbonate-Vitamin D (OYSTER SHELL CALCIUM/D) 500-5 MG-MCG TABS Take 1 tablet by mouth 2 times daily (with meals)    vitamin D (CHOLECALCIFEROL) 25 MCG (1000 UT) TABS tablet Take 4,000 Units by mouth daily    cyanocobalamin 1000 MCG tablet Take 1,000 mcg by mouth daily    diazePAM (VALIUM) 5 MG tablet Take 5 mg by mouth every 6 hours as needed. exemestane (AROMASIN) 25 MG tablet Take 25 mg by mouth daily    gabapentin (NEURONTIN) 300 MG capsule Take 300 mg by mouth.    haloperidol (HALDOL) 2 MG tablet TAKE ONE TABLET BY MOUTH TWICE A DAY    hyoscyamine (OSCIMIN) 125 MCG TBDP dispersible tablet Take 0.125 mg by mouth every 4 hours as needed    losartan (COZAAR) 25 MG tablet Take 25 mg by mouth daily    meloxicam (MOBIC) 15 MG tablet Take 15 mg by mouth daily    ondansetron (ZOFRAN) 8 MG tablet Take 8 mg by mouth every 8 hours as needed    palbociclib (IBRANCE) 75 MG capsule Take 75 mg by mouth daily    temazepam (RESTORIL) 15 MG capsule Take 15 mg by mouth. No current facility-administered medications for this visit. Allergies and Intolerances: Allergies   Allergen Reactions    Cephalexin Rash    Metronidazole Rash    Shellfish Allergy Nausea And Vomiting     More of just eating the actual shellfish. Sulfa Antibiotics Rash    Tramadol Nausea And Vomiting     Patient states she is allergic to most Narcotics    Vancomycin Rash     Family History:   Family History   Problem Relation Age of Onset    Osteoporosis Sister     Osteoporosis Mother     Stroke Father     Hypertension Father     Cancer Paternal Aunt         breast     Social History:   She  reports that she quit smoking about 17 years ago. Her smoking use included cigarettes. She smoked an average of 1 pack per day. She has never used smokeless tobacco.  She is  and lives with . They have one daughter and two grandchildren.     Social History     Substance and Sexual Activity   Alcohol Use Not Currently    Alcohol/week: 5.8 standard drinks     Immunization History:  Immunization History   Administered Date(s) Administered    COVID-19, MODERNA BLUE border, Primary or Immunocompromised, (age 12y+), IM, 100 mcg/0.5mL 02/07/2021, 03/07/2021, 11/07/2021    Influenza Virus Vaccine 10/08/2010, 11/01/2011, 10/02/2012, 09/01/2014    Influenza, FLUAD, (age 72 y+), Adjuvanted, 0.5mL 09/23/2020, 10/15/2021    Influenza, High Dose (Fluzone 65 yrs and older) 10/01/2015, 10/25/2016, 10/05/2017    Influenza, Triv, inactivated, subunit, adjuvanted, IM (Fluad 65 yrs and older) 10/05/2018, 10/17/2019    Pneumococcal Conjugate 13-valent ([id number]) 10/27/2015    Pneumococcal Polysaccharide ([id number]) 04/15/2013    Td vaccine (adult) 05/05/2008    Tdap (Boostrix, Adacel) 09/12/2014    Zoster Live (Zostavax) 09/20/2011       Review of Systems:   As above included in HPI. Otherwise 11 point review of systems negative including constitutional, skin, HENT, eyes, respiratory, cardiovascular, gastrointestinal, genitourinary, musculoskeletal, endocrine, hematologic, allergy, and neurologic. Physical:   Vitals:    02/16/23 0927   BP: (!) 144/89   Pulse: 83   Resp: 16   Temp: 97.5 °F (36.4 °C)   SpO2: 98%       Exam:   Patient appears in no apparent distress. Affect is appropriate. HEENT: PERRLA, anicteric, no adenopathy or thyromegaly. No carotid bruits or radiated murmur. Lungs: clear to auscultation, no wheezes, rhonchi, or rales. Breast: Small (2 cm) circular yellow discoloration on lateral right breast, tender to palpation. No palpable masses or right axillary lymphadenopathy. No nipple discharge and skin otherwise appearing normal  heart: regular rate and rhythm. No murmur, rubs, gallops  Abdomen: soft, nontender, nondistended, normal bowel sounds, no hepatosplenomegaly or masses. Extremities: without edema. Review of Data:  Labs:  No visits with results within 1 Month(s) from this visit.    Latest known visit with results is:   Orders Only on 05/16/2022   Component Date Value Ref Range Status    Creatinine, POC 05/16/2022 0.9  0.6 - 1.3 MG/DL Final    GFR  05/16/2022 >60  >60 ml/min/1.73m2 Final    GFR Non- 05/16/2022 >60  >60 ml/min/1.73m2 Final         Health Maintenance:   Screening:               Mammogram: negative (1/2023)              PAP smear: negative (9/2013) Dr Judit Lambert. No further screening needed. Colorectal: colonoscopy (8/2021) normal.  Dr. Jesus Bazan. No further screening needed given age. Depression: none              DM (HbA1c/FPG): FPG 93 (12/2022)              Hepatitis C: unknown              Falls: none              DEXA: osteopenia (11/2021) lumbar compression fractures (11/2022). On Reclast.  Dr. Marilee Post. Smoking:distant past              Glaucoma: regular eye exams with Dr. Jaylin Connors (last 3/2022)              Vitamin D: 54.2 (12/2022)              Medicare Wellness: 6/23/2022        Impression:  Patient Active Problem List   Diagnosis    Aortic valve regurgitation    Lumbar spinal stenosis    Breast cancer ([facility] [address]    Osteoarthritis    Aneurysm of intracranial portion of internal carotid artery    Insomnia    Diverticulosis    Essential hypertension    Fatigue    Osteopenia    Nontraumatic incomplete tear of right rotator cuff    HLD (hyperlipidemia)    Spinal stenosis of cervical region    Lymphedema of arm    Malignant neoplasm metastatic to lung [facility])    Tourette syndrome    Lumbar facet arthropathy    Vertebral compression fracture (HCC)    Degenerative joint disease of cervical spine    DDD (degenerative disc disease), lumbar    Immunosuppressed due to chemotherapy ([facility] [address]    History of diverticulitis       Plan:  Right breast tenderness. Reported 2-week history of pruritic rash which resolved following use of topical hydrocortisone. Noted tenderness to palpation in similar area of breast and presents today for exam.  No palpable masses or axillary lymphadenopathy noted.   Small area of ecchymoses evident in area of tenderness so discomfort likely secondary to bruising. Patient did not recall any trauma. Advised to continue to monitor and call if any concerning findings develop. Fatigue, multifactorial.  Has been a chronic issue for several years and attributed to treatment with Ibrance and letrozole. Completed extensive evaluation for other causes and negative. Likely also contributed to by poor sleep/chronic insomnia and daily treatment with Haldol for for Tourette's syndrome. Aware that unable to alleviate fatigue which is caused by her breast cancer treatment, but attempting to address other contributors to fatigue so as to lessen severity. Difficulty with chronic back pain and restless leg symptoms impacting her sleep at night. Also with difficulty with sleep onset insomnia and not sleep maintenance. Addressed the following:     Back pain due to lumbar radiculopathy and compression fractures. Chronic low back pain due to degenerative lumbar disease as well as new lumbar compression fractures at L2 and L4. Previously on gabapentin 300 mg nightly and will occasionally take Tylenol ES. Advised that would recommend attempts at better control of low back pain with dose of gabapentin increased to 300 mg every morning and 600 mg nightly. Advised to also take two Tylenol ES twice daily with gabapentin. Reports today noting benefit in control of restless leg symptoms at night with higher dose of gabapentin. Review of lumbar MRI from 11/2022 with evidence of likely compression of the L5 nerve root which may be the source of her left leg weakness. If not improved with medication adjustment, will discuss obtaining EMG/NCS to further clarify if left weakness radicular in etiology. Chronic insomnia. Reporting only sleep onset insomnia but no difficulty with sleep maintenance. Using melatonin 5 mg nightly and required to use temazepam intermittently.   Described difficulty with back pain and leg pain when attempting to fall asleep at night which appeared to contribute to insomnia, and dose of gabapentin increased to 600 mg nightly as discussed and advised to take with two extra strength Tylenol to hopefully alleviate some of the sleep onset difficulty. Reported taking two temazepam last night due to difficulty with sleep onset, but slept well after falling asleep. Will consider other options at next visit if not improved. Chronic issues:  1. Fatigue. Significant ongoing persistent fatigue felt to likely be a result of breast cancer treatment with Ibrance and letrozole. Completed evaluation in 11/2020 with EKG unremarkable, chest x-ray negative, and and echocardiogram (12/1/2020) showed normal LV function (EF 55-60%), trileaflet AV with moderate AI, and PAP 30 mmHg. Repeat staging CT scans in 6/2021 without evidence of recurrence or metastases. Iron studies performed by Dr. Richa Puri showed a ferritin level of 13 although no evidence of anemia. Underwent upper endoscopy and colonoscopy by Dr. Mikayla Dong (8/11/2021) without evidence of source of iron loss. Random colon biopsies negative. Reported progression of fatigue at last visit on 3/3/2022 with \"waves of severe fatigue and shortness of breath with minimal exertion\". Repeat lab evaluation by oncology showed normal CBC, CMP, ferritin, and iron studies. Repeat EKG (3/3/2022) was unchanged and repeat echocardiogram (4/11/2022) showed no change. Repeat EKG (3/3/2022) was unchanged and repeat echocardiogram (4/11/2022) showed no change. Trial of holding Ibrance in 3/2022 for 7 weeks with minimal improvement and felt that letrozole likely culprit and changed to Aromasin without improvement so resumed letrozole. Wishing to continue with therapy with Ibrance and aromatase inhibitor given effectiveness. Will continue to address. 2. Hypertension. Has been well controlled on losartan 25 mg daily. Mildly elevated today and likely secondary to anxiety. Will reassess at next visit. Renal function remains normal (12/2022) with creatinine 0.8/eGFR >60. Brain MRI (5/2021) with evidence of mild to moderate burden nonspecific white matter lesions and a few tiny old lacunar infarcts, likely from longstanding hypertension. Patient had been started on aspirin 81 mg daily as advised by Dr. Piotr Lo, but reports that she is no longer taking. 3. Breast cancer with pulmonary metastasis. Continues on current therapy with Ibrance and letrozole. Reports no difference noted when changed to Aromasin. Most recent staging CT chest/ abdomen/ and pelvis (11/2022) without evidence of recurrent or metastatic disease. Continuing with annual mammograms, last 1/2023, without evidence of recurrent disease. Not using compression sleeve for lymphedema regularly, but receiving massage therapy every three weeks with good results. Continuing to follow with Dr. Leah Mir.   4. Right ICA terminus aneurysm. MRA brain (4/2019) showed a 2 mm superiorly directed aneurysm at the right ICA terminus. Repeat MRA brain (5/2021) showed no significant interval change. Reported intermittent tremor with intention (L>R) at last visit, and underwent repeat brain MRI/MRA (5/2022) without change. Being monitored by Dr. Piotr Lo. 5. Hyperlipidemia. Calculated 10 year ASCVD risk is 19.4 %, which places her in one of the four statin benefit groups (primary prevention with risk > 7.5%). LDL overall stable at 121 and HDL 62 (1/2023). Given lack of history of ASCVD, no evidence of atherosclerotic disease on chest and abdominal CT scans, and history of metastatic breast cancer, decision made not to treat with statin at this time. Continue to emphasized importance of lifestyle modifications, including heart healthy diet and exercise. 6. Osteopenia. History of T11 compression fracture in 4/2016, s/p kyphoplasty. Last bone density scan 11/2019.  Using femoral neck T-scores, calculated FRAX score estimates her 10 year risk of a major osteoporetic fracture at 16 % and hip fracture at 2.3%, which alone are not an indication for biphosphonate treatment. However, development of a vertebral compression fracture and treatment with aromatase inhibitor increases likelihood of progression. Evaluated by Dr. Juana Rendon and received first dose of Reclast on 3/29/2021. Repeat bone density scan performed in 11/2021 showed stability without statistically significant change with FRAX scores calculated at 14% / 2.2%, and advised to continue Reclast infusions, last 4/4/2022. Now with new lumbar compression fractures noted on lumbar MRI in 11/2022. Continuing to follow with Dr. Juana Rendon with next visit scheduled in 4/2023.  7. Chronic low back pain with left leg weakness. Known lumbar stenosis and facet arthropathy. Using meloxicam, cyclobenzaprine, gabapentin, and Tylenol. Recent recurrence of symptoms without sustained improvement despite treatment with steroids, NSAIDS, muscle relaxants and gabapentin. Continuing to have persistent difficulty with left-sided buttock pain and underwent left hip MRI (11/6/2021) which showed mild bilateral hip osteoarthritis (L>R), and asymmetric atrophy of the proximal left tensor fascia wayne. Received a ultrasound-guided steroid injection for left trochanteric bursitis on 11/10/2021, which provided some transient relief. Underwent DANIEL at left L4/5 and L5/S1 on 12/13/2021 by Dr. Kaitlyn Emery with some improvement. Completed physical therapy with improvement in pain in bilateral hips and lower back. However, noted worsening back pain and left leg weakness requiring use of a cane for ambulation due to walking with a limp. Received bilateral SI joint injections in 9/2022 with some relief. Repeat lumbar MRI (11/2022) showed an acute/subacute fracture of the L2 inferior endplate and subacute fracture of L4 superior endplate; multilevel degenerative changes without change.   Underwent bilateral SI transforaminal DANIEL (11/14/2022) with minimal improvement. Physical therapy discontinued due to the new compression fractures and lack of efficacy. Had follow-up visit with JOSE Turpin on 1/24/2023 and no further interventions recommended due to lack of benefit. 8. Tourette's syndrome. Followed by Dr. Markus Sosa. On Haldol with current dose of 2 mg twice daily previously very effective in controlling symptoms. However, noting worsening Tourette's symptoms and advised that may take an additional 2 mg as needed to control. Also noting some increasing tremor in the evening when fatigued and thought to be a side effect of Haldol. Will continue to monitor. 9. History of recurrent diverticulitis. Colonoscopy (2/2016) with moderately severe diverticulosis in the ascending and descending colon. Has had multiple recent episodes of abdominal pain which were treated empirically with Cipro. Episode of symptoms in 3/2018 resolved prior to initiating antibiotics, bringing into question whether her abdominal symptoms were due to diverticulitis or irritable bowel syndrome. Had recurrent episode in 7/2018, and abdominal CT scan confirmed diagnosis of acute diverticulitis. Treated with Augmentin. Subsequently evaluated by Dr. Mikayla Dong and instructed to continue Probiotics and use hyoscyamine as needed when develop abdominal complaints as concerned that recurrent symptoms related to IBS. Diagnosed with recurrent proximal sigmoid diverticulitis on CT abdomen/pelvis in [facility] on 8/9/2022 and treated with Augmentin with improvement. Contacted the office on 2/3/2023 with left sided abdominal pain and treated with Augmentin for presumed diverticulitis. Reports overall resolution of symptoms today with antibiotic completion. Advised to continue using hyoscyamine as needed for abdominal discomfort. Will continue to follow. 10. GERD. Using Protonix 20 mg daily with reasonable control. Reports may increase to 40 mg occasionally for increased symptoms.   Underwent upper endoscopy (8/11/2021) by Dr. Carrol Chow to evaluate iron deficiency and noted to have normal stomach and duodenum, abnormal motility of the esophagus and a small hiatal hernia. Will continue to monitor. 11. Insomnia. Using melatonin nightly and temazepam alternating with diazepam intermittently when needed. Benzodiazepines prescribed by Dr. Piotr Lo. Experiencing increasing difficulty as discussed with plan as outlined. 12. Anxiety. Patient described symptoms of anxiety and possible mild depression felt to likely be reactive to current health issues. Prescribed sertraline 25 mg daily but did not initiate and not wishing to proceed with treatment. She does have continued difficulty with chronic insomnia as discussed. Will continue to monitor. 13. Overweight. Weight decreased 3 pounds at last visit with BMI 27.87. Emphasized continued lifestyle modifications including attempts at regular exercise and heart healthy diet. Will continue to monitor. 14. Health maintenance. Completed four dose series of the [address] vaccine given immunosuppressed status and has received the updated bivalent Pfizer booster dose. Has not yet received Shingrix vaccine and remains hesitant to do so. Other immunizations up to date. Mammogram up-to-date. Vitamin D level remains normal on maintenance dose supplement. Continue regular eye exams with Dr. hRiannon Oliver. Completed cataract surgery in 4/2622 without complications. Medicare wellness visit up-to-date. Patient understands recommendations and agrees with plan. Follow-up as previously scheduled.

## 2023-02-16 NOTE — PATIENT INSTRUCTIONS
Heart-Healthy Diet: Care Instructions  Your Care Instructions     A heart-healthy diet has lots of vegetables, fruits, nuts, beans, and whole grains, and is low in salt. It limits foods that are high in saturated fat, such as meats, cheeses, and fried foods. It may be hard to change your diet, but even small changes can lower your risk of heart attack and heart disease. Follow-up care is a key part of your treatment and safety. Be sure to make and go to all appointments, and call your doctor if you are having problems. It's also a good idea to know your test results and keep a list of the medicines you take. How can you care for yourself at home? Watch your portions  Learn what a serving is. A \"serving\" and a \"portion\" are not always the same thing. Make sure that you are not eating larger portions than are recommended. For example, a serving of pasta is ½ cup. A serving size of meat is 2 to 3 ounces. A 3-ounce serving is about the size of a deck of cards. Measure serving sizes until you are good at [place]" them. Keep in mind that restaurants often serve portions that are 2 or 3 times the size of one serving. To keep your energy level up and keep you from feeling hungry, eat often but in smaller portions. Eat only the number of calories you need to stay at a healthy weight. If you need to lose weight, eat fewer calories than your body burns (through exercise and other physical activity). Eat more fruits and vegetables  Eat a variety of fruit and vegetables every day. Dark green, deep orange, red, or yellow fruits and vegetables are especially good for you. Examples include spinach, carrots, peaches, and berries. Keep carrots, celery, and other veggies handy for snacks. Buy fruit that is in season and store it where you can see it so that you will be tempted to eat it. Cook dishes that have a lot of veggies in them, such as stir-fries and soups.   Limit saturated and trans fat  Read food labels, and try to avoid saturated and trans fats. They increase your risk of heart disease. Use olive or canola oil when you cook. Bake, broil, grill, or steam foods instead of frying them. Choose lean meats instead of high-fat meats such as hot dogs and sausages. Cut off all visible fat when you prepare meat. Eat fish, skinless poultry, and meat alternatives such as soy products instead of high-fat meats. Soy products, such as tofu, may be especially good for your heart. Choose low-fat or fat-free milk and dairy products. Eat foods high in fiber  Eat a variety of grain products every day. Include whole-grain foods that have lots of fiber and nutrients. Examples of whole-grain foods include oats, whole wheat bread, and brown rice. Buy whole-grain breads and cereals, instead of white bread or pastries. Limit salt and sodium  Limit how much salt and sodium you eat to help lower your blood pressure. Taste food before you salt it. Add only a little salt when you think you need it. With time, your taste buds will adjust to less salt. Eat fewer snack items, fast foods, and other high-salt, processed foods. Check food labels for the amount of sodium in packaged foods. Choose low-sodium versions of canned goods (such as soups, vegetables, and beans). Limit sugar  Limit drinks and foods with added sugar. These include candy, desserts, and soda pop. Limit alcohol  Limit alcohol to no more than 2 drinks a day for men and 1 drink a day for women. Too much alcohol can cause health problems. When should you call for help? Watch closely for changes in your health, and be sure to contact your doctor if:    You would like help planning heart-healthy meals. Where can you learn more? Go to http://www.fink.com/  Enter V137 in the search box to learn more about \"Heart-Healthy Diet: Care Instructions. \"  Current as of: August 22, 2019               Content Version: 12.6  © 7080-3887 [employer]. Care instructions adapted under license by [employer] (which disclaims liability or warranty for this information). If you have questions about a medical condition or this instruction, always ask your healthcare professional. [address] any warranty or liability for your use of this information.

## 2023-02-26 NOTE — PROGRESS NOTES
2023    TELEHEALTH EVALUATION -- Audio/Visual (During [facility]- public health emergency)    HPI:    Rasheed Rosales (:  1945) has requested an audio/video evaluation for the following concern(s):    Sleep onset insomnia, chronic fatigue, and left leg weakness. HPI:   Zen Dimas is a 78y.o. year old female who presents today for a follow-up visit. She has a history of hypertension, dyslipidemia,  metastatic breast cancer, GERD, diverticulosis with recurrent diverticulitis, osteoarthritis s/p right knee replacement (), lumbar degenerative disease, osteopenia, compression fracture, and Tourette's syndrome. She is accompanied by her . She has completed the four dose Moderna COVID-19 vaccine series given immunosuppressed status and received the updated bivalent Pfizer booster dose. On 2023, she reported continued fatigue while on Ibrance and letrozole. She described the fatigue as impacting her quality of life by limiting her activities of daily living. She also reported ongoing back discomfort and pain which made performing any activities at home difficult. She also stated that she was not sleeping well at night due to difficulty falling asleep and due to her back pain, and she felt that her lack of sleep was also contributing to her fatigue. She reported taking 2 extra strength Tylenol and gabapentin 300 mg nightly, and intermittently taking temazepam 15 mg when she was unable to fall asleep. She stated that diazepam 5 mg was no longer effective in helping with her insomnia. She did note worsening of her Tourette's and intermittently would take an additional Haldol 2 mg as needed to help control. She requested recommendations to address her sleep issues, back discomfort, and ongoing fatigue, and was advised to increase her dose of gabapentin to 300 mg each morning and 600 mg nightly and take with Tylenol ES twice daily.   Goal was to see if controlling back pain with increasing dose of gabapentin at night could help manage her insomnia and fatigue. She reports today that she has noted improvement in her restless leg symptoms at night on the higher dose of gabapentin, but continues to have difficulty falling asleep. She states that she will need to take 1-2 temazepam approximately 3-4 times per week due to her difficulty with sleep onset insomnia, but continues to deny any difficulty with sleep maintenance. She also continues to report difficulty with left leg pain and weakness. She reports today that she is having difficulty ambulating due to concern that her leg will \"give out\". She states that she notes pain in the area of her left knee as well as involving her upper leg. She has been under the care of of JOSE Turpin at [place] and received bilateral SI joint injections in 9/2022 with some relief, but due to persistent symptoms she underwent a repeat lumbar MRI (11/2022) which showed an acute/subacute fracture of the L2 inferior endplate and subacute fracture of L4 superior endplate; and multilevel degenerative changes without change. She completed bilateral SI transforaminal DANIEL on 11/14/2022 with minimal improvement. It was not felt that the pain from the compression fractures was significant enough to proceed with kyphoplasty since she had not been experiencing a significant increase in her pain. She discontinued physical therapy due to the new compression fractures and lack of efficacy. She had a follow-up visit with JOSE Turpin on 1/24/2023 and no further interventions were recommended due to lack of benefit. On 2/16/2023, she was seen for an in office evaluation of right breast tenderness. She reported 2 week history of pruritus involving the medial axilla and lateral aspect of her right breast.  She noted improvement when applying topical hydrocortisone but then pruritus would recur.   On 2/13/2023, she noted onset tenderness to palpation in the area of the prior rash. She denied any pain with movement or persistent pain and denied any known injury. Exam with negative except for a small area of ecchymoses in the area of tenderness and it was felt that discomfort likely secondary to bruising although she did not recall any trauma. Advised to continue to monitor. Reports today that noted resolution of the pain although pruritus recurred with some discomfort in the area of her right nipple. She states that she contacted Dr. Viola Carter office and was told to continue to observe and call back in 2 weeks if it had not improved. Summary of prior hospitalizations and medical history:  On 3/3/2022, she presented for evaluation of worsening fatigue. She was evaluated in 11/2020 when she described dyspnea in association with fatigue when performing exertional activities. EKG (11/24/2020) was unremarkable, chest x-ray (11/24/2020) was negative, and echocardiogram (12/1/2020) showed normal LV function (EF 55-60%), trileaflet AV with moderate AI, and PAP 30 mmHg. She had repeat staging CT scans in 6/2021 and 12/2021 without evidence of recurrence or metastases. Iron studies performed by Dr. Rylie Myers showed a ferritin level of 13 although no evidence of anemia. She underwent upper endoscopy and colonoscopy by Dr. César Stevens (8/11/2021) without evidence of source of iron loss. At her last visit, she reported that her fatigue had significantly progressed, describing \"waves of severe fatigue and shortness of breath with minimal exertion\". She stated that she had no energy to go out with friends and has been having difficulty with her normal ADL activities at home due to fatigue. She underwent a repeat lab evaluation by oncology, which showed normal CBC, CMP, ferritin, and iron studies. She reported that decision was made by Dr. Rylie Myers to hold [facility] for a total of 7 weeks before reinitiating to see if may help with symptoms. She continued on letrozole.   She also underwent a repeat echocardiogram on 4/11/2022 which continue to show normal LV function and stable moderate AI. She noted improvement while Holland Dorsey was being held, but decided to proceed with resumption of therapy on 4/1/2022. Decision made to change her aromatase inhibitor therapy from letrozole to Aromasin. She has a history of left breast cancer, which was diagnosed in 7/2010 as invasive ductal adenocarcinoma, s/p left modified radical mastectomy with sentinel node biopsy, performed by Dr. Elisa Gunter. She had 3 positive lymph nodes and was classified as stage 1B, T1c N1 M0, ER and TX positive, Her-2/rosa negative by FISH. She underwent 6 cycles of chemotherapy with Docetaxel and Cytoxan. She was subsequently unable to tolerate anastrozole, exemestane, tamoxifen, and letrozole due to profound fatigue and arthralgias. Her course was complicated by chronic left arm lymphedema, managed with a compression sleeve. A chest CT scan in 3/2014 noted several small pulmonary nodules which were concerning for metastases but were too small to biopsy. They were followed with sequential CT scans , and in 12/2014, repeat chest CT scan showed enlarging bilateral pulmonary nodules compatible with progression of metastatic disease. A fine needle aspirate was performed on 12/21/2014 which showed benign pulmonary parenchyma with alveolar hemorrhage. Repeat chest CT scan in 3/12/2015 showed mild interval progression of the lung metastases with enlarging nodules and development of new nodules. A CT guided needle biopsy was performed on 3/23/2015, which confirmed metastatic adenocarcinoma consistent with breast primary (stage IV, ER/TX positive, and TTF-1 negative). On 4/13/2015, she was started on therapy with Ibrance and letrozole. Follow-up CT scan (7/15/15) showed partial response with decreasing size of some nodules and resolution of other nodules.  Most recent chest, abdomen, and pelvis CT scan was obtained in 6/2017, showing stable or decreased multiple bilateral pulmonary nodules and no suspicious new pulmonary nodules. She continues on palbociclib, but was changed from letrozole to fulvestrant. She describes persistent fatigue which she attributes to monthly treatment with fulvestrant (Faslodex). She underwent restaging chest, abdomen, and pelvis CT scan in 7/2019 which showed that her pulmonary nodules were unchanged, and no other evidence of metastatic disease. Repeat staging CT chest/ abdomen/ pelvis in 1/2021 showed no evidence of metastasis or adenopathy. She is followed by Dr. Gerrianne Angelucci. She states that she established care with Dr. Reyes Gutiérrez, but was told that she may have her mammograms and breast exams in our office with referral to her if something arises. She also has a history of a right ovarian cyst, but was released from the care of Dr. Cammy Kaplan since it was concluded to be benign. She has a history of hypertension treated with losartan. She monitors her blood pressure mainly when visiting multiple physicians and reports that it is well controlled. She has no history of heart disease, and denies any chest pain, shortness of breath at rest or with exertion, palpitations, lightheadedness, or edema. In 4/11/2016, she sustained a fall with subsequent severe pain in her mid to upper lumbar region and wrapping around waist. She was treated with meloxicam, tylenol and flexeril, but because of persistent discomfort, she presented to [facility] on 4/15/2016 and lumbosacral spine films showed marked degenerative changes with disc space obliteration throughout lumbar spine and slight anterior spondylolisthesis of L4. She was evaluated by Dr. America Bruce on 4/18/2016 who recommended physical therapy and evaluation by Dr. Ellie Rondon for her degenerative spine changes. She continued to take meloxicam and tylenol and started physical therapy, but noted worsening of her pain.  She subsequently was evaluated by Dr. Deidra Calderon, who obtained a lumbar MRI on 4/26/2016, which showed a new subacute compression fracture of T11 vertebral body with diffuse marrow edema and mild paravertebral inflammatory stranding, no retropulsion or central stenosis; more severe degenerative disease along the right side at L4-L5, L5-S1, potential mild compression of right exiting L4 and L5 nerve roots. She was referred to Dr. Jordan Latham for kyphoplasty of the T11 compression fracture given failure of pain control with conservative measures. She underwent the procedure on 0/4/9858 without complications, and T73 vertebral body bone core and aspirate biopsies were performed, which showed only reactive bone changes and no evidence of malignancy. She has a history of osteopenia, with a history of fracture of her sacrum. She reports that she was treated with alendronate in 12/2011 but discontinued it in 4/2013 due to intolerence. Bone density scan (11/2017) was consistent with osteopenia with femoral neck T-scores: left -1.4/ right -1.4 and distal radius -2.2 (lumbar T-score could not be assessed). A repeat bone density scan was obtained (11/2019) showing continued evidence of osteopenia with femoral neck T-scores: left -1.4/ right -1.4 and distal radius -2.4 (lumbar T-score could not be assessed). She was referred to Dr. Tahmina West given her multiple compression fractures and treatment with letrozole, and was started on Reclast in 3/2021. She continues to take calcium and vitamin D. In 10/2016, she developed left sided low back pain with radiation to her left leg. She underwent a lumbar MRI (9/2016) showing scoliosis and advanced multilevel degenerative changes with areas of foraminal stenosis at L3-L4 and L5-S1; mild/moderate central canal stenosis at L4-L5 and L5-S1. She was evaluated by Dr. Mercy Fernández and treated with pregabalin with some improvement. She subsequently received an epidural steroid injection with improvement and discontinued pregabalin.  She reported worsening low back pain and right sciatica, and underwent an epidural steroid injection at right L4-5 transforaminal approach by Dr. Alissa Storey on 3/18/2019. She has noted improvement in her right hip and leg pain. She underwent a lumbar MRI (6/2020) for worsening right sciatica, showing very advanced multilevel DDD and DJD with extensive bone marrow edema reaction to DDD; chronic compression fractures; single level of degenerative central spinal stenosis, moderate, at L4/L5, worsened compared 2016; multilevel degenerative foraminal narrowing worst right L4/L5, bilateral L5/S1; no evidence of metastatic breast cancer. She underwent left L4/5 and right L5-S1 DANIEL in 6/2020 and Left S1 TF DANIEL in 8/2020 by Dr. Alissa Storey with improvement. She reports continuing to have persistent difficulty with left-sided buttock and pelvic pain, and she underwent left hip MRI (11/6/2021) which showed mild bilateral hip osteoarthritis (L>R), and asymmetric atrophy of the proximal left tensor fascia wayne. She reports that she received a ultrasound-guided steroid injection for left trochanteric bursitis on 11/10/2021, which provided some relief transiently, but pain has since recurred. She underwent an DANIEL left L4/5 and L5/S1 on 12/13/2021 by Dr. Alissa Storey with some improvement. She subsequently underwent physical therapy with significant benefit. In 4/2017, she noted increasing cervical and upper back discomfort. She has had multiple cervical MRI's,and underwent repeat in 4/2017 which showed multilevel degenerative disc disease and facet arthropathy without change from prior studies with mild central canal stenosis C3-C4 and C5-C6, and moderate central canal stenosis C6-C7. She was treated with Mobic and Flexeril, and gabapentin at bedtime. She complained of right shoulder pain.  X-ray of her shoulder (11/2017) was negative, and she had a right shoulder MRI (5/14/2018) which showed deep partial thickness undersurface tear of the distal supraspinatus, likely with full thickness slitlike perforations; no gross tendon retraction, but there is moderate muscle atrophy; partial thickness tearing with longitudinal components involving the biceps tendon at its sulcal turn, some associated tenosynovitis; accompanying short length longitudinal split tear in the distal subscapularis; moderate infraspinatus tendinosis, and mild to moderate subacromial subdeltoid bursitis. She underwent evaluation by Dr. Aiden Yu on 5/18/2018, and received a glenohumeral cortisone injection. She has a history of GERD and diverticulosis with recurrent diverticulitis,. She was evaluated by Dr. Shelia Laughlin in 2/23/2016 and underwent an upper endoscopy, which revealed a Schatzki's ring at the gastroesophageal junction (dilated) with mild distal esophagitis and a small hiatal hernia. A screening colonoscopy was also performed showing moderately severe diverticulosis of the ascending and descending colon and a 2 mm sessile sigmoid polyp (pathology: hyperplastic). Recommendations for follow-up colonoscopy in 10 years. She denies any abdominal pain, nausea, vomiting, melena, hematochezia, or change in bowel movements. She was evaluated in 8/2017 by DAVIDSON Glass with complaints of abdominal pain and was treated with Cipro for presumed diverticulitis. She contacted the office again in 10/2017 and 1/2018 with a recurrence of symptoms, and was treated for a 10 day course with Cipro with improvement. Abdominal CT scan in 2/6/2018 for staging for her breast cancer did show evidence of moderate to severe diverticulosis, but no evidence of diverticulitis. She did present with similar symptoms in 3/2018 and was evaluated by DAVISDON Hughes and prescribed Cipro. However, her symptoms resolved prior to taking the antibiotics.  In 7/2018, she again presented with left lower quadrant pain, and an abdominal CT scan (7/13/2018) which showed evidence of uncomplicated diverticulitis involving the lower left colon and upper sigmoid. She was treated with Cipro and Flagyl initially, but developed a rash due to Flagyl, and was subsequently changed to Augmentin. She had a follow-up visit with Dr. Sharyle Stands and it was his opinion that she also had IBS which mimicked her episodes of diverticulitis. He recommended continuing on a probiotic and prescribed hyoscyamine to use as needed. She has noted improvement since doing so. She underwent evaluation for iron deficiency when she was found to have a ferritin of 13 in 6/2021 which was performed to evaluate her fatigue. On 8/11/2021 she underwent upper endoscopy and colonoscopy by Dr. Sharyle Stands. Upper endoscopy showed normal stomach and duodenum, abnormal motility of the esophagus, and a small hiatal hernia. Colonoscopy showed increased vascularity throughout the colon concerning for microscopic colitis, but pathology of multiple random biopsies was negative. No source for iron loss was identified. On 8/9/2022, she presented to the [facility] ED with abdominal pain and lab evaluation showed WBC 13.9, Hb 13.9/HCT 41.7, creatinine 0.76/eGFR >60, lipase normal, urinalysis negative; CT abdomen/pelvis showed evidence of proximal sigmoid diverticulitis without complication. She was treated with Augmentin with clinical improvement. She also has a history of Tourette's syndrome controlled with Haldol. She is followed by Dr. Norma Han.        Past Medical History:   Diagnosis Date    Arthritis     Breast cancer metastasized to lung [facility])     Left Breast    Chronic pain     Colon polyps 2/22/16    distal sigmoid, Dr. Morris    DDD (degenerative disc disease)     Diverticulitis of colon     Diverticulosis 2/22/16    mild in the ascedning and sigmoid colon, Dr. Be Hester (hyperlipidemia)     Hypertension     Osteopenia     Reactive depression 3/4/2022    Tourette syndrome     Vitamin D deficiency      Past Surgical History:   Procedure Laterality Date    APPENDECTOMY      BREAST BIOPSY 7-30-10    Left Breast w/Nipple Duct exploration and excisional biopsy-left    DILATION AND CURETTAGE OF UTERUS      x3    HYSTEROSCOPY DIAGNOSTIC      MASTECTOMY, MODIFIED RADICAL Left 09/03/2010    ORTHOPEDIC SURGERY Right 2013    knee replacement    TONSILLECTOMY       Current Outpatient Medications   Medication Sig    zolpidem (AMBIEN) 5 MG tablet Take 1 tablet by mouth nightly as needed for Sleep for up to 30 days. Max Daily Amount: 5 mg    pantoprazole (PROTONIX) 40 MG tablet Take 40 mg by mouth daily    letrozole (FEMARA) 2.5 MG tablet Take 2.5 mg by mouth daily    acetaminophen (TYLENOL) 500 MG tablet Take 500 mg by mouth every 6 hours as needed    Biotin 2.5 MG CAPS Take by mouth    Calcium Carbonate-Vitamin D (OYSTER SHELL CALCIUM/D) 500-5 MG-MCG TABS Take 1 tablet by mouth 2 times daily (with meals)    vitamin D (CHOLECALCIFEROL) 25 MCG (1000 UT) TABS tablet Take 4,000 Units by mouth daily    cyanocobalamin 1000 MCG tablet Take 1,000 mcg by mouth daily    diazePAM (VALIUM) 5 MG tablet Take 5 mg by mouth every 6 hours as needed. gabapentin (NEURONTIN) 300 MG capsule Take 300 mg by mouth.    haloperidol (HALDOL) 2 MG tablet With a 3rd dose PRN    hyoscyamine (OSCIMIN) 125 MCG TBDP dispersible tablet Take 0.125 mg by mouth every 4 hours as needed    losartan (COZAAR) 25 MG tablet Take 25 mg by mouth daily    meloxicam (MOBIC) 15 MG tablet Take 15 mg by mouth daily    ondansetron (ZOFRAN) 8 MG tablet Take 8 mg by mouth every 8 hours as needed    palbociclib (IBRANCE) 75 MG capsule Take 75 mg by mouth daily    temazepam (RESTORIL) 15 MG capsule Take 15 mg by mouth. No current facility-administered medications for this visit. Allergies and Intolerances: Allergies   Allergen Reactions    Cephalexin Rash    Metronidazole Rash    Shellfish Allergy Nausea And Vomiting     More of just eating the actual shellfish.     Sulfa Antibiotics Rash    Tramadol Nausea And Vomiting     Patient states she is allergic to most Narcotics    Vancomycin Rash     Family History:   Family History   Problem Relation Age of Onset    Osteoporosis Sister     Osteoporosis Mother     Stroke Father     Hypertension Father     Cancer Paternal Aunt         breast     Social History:   She  reports that she quit smoking about 17 years ago. Her smoking use included cigarettes. She smoked an average of 1 pack per day. She has never used smokeless tobacco.  She is  and lives with . They have one daughter and two grandchildren. Social History     Substance and Sexual Activity   Alcohol Use Not Currently    Alcohol/week: 5.8 standard drinks     Immunization History:  Immunization History   Administered Date(s) Administered    COVID-19, MODERNA BLUE border, Primary or Immunocompromised, (age 12y+), IM, 100 mcg/0.5mL 02/07/2021, 03/07/2021, 11/07/2021    Influenza Virus Vaccine 10/08/2010, 11/01/2011, 10/02/2012, 09/01/2014    Influenza, FLUAD, (age 72 y+), Adjuvanted, 0.5mL 09/23/2020, 10/15/2021    Influenza, High Dose (Fluzone 65 yrs and older) 10/01/2015, 10/25/2016, 10/05/2017    Influenza, Triv, inactivated, subunit, adjuvanted, IM (Fluad 65 yrs and older) 10/05/2018, 10/17/2019    Pneumococcal Conjugate 13-valent ([id number]) 10/27/2015    Pneumococcal Polysaccharide ([id number]) 04/15/2013    Td vaccine (adult) 05/05/2008    Tdap (Boostrix, Adacel) 09/12/2014    Zoster Live (Zostavax) 09/20/2011       Review of Systems:   As above included in HPI. Otherwise 11 point review of systems negative including constitutional, skin, HENT, eyes, respiratory, cardiovascular, gastrointestinal, genitourinary, musculoskeletal, endocrine, hematologic, allergy, and neurologic. Physical:   There were no vitals filed for this visit.       Exam:     PHYSICAL EXAMINATION:    Constitutional: [x] Appears well-developed and well-nourished [x] No apparent distress      [] Abnormal-   Mental status  [x] Alert and awake  [x] Oriented to person/place/time [x]Able to follow commands      Eyes:  EOM    [x]  Normal  [] Abnormal-  Sclera  [x]  Normal  [] Abnormal -         Discharge [x]  None visible  [] Abnormal -    HENT:   [x] Normocephalic, atraumatic.  [] Abnormal   [x] Mouth/Throat: Mucous membranes are moist.     External Ears [x] Normal  [] Abnormal-     Neck: [x] No visualized mass     Pulmonary/Chest: [x] Respiratory effort normal.  [] No visualized signs of difficulty breathing or respiratory distress        [] Abnormal-      Musculoskeletal:   [] Normal gait with no signs of ataxia         [x] Normal range of motion of neck        [] Abnormal-       Neurological:        [x] No Facial Asymmetry (Cranial nerve 7 motor function) (limited exam to video visit)          [x] No gaze palsy        [] Abnormal-         Skin:        [] No significant exanthematous lesions or discoloration noted on facial skin         [x] Abnormal-            Psychiatric:       [x] Normal Affect [] No Hallucinations        [] Abnormal-           Review of Data:  Labs:  No visits with results within 1 Month(s) from this visit.   Latest known visit with results is:   Orders Only on 05/16/2022   Component Date Value Ref Range Status    Creatinine, POC 05/16/2022 0.9  0.6 - 1.3 MG/DL Final    GFR  05/16/2022 >60  >60 ml/min/1.73m2 Final    GFR Non- 05/16/2022 >60  >60 ml/min/1.73m2 Final         Health Maintenance:   Screening:               Mammogram: negative (1/2023)              PAP smear: negative (9/2013) Dr Nguyen. No further screening needed.              Colorectal: colonoscopy (8/2021) normal.  Dr. Dickerson.  No further screening needed given age.              Depression: none              DM (HbA1c/FPG): FPG 93 (12/2022)              Hepatitis C: unknown              Falls: none              DEXA: osteopenia (11/2021) lumbar compression fractures (11/2022). On Reclast.  Dr. Murcia.              Smoking:distant past               Glaucoma: regular eye exams with Dr. Marisa Moon (last 3/2022)              Vitamin D: 54.2 (12/2022)              Medicare Wellness: 6/23/2022        Impression:  Patient Active Problem List   Diagnosis    Aortic valve regurgitation    Lumbar spinal stenosis    Breast cancer ([facility] [address]    Osteoarthritis    Aneurysm of intracranial portion of internal carotid artery    Insomnia    Diverticulosis    Essential hypertension    Fatigue    Osteopenia    Nontraumatic incomplete tear of right rotator cuff    HLD (hyperlipidemia)    Spinal stenosis of cervical region    Lymphedema of arm    Malignant neoplasm metastatic to lung [facility])    Tourette syndrome    Lumbar facet arthropathy    Vertebral compression fracture (HCC)    Degenerative joint disease of cervical spine    DDD (degenerative disc disease), lumbar    Immunosuppressed due to chemotherapy ([facility] [address]    History of diverticulitis       Plan:  Fatigue, multifactorial.  Has been a chronic issue for several years and attributed to treatment with Ibrance and letrozole. Completed extensive evaluation for other causes and negative. Likely also contributed to by poor sleep/chronic insomnia and daily treatment with Haldol for for Tourette's syndrome. Aware that unable to alleviate fatigue which is caused by her breast cancer treatment, but attempting to address other contributors to fatigue so as to lessen severity. Difficulty with chronic back pain and restless leg symptoms impacting her sleep at night. Also with difficulty with sleep onset insomnia but not sleep maintenance. Addressed the following:     Back pain due to lumbar radiculopathy and compression fractures. Chronic low back pain due to degenerative lumbar disease as well as new lumbar compression fractures at L2 and L4. Previously on gabapentin 300 mg nightly and occasional Tylenol ES.   Dose of gabapentin increased to 300 mg every morning and 600 mg nightly at last visit, and advised to also take two Tylenol ES twice daily with gabapentin. Reports today noting benefit in control of restless leg symptoms at night with higher dose of gabapentin, but not helping with management of insomnia and daytime fatigue. Discussed ongoing left leg weakness and advised that review of lumbar MRI (11/2022) showed evidence of likely compression of the L5 nerve root. Advised that would recommend follow-up with [facility] to consider obtaining EMG/NCS to clarify if left leg weakness could be radicular in etiology. Chronic insomnia. Reporting difficulty with sleep onset insomnia but no difficulty with sleep maintenance. Using melatonin 5 mg nightly and required to use temazepam 3-4 times per week. Did not note any change in sleep onset insomnia with increase in dose of gabapentin to 600 mg nightly. Reports continuing to take 1-2 tablets of temazepam 3-4 times per week to help. Unable to treat with amitriptyline or trazodone given QTc prolongation and interaction with Haldol. Will begin Ambien 5 mg nightly and advised to avoid taking temazepam when using Ambien. Requested that she send update in 1-2 weeks regarding effectiveness. Right breast tenderness. Reported 2-week history of pruritic rash which resolved following use of topical hydrocortisone. Noted tenderness to palpation of lateral breast, but exam negative except for a small area of ecchymoses evident in area of tenderness although did not recall any trauma. Advised to continue to monitor and reports some recurrence of pruritus and nipple irritation. Contacted Dr. Jayna Hubbard office and also recommended observation. Chronic issues:  1. Fatigue. Significant ongoing persistent fatigue felt to likely be a result of breast cancer treatment with Ibrance and letrozole. Completed evaluation in 11/2020 with EKG unremarkable, chest x-ray negative, and and echocardiogram (12/1/2020) showed normal LV function (EF 55-60%), trileaflet AV with moderate AI, and PAP 30 mmHg.  Repeat staging CT scans in 6/2021 without evidence of recurrence or metastases. Iron studies performed by Dr. Rosalina Arguello showed a ferritin level of 13 although no evidence of anemia. Underwent upper endoscopy and colonoscopy by Dr. Babs Paul (8/11/2021) without evidence of source of iron loss. Random colon biopsies negative. Reported progression of fatigue at last visit on 3/3/2022 with \"waves of severe fatigue and shortness of breath with minimal exertion\". Repeat lab evaluation by oncology showed normal CBC, CMP, ferritin, and iron studies. Repeat EKG (3/3/2022) was unchanged and repeat echocardiogram (4/11/2022) showed no change. Repeat EKG (3/3/2022) was unchanged and repeat echocardiogram (4/11/2022) showed no change. Trial of holding Ibrance in 3/2022 for 7 weeks with minimal improvement and felt that letrozole likely culprit and changed to Aromasin without improvement so resumed letrozole. Wishing to continue with therapy with Ibrance and aromatase inhibitor given effectiveness. Will continue to address. 2. Hypertension. Has been well controlled on losartan 25 mg daily. Mildly elevated today and likely secondary to anxiety. Will reassess at next visit. Renal function remains normal (12/2022) with creatinine 0.8/eGFR >60. Brain MRI (5/2021) with evidence of mild to moderate burden nonspecific white matter lesions and a few tiny old lacunar infarcts, likely from longstanding hypertension. Patient had been started on aspirin 81 mg daily as advised by Dr. Lisandro Dodge, but reports that she is no longer taking. 3. Breast cancer with pulmonary metastasis. Continues on current therapy with Ibrance and letrozole. Reports no difference noted when changed to Aromasin. Most recent staging CT chest/ abdomen/ and pelvis (11/2022) without evidence of recurrent or metastatic disease. Continuing with annual mammograms, last 1/2023, without evidence of recurrent disease.  Not using compression sleeve for lymphedema regularly, but receiving massage therapy every three weeks with good results. Continuing to follow with Dr. Dionicio Trejo and Dr. Hannah Kohler. 4. Right ICA terminus aneurysm. MRA brain (4/2019) showed a 2 mm superiorly directed aneurysm at the right ICA terminus. Repeat MRA brain (5/2021) showed no significant interval change. Reported intermittent tremor with intention (L>R) at last visit, and underwent repeat brain MRI/MRA (5/2022) without change. Being monitored by Dr. Ryan Max. 5. Hyperlipidemia. Calculated 10 year ASCVD risk is 19.4 %, which places her in one of the four statin benefit groups (primary prevention with risk > 7.5%). LDL overall stable at 121 and HDL 62 (1/2023). Given lack of history of ASCVD, no evidence of atherosclerotic disease on chest and abdominal CT scans, and history of metastatic breast cancer, decision made not to treat with statin at this time. Continue to emphasized importance of lifestyle modifications, including heart healthy diet and exercise. 6. Osteopenia. History of T11 compression fracture in 4/2016, s/p kyphoplasty. Last bone density scan 11/2019. Using femoral neck T-scores, calculated FRAX score estimates her 10 year risk of a major osteoporetic fracture at 16 % and hip fracture at 2.3%, which alone are not an indication for biphosphonate treatment. However, development of a vertebral compression fracture and treatment with aromatase inhibitor increases likelihood of progression. Evaluated by Dr. Rogelio Mcduffie and received first dose of Reclast on 3/29/2021. Repeat bone density scan performed in 11/2021 showed stability without statistically significant change with FRAX scores calculated at 14% / 2.2%, and advised to continue Reclast infusions, last 4/4/2022. Now with new lumbar compression fractures noted on lumbar MRI in 11/2022. Continuing to follow with Dr. Rogelio Mcduffie with next visit scheduled in 4/2023.  7. Chronic low back pain with left leg weakness.  Known lumbar stenosis and facet arthropathy. Using meloxicam, cyclobenzaprine, gabapentin, and Tylenol. Recent recurrence of symptoms without sustained improvement despite treatment with steroids, NSAIDS, muscle relaxants and gabapentin. Continuing to have persistent difficulty with left-sided buttock pain and underwent left hip MRI (11/6/2021) which showed mild bilateral hip osteoarthritis (L>R), and asymmetric atrophy of the proximal left tensor fascia wayne. Received a ultrasound-guided steroid injection for left trochanteric bursitis on 11/10/2021, which provided some transient relief. Underwent DANIEL at left L4/5 and L5/S1 on 12/13/2021 by Dr. Law Cheatham with some improvement. Completed physical therapy with improvement in pain in bilateral hips and lower back. However, noted worsening back pain and left leg weakness requiring use of a cane for ambulation due to walking with a limp. Received bilateral SI joint injections in 9/2022 with some relief. Repeat lumbar MRI (11/2022) showed an acute/subacute fracture of the L2 inferior endplate and subacute fracture of L4 superior endplate; multilevel degenerative changes without change. Underwent bilateral SI transforaminal DANIEL (11/14/2022) with minimal improvement. Physical therapy discontinued due to the new compression fractures and lack of efficacy. Had follow-up visit with [facility] on 1/24/2023 and no further interventions recommended due to lack of benefit. 8. Tourette's syndrome. Followed by Dr. Clyde Reyes. On Haldol with current dose of 2 mg twice daily previously very effective in controlling symptoms. However, noting worsening Tourette's symptoms and advised that may take an additional 2 mg as needed to control. Also noting some increasing tremor in the evening when fatigued and thought to be a side effect of Haldol. Will continue to monitor. 9. History of recurrent diverticulitis. Colonoscopy (2/2016) with moderately severe diverticulosis in the ascending and descending colon.  Has had multiple recent episodes of abdominal pain which were treated empirically with Cipro. Episode of symptoms in 3/2018 resolved prior to initiating antibiotics, bringing into question whether her abdominal symptoms were due to diverticulitis or irritable bowel syndrome. Had recurrent episode in 7/2018, and abdominal CT scan confirmed diagnosis of acute diverticulitis. Treated with Augmentin. Subsequently evaluated by Dr. Kameron Berumen and instructed to continue Probiotics and use hyoscyamine as needed when develop abdominal complaints as concerned that recurrent symptoms related to IBS. Diagnosed with recurrent proximal sigmoid diverticulitis on CT abdomen/pelvis in [facility] on 8/9/2022 and treated with Augmentin with improvement. Contacted the office on 2/3/2023 with left sided abdominal pain and treated with Augmentin for presumed diverticulitis. Reports overall resolution of symptoms today with antibiotic completion. Advised to continue using hyoscyamine as needed for abdominal discomfort. Will continue to follow. 10. GERD. Using Protonix 20 mg daily with reasonable control. Reports may increase to 40 mg occasionally for increased symptoms. Underwent upper endoscopy (8/11/2021) by Dr. Kameron Berumen to evaluate iron deficiency and noted to have normal stomach and duodenum, abnormal motility of the esophagus and a small hiatal hernia. Will continue to monitor. 11. Insomnia. Had been using melatonin nightly and temazepam alternating with diazepam intermittently when needed. Benzodiazepines prescribed by Dr. Amparo Xiong. Experiencing increasing difficulty as discussed with plan as outlined. 12. Anxiety. Patient described symptoms of anxiety and possible mild depression felt to likely be reactive to current health issues. Prescribed sertraline 25 mg daily but did not initiate and not wishing to proceed with treatment. She does have continued difficulty with chronic insomnia as discussed. Will continue to monitor. 13. Overweight. Weight decreased 3 pounds at last visit with BMI 27.87. Emphasized continued lifestyle modifications including attempts at regular exercise and heart healthy diet. Will continue to monitor. 14. Health maintenance. Completed four dose series of the [address] 19 vaccine given immunosuppressed status and has received the updated bivalent Pfizer booster dose. Has not yet received Shingrix vaccine and remains hesitant to do so. Other immunizations up to date. Mammogram up-to-date. Vitamin D level remains normal on maintenance dose supplement. Continue regular eye exams with Dr. Abdulaziz Kat. Completed cataract surgery in 0/5194 without complications. Medicare wellness visit up-to-date. Patient understands recommendations and agrees with plan. Follow-up as previously scheduled. Tera Crow, was evaluated through a synchronous (real-time) audio-video encounter. The patient (or guardian if applicable) is aware that this is a billable service, which includes applicable co-pays. This Virtual Visit was conducted with patient's (and/or legal guardian's) consent. The visit was conducted pursuant to the emergency declaration under the [address], [address] [place] [address] [facility] authority and the [place] [employer] Act. Patient identification was verified, and a caregiver was present when appropriate. The patient was located at Home: [address] [facility] [address] [address]  Provider was located at [facility] (Appt Dept): [address] [place] [address],  [address]        Total time spent on this encounter:  35 minutes    --Levi Mc MD on 2/28/2023 at 12:58 PM    An electronic signature was used to authenticate this note.

## 2023-02-28 ENCOUNTER — TELEMEDICINE (OUTPATIENT)
Age: 78
End: 2023-02-28
Payer: MEDICARE

## 2023-02-28 DIAGNOSIS — D84.821 IMMUNOSUPPRESSED DUE TO CHEMOTHERAPY (HCC): ICD-10-CM

## 2023-02-28 DIAGNOSIS — T45.1X5A IMMUNOSUPPRESSED DUE TO CHEMOTHERAPY (HCC): ICD-10-CM

## 2023-02-28 DIAGNOSIS — Z17.0 MALIGNANT NEOPLASM OF LEFT BREAST IN FEMALE, ESTROGEN RECEPTOR POSITIVE, UNSPECIFIED SITE OF BREAST (HCC): ICD-10-CM

## 2023-02-28 DIAGNOSIS — M51.36 DDD (DEGENERATIVE DISC DISEASE), LUMBAR: ICD-10-CM

## 2023-02-28 DIAGNOSIS — Z79.899 IMMUNOSUPPRESSED DUE TO CHEMOTHERAPY (HCC): ICD-10-CM

## 2023-02-28 DIAGNOSIS — S32.000S COMPRESSION FRACTURE OF LUMBAR VERTEBRA, UNSPECIFIED LUMBAR VERTEBRAL LEVEL, SEQUELA: ICD-10-CM

## 2023-02-28 DIAGNOSIS — C50.912 MALIGNANT NEOPLASM OF LEFT BREAST IN FEMALE, ESTROGEN RECEPTOR POSITIVE, UNSPECIFIED SITE OF BREAST (HCC): ICD-10-CM

## 2023-02-28 DIAGNOSIS — N64.4 BREAST TENDERNESS IN FEMALE: ICD-10-CM

## 2023-02-28 DIAGNOSIS — G47.00 INSOMNIA, UNSPECIFIED TYPE: Primary | ICD-10-CM

## 2023-02-28 DIAGNOSIS — R53.82 CHRONIC FATIGUE: ICD-10-CM

## 2023-02-28 DIAGNOSIS — C78.01 MALIGNANT NEOPLASM METASTATIC TO BOTH LUNGS (HCC): ICD-10-CM

## 2023-02-28 DIAGNOSIS — R29.898 LEFT LEG WEAKNESS: ICD-10-CM

## 2023-02-28 DIAGNOSIS — C78.02 MALIGNANT NEOPLASM METASTATIC TO BOTH LUNGS (HCC): ICD-10-CM

## 2023-02-28 PROCEDURE — 99213 OFFICE O/P EST LOW 20 MIN: CPT | Performed by: INTERNAL MEDICINE

## 2023-02-28 PROCEDURE — 3017F COLORECTAL CA SCREEN DOC REV: CPT | Performed by: INTERNAL MEDICINE

## 2023-02-28 PROCEDURE — G8399 PT W/DXA RESULTS DOCUMENT: HCPCS | Performed by: INTERNAL MEDICINE

## 2023-02-28 PROCEDURE — 1090F PRES/ABSN URINE INCON ASSESS: CPT | Performed by: INTERNAL MEDICINE

## 2023-02-28 PROCEDURE — 1123F ACP DISCUSS/DSCN MKR DOCD: CPT | Performed by: INTERNAL MEDICINE

## 2023-02-28 PROCEDURE — G8427 DOCREV CUR MEDS BY ELIG CLIN: HCPCS | Performed by: INTERNAL MEDICINE

## 2023-02-28 PROCEDURE — 99214 OFFICE O/P EST MOD 30 MIN: CPT | Performed by: INTERNAL MEDICINE

## 2023-02-28 RX ORDER — ZOLPIDEM TARTRATE 5 MG/1
5 TABLET ORAL NIGHTLY PRN
Qty: 30 TABLET | Refills: 0 | Status: SHIPPED | OUTPATIENT
Start: 2023-02-28 | End: 2023-03-30

## 2023-02-28 ASSESSMENT — PATIENT HEALTH QUESTIONNAIRE - PHQ9
SUM OF ALL RESPONSES TO PHQ QUESTIONS 1-9: 0
SUM OF ALL RESPONSES TO PHQ QUESTIONS 1-9: 0
2. FEELING DOWN, DEPRESSED OR HOPELESS: 0
SUM OF ALL RESPONSES TO PHQ9 QUESTIONS 1 & 2: 0
SUM OF ALL RESPONSES TO PHQ QUESTIONS 1-9: 0
SUM OF ALL RESPONSES TO PHQ QUESTIONS 1-9: 0
1. LITTLE INTEREST OR PLEASURE IN DOING THINGS: 0

## 2023-02-28 NOTE — PROGRESS NOTES
Anai Neto presents today for   Chief Complaint   Patient presents with    Follow-up     4 week follow up           1. \"Have you been to the ER, urgent care clinic since your last visit? Hospitalized since your last visit? \" no    2. \"Have you seen or consulted any other health care providers outside of the [address] [place] [address] since your last visit? \" no     3. For patients aged 39-70: Has the patient had a colonoscopy / FIT/ Cologuard? Yes - no Care Gap present      If the patient is female:    4. For patients aged 41-77: Has the patient had a mammogram within the past 2 years? NA - based on age or sex      11. For patients aged 21-65: Has the patient had a pap smear?  NA - based on age or sex

## 2023-03-21 ENCOUNTER — PATIENT MESSAGE (OUTPATIENT)
Age: 78
End: 2023-03-21

## 2023-03-22 NOTE — TELEPHONE ENCOUNTER
Called and spoke with patient. Discussed evaluation at [facility] and current difficulty with increased fatigue. Discern that onset may have been related to initiating Ambien 5 mg nightly for severe issue with insomnia. She states that she has not taken it for the last few nights and is feeling somewhat better today. Answered all questions and will continue to monitor.

## 2023-03-30 ENCOUNTER — TELEPHONE (OUTPATIENT)
Age: 78
End: 2023-03-30

## 2023-03-30 DIAGNOSIS — M54.2 NECK PAIN: Primary | ICD-10-CM

## 2023-03-30 DIAGNOSIS — M54.81 CERVICO-OCCIPITAL NEURALGIA OF RIGHT SIDE: ICD-10-CM

## 2023-03-30 RX ORDER — METHYLPREDNISOLONE 4 MG/1
4 TABLET ORAL SEE ADMIN INSTRUCTIONS
Qty: 1 KIT | Refills: 0 | Status: SHIPPED | OUTPATIENT
Start: 2023-03-30

## 2023-04-05 ENCOUNTER — CLINICAL DOCUMENTATION (OUTPATIENT)
Age: 78
End: 2023-04-05

## 2023-04-05 DIAGNOSIS — M54.2 NECK PAIN: ICD-10-CM

## 2023-04-05 DIAGNOSIS — M47.22 CERVICAL RADICULOPATHY DUE TO DEGENERATIVE JOINT DISEASE OF SPINE: ICD-10-CM

## 2023-04-05 DIAGNOSIS — M54.81 CERVICO-OCCIPITAL NEURALGIA OF RIGHT SIDE: ICD-10-CM

## 2023-04-05 DIAGNOSIS — M54.81 OCCIPITAL NEURALGIA OF RIGHT SIDE: Primary | ICD-10-CM

## 2023-04-05 RX ORDER — LOSARTAN POTASSIUM 25 MG/1
25 TABLET ORAL 2 TIMES DAILY
Qty: 180 TABLET | Refills: 3 | Status: SHIPPED | OUTPATIENT
Start: 2023-04-05

## 2023-04-05 NOTE — PROGRESS NOTES
Patient presented at her 's visit with record of blood pressure readings and her home monitor. Her dose of losartan was increased to 50 mg daily on 3/30/2023 due to elevated home readings. Cording to her home readings over the last week, her blood pressure remains elevated but she also relates that she has been experiencing increasing neck pain with occipital neuralgia during this time. Upon arrival in office: Blood pressure was 135/83 and home monitor showed blood pressure of 146/83, representing a 10-point discrepancy in systolic blood pressure readings. Given this fact, review of her home readings show reasonable control despite pain. Advised to continue on losartan 50 mg daily and instructed that she may also dose it at 25 mg twice daily. New prescription sent to [facility]. Patient also describing worsening neck pain and occipital neuralgia. She was prescribed a Medrol Dosepak on 3/30/2023 but does not report any significant improvement today. She continues to take gabapentin at bedtime and has attempted to take an additional dose in the morning but finds that it makes her too groggy. She has attempted to schedule a follow-up appointment with Dr. Curry Finley as recommended by Dr. Masood Armenta, but she states that she cannot get an appointment for several weeks and before any procedure can be performed, she will need an updated cervical MRI. Last brain MRI in 5/2021 showed interval increase in significant degenerative changes at C3/4 with some cord deformation. Will place order.

## 2023-04-06 ENCOUNTER — TELEPHONE (OUTPATIENT)
Age: 78
End: 2023-04-06

## 2023-04-06 NOTE — TELEPHONE ENCOUNTER
Pt calling asking for Dr Berenice Maravilla review and opinion re: changes in medication Dr Giselle Hoskins is making. Stated he wants to take her off Reclast and have her take Prolia. She was unsure of his reasons. He did say some studies show benefit to breast cancer survivors and may also be easier on kidneys.

## 2023-04-07 ENCOUNTER — HOSPITAL ENCOUNTER (OUTPATIENT)
Facility: HOSPITAL | Age: 78
End: 2023-04-07
Payer: MEDICARE

## 2023-04-07 DIAGNOSIS — M54.2 NECK PAIN: ICD-10-CM

## 2023-04-07 DIAGNOSIS — M54.81 CERVICO-OCCIPITAL NEURALGIA OF RIGHT SIDE: ICD-10-CM

## 2023-04-07 DIAGNOSIS — M47.22 CERVICAL RADICULOPATHY DUE TO DEGENERATIVE JOINT DISEASE OF SPINE: ICD-10-CM

## 2023-04-07 PROCEDURE — 72141 MRI NECK SPINE W/O DYE: CPT

## 2023-04-07 NOTE — TELEPHONE ENCOUNTER
Called and spoke with patient. Discussed change to Prolia from Reclast and answered all questions. Patient also reported significant increase in neck pain after receiving lymphedema therapy today. Unable to tolerate narcotic medications and currently on meloxicam.  Advised that she may hold meloxicam and begin ibuprofen 600-800 mg 3 times daily alternating with Tylenol to help with pain control. Also on gabapentin and advised that she may take 300 mg every morning and midday and 600 mg nightly to attempt better pain control. Scheduled for a cervical MRI tomorrow. Further recommendations after review MRI results.

## 2023-05-17 ENCOUNTER — TELEPHONE (OUTPATIENT)
Age: 78
End: 2023-05-17

## 2023-05-17 NOTE — TELEPHONE ENCOUNTER
Patient called in stating that she would like for dr Kinjal Montes De Oca to give her a call regarding a few appts she has had with other drs. She said it was to much to type in my chart, can you give her a call today or tomorrow.  Please Advise    Patient number [phone]

## 2023-05-19 NOTE — TELEPHONE ENCOUNTER
Called and spoke with patient last night. Discussed recent treatment by [facility] for her neck pain and neuralgia. On 4/25/2023, she received a cervical medial branch block to the C2/3 and C3/4 facet joints by Dr. Love Larson. She reports that she did not experience any benefit. On 5/17/2023, she had a follow-up visit with [facility] and reports that she is now scheduled for a C3/4 transforaminal epidural steroid injection by Dr. Love Larson on 6/9/2023. She states that there was also mention that some of her symptoms could be secondary to right-sided trigeminal neuralgia and patient reports that she has a follow-up appointment with Dr. Carolyn Perez next week. Discussed that trigeminal neuralgia pain usually involves the face along the distribution of the trigeminal nerve and not the posterior scalp and ear. However, Dr. Carolyn Perez will be able to clarify based upon her exam.    Reviewed notes from [facility] in [facility] Patient scheduled for a follow-up appointment with me on 6/28/2023.

## 2023-05-24 ENCOUNTER — TELEPHONE (OUTPATIENT)
Age: 78
End: 2023-05-24

## 2023-05-24 DIAGNOSIS — Z51.81 MEDICATION MONITORING ENCOUNTER: Primary | ICD-10-CM

## 2023-05-24 DIAGNOSIS — G47.00 INSOMNIA, UNSPECIFIED TYPE: ICD-10-CM

## 2023-05-24 RX ORDER — TEMAZEPAM 15 MG/1
15 CAPSULE ORAL NIGHTLY PRN
Qty: 30 CAPSULE | Refills: 0 | Status: SHIPPED | OUTPATIENT
Start: 2023-05-24 | End: 2023-06-23

## 2023-05-24 NOTE — TELEPHONE ENCOUNTER
Reviewed report generated by the [facility]. Does not demonstrate aberrancies or inconsistencies with regard to the prescribing of controlled medications to this patient by other providers. Last filled 1/14/2023 per . Last UDS not done. Will need to obtain at next visit on 6/28/2023. Order placed. Will need to sign an updated controlled substance agreement at next visit.

## 2023-05-24 NOTE — TELEPHONE ENCOUNTER
Please send refill to [facility]    temazepam (RESTORIL) 15 mg capsule 30 Capsule 0 1/13/2023     Sig - Route: Take 1 Capsule by mouth nightly as needed for Sleep.  Max Daily Amount: 15 mg. - Oral    Sent to pharmacy as: temazepam 15 mg capsule (RESTORIL)    E-Prescribing Status: Receipt confirmed by pharmacy (1/13/2023  1:22 PM EST)

## 2023-05-26 ENCOUNTER — HOSPITAL ENCOUNTER (OUTPATIENT)
Facility: HOSPITAL | Age: 78
End: 2023-05-26
Payer: MEDICARE

## 2023-05-26 DIAGNOSIS — C79.51 MALIGNANT NEOPLASM METASTATIC TO BONE (HCC): ICD-10-CM

## 2023-05-26 DIAGNOSIS — C50.512 MALIGNANT NEOPLASM OF LOWER-OUTER QUADRANT OF LEFT FEMALE BREAST, UNSPECIFIED ESTROGEN RECEPTOR STATUS (HCC): ICD-10-CM

## 2023-05-26 DIAGNOSIS — C78.00 MALIGNANT NEOPLASM METASTATIC TO LUNG, UNSPECIFIED LATERALITY (HCC): ICD-10-CM

## 2023-05-26 LAB — CREAT UR-MCNC: 0.8 MG/DL (ref 0.6–1.3)

## 2023-05-26 PROCEDURE — 82565 ASSAY OF CREATININE: CPT

## 2023-05-26 PROCEDURE — 71260 CT THORAX DX C+: CPT

## 2023-05-26 PROCEDURE — 6360000004 HC RX CONTRAST MEDICATION: Performed by: INTERNAL MEDICINE

## 2023-05-26 RX ADMIN — IOPAMIDOL 100 ML: 612 INJECTION, SOLUTION INTRAVENOUS at 13:52

## 2023-05-31 ENCOUNTER — TELEPHONE (OUTPATIENT)
Age: 78
End: 2023-05-31

## 2023-05-31 DIAGNOSIS — M47.22 OSTEOARTHRITIS OF SPINE WITH RADICULOPATHY, CERVICAL REGION: Primary | ICD-10-CM

## 2023-05-31 RX ORDER — GABAPENTIN 300 MG/1
CAPSULE ORAL
Qty: 360 CAPSULE | Refills: 0 | Status: SHIPPED | OUTPATIENT
Start: 2023-05-31 | End: 2023-08-29

## 2023-05-31 NOTE — TELEPHONE ENCOUNTER
gabapentin (NEURONTIN) 300 MG capsule     Patient states that she need a new prescription sent to the pharmacy being that her dosage was change from 3 to 4 a day she is all out.

## 2023-05-31 NOTE — TELEPHONE ENCOUNTER
Reviewed report generated by the [facility]. Does not demonstrate aberrancies or inconsistencies with regard to the prescribing of controlled medications to this patient by other providers. Last filled 3/12/2023 per  for #[id number]    Last UDS not done . Will repeat at next appointment on 6/28/2023. Order placed. Will need to sign an updated controlled substance agreement at next visit.

## 2023-06-01 ENCOUNTER — PATIENT MESSAGE (OUTPATIENT)
Age: 78
End: 2023-06-01

## 2023-06-01 DIAGNOSIS — C78.02 MALIGNANT NEOPLASM METASTATIC TO BOTH LUNGS (HCC): ICD-10-CM

## 2023-06-01 DIAGNOSIS — Z17.0 MALIGNANT NEOPLASM OF LEFT BREAST IN FEMALE, ESTROGEN RECEPTOR POSITIVE, UNSPECIFIED SITE OF BREAST (HCC): ICD-10-CM

## 2023-06-01 DIAGNOSIS — R93.89 ABNORMAL CT SCAN, CHEST: ICD-10-CM

## 2023-06-01 DIAGNOSIS — J84.10 PULMONARY FIBROSIS (HCC): ICD-10-CM

## 2023-06-01 DIAGNOSIS — C50.912 MALIGNANT NEOPLASM OF LEFT BREAST IN FEMALE, ESTROGEN RECEPTOR POSITIVE, UNSPECIFIED SITE OF BREAST (HCC): ICD-10-CM

## 2023-06-01 DIAGNOSIS — R06.09 DYSPNEA ON EXERTION: Primary | ICD-10-CM

## 2023-06-01 DIAGNOSIS — C78.01 MALIGNANT NEOPLASM METASTATIC TO BOTH LUNGS (HCC): ICD-10-CM

## 2023-06-03 NOTE — TELEPHONE ENCOUNTER
Called and spoke with patient. Discussed referral to Dr. Xin Sinclair by neurology given ongoing pain. However, patient is scheduled for an DANIEL injection with Dr. Dimitri Nickerson next week and is hopeful that this will be helpful. Planning to address other options with him in hopes of avoiding surgery. Discussed ongoing dyspnea on exertion and patient reports appears to be worsening. Reports that SpO2 at oncology at 91% after walking from parking lot. Reviewed chest CT scan (5/26/2023) performed for surveillance of breast cancer. Reported mild subpleural reticulations with a basilar gradient likely representing mild fibrosis. Discussed that this may be contributing to her short windedness and recommended evaluation by pulmonary. Patient agreeable and will place referral to Dr. Jessica Claros.

## 2023-06-28 ENCOUNTER — OFFICE VISIT (OUTPATIENT)
Age: 78
End: 2023-06-28
Payer: MEDICARE

## 2023-06-28 VITALS
SYSTOLIC BLOOD PRESSURE: 128 MMHG | BODY MASS INDEX: 28.35 KG/M2 | DIASTOLIC BLOOD PRESSURE: 79 MMHG | TEMPERATURE: 96.8 F | HEIGHT: 59 IN | WEIGHT: 140.6 LBS | OXYGEN SATURATION: 96 % | RESPIRATION RATE: 16 BRPM | HEART RATE: 70 BPM

## 2023-06-28 DIAGNOSIS — M54.81 OCCIPITAL NEURALGIA OF RIGHT SIDE: ICD-10-CM

## 2023-06-28 DIAGNOSIS — R93.89 ABNORMAL CT SCAN, CHEST: ICD-10-CM

## 2023-06-28 DIAGNOSIS — J84.10 PULMONARY FIBROSIS (HCC): ICD-10-CM

## 2023-06-28 DIAGNOSIS — C78.02 MALIGNANT NEOPLASM METASTATIC TO BOTH LUNGS (HCC): ICD-10-CM

## 2023-06-28 DIAGNOSIS — Z71.89 ACP (ADVANCE CARE PLANNING): ICD-10-CM

## 2023-06-28 DIAGNOSIS — R06.09 DYSPNEA ON EXERTION: ICD-10-CM

## 2023-06-28 DIAGNOSIS — I89.0 LYMPHEDEMA OF ARM: ICD-10-CM

## 2023-06-28 DIAGNOSIS — I35.1 NONRHEUMATIC AORTIC VALVE INSUFFICIENCY: ICD-10-CM

## 2023-06-28 DIAGNOSIS — E55.9 VITAMIN D DEFICIENCY, UNSPECIFIED: ICD-10-CM

## 2023-06-28 DIAGNOSIS — F95.2 TOURETTE SYNDROME: ICD-10-CM

## 2023-06-28 DIAGNOSIS — M48.02 SPINAL STENOSIS OF CERVICAL REGION: ICD-10-CM

## 2023-06-28 DIAGNOSIS — Z79.899 IMMUNOSUPPRESSED DUE TO CHEMOTHERAPY (HCC): ICD-10-CM

## 2023-06-28 DIAGNOSIS — Z17.0 MALIGNANT NEOPLASM OF LEFT BREAST IN FEMALE, ESTROGEN RECEPTOR POSITIVE, UNSPECIFIED SITE OF BREAST (HCC): ICD-10-CM

## 2023-06-28 DIAGNOSIS — D84.821 IMMUNOSUPPRESSED DUE TO CHEMOTHERAPY (HCC): ICD-10-CM

## 2023-06-28 DIAGNOSIS — E78.00 PURE HYPERCHOLESTEROLEMIA: ICD-10-CM

## 2023-06-28 DIAGNOSIS — C50.912 MALIGNANT NEOPLASM OF LEFT BREAST IN FEMALE, ESTROGEN RECEPTOR POSITIVE, UNSPECIFIED SITE OF BREAST (HCC): ICD-10-CM

## 2023-06-28 DIAGNOSIS — G47.00 INSOMNIA, UNSPECIFIED TYPE: ICD-10-CM

## 2023-06-28 DIAGNOSIS — T45.1X5A IMMUNOSUPPRESSED DUE TO CHEMOTHERAPY (HCC): ICD-10-CM

## 2023-06-28 DIAGNOSIS — C78.01 MALIGNANT NEOPLASM METASTATIC TO BOTH LUNGS (HCC): ICD-10-CM

## 2023-06-28 DIAGNOSIS — R53.82 CHRONIC FATIGUE: ICD-10-CM

## 2023-06-28 DIAGNOSIS — Z00.00 MEDICARE ANNUAL WELLNESS VISIT, SUBSEQUENT: ICD-10-CM

## 2023-06-28 DIAGNOSIS — I10 ESSENTIAL HYPERTENSION: Primary | ICD-10-CM

## 2023-06-28 PROCEDURE — 99211 OFF/OP EST MAY X REQ PHY/QHP: CPT | Performed by: INTERNAL MEDICINE

## 2023-06-28 RX ORDER — LOSARTAN POTASSIUM 50 MG/1
50 TABLET ORAL DAILY
Qty: 90 TABLET | Refills: 3 | Status: SHIPPED | OUTPATIENT
Start: 2023-06-28

## 2023-06-28 SDOH — ECONOMIC STABILITY: INCOME INSECURITY: HOW HARD IS IT FOR YOU TO PAY FOR THE VERY BASICS LIKE FOOD, HOUSING, MEDICAL CARE, AND HEATING?: NOT HARD AT ALL

## 2023-06-28 SDOH — ECONOMIC STABILITY: HOUSING INSECURITY
IN THE LAST 12 MONTHS, WAS THERE A TIME WHEN YOU DID NOT HAVE A STEADY PLACE TO SLEEP OR SLEPT IN A SHELTER (INCLUDING NOW)?: NO

## 2023-06-28 SDOH — ECONOMIC STABILITY: FOOD INSECURITY: WITHIN THE PAST 12 MONTHS, YOU WORRIED THAT YOUR FOOD WOULD RUN OUT BEFORE YOU GOT MONEY TO BUY MORE.: NEVER TRUE

## 2023-06-28 SDOH — ECONOMIC STABILITY: FOOD INSECURITY: WITHIN THE PAST 12 MONTHS, THE FOOD YOU BOUGHT JUST DIDN'T LAST AND YOU DIDN'T HAVE MONEY TO GET MORE.: NEVER TRUE

## 2023-06-28 ASSESSMENT — LIFESTYLE VARIABLES
HAS A RELATIVE, FRIEND, DOCTOR, OR ANOTHER HEALTH PROFESSIONAL EXPRESSED CONCERN ABOUT YOUR DRINKING OR SUGGESTED YOU CUT DOWN: 0
HAVE YOU OR SOMEONE ELSE BEEN INJURED AS A RESULT OF YOUR DRINKING: 0
HOW OFTEN DURING THE LAST YEAR HAVE YOU FOUND THAT YOU WERE NOT ABLE TO STOP DRINKING ONCE YOU HAD STARTED: 0
HOW MANY STANDARD DRINKS CONTAINING ALCOHOL DO YOU HAVE ON A TYPICAL DAY: 1 OR 2
HOW OFTEN DURING THE LAST YEAR HAVE YOU HAD A FEELING OF GUILT OR REMORSE AFTER DRINKING: 0
HOW OFTEN DURING THE LAST YEAR HAVE YOU BEEN UNABLE TO REMEMBER WHAT HAPPENED THE NIGHT BEFORE BECAUSE YOU HAD BEEN DRINKING: 0
HOW OFTEN DURING THE LAST YEAR HAVE YOU FAILED TO DO WHAT WAS NORMALLY EXPECTED FROM YOU BECAUSE OF DRINKING: 0
HOW OFTEN DO YOU HAVE A DRINK CONTAINING ALCOHOL: 4 OR MORE TIMES A WEEK
HOW OFTEN DURING THE LAST YEAR HAVE YOU NEEDED AN ALCOHOLIC DRINK FIRST THING IN THE MORNING TO GET YOURSELF GOING AFTER A NIGHT OF HEAVY DRINKING: 0

## 2023-06-28 ASSESSMENT — PATIENT HEALTH QUESTIONNAIRE - PHQ9
2. FEELING DOWN, DEPRESSED OR HOPELESS: 0
SUM OF ALL RESPONSES TO PHQ QUESTIONS 1-9: 0
SUM OF ALL RESPONSES TO PHQ9 QUESTIONS 1 & 2: 0
1. LITTLE INTEREST OR PLEASURE IN DOING THINGS: 0
SUM OF ALL RESPONSES TO PHQ QUESTIONS 1-9: 0

## 2023-07-02 PROBLEM — J84.10 PULMONARY FIBROSIS (HCC): Status: ACTIVE | Noted: 2023-07-02

## 2023-07-02 PROBLEM — M75.111 NONTRAUMATIC INCOMPLETE TEAR OF RIGHT ROTATOR CUFF: Status: RESOLVED | Noted: 2018-11-10 | Resolved: 2023-07-02

## 2023-07-02 PROBLEM — R06.09 DYSPNEA ON EXERTION: Status: ACTIVE | Noted: 2022-03-04

## 2023-07-05 ENCOUNTER — PATIENT MESSAGE (OUTPATIENT)
Age: 78
End: 2023-07-05

## 2023-07-10 ENCOUNTER — PROCEDURE VISIT (OUTPATIENT)
Age: 78
End: 2023-07-10
Payer: MEDICARE

## 2023-07-10 VITALS — BODY MASS INDEX: 28.43 KG/M2 | WEIGHT: 141 LBS | HEIGHT: 59 IN

## 2023-07-10 DIAGNOSIS — D86.9 SARCOIDOSIS: Primary | ICD-10-CM

## 2023-07-10 LAB
DLCO %PRED: NORMAL
DLCO PRED: NORMAL
DLCO/VA %PRED: NORMAL
DLCO/VA PRED: NORMAL
DLCO/VA: NORMAL
DLCO: NORMAL

## 2023-07-11 PROCEDURE — 94010 BREATHING CAPACITY TEST: CPT | Performed by: INTERNAL MEDICINE

## 2023-07-11 PROCEDURE — 94729 DIFFUSING CAPACITY: CPT | Performed by: INTERNAL MEDICINE

## 2023-07-11 PROCEDURE — 94727 GAS DIL/WSHOT DETER LNG VOL: CPT | Performed by: INTERNAL MEDICINE

## 2023-07-17 ENCOUNTER — OFFICE VISIT (OUTPATIENT)
Age: 78
End: 2023-07-17
Payer: MEDICARE

## 2023-07-17 ENCOUNTER — PATIENT MESSAGE (OUTPATIENT)
Age: 78
End: 2023-07-17

## 2023-07-17 VITALS
OXYGEN SATURATION: 96 % | WEIGHT: 141.6 LBS | RESPIRATION RATE: 16 BRPM | SYSTOLIC BLOOD PRESSURE: 140 MMHG | DIASTOLIC BLOOD PRESSURE: 86 MMHG | HEIGHT: 59 IN | TEMPERATURE: 97.5 F | BODY MASS INDEX: 28.55 KG/M2 | HEART RATE: 86 BPM

## 2023-07-17 DIAGNOSIS — J84.10 PULMONARY FIBROSIS (HCC): Primary | ICD-10-CM

## 2023-07-17 DIAGNOSIS — R06.09 DYSPNEA ON EXERTION: ICD-10-CM

## 2023-07-17 DIAGNOSIS — K21.9 GASTROESOPHAGEAL REFLUX DISEASE WITHOUT ESOPHAGITIS: ICD-10-CM

## 2023-07-17 DIAGNOSIS — I35.1 NONRHEUMATIC AORTIC VALVE INSUFFICIENCY: Primary | ICD-10-CM

## 2023-07-17 PROCEDURE — 3079F DIAST BP 80-89 MM HG: CPT | Performed by: INTERNAL MEDICINE

## 2023-07-17 PROCEDURE — G8417 CALC BMI ABV UP PARAM F/U: HCPCS | Performed by: INTERNAL MEDICINE

## 2023-07-17 PROCEDURE — 3017F COLORECTAL CA SCREEN DOC REV: CPT | Performed by: INTERNAL MEDICINE

## 2023-07-17 PROCEDURE — G8399 PT W/DXA RESULTS DOCUMENT: HCPCS | Performed by: INTERNAL MEDICINE

## 2023-07-17 PROCEDURE — 1090F PRES/ABSN URINE INCON ASSESS: CPT | Performed by: INTERNAL MEDICINE

## 2023-07-17 PROCEDURE — G8427 DOCREV CUR MEDS BY ELIG CLIN: HCPCS | Performed by: INTERNAL MEDICINE

## 2023-07-17 PROCEDURE — 99204 OFFICE O/P NEW MOD 45 MIN: CPT | Performed by: INTERNAL MEDICINE

## 2023-07-17 PROCEDURE — 3077F SYST BP >= 140 MM HG: CPT | Performed by: INTERNAL MEDICINE

## 2023-07-17 PROCEDURE — 1123F ACP DISCUSS/DSCN MKR DOCD: CPT | Performed by: INTERNAL MEDICINE

## 2023-07-17 PROCEDURE — 1036F TOBACCO NON-USER: CPT | Performed by: INTERNAL MEDICINE

## 2023-07-18 ENCOUNTER — TELEPHONE (OUTPATIENT)
Age: 78
End: 2023-07-18

## 2023-07-18 DIAGNOSIS — F41.9 ANXIETY: Primary | ICD-10-CM

## 2023-07-18 RX ORDER — DIAZEPAM 5 MG/1
5 TABLET ORAL EVERY 12 HOURS PRN
Qty: 30 TABLET | Refills: 0 | Status: SHIPPED | OUTPATIENT
Start: 2023-07-18 | End: 2023-08-17

## 2023-07-18 NOTE — TELEPHONE ENCOUNTER
Reviewed report generated by the [facility]. Does not demonstrate aberrancies or inconsistencies with regard to the prescribing of controlled medications to this patient by other providers. Last filled 9/10/2022 per . Last UDS not performed . Will repeat at next lab appointment. Order already placed. Will need to sign an updated controlled substance agreement at next visit.

## 2023-07-18 NOTE — TELEPHONE ENCOUNTER
Please send refill to [facility]. diazePAM (Valium) 5 mg tablet 30 Tablet 0 3/15/2022     Sig - Route: Take 1 Tablet by mouth every six (6) hours as needed for Anxiety.  Max Daily Amount: 20 mg. - Oral

## 2023-07-19 NOTE — TELEPHONE ENCOUNTER
Called and spoke with patient. Reviewed recommendations of Dr. Abhishek Muñoz and decision made to proceed with repeat echocardiogram to reevaluate her aortic regurgitation. Will consider VQ scan if echocardiogram is unchanged. Order placed.

## 2023-07-28 ENCOUNTER — PATIENT MESSAGE (OUTPATIENT)
Age: 78
End: 2023-07-28

## 2023-07-28 ENCOUNTER — TELEPHONE (OUTPATIENT)
Age: 78
End: 2023-07-28

## 2023-07-28 NOTE — TELEPHONE ENCOUNTER
Called and spoke with patient. Reports that awoke this morning feeling poorly with nausea and has had 13 episodes of severe diarrhea. Reports took two Imodium and diarrhea appears to have subsided. Had two episodes of vomiting, headache, fatigue, and myalgias. T 99.1 F. No nasal congestion, postnasal drainage, cough or dyspnea. Immunosuppressed and just completed course of Ibrance on Wednesday. Completed vaccinated including the updated bivalent booster dose. Advised treatment with Paxlovid. Normal renal function so will receive full dose. Made aware of possible side effects. Discussed monitoring blood pressure and decreasing dose or holding dose of losartan if low. No other medication interactions. Discussed isolation precautions and recommendations. Urged good fluid and po intake. Answered all questions. [id number]

## 2023-07-28 NOTE — TELEPHONE ENCOUNTER
Patient called and states that she has been experiencing nausea and severe diarrhea this morning. \  She states she has thrown up once and has been 2 the bathroom about 13 times. She has no fever but a little bit of a headache and just feels unwell. Patient didn't know if she should take a covid test and states she has only been out of the house once on wed. In the last 6 days. Please Advise.

## 2023-07-28 NOTE — TELEPHONE ENCOUNTER
Patient tested with an  COVID test and it was positive.     She uses [facility] [address].

## 2023-07-29 NOTE — TELEPHONE ENCOUNTER
Reviewed patient messages. Reports that she performed 2 repeat COVID-19 rapid antigen test and both were negative. The initial positive result was using an  test kit. Patient reports that she decided not to proceed with treatment with Paxlovid given negative test results. Called and spoke with patient. Reports that she is still feeling poorly with low-grade fevers responsive to Tylenol. No further diarrhea or vomiting, and able to tolerate p.o. fluids and eggs for breakfast this morning. No abdominal pain, dysuria, cough, nasal congestion, sore throat, or other symptoms suggestive of infection. Advised that would not begin treatment with Paxlovid given two negative test results. Advised to continue to monitor and maintain good fluid intake. If fever persists or symptoms worsen, recommended evaluation in urgent care or ED.   Answered all questions

## 2023-08-04 NOTE — PROGRESS NOTES
1. Have you been to the ER, urgent care clinic or hospitalized since your last visit? NO.     2. Have you seen or consulted any other health care providers outside of the [address] [place] [address] since your last visit (Include any pap smears or colon screening)?  NO testing    -safe sexual practices    -FU w/ PCP for all non-GYN medical issues and regular screening     -annual Flu vaccine recommended    -healthy eating, exercise     2) Dysmenorrhea:   - Normal US with prior OB  - Not a candidate for estrogen   - Will try POPs for now.  LNG-IUD also discussed    Orders Placed This Encounter   Medications    norethindrone (ORTHO MICRONOR) 0.35 MG tablet     Sig: Take 1 tablet by mouth daily     Dispense:  28 tablet     Refill:  12         Eduarda Chanel DO

## 2023-08-09 ENCOUNTER — TELEPHONE (OUTPATIENT)
Age: 78
End: 2023-08-09

## 2023-08-09 NOTE — TELEPHONE ENCOUNTER
Patient called and states she has been experiencing loose stools yesterday and today. Patient states she had some intestinal infection recently and doesn't know if it is coming back. Patient states she took Hyoscyamine last night but states he did not help. Please Advise.

## 2023-08-10 ENCOUNTER — PATIENT MESSAGE (OUTPATIENT)
Age: 78
End: 2023-08-10

## 2023-08-10 NOTE — TELEPHONE ENCOUNTER
Called and spoke with patient earlier this evening. Reports having multiple episodes of soft stools over the last 2 days, and reports particularly occurring after eating. Denies any fever, chills, abdominal pain, hematochezia, or melena. Admits to fatigue. Discussed beginning probiotic and increasing fiber in diet. Also advised that may take one Imodium as needed to control. Advised to call back if no improvement. Answered all questions.

## 2023-08-11 ENCOUNTER — TELEPHONE (OUTPATIENT)
Age: 78
End: 2023-08-11

## 2023-08-11 NOTE — TELEPHONE ENCOUNTER
Called and spoke with patient. Reports having left lower quadrant abdominal discomfort and bloating but no further diarrhea. Reported a formed stool this morning. Was seen at the oncology office today and blood work was stable with normal WBC count. Denies any fever or chills. Advised that would recommend continuing on probiotic and using hyoscyamine as prescribed by Dr. Amalia Deal as may represent a flare of her irritable bowel syndrome. If no improvement or develops worsening symptoms, advised to reach out over the weekend and will consider prescribing antibiotics for possible diverticulitis flare. Answered all questions.

## 2023-08-11 NOTE — TELEPHONE ENCOUNTER
----- Message from Davian Will sent at 8/10/2023  7:25 PM EDT -----  Regarding: update  Contact: [phone]  Dr. Nirali Chahal,  I apologize for bothering you again, but wondering if it would be wise, given tomorrow is Friday, for you to call in an antibiotic for me in case I get in serious trouble as often happens with diverticulitis. The loose stools have ceased with an Imodium last night, but there is discomfort as of this afternoon and evening on my lower left abdomen. My whole stomach feels bloated. I truly do not want to get an ER scan over the weekend since I am due for my 6 month oncology scan the end of Nov.  Also, I am hoping Dr. Carey Montes will delay the start of another cycle of Ibrance tomorrow so my stomach can recoup. . My stamina is way down as well. I feel I have so many health concerns that I continue to address with you and Manuel jackson. Thank you for the continuing support.   Shay Seen

## 2023-08-29 RX ORDER — MELOXICAM 15 MG/1
TABLET ORAL
Qty: 90 TABLET | Refills: 3 | Status: SHIPPED | OUTPATIENT
Start: 2023-08-29

## 2023-08-29 NOTE — TELEPHONE ENCOUNTER
PCP: Cordelia Vidales MD    Last refill per chart:  meloxicam (MOBIC) 15 MG tablet 15 mg, Oral, DAILY Edit       Summary: Take 1 tablet by mouth daily   Dose, Frequency: 15 mg, DAILY  Start: 11/22/2022  Ord/Sold: 1/10/2023 (O)  Report  Taking:   Long-term:   Med Dose History       Patient Sig: Take 1 tablet by mouth daily       Ordered on: 1/10/2023          Future Appointments   Date Time Provider [address] [place] [address]   12/20/2023  9:15 AM IO LAB VISIT [facility] BS AMB   12/28/2023 12:30 PM Cordelia Vidales MD IO BS AMB

## 2023-08-30 ENCOUNTER — TELEPHONE (OUTPATIENT)
Age: 78
End: 2023-08-30

## 2023-08-30 NOTE — TELEPHONE ENCOUNTER
Please send refill to [facility]    hyoscyamine sulfate (CYSTOSPAZ) 0.125 mg rapid dissolve tablet 30 Tablet 1 8/22/2022     Sig - Route: Take 1 Tablet by mouth every four (4) hours as needed for Diarrhea.  - Oral

## 2023-09-06 ENCOUNTER — TELEPHONE (OUTPATIENT)
Age: 78
End: 2023-09-06

## 2023-09-06 DIAGNOSIS — M47.22 OSTEOARTHRITIS OF SPINE WITH RADICULOPATHY, CERVICAL REGION: ICD-10-CM

## 2023-09-06 RX ORDER — DICYCLOMINE HCL 20 MG
20 TABLET ORAL EVERY 6 HOURS PRN
Qty: 60 TABLET | Refills: 1 | Status: SHIPPED | OUTPATIENT
Start: 2023-09-06

## 2023-09-06 RX ORDER — GABAPENTIN 300 MG/1
CAPSULE ORAL
Qty: 360 CAPSULE | Refills: 1 | Status: SHIPPED | OUTPATIENT
Start: 2023-09-06 | End: 2023-12-05

## 2023-09-06 NOTE — TELEPHONE ENCOUNTER
Pt calling re:    hyoscyamine (LEVSIN/SL) 125 MCG sublingual tablet    Stated per [place] it is non formulary and the following ph and fax numbers are below to try and expedite the case. Ph [phone]  fax [phone]    Stated she was also advised the alternative Bentyl is also not covered.

## 2023-09-06 NOTE — TELEPHONE ENCOUNTER
Reviewed report generated by the [facility]. Does not demonstrate aberrancies or inconsistencies with regard to the prescribing of controlled medications to this patient by other providers. Last filled 5/31/2023 per . Last UDS not on file. Will repeat at next lab appointment. Order ready placed. Will need to sign an updated controlled substance agreement at next visit.

## 2023-09-06 NOTE — TELEPHONE ENCOUNTER
Please let the patient know that dicyclomine (Bentyl) has been sent to [id number] [place] [address] and is reportedly covered by her insurance.

## 2023-09-08 ENCOUNTER — HOSPITAL ENCOUNTER (OUTPATIENT)
Facility: HOSPITAL | Age: 78
End: 2023-09-08
Payer: MEDICARE

## 2023-09-08 DIAGNOSIS — M54.16 LUMBAR RADICULOPATHY: ICD-10-CM

## 2023-09-08 DIAGNOSIS — M54.9 MID BACK PAIN ON LEFT SIDE: ICD-10-CM

## 2023-09-08 PROCEDURE — 72110 X-RAY EXAM L-2 SPINE 4/>VWS: CPT

## 2023-09-08 PROCEDURE — 72070 X-RAY EXAM THORAC SPINE 2VWS: CPT

## 2023-09-11 ENCOUNTER — PATIENT MESSAGE (OUTPATIENT)
Age: 78
End: 2023-09-11

## 2023-09-15 ENCOUNTER — PATIENT MESSAGE (OUTPATIENT)
Age: 78
End: 2023-09-15

## 2023-09-15 ENCOUNTER — TELEPHONE (OUTPATIENT)
Age: 78
End: 2023-09-15

## 2023-09-15 NOTE — TELEPHONE ENCOUNTER
Called and spoke with patient. Advised to perform home COVID test now and to let us know regarding results. Discussed that unable to send in Paxlovid unless test is positive. Answered all questions.

## 2023-09-16 NOTE — TELEPHONE ENCOUNTER
Called and spoke with patient. Sent in [facility] to [place]. Normal renal function so full dose. Discussed possible medication interactions with Haldol and advised to monitor blood pressure. Answered all questions.

## 2023-09-21 ENCOUNTER — TELEPHONE (OUTPATIENT)
Age: 78
End: 2023-09-21

## 2023-09-21 NOTE — TELEPHONE ENCOUNTER
Patient called requesting to speak with a nurse regarding covid, states she is recovering from having it recently and would like to speak with someone regarding what her next steps are. She is requesting a return call and can be reached at [phone]. Thank you.

## 2023-09-26 NOTE — TELEPHONE ENCOUNTER
Called patient, she states isolating for 6 days and testing negative, told her that was appropriate, no other questions.

## 2023-10-06 ENCOUNTER — TELEPHONE (OUTPATIENT)
Age: 78
End: 2023-10-06

## 2023-10-06 DIAGNOSIS — R93.89 ABNORMAL CT SCAN, CHEST: Primary | ICD-10-CM

## 2023-10-06 DIAGNOSIS — G47.00 INSOMNIA, UNSPECIFIED TYPE: ICD-10-CM

## 2023-10-06 RX ORDER — TEMAZEPAM 15 MG/1
15 CAPSULE ORAL NIGHTLY PRN
Qty: 30 CAPSULE | Refills: 0 | Status: SHIPPED | OUTPATIENT
Start: 2023-10-06 | End: 2023-11-05

## 2023-10-06 NOTE — TELEPHONE ENCOUNTER
Please refill and send to [address] [place] [address] -  [phone]        temazepam (RESTORIL) 15 MG capsule

## 2023-10-06 NOTE — TELEPHONE ENCOUNTER
Pt states all the symptoms she has been having are getting worse and says she is struggling. She needs to speak with you concerning this matter. Pt states she cant wait until her December appt. She can vv or in office. Please advise with avail date.      Alejandro [phone]

## 2023-10-06 NOTE — TELEPHONE ENCOUNTER
May add a virtual visit at 1:00 PM on 10/12/2023. Reviewed report generated by the [facility]. Does not demonstrate aberrancies or inconsistencies with regard to the prescribing of controlled medications to this patient by other providers. Last filled temazepam 5/25/2023 per . Last UDS not performed. Will repeat at next lab appointment. Order already placed. Will need to sign an updated controlled substance agreement at next visit.

## 2023-10-12 ENCOUNTER — TELEPHONE (OUTPATIENT)
Age: 78
End: 2023-10-12

## 2023-10-12 ENCOUNTER — TELEMEDICINE (OUTPATIENT)
Age: 78
End: 2023-10-12

## 2023-10-12 DIAGNOSIS — R06.09 DYSPNEA ON EXERTION: ICD-10-CM

## 2023-10-12 DIAGNOSIS — R53.82 CHRONIC FATIGUE: Primary | ICD-10-CM

## 2023-10-12 DIAGNOSIS — I10 ESSENTIAL HYPERTENSION: ICD-10-CM

## 2023-10-12 DIAGNOSIS — D84.821 IMMUNOSUPPRESSED DUE TO CHEMOTHERAPY (HCC): ICD-10-CM

## 2023-10-12 DIAGNOSIS — C50.912 MALIGNANT NEOPLASM OF LEFT BREAST IN FEMALE, ESTROGEN RECEPTOR POSITIVE, UNSPECIFIED SITE OF BREAST (HCC): ICD-10-CM

## 2023-10-12 DIAGNOSIS — M48.061 SPINAL STENOSIS OF LUMBAR REGION WITHOUT NEUROGENIC CLAUDICATION: ICD-10-CM

## 2023-10-12 DIAGNOSIS — M47.22 OSTEOARTHRITIS OF SPINE WITH RADICULOPATHY, CERVICAL REGION: ICD-10-CM

## 2023-10-12 DIAGNOSIS — C78.02 MALIGNANT NEOPLASM METASTATIC TO BOTH LUNGS (HCC): ICD-10-CM

## 2023-10-12 DIAGNOSIS — J84.10 PULMONARY FIBROSIS (HCC): ICD-10-CM

## 2023-10-12 DIAGNOSIS — T45.1X5A IMMUNOSUPPRESSED DUE TO CHEMOTHERAPY (HCC): ICD-10-CM

## 2023-10-12 DIAGNOSIS — Z79.899 IMMUNOSUPPRESSED DUE TO CHEMOTHERAPY (HCC): ICD-10-CM

## 2023-10-12 DIAGNOSIS — Z17.0 MALIGNANT NEOPLASM OF LEFT BREAST IN FEMALE, ESTROGEN RECEPTOR POSITIVE, UNSPECIFIED SITE OF BREAST (HCC): ICD-10-CM

## 2023-10-12 DIAGNOSIS — G47.00 INSOMNIA, UNSPECIFIED TYPE: ICD-10-CM

## 2023-10-12 DIAGNOSIS — M85.89 OSTEOPENIA OF MULTIPLE SITES: ICD-10-CM

## 2023-10-12 DIAGNOSIS — C78.01 MALIGNANT NEOPLASM METASTATIC TO BOTH LUNGS (HCC): ICD-10-CM

## 2023-10-12 DIAGNOSIS — M47.816 LUMBAR FACET ARTHROPATHY: ICD-10-CM

## 2023-10-12 RX ORDER — ZOLPIDEM TARTRATE 5 MG/1
5 TABLET ORAL NIGHTLY PRN
Qty: 30 TABLET | Refills: 0 | Status: SHIPPED | OUTPATIENT
Start: 2023-10-12 | End: 2023-11-11

## 2023-10-12 RX ORDER — DULOXETIN HYDROCHLORIDE 20 MG/1
20 CAPSULE, DELAYED RELEASE ORAL DAILY
Qty: 30 CAPSULE | Refills: 3 | Status: SHIPPED | OUTPATIENT
Start: 2023-10-12

## 2023-10-12 NOTE — PATIENT INSTRUCTIONS
Care instructions adapted under license by [employer] (which disclaims liability or warranty for this information). If you have questions about a medical condition or this instruction, always ask your healthcare professional. [address] any warranty or liability for your use of this information. Low Sodium Diet (2,000 Milligram): Care Instructions  Your Care Instructions     Too much sodium causes your body to hold on to extra water. This can raise your blood pressure and force your heart and kidneys to work harder. In very serious cases, this could cause you to be put in the hospital. It might even be life-threatening. By limiting sodium, you will feel better and lower your risk of serious problems. The most common source of sodium is salt. People get most of the salt in their diet from canned, prepared, and packaged foods. Fast food and restaurant meals also are very high in sodium. Your doctor will probably limit your sodium to less than 2,000 milligrams (mg) a day. This limit counts all the sodium in prepared and packaged foods and any salt you add to your food. Follow-up care is a key part of your treatment and safety. Be sure to make and go to all appointments, and call your doctor if you are having problems. It's also a good idea to know your test results and keep a list of the medicines you take. How can you care for yourself at home? Read food labels  Read labels on cans and food packages. The labels tell you how much sodium is in each serving. Make sure that you look at the serving size. If you eat more than the serving size, you have eaten more sodium. Food labels also tell you the Percent Daily Value for sodium. Choose products with low Percent Daily Values for sodium. Be aware that sodium can come in forms other than salt, including monosodium glutamate (MSG), sodium citrate, and sodium bicarbonate (baking soda). MSG is often added to Asian food.  When you

## 2023-10-12 NOTE — PROGRESS NOTES
Blaire Pool presents today for   Chief Complaint   Patient presents with    Follow-up       1. \"Have you been to the ER, urgent care clinic since your last visit? Hospitalized since your last visit? \" no    2. \"Have you seen or consulted any other health care providers outside of the [address] [place] [address] since your last visit? \" no     3. For patients aged 43-73: Has the patient had a colonoscopy / FIT/ Cologuard? NA - based on age      If the patient is female:    4. For patients aged 43-66: Has the patient had a mammogram within the past 2 years? NA - based on age or sex      11. For patients aged 21-65: Has the patient had a pap smear?  NA - based on age or sex
reasonable control. Reports may increase to 40 mg occasionally for increased symptoms. Underwent upper endoscopy (8/11/2021) by Dr. Norma Mosher to evaluate iron deficiency and noted to have normal stomach and duodenum, abnormal motility of the esophagus and a small hiatal hernia. Will continue to monitor. 13. Chronic insomnia. Difficulty with sleep onset insomnia as discussed but no difficulty with sleep maintenance. Using melatonin 5 mg nightly and required to use temazepam 3-4 times per week. Did not note any change in sleep onset insomnia with increase in dose of gabapentin to 600 mg nightly. Unable to treat with amitriptyline or trazodone given QTc prolongation and interaction with Haldol. Prescribed Ambien 5 mg nightly and noting effective although reporting some residual fatigue the following day. Not taking any medication every night since also may fall asleep on her own. Will continue to monitor. 14. Anxiety. Patient described symptoms of anxiety and possible mild depression felt to likely be reactive to current health issues. Prescribed sertraline 25 mg daily but did not initiate and not wishing to proceed with treatment. She does have continued difficulty with chronic insomnia which appears related to anxiety as discussed. May find some benefit with initiation of Cymbalta 20 mg daily. Will continue to monitor. 15.  History of COVID-19 infection. Tested positive for COVID-19 on 9/16/2023 with symptoms of fever, headache, nasal congestion, postnasal drainage, and cough. She was prescribed Paxlovid and reports gradual improvement today without sequela. 16. Overweight. Weight remaining overall stable today with BMI 28.4. Emphasized continued lifestyle modifications including attempts at regular exercise and heart healthy diet. Will continue to monitor. 17. Health maintenance.  Completed four dose series of the [address] Summers 19 vaccine given immunosuppressed status and has received the updated

## 2023-10-12 NOTE — TELEPHONE ENCOUNTER
Please request copy of recent labs performed by Dr. Delmar Ruelas at [facility].

## 2023-10-30 NOTE — TELEPHONE ENCOUNTER
Called and spoke with patient. Discussed neck pain and reports having an occipital neuralgia. Reports pain significant and having difficulty with normal activities due to discomfort. Will treat with a Medrol Dosepak to see if may help alleviate acute flare. Discussed elevated blood pressure readings. Patient currently taking losartan 25 mg daily and advised that would recommend increasing to 50 mg daily. She will take 2 tablets of the 25 mg dose and continue monitoring her blood pressure twice daily. Will follow-up in 1 week as her  has an appointment.
Patient called and states she has had neck pain on her right side for a long time (she thinks Dr Kavin Zuniga is aware) and it become worse and very painful recently. She is calling to see if Dr Kavin Zuniga wants her to take an extra gabapentin at night or if there is something else Dr Kavin Zuniga suggests she do. She can be reached at [phone].   Please advise, thank you
Pt called again. She forgot she also wanted to give Dr Yudelka Clayton the following information and see what she advises. Stated re: blood pressure issues for the last 3 to 4 weeks.      Stated went to [facility] about 3 weeks ago and BP was 160's/100's    At her oncology appt last week it was 160/90's    The last 4 days it has ranged from upper 140's or 150's Felipe Acosta upper 80's      Pharmacy is [facility], [address] [address] [address]
Stable

## 2023-11-14 ENCOUNTER — PATIENT MESSAGE (OUTPATIENT)
Age: 78
End: 2023-11-14

## 2023-11-16 NOTE — TELEPHONE ENCOUNTER
Called and spoke with patient earlier this evening. Discussed issues with ongoing fatigue and likely multifactorial with contribution from multiple medications, difficulty with insomnia, and recent COVID infection. Discussed that symptoms not likely cardiac in etiology given generalized fatigue without relation to exertion. Urged to begin Cymbalta as may help with pain control given chronic back and neck issues. Answered all questions.

## 2023-11-27 ENCOUNTER — TELEPHONE (OUTPATIENT)
Age: 78
End: 2023-11-27

## 2023-11-27 ENCOUNTER — HOSPITAL ENCOUNTER (OUTPATIENT)
Facility: HOSPITAL | Age: 78
Discharge: HOME | End: 2023-11-30
Payer: MEDICARE

## 2023-11-27 DIAGNOSIS — C79.51 SECONDARY MALIGNANT NEOPLASM OF BONE (HCC): ICD-10-CM

## 2023-11-27 DIAGNOSIS — G47.00 INSOMNIA, UNSPECIFIED TYPE: Primary | ICD-10-CM

## 2023-11-27 DIAGNOSIS — C50.919 PRIMARY MALIGNANT NEOPLASM OF FEMALE BREAST (HCC): ICD-10-CM

## 2023-11-27 DIAGNOSIS — C78.00 MALIGNANT NEOPLASM METASTATIC TO LUNG, UNSPECIFIED LATERALITY (HCC): ICD-10-CM

## 2023-11-27 LAB — CREAT UR-MCNC: 0.8 MG/DL (ref 0.6–1.3)

## 2023-11-27 PROCEDURE — 82565 ASSAY OF CREATININE: CPT

## 2023-11-27 PROCEDURE — 74177 CT ABD & PELVIS W/CONTRAST: CPT

## 2023-11-27 PROCEDURE — 6360000004 HC RX CONTRAST MEDICATION: Performed by: NURSE PRACTITIONER

## 2023-11-27 RX ORDER — DIAZEPAM 5 MG/1
5 TABLET ORAL NIGHTLY PRN
Qty: 30 TABLET | Refills: 0 | Status: SHIPPED | OUTPATIENT
Start: 2023-11-27 | End: 2023-12-27

## 2023-11-27 RX ADMIN — IOPAMIDOL 100 ML: 612 INJECTION, SOLUTION INTRAVENOUS at 15:09

## 2023-11-27 NOTE — TELEPHONE ENCOUNTER
Reviewed report generated by the [facility]. Does not demonstrate aberrancies or inconsistencies with regard to the prescribing of controlled medications to this patient by other providers. Last filled diazepam 7/18/2022 per . Last UDS never done. Will obtain at next lab appointment. Order placed. Will need to sign an updated controlled substance agreement at next visit. Please advise patient that Dr. Bronwyn Bello has been prescribing her Haldol and last prescription filled on 8/1/2023 had 1 refill on it. She should call [facility] to fill.

## 2023-11-30 ENCOUNTER — TELEPHONE (OUTPATIENT)
Age: 78
End: 2023-11-30

## 2023-11-30 DIAGNOSIS — C78.02 MALIGNANT NEOPLASM METASTATIC TO BOTH LUNGS (HCC): ICD-10-CM

## 2023-11-30 DIAGNOSIS — R06.09 DYSPNEA ON EXERTION: ICD-10-CM

## 2023-11-30 DIAGNOSIS — Z17.0 MALIGNANT NEOPLASM OF LEFT BREAST IN FEMALE, ESTROGEN RECEPTOR POSITIVE, UNSPECIFIED SITE OF BREAST (HCC): ICD-10-CM

## 2023-11-30 DIAGNOSIS — R06.02 SHORTNESS OF BREATH: Primary | ICD-10-CM

## 2023-11-30 DIAGNOSIS — C78.01 MALIGNANT NEOPLASM METASTATIC TO BOTH LUNGS (HCC): ICD-10-CM

## 2023-11-30 DIAGNOSIS — C50.912 MALIGNANT NEOPLASM OF LEFT BREAST IN FEMALE, ESTROGEN RECEPTOR POSITIVE, UNSPECIFIED SITE OF BREAST (HCC): ICD-10-CM

## 2023-11-30 NOTE — TELEPHONE ENCOUNTER
----- Message from Heather Rod sent at 11/30/2023 10:00 AM EST -----  Subject: Results Request    QUESTIONS  Results: CT of abdomen, chest and pelvis ;  Ordered by: [place] [id number] [facility] Provider -   Federica Hewitt   Date Performed: 2023-11-27  ---------------------------------------------------------------------------  --------------  Juju SMITH    [phone]; OK to leave message on voicemail,OK to respond with electronic   message via [employer] portal (only for patients who have registered [employer]   account)  ---------------------------------------------------------------------------  --------------

## 2023-11-30 NOTE — TELEPHONE ENCOUNTER
Pt called and stated that her scan showed in MyChart and she is concerned about what she saw. Patient stated she is \"going downhill quickly\" and would like PCP to call at earliest convenience.    Can be reached at [phone]

## 2023-12-01 NOTE — TELEPHONE ENCOUNTER
Called and spoke with patient. Reports significant difficulty with worsening fatigue and breathlessness with activities. Underwent staging CT chest abdomen and pelvis for Dr. Fannie Jalloh and noted possible enlargement of pulmonary artery trunk at 3.1 cm caliber which may be suggestive of mild pulmonary hypertension. However, echocardiogram (8/16/2023) showed insignificant tricuspid regurgitation so unable to assess RVSP but did not appear elevated. Aortic regurgitation was moderate and unchanged from prior study in 4/2022. Patient had undergone evaluation by Dr. Maikel Adan and recommendation was to proceed with VQ scan if echocardiogram showed unchanged aortic regurgitation. Patient wishes to proceed and order placed. She has follow-up scheduled with Dr. Maikel Aadn on 2/6/2024.

## 2023-12-12 DIAGNOSIS — R53.82 CHRONIC FATIGUE: Primary | ICD-10-CM

## 2023-12-26 ENCOUNTER — TRANSCRIBE ORDERS (OUTPATIENT)
Facility: HOSPITAL | Age: 78
End: 2023-12-26

## 2023-12-26 DIAGNOSIS — Z12.31 VISIT FOR SCREENING MAMMOGRAM: Primary | ICD-10-CM

## 2023-12-27 ENCOUNTER — TELEPHONE (OUTPATIENT)
Age: 78
End: 2023-12-27

## 2023-12-27 NOTE — TELEPHONE ENCOUNTER
Patient and her  was schedule on 12/28/23 for an appt but was advise that the dr is out of office this week, she states that she has really gone down in the last couple of weeks and would like to know if her and her  can be worked in before 1/25/23 which was the next available date.  Please Advise

## 2024-01-04 ENCOUNTER — OFFICE VISIT (OUTPATIENT)
Age: 79
End: 2024-01-04

## 2024-01-04 VITALS
SYSTOLIC BLOOD PRESSURE: 128 MMHG | WEIGHT: 139 LBS | DIASTOLIC BLOOD PRESSURE: 74 MMHG | BODY MASS INDEX: 28.02 KG/M2 | TEMPERATURE: 98.2 F | RESPIRATION RATE: 16 BRPM | OXYGEN SATURATION: 98 % | HEART RATE: 68 BPM | HEIGHT: 59 IN

## 2024-01-04 DIAGNOSIS — I10 ESSENTIAL HYPERTENSION: ICD-10-CM

## 2024-01-04 DIAGNOSIS — E78.00 PURE HYPERCHOLESTEROLEMIA: ICD-10-CM

## 2024-01-04 DIAGNOSIS — F41.9 ANXIETY: ICD-10-CM

## 2024-01-04 DIAGNOSIS — M85.89 OSTEOPENIA OF MULTIPLE SITES: ICD-10-CM

## 2024-01-04 DIAGNOSIS — R07.89 CHEST PAIN, ATYPICAL: ICD-10-CM

## 2024-01-04 DIAGNOSIS — M48.061 SPINAL STENOSIS OF LUMBAR REGION WITHOUT NEUROGENIC CLAUDICATION: ICD-10-CM

## 2024-01-04 DIAGNOSIS — F95.2 TOURETTE SYNDROME: ICD-10-CM

## 2024-01-04 DIAGNOSIS — M47.22 OSTEOARTHRITIS OF SPINE WITH RADICULOPATHY, CERVICAL REGION: ICD-10-CM

## 2024-01-04 DIAGNOSIS — C78.01 MALIGNANT NEOPLASM METASTATIC TO BOTH LUNGS (HCC): ICD-10-CM

## 2024-01-04 DIAGNOSIS — D84.821 IMMUNOSUPPRESSED DUE TO CHEMOTHERAPY (HCC): ICD-10-CM

## 2024-01-04 DIAGNOSIS — T45.1X5A IMMUNOSUPPRESSED DUE TO CHEMOTHERAPY (HCC): ICD-10-CM

## 2024-01-04 DIAGNOSIS — R06.09 DYSPNEA ON EXERTION: Primary | ICD-10-CM

## 2024-01-04 DIAGNOSIS — R53.83 FATIGUE, UNSPECIFIED TYPE: ICD-10-CM

## 2024-01-04 DIAGNOSIS — Z17.0 MALIGNANT NEOPLASM OF LEFT BREAST IN FEMALE, ESTROGEN RECEPTOR POSITIVE, UNSPECIFIED SITE OF BREAST (HCC): ICD-10-CM

## 2024-01-04 DIAGNOSIS — C78.02 MALIGNANT NEOPLASM METASTATIC TO BOTH LUNGS (HCC): ICD-10-CM

## 2024-01-04 DIAGNOSIS — J84.10 PULMONARY FIBROSIS (HCC): ICD-10-CM

## 2024-01-04 DIAGNOSIS — C50.912 MALIGNANT NEOPLASM OF LEFT BREAST IN FEMALE, ESTROGEN RECEPTOR POSITIVE, UNSPECIFIED SITE OF BREAST (HCC): ICD-10-CM

## 2024-01-04 DIAGNOSIS — I35.1 NONRHEUMATIC AORTIC VALVE INSUFFICIENCY: ICD-10-CM

## 2024-01-04 DIAGNOSIS — Z79.899 IMMUNOSUPPRESSED DUE TO CHEMOTHERAPY (HCC): ICD-10-CM

## 2024-01-04 SDOH — ECONOMIC STABILITY: FOOD INSECURITY: WITHIN THE PAST 12 MONTHS, THE FOOD YOU BOUGHT JUST DIDN'T LAST AND YOU DIDN'T HAVE MONEY TO GET MORE.: NEVER TRUE

## 2024-01-04 SDOH — ECONOMIC STABILITY: FOOD INSECURITY: WITHIN THE PAST 12 MONTHS, YOU WORRIED THAT YOUR FOOD WOULD RUN OUT BEFORE YOU GOT MONEY TO BUY MORE.: NEVER TRUE

## 2024-01-04 SDOH — ECONOMIC STABILITY: INCOME INSECURITY: HOW HARD IS IT FOR YOU TO PAY FOR THE VERY BASICS LIKE FOOD, HOUSING, MEDICAL CARE, AND HEATING?: NOT VERY HARD

## 2024-01-04 ASSESSMENT — PATIENT HEALTH QUESTIONNAIRE - PHQ9
3. TROUBLE FALLING OR STAYING ASLEEP: 0
SUM OF ALL RESPONSES TO PHQ QUESTIONS 1-9: 0
SUM OF ALL RESPONSES TO PHQ QUESTIONS 1-9: 0
2. FEELING DOWN, DEPRESSED OR HOPELESS: 0
1. LITTLE INTEREST OR PLEASURE IN DOING THINGS: 0
7. TROUBLE CONCENTRATING ON THINGS, SUCH AS READING THE NEWSPAPER OR WATCHING TELEVISION: 0
4. FEELING TIRED OR HAVING LITTLE ENERGY: 0
9. THOUGHTS THAT YOU WOULD BE BETTER OFF DEAD, OR OF HURTING YOURSELF: 0
5. POOR APPETITE OR OVEREATING: 0
8. MOVING OR SPEAKING SO SLOWLY THAT OTHER PEOPLE COULD HAVE NOTICED. OR THE OPPOSITE, BEING SO FIGETY OR RESTLESS THAT YOU HAVE BEEN MOVING AROUND A LOT MORE THAN USUAL: 0
6. FEELING BAD ABOUT YOURSELF - OR THAT YOU ARE A FAILURE OR HAVE LET YOURSELF OR YOUR FAMILY DOWN: 0
SUM OF ALL RESPONSES TO PHQ9 QUESTIONS 1 & 2: 0
10. IF YOU CHECKED OFF ANY PROBLEMS, HOW DIFFICULT HAVE THESE PROBLEMS MADE IT FOR YOU TO DO YOUR WORK, TAKE CARE OF THINGS AT HOME, OR GET ALONG WITH OTHER PEOPLE: 0
SUM OF ALL RESPONSES TO PHQ QUESTIONS 1-9: 0
SUM OF ALL RESPONSES TO PHQ QUESTIONS 1-9: 0

## 2024-01-04 NOTE — PROGRESS NOTES
HPI:   Janie Griffin is a 78 y.o. year old female who presents today for a physical exam.  She has a history of hypertension, dyslipidemia,  metastatic breast cancer, GERD, diverticulosis with recurrent diverticulitis, osteoarthritis s/p right knee replacement (2012), lumbar degenerative disease, osteopenia, compression fracture, and Tourette's syndrome.   She reports that she is doing reasonably well.      On 2/1/2023, she reported continued fatigue while on Ibrance and letrozole.  She described the fatigue as impacting her quality of life by limiting her activities of daily living. She reported not sleeping well at night due to difficulty falling asleep and she felt that her lack of sleep was also contributing to her fatigue.  She reported taking temazepam 15 mg intermittently when she was unable to fall asleep.  Due to ongoing neck and back pain, her dose of gabapentin was increased to 600 mg nightly, and she reported noting improvement in her restless leg symptoms at night on the higher dose of gabapentin, but did not feel that it helped with her sleep onset insomnia. She was prescribed Ambien 5 mg on 2/28/2023 and reported taking it intermittently and noting improvement when using.  However, she reported that she will fall asleep on her own without medication on occasion. She discusses that her difficulty falling asleep seems to be related to anxiety and ruminating thoughts at bedtime.  On 10/12/2023, she was prescribed Cymbalta 20 mg daily, but reports that she was unable to tolerate due to diarrhea. She reports today ongoing fatigue which is limiting ADLs and other activities with friends and family. She states that it does not not appear to be related to her difficulty with insomnia since she remains fatigued even on days that she has a good night sleep.      On 6/1/2023, she contacted the office and reported ongoing dyspnea on exertion which appeared to be worsening.  She stated that upon presentation to

## 2024-01-04 NOTE — PROGRESS NOTES
Janie Griffin presents today for   Chief Complaint   Patient presents with    6 Month Follow-Up       1. \"Have you been to the ER, urgent care clinic since your last visit?  Hospitalized since your last visit?\" no    2. \"Have you seen or consulted any other health care providers outside of the [facility] since your last visit?\" no     3. For patients aged 45-75: Has the patient had a colonoscopy / FIT/ Cologuard? NA - based on age      If the patient is female:    4. For patients aged 40-74: Has the patient had a mammogram within the past 2 years? NA - based on age or sex      5. For patients aged 21-65: Has the patient had a pap smear? NA - based on age or sex

## 2024-01-05 ENCOUNTER — PATIENT MESSAGE (OUTPATIENT)
Age: 79
End: 2024-01-05

## 2024-01-05 DIAGNOSIS — R06.09 DYSPNEA ON EXERTION: Primary | ICD-10-CM

## 2024-01-05 NOTE — TELEPHONE ENCOUNTER
From: Janie Griffin  To: Dr. Michelle Darling  Sent: 1/5/2024 12:21 PM EST  Subject: Need order submitted     Hi Dr. Darling,  Thank you for being so thorough with Art and myself yesterday. I have scheduled his MRI/MRA for this Sunday morning at [facility]. I have also scheduled my VQ test for the 15th of this month at [facility] but they are telling me I need an order in the system for a chest x-ray prior to the exam, if you wouldn’t mind doing that for me. Thank you!  I also failed to ask you one other thing yesterday. My daughter and I are wondering if any of my symptoms could be auto immune that I should address with Dr. Murcia. Just wanted to get your take on that.  Thank you so much and have a pleasant weekend.  Janie Griffin   
General

## 2024-01-09 ENCOUNTER — TELEPHONE (OUTPATIENT)
Age: 79
End: 2024-01-09

## 2024-01-09 NOTE — TELEPHONE ENCOUNTER
----- Message from Daria King sent at 1/9/2024 10:31 AM EST -----  Subject: Message to Provider    QUESTIONS  Information for Provider? Patient is inquiring about the status for the   referral for Cardiology. Please contact patient with referral information.     ---------------------------------------------------------------------------  --------------  CALL BACK INFO  [phone]; OK to leave message on voicemail  ---------------------------------------------------------------------------  --------------  SCRIPT ANSWERS  Relationship to Patient? Self

## 2024-01-10 ENCOUNTER — HOSPITAL ENCOUNTER (OUTPATIENT)
Facility: HOSPITAL | Age: 79
Discharge: HOME | End: 2024-01-13
Attending: INTERNAL MEDICINE
Payer: MEDICARE

## 2024-01-10 VITALS — BODY MASS INDEX: 28.02 KG/M2 | HEIGHT: 59 IN | WEIGHT: 139 LBS

## 2024-01-10 DIAGNOSIS — Z12.31 VISIT FOR SCREENING MAMMOGRAM: ICD-10-CM

## 2024-01-10 PROCEDURE — 77063 BREAST TOMOSYNTHESIS BI: CPT

## 2024-01-11 ENCOUNTER — PATIENT MESSAGE (OUTPATIENT)
Age: 79
End: 2024-01-11

## 2024-01-11 RX ORDER — LOSARTAN POTASSIUM 50 MG/1
50 TABLET ORAL DAILY
Qty: 90 TABLET | Refills: 3 | Status: SHIPPED | OUTPATIENT
Start: 2024-01-11

## 2024-01-12 NOTE — TELEPHONE ENCOUNTER
From: Janie Griffin  To: Dr. Michelle Darling  Sent: 1/11/2024 7:22 PM EST  Subject: PS    Thank you for your call ashley. Art's other 2 reports came in later and we do have them now.   And for your records, meant to tell you we both got our covid vaccines today.  We will see you soon.  Thanks so much,  Janie Griffin

## 2024-01-15 ENCOUNTER — HOSPITAL ENCOUNTER (OUTPATIENT)
Facility: HOSPITAL | Age: 79
Discharge: HOME | End: 2024-01-18
Payer: MEDICARE

## 2024-01-15 ENCOUNTER — HOSPITAL ENCOUNTER (OUTPATIENT)
Facility: HOSPITAL | Age: 79
Discharge: HOME | End: 2024-01-18
Attending: INTERNAL MEDICINE
Payer: MEDICARE

## 2024-01-15 DIAGNOSIS — C78.02 MALIGNANT NEOPLASM METASTATIC TO BOTH LUNGS (HCC): ICD-10-CM

## 2024-01-15 DIAGNOSIS — Z17.0 MALIGNANT NEOPLASM OF LEFT BREAST IN FEMALE, ESTROGEN RECEPTOR POSITIVE, UNSPECIFIED SITE OF BREAST (HCC): ICD-10-CM

## 2024-01-15 DIAGNOSIS — R06.09 DYSPNEA ON EXERTION: ICD-10-CM

## 2024-01-15 DIAGNOSIS — C50.912 MALIGNANT NEOPLASM OF LEFT BREAST IN FEMALE, ESTROGEN RECEPTOR POSITIVE, UNSPECIFIED SITE OF BREAST (HCC): ICD-10-CM

## 2024-01-15 DIAGNOSIS — C78.01 MALIGNANT NEOPLASM METASTATIC TO BOTH LUNGS (HCC): ICD-10-CM

## 2024-01-15 PROCEDURE — 71046 X-RAY EXAM CHEST 2 VIEWS: CPT

## 2024-01-15 PROCEDURE — 3430000000 HC RX DIAGNOSTIC RADIOPHARMACEUTICAL: Performed by: INTERNAL MEDICINE

## 2024-01-15 PROCEDURE — A9539 TC99M PENTETATE: HCPCS | Performed by: INTERNAL MEDICINE

## 2024-01-15 RX ORDER — KIT FOR THE PREPARATION OF TECHNETIUM TC 99M PENTETATE 20 MG/1
33 INJECTION, POWDER, LYOPHILIZED, FOR SOLUTION INTRAVENOUS; RESPIRATORY (INHALATION)
Status: COMPLETED | OUTPATIENT
Start: 2024-01-15 | End: 2024-01-15

## 2024-01-15 RX ADMIN — Medication 5.5 MILLICURIE: at 12:40

## 2024-01-15 RX ADMIN — Medication 33 MILLICURIE: at 12:00

## 2024-01-17 ENCOUNTER — PATIENT MESSAGE (OUTPATIENT)
Age: 79
End: 2024-01-17

## 2024-01-22 ENCOUNTER — TELEPHONE (OUTPATIENT)
Age: 79
End: 2024-01-22

## 2024-01-22 RX ORDER — PANTOPRAZOLE SODIUM 40 MG/1
40 TABLET, DELAYED RELEASE ORAL DAILY
Qty: 90 TABLET | Refills: 3 | Status: SHIPPED | OUTPATIENT
Start: 2024-01-22

## 2024-02-06 ENCOUNTER — OFFICE VISIT (OUTPATIENT)
Age: 79
End: 2024-02-06
Payer: MEDICARE

## 2024-02-06 VITALS
BODY MASS INDEX: 28.51 KG/M2 | RESPIRATION RATE: 16 BRPM | OXYGEN SATURATION: 97 % | HEART RATE: 77 BPM | DIASTOLIC BLOOD PRESSURE: 72 MMHG | TEMPERATURE: 98 F | SYSTOLIC BLOOD PRESSURE: 128 MMHG | HEIGHT: 59 IN | WEIGHT: 141.4 LBS

## 2024-02-06 DIAGNOSIS — R53.81 MALAISE: ICD-10-CM

## 2024-02-06 DIAGNOSIS — C78.01 MALIGNANT NEOPLASM METASTATIC TO BOTH LUNGS (HCC): ICD-10-CM

## 2024-02-06 DIAGNOSIS — C78.02 MALIGNANT NEOPLASM METASTATIC TO BOTH LUNGS (HCC): ICD-10-CM

## 2024-02-06 DIAGNOSIS — R06.02 SOB (SHORTNESS OF BREATH) ON EXERTION: Primary | ICD-10-CM

## 2024-02-06 PROBLEM — D70.2 DRUG-INDUCED NEUTROPENIA (HCC): Status: ACTIVE | Noted: 2024-02-06

## 2024-02-06 PROBLEM — D69.6 DISORDER INVOLVING THROMBOCYTOPENIA (HCC): Status: ACTIVE | Noted: 2024-02-06

## 2024-02-06 PROBLEM — R60.9 EDEMA: Status: ACTIVE | Noted: 2024-02-06

## 2024-02-06 PROBLEM — N94.9 DISCOMFORT OF VAGINA: Status: ACTIVE | Noted: 2024-02-06

## 2024-02-06 PROBLEM — R10.9 ABDOMINAL PAIN: Status: ACTIVE | Noted: 2024-02-06

## 2024-02-06 PROBLEM — N83.209 CYST OF OVARY: Status: ACTIVE | Noted: 2024-02-06

## 2024-02-06 PROBLEM — C79.51 MALIGNANT NEOPLASM METASTATIC TO BONE (HCC): Status: ACTIVE | Noted: 2024-02-06

## 2024-02-06 PROBLEM — M54.50 CHRONIC LOW BACK PAIN: Status: ACTIVE | Noted: 2024-02-06

## 2024-02-06 PROBLEM — I89.0 LYMPHEDEMA: Status: ACTIVE | Noted: 2024-02-06

## 2024-02-06 PROBLEM — D75.89 MACROCYTOSIS: Status: ACTIVE | Noted: 2024-02-06

## 2024-02-06 PROBLEM — L65.9 ALOPECIA: Status: ACTIVE | Noted: 2024-02-06

## 2024-02-06 PROBLEM — C80.1 MALIGNANT NEOPLASM (HCC): Status: ACTIVE | Noted: 2024-02-06

## 2024-02-06 PROBLEM — G89.29 CHRONIC LOW BACK PAIN: Status: ACTIVE | Noted: 2024-02-06

## 2024-02-06 PROBLEM — E55.9 VITAMIN D DEFICIENCY: Status: ACTIVE | Noted: 2024-02-06

## 2024-02-06 PROBLEM — R11.0 NAUSEA: Status: ACTIVE | Noted: 2024-02-06

## 2024-02-06 PROBLEM — R19.7 DIARRHEA: Status: ACTIVE | Noted: 2024-02-06

## 2024-02-06 PROBLEM — R52 GENERALIZED PAIN: Status: ACTIVE | Noted: 2024-02-06

## 2024-02-06 PROBLEM — D53.9 MACROCYTIC ANEMIA: Status: ACTIVE | Noted: 2024-02-06

## 2024-02-06 PROCEDURE — 3078F DIAST BP <80 MM HG: CPT | Performed by: INTERNAL MEDICINE

## 2024-02-06 PROCEDURE — G8399 PT W/DXA RESULTS DOCUMENT: HCPCS | Performed by: INTERNAL MEDICINE

## 2024-02-06 PROCEDURE — 99215 OFFICE O/P EST HI 40 MIN: CPT | Performed by: INTERNAL MEDICINE

## 2024-02-06 PROCEDURE — 1123F ACP DISCUSS/DSCN MKR DOCD: CPT | Performed by: INTERNAL MEDICINE

## 2024-02-06 PROCEDURE — G8484 FLU IMMUNIZE NO ADMIN: HCPCS | Performed by: INTERNAL MEDICINE

## 2024-02-06 PROCEDURE — 3074F SYST BP LT 130 MM HG: CPT | Performed by: INTERNAL MEDICINE

## 2024-02-06 PROCEDURE — G8427 DOCREV CUR MEDS BY ELIG CLIN: HCPCS | Performed by: INTERNAL MEDICINE

## 2024-02-06 PROCEDURE — 1036F TOBACCO NON-USER: CPT | Performed by: INTERNAL MEDICINE

## 2024-02-06 PROCEDURE — 1090F PRES/ABSN URINE INCON ASSESS: CPT | Performed by: INTERNAL MEDICINE

## 2024-02-06 PROCEDURE — G8417 CALC BMI ABV UP PARAM F/U: HCPCS | Performed by: INTERNAL MEDICINE

## 2024-02-06 RX ORDER — TEMAZEPAM 15 MG/1
CAPSULE ORAL
COMMUNITY
Start: 2023-11-15

## 2024-02-06 NOTE — PROGRESS NOTES
Janie Griffin presents today for   Chief Complaint   Patient presents with    Pulmonary Fibrosis        Is someone accompanying this pt?     Is the patient using any DME equipment during OV? No    -DME Company No    Depression Screenin/4/2024     2:29 PM   PHQ-9 Questionaire   Little interest or pleasure in doing things 0   Feeling down, depressed, or hopeless 0   Trouble falling or staying asleep, or sleeping too much 0   Feeling tired or having little energy 0   Poor appetite or overeating 0   Feeling bad about yourself - or that you are a failure or have let yourself or your family down 0   Trouble concentrating on things, such as reading the newspaper or watching television 0   Moving or speaking so slowly that other people could have noticed. Or the opposite - being so fidgety or restless that you have been moving around a lot more than usual 0   Thoughts that you would be better off dead, or of hurting yourself in some way 0   PHQ-9 Total Score 0   If you checked off any problems, how difficult have these problems made it for you to do your work, take care of things at home, or get along with other people? 0       Learning Assessment:        Abuse Screening:         No data to display                Fall Risk        Coordination of Care:    1. Have you been to the ER, urgent care clinic since your last visit? Hospitalized since your last visit? No    2. Have you seen or consulted any other health care providers outside of the [facility] since your last visit? Include any pap smears or colon screening. NA    Medication list has been update per patient.    
1967     Years since quittin.1    Smokeless tobacco: Never    Tobacco comments:     Casual smoker during college   Substance and Sexual Activity    Alcohol use: Not Currently     Alcohol/week: 5.8 standard drinks of alcohol    Drug use: No    Sexual activity: Not Currently     Social Determinants of Health     Financial Resource Strain: Low Risk  (2024)    Overall Financial Resource Strain (CARDIA)     Difficulty of Paying Living Expenses: Not very hard   Food Insecurity: No Food Insecurity (2024)    Hunger Vital Sign     Worried About Running Out of Food in the Last Year: Never true     Ran Out of Food in the Last Year: Never true   Transportation Needs: Unknown (2024)    PRAPARE - Transportation     Lack of Transportation (Non-Medical): No   Physical Activity: Insufficiently Active (2023)    Exercise Vital Sign     Days of Exercise per Week: 2 days     Minutes of Exercise per Session: 60 min   Housing Stability: Unknown (2024)    Housing Stability Vital Sign     Unstable Housing in the Last Year: No       Family History   Problem Relation Age of Onset    Osteoporosis Sister     Osteoporosis Mother     Stroke Father     Hypertension Father     Cancer Paternal Aunt         breast       Allergies   Allergen Reactions    Cephalexin Rash    Metronidazole Rash    Shellfish Allergy Nausea And Vomiting     More of just eating the actual shellfish.    Sulfa Antibiotics Rash    Tramadol Nausea And Vomiting     Patient states she is allergic to most Narcotics    Vancomycin Rash       Current Outpatient Medications   Medication Instructions    acetaminophen (TYLENOL) 500 mg, Oral, EVERY 6 HOURS PRN    Biotin 2.5 MG CAPS Oral    Calcium Carbonate-Vitamin D (OYSTER SHELL CALCIUM/D) 500-5 MG-MCG TABS 1 tablet, Oral, 2 TIMES DAILY WITH MEALS    dicyclomine (BENTYL) 20 mg, Oral, EVERY 6 HOURS PRN    gabapentin (NEURONTIN) 300 MG capsule Take 300 mg each morning and midday and 600 mg at bedtime.

## 2024-02-09 ENCOUNTER — TELEPHONE (OUTPATIENT)
Age: 79
End: 2024-02-09

## 2024-02-09 ENCOUNTER — PATIENT MESSAGE (OUTPATIENT)
Age: 79
End: 2024-02-09

## 2024-02-09 NOTE — TELEPHONE ENCOUNTER
Patient states that she saw Dr. Darling on Wednesday and wants to know DR. Darling opinion on dry needling and acupuncture, and if it would help her back and knee pain, she has access to both. Patient states you can answer via [employer].

## 2024-02-20 NOTE — TELEPHONE ENCOUNTER
Spoke with patient.  Received paperwork from visit to [facility] today and discussed x-ray findings.  Compared to prior lumbar MRI from 11/2022 and does not appear to show any new findings.  Patient has severe right radicular leg pain and is scheduled next week at [facility] for reevaluation.    Also discussed follow-up visit with Dr. Luke and recommendation to hold Ibrance and obtain chest CT to rule out possibility of pneumonitis as the cause of her dyspnea on exertion.  In the process of scheduling.

## 2024-02-22 ENCOUNTER — TRANSCRIBE ORDERS (OUTPATIENT)
Facility: HOSPITAL | Age: 79
End: 2024-02-22

## 2024-02-22 DIAGNOSIS — C50.912 MALIGNANT NEOPLASM OF LEFT FEMALE BREAST, UNSPECIFIED ESTROGEN RECEPTOR STATUS, UNSPECIFIED SITE OF BREAST (HCC): ICD-10-CM

## 2024-02-22 DIAGNOSIS — R06.02 SOB (SHORTNESS OF BREATH): Primary | ICD-10-CM

## 2024-02-23 ENCOUNTER — HOSPITAL ENCOUNTER (OUTPATIENT)
Facility: HOSPITAL | Age: 79
End: 2024-02-23
Payer: MEDICARE

## 2024-02-23 DIAGNOSIS — R06.02 SOB (SHORTNESS OF BREATH): ICD-10-CM

## 2024-02-23 DIAGNOSIS — C50.912 MALIGNANT NEOPLASM OF LEFT FEMALE BREAST, UNSPECIFIED ESTROGEN RECEPTOR STATUS, UNSPECIFIED SITE OF BREAST (HCC): ICD-10-CM

## 2024-02-23 PROCEDURE — 71250 CT THORAX DX C-: CPT

## 2024-02-23 RX ORDER — MELOXICAM 15 MG/1
15 TABLET ORAL DAILY
Qty: 90 TABLET | Refills: 3 | Status: SHIPPED | OUTPATIENT
Start: 2024-02-23

## 2024-02-28 ENCOUNTER — TELEPHONE (OUTPATIENT)
Age: 79
End: 2024-02-28

## 2024-02-28 NOTE — TELEPHONE ENCOUNTER
Pt was seen at [facility]  ([facility]) the PA would like her to speak with Dr Darling about pain medication options   Pt also would like  to know her CT from oncology  has been read and has come back clear

## 2024-03-01 ENCOUNTER — OFFICE VISIT (OUTPATIENT)
Age: 79
End: 2024-03-01
Payer: MEDICARE

## 2024-03-01 VITALS
OXYGEN SATURATION: 98 % | HEART RATE: 79 BPM | BODY MASS INDEX: 28.63 KG/M2 | DIASTOLIC BLOOD PRESSURE: 82 MMHG | HEIGHT: 59 IN | WEIGHT: 142 LBS | SYSTOLIC BLOOD PRESSURE: 158 MMHG

## 2024-03-01 DIAGNOSIS — R06.09 DYSPNEA ON EXERTION: Primary | ICD-10-CM

## 2024-03-01 DIAGNOSIS — I35.1 NONRHEUMATIC AORTIC VALVE INSUFFICIENCY: ICD-10-CM

## 2024-03-01 DIAGNOSIS — I10 PRIMARY HYPERTENSION: ICD-10-CM

## 2024-03-01 DIAGNOSIS — C78.01 MALIGNANT NEOPLASM METASTATIC TO BOTH LUNGS (HCC): ICD-10-CM

## 2024-03-01 DIAGNOSIS — C78.02 MALIGNANT NEOPLASM METASTATIC TO BOTH LUNGS (HCC): ICD-10-CM

## 2024-03-01 PROCEDURE — G8427 DOCREV CUR MEDS BY ELIG CLIN: HCPCS | Performed by: INTERNAL MEDICINE

## 2024-03-01 PROCEDURE — 1123F ACP DISCUSS/DSCN MKR DOCD: CPT | Performed by: INTERNAL MEDICINE

## 2024-03-01 PROCEDURE — G8399 PT W/DXA RESULTS DOCUMENT: HCPCS | Performed by: INTERNAL MEDICINE

## 2024-03-01 PROCEDURE — 1036F TOBACCO NON-USER: CPT | Performed by: INTERNAL MEDICINE

## 2024-03-01 PROCEDURE — 3079F DIAST BP 80-89 MM HG: CPT | Performed by: INTERNAL MEDICINE

## 2024-03-01 PROCEDURE — 93000 ELECTROCARDIOGRAM COMPLETE: CPT | Performed by: INTERNAL MEDICINE

## 2024-03-01 PROCEDURE — G8417 CALC BMI ABV UP PARAM F/U: HCPCS | Performed by: INTERNAL MEDICINE

## 2024-03-01 PROCEDURE — 99204 OFFICE O/P NEW MOD 45 MIN: CPT | Performed by: INTERNAL MEDICINE

## 2024-03-01 PROCEDURE — 3077F SYST BP >= 140 MM HG: CPT | Performed by: INTERNAL MEDICINE

## 2024-03-01 PROCEDURE — G8484 FLU IMMUNIZE NO ADMIN: HCPCS | Performed by: INTERNAL MEDICINE

## 2024-03-01 PROCEDURE — 1090F PRES/ABSN URINE INCON ASSESS: CPT | Performed by: INTERNAL MEDICINE

## 2024-03-01 ASSESSMENT — ENCOUNTER SYMPTOMS
SHORTNESS OF BREATH: 1
VOMITING: 0
NAUSEA: 0
ABDOMINAL DISTENTION: 0
SORE THROAT: 0
ABDOMINAL PAIN: 0
COUGH: 0

## 2024-03-01 ASSESSMENT — ANXIETY QUESTIONNAIRES
IF YOU CHECKED OFF ANY PROBLEMS ON THIS QUESTIONNAIRE, HOW DIFFICULT HAVE THESE PROBLEMS MADE IT FOR YOU TO DO YOUR WORK, TAKE CARE OF THINGS AT HOME, OR GET ALONG WITH OTHER PEOPLE: NOT DIFFICULT AT ALL
3. WORRYING TOO MUCH ABOUT DIFFERENT THINGS: 0
2. NOT BEING ABLE TO STOP OR CONTROL WORRYING: 0
5. BEING SO RESTLESS THAT IT IS HARD TO SIT STILL: 0
4. TROUBLE RELAXING: 0
GAD7 TOTAL SCORE: 0
7. FEELING AFRAID AS IF SOMETHING AWFUL MIGHT HAPPEN: 0
1. FEELING NERVOUS, ANXIOUS, OR ON EDGE: 0
6. BECOMING EASILY ANNOYED OR IRRITABLE: 0

## 2024-03-01 ASSESSMENT — PATIENT HEALTH QUESTIONNAIRE - PHQ9
3. TROUBLE FALLING OR STAYING ASLEEP: 0
7. TROUBLE CONCENTRATING ON THINGS, SUCH AS READING THE NEWSPAPER OR WATCHING TELEVISION: 0
6. FEELING BAD ABOUT YOURSELF - OR THAT YOU ARE A FAILURE OR HAVE LET YOURSELF OR YOUR FAMILY DOWN: 0
SUM OF ALL RESPONSES TO PHQ9 QUESTIONS 1 & 2: 0
SUM OF ALL RESPONSES TO PHQ QUESTIONS 1-9: 0
10. IF YOU CHECKED OFF ANY PROBLEMS, HOW DIFFICULT HAVE THESE PROBLEMS MADE IT FOR YOU TO DO YOUR WORK, TAKE CARE OF THINGS AT HOME, OR GET ALONG WITH OTHER PEOPLE: 0
SUM OF ALL RESPONSES TO PHQ QUESTIONS 1-9: 0
5. POOR APPETITE OR OVEREATING: 0
9. THOUGHTS THAT YOU WOULD BE BETTER OFF DEAD, OR OF HURTING YOURSELF: 0
SUM OF ALL RESPONSES TO PHQ QUESTIONS 1-9: 0
4. FEELING TIRED OR HAVING LITTLE ENERGY: 0
SUM OF ALL RESPONSES TO PHQ QUESTIONS 1-9: 0
8. MOVING OR SPEAKING SO SLOWLY THAT OTHER PEOPLE COULD HAVE NOTICED. OR THE OPPOSITE, BEING SO FIGETY OR RESTLESS THAT YOU HAVE BEEN MOVING AROUND A LOT MORE THAN USUAL: 0
1. LITTLE INTEREST OR PLEASURE IN DOING THINGS: 0

## 2024-03-01 NOTE — TELEPHONE ENCOUNTER
Called and spoke with patient. Discussed ongoing issues with pain and attempt at receiving a transforaminal DANIEL today with Dr. Power.  Discussed options for pain control and patient agreeable to attempt to start Cymbalta 20 mg daily as previously prescribed.  She states that she experienced diarrhea after several days when tried previously, and advised that she may take Imodium given known history of IBS.  Also discussed referral to pain management with Dr. Lyn and patient states that she will discuss with Keith Matias at her follow-up visit in 2 weeks.  Answered all questions.

## 2024-03-01 NOTE — PROGRESS NOTES
03/01/24     Janie Griffin  is a 78 y.o. female     Chief Complaint   Patient presents with    New Patient     Referred by pcp    Shortness of Breath     With exertion       Shortness of Breath  Pertinent negatives include no abdominal pain, chest pain, fever, headaches, leg swelling, rash, sore throat or vomiting.       Patient presents for a new office visit.  She was referred here by her PCP for evaluation of intermittent shortness of breath primarily with physical activity.  She does not have a prior cardiac history.  She does have a history of breast cancer initially diagnosed localized to her left breast in 2010 treated with mastectomy and chemotherapy.  She had a recurrence with metastasis to her lungs 3 to 4 years later and has been managing this with immunotherapy and antiestrogen therapy without recurrence for nearly 10 years.    She underwent an echocardiogram in August 2023 which showed preserved LVEF of 54%, moderate concentric LVH, moderate aortic insufficiency and mild mitral regurgitation.  She denies ever having any leg swelling, no orthopnea, no PND.  She recently underwent a follow-up CT scan of her lungs last week which did not show any acute pulmonary issues.    She has never had any chest pain, heart palpitations, dizziness, nor syncope.  She does notice that she gets headaches when her blood pressure is elevated.  She states that her PCP recently increased her losartan up to 50 mg last year which has been better controlling her blood pressure.    Past Medical History:   Diagnosis Date    Arthritis     Breast cancer metastasized to lung (HCC)     Left Breast    Chronic pain     Colon polyps 2/22/16    distal sigmoid, Dr. Daniels    DDD (degenerative disc disease)     Diverticulitis of colon     Diverticulosis 2/22/16    mild in the ascedning and sigmoid colon, Dr. daniels    Gastroesophageal reflux disease without esophagitis 7/17/2023    HLD (hyperlipidemia)     Hypertension     
Janie Griffin presents today for   Chief Complaint   Patient presents with    New Patient     Referred by pcp    Shortness of Breath     With exertion       Janie Griffin preferred language for health care discussion is english/other.    Is someone accompanying this pt? yes    Is the patient using any DME equipment during OV? yes    Depression Screening:  Depression: Not at risk (3/1/2024)    PHQ-2     PHQ-2 Score: 0        Learning Assessment:  Who is the primary learner? Patient    What is the preferred language for health care of the primary learner? ENGLISH    How does the primary learner prefer to learn new concepts? DEMONSTRATION    Answered By patient    Relationship to Learner SELF           Pt currently taking Anticoagulant therapy? no    Pt currently taking Antiplatelet therapy ? no      Coordination of Care:  1. Have you been to the ER, urgent care clinic since your last visit? Hospitalized since your last visit? no    2. Have you seen or consulted any other health care providers outside of the [facility] since your last visit? Include any pap smears or colon screening. no    
15-[birth year]-2018

## 2024-03-07 ENCOUNTER — TRANSCRIBE ORDERS (OUTPATIENT)
Facility: HOSPITAL | Age: 79
End: 2024-03-07

## 2024-03-07 DIAGNOSIS — I67.1 CEREBRAL ANEURYSM, NONRUPTURED: Primary | ICD-10-CM

## 2024-03-22 ENCOUNTER — HOSPITAL ENCOUNTER (OUTPATIENT)
Facility: HOSPITAL | Age: 79
End: 2024-03-22
Payer: MEDICARE

## 2024-03-22 DIAGNOSIS — I67.1 CEREBRAL ANEURYSM, NONRUPTURED: ICD-10-CM

## 2024-03-22 PROCEDURE — 70544 MR ANGIOGRAPHY HEAD W/O DYE: CPT

## 2024-04-05 ENCOUNTER — TELEPHONE (OUTPATIENT)
Age: 79
End: 2024-04-05

## 2024-04-05 DIAGNOSIS — G47.00 INSOMNIA, UNSPECIFIED TYPE: Primary | ICD-10-CM

## 2024-04-05 RX ORDER — TEMAZEPAM 15 MG/1
CAPSULE ORAL
Qty: 30 CAPSULE | Refills: 0 | Status: SHIPPED | OUTPATIENT
Start: 2024-04-05 | End: 2024-05-05

## 2024-04-05 NOTE — TELEPHONE ENCOUNTER
[facility]5/2024    The last fill date for Temazepam 15 Mg Capsule  was 11/15/2023 for a 30 d/s qty 30      Last UDS: 12/20/2023    CSA Last signed: not on file         PCP: Michelle Darling MD    Last appt:  4/4/2024  No future appointments.    Requested Prescriptions     Pending Prescriptions Disp Refills    temazepam (RESTORIL) 15 MG capsule 30 capsule 0

## 2024-04-05 NOTE — TELEPHONE ENCOUNTER
Pt called for refill request      temazepam (RESTORIL) 15 MG capsule       [id number] - [place], [place] - [address] - P [phone] - F [phone] [[id number]]

## 2024-04-07 PROBLEM — C79.51 MALIGNANT NEOPLASM METASTATIC TO BONE (HCC): Status: RESOLVED | Noted: 2024-02-06 | Resolved: 2024-04-07

## 2024-04-07 PROBLEM — R52 GENERALIZED PAIN: Status: RESOLVED | Noted: 2024-02-06 | Resolved: 2024-04-07

## 2024-04-07 PROBLEM — E66.3 OVERWEIGHT (BMI 25.0-29.9): Status: ACTIVE | Noted: 2024-04-07

## 2024-04-07 PROBLEM — R10.9 ABDOMINAL PAIN: Status: RESOLVED | Noted: 2024-02-06 | Resolved: 2024-04-07

## 2024-04-07 PROBLEM — R11.0 NAUSEA: Status: RESOLVED | Noted: 2024-02-06 | Resolved: 2024-04-07

## 2024-04-07 PROBLEM — I89.0 LYMPHEDEMA: Status: RESOLVED | Noted: 2024-02-06 | Resolved: 2024-04-07

## 2024-04-07 PROBLEM — R60.9 EDEMA: Status: RESOLVED | Noted: 2024-02-06 | Resolved: 2024-04-07

## 2024-04-07 PROBLEM — C80.1 MALIGNANT NEOPLASM (HCC): Status: RESOLVED | Noted: 2024-02-06 | Resolved: 2024-04-07

## 2024-04-07 PROBLEM — L65.9 ALOPECIA: Status: RESOLVED | Noted: 2024-02-06 | Resolved: 2024-04-07

## 2024-04-07 PROBLEM — D70.2 DRUG-INDUCED NEUTROPENIA (HCC): Status: RESOLVED | Noted: 2024-02-06 | Resolved: 2024-04-07

## 2024-04-07 PROBLEM — D53.9 MACROCYTIC ANEMIA: Status: RESOLVED | Noted: 2024-02-06 | Resolved: 2024-04-07

## 2024-04-07 PROBLEM — N94.9 DISCOMFORT OF VAGINA: Status: RESOLVED | Noted: 2024-02-06 | Resolved: 2024-04-07

## 2024-04-07 PROBLEM — D69.6 DISORDER INVOLVING THROMBOCYTOPENIA (HCC): Status: RESOLVED | Noted: 2024-02-06 | Resolved: 2024-04-07

## 2024-04-07 PROBLEM — R19.7 DIARRHEA: Status: RESOLVED | Noted: 2024-02-06 | Resolved: 2024-04-07

## 2024-04-07 PROBLEM — R53.81 MALAISE: Status: RESOLVED | Noted: 2024-02-06 | Resolved: 2024-04-07

## 2024-04-24 ENCOUNTER — PATIENT MESSAGE (OUTPATIENT)
Age: 79
End: 2024-04-24

## 2024-04-25 NOTE — TELEPHONE ENCOUNTER
Called and spoke with patient.  Patient reports benefit with pregabalin in controlling her sciatic nerve pain, and advised that she is currently taking a low-dose at 50 mg twice daily is not likely causing her fatigue.  She does report noting benefit from pregabalin since she will note some increase in nerve pain when her dose is due.  Discussed sleep hygiene and acknowledges that she has not been sleeping well since taking pregabalin since she has not been using her other sleep medications due to concern of taking concurrently with pregabalin.  Reassured that she could take as she had previously.  Discussed increased neck pain being addressed by physical therapy and some recent worsening of Tourette's despite treatment with Haldol.  Advised to follow-up with Dr. Ramsey if does not improve.  Continues off Ibrance for 5 weeks but remains on letrozole.  Answered all questions.

## 2024-04-29 ENCOUNTER — TELEPHONE (OUTPATIENT)
Age: 79
End: 2024-04-29

## 2024-04-29 RX ORDER — BENZONATATE 100 MG/1
100 CAPSULE ORAL 3 TIMES DAILY PRN
Qty: 30 CAPSULE | Refills: 0 | Status: SHIPPED | OUTPATIENT
Start: 2024-04-29 | End: 2024-05-09

## 2024-04-29 NOTE — TELEPHONE ENCOUNTER
Pt called and stated that she had a Prolia injection Thursday afternoon.  She now has a cough, sore throat and is feeling lethargic. Pt would like to know if this could be a reaction to her injection.   She has reached out to rheumatology but has not heard back from their office.     Please advise     Pt can be reached at [phone]

## 2024-04-29 NOTE — TELEPHONE ENCOUNTER
Called and spoke with patient. Reports nonproductive cough, sore throat and fatigue since 4/26/2024. No fever, chills, rhinorrhea, nasal drainage, or dyspnea. Advised by Dr. Murcia that not secondary to Prolia. Discussed that likely viral URI and would recommend performing home COVID test to rule out COVID-19.  Discussed symptomatic therapy and will send in Tessalon to help with cough.  Advised to call back with worsening symptoms.  Answered all questions.

## 2024-05-02 ENCOUNTER — TELEPHONE (OUTPATIENT)
Age: 79
End: 2024-05-02

## 2024-05-02 NOTE — TELEPHONE ENCOUNTER
Pt called back today stating her symptoms have gotten worse, she states she now has congestion with the cough and is feeling worse each day , she is asking if Dr Darling would want her to get a xray or to start an antibiotic  please advise   [phone]

## 2024-05-02 NOTE — TELEPHONE ENCOUNTER
Called and spoke with patient.  Reports ongoing symptoms with cough and fatigue.  States that cough appears \"looser\" but unable to cough anything up.  Reports cough in upper chest region into the neck.  Denies any nasal congestion or rhinorrhea.  Denies any fever, chills, or dyspnea.  Currently using Tessalon at night which does help control cough but not using anything during the day.  Advised that she should continue using Tessalon and may also begin Robitussin DM to help with cough.  Discussed likely viral in etiology but she should call back if any change in symptoms or fever develops.    Discussed experiencing increased neck pain masturbated by coughing and taking Tylenol every 6 hours and pregabalin as prescribed twice per day.

## 2024-05-04 ENCOUNTER — TELEPHONE (OUTPATIENT)
Age: 79
End: 2024-05-04

## 2024-05-04 NOTE — TELEPHONE ENCOUNTER
Called and spoke with patient and follow-up.  Reports evaluated at [facility] and chest x-ray negative.  Labs revealed normal WBC count but given ongoing symptoms, prescribed doxycycline 100 mg twice daily for 7 days and albuterol inhaler for likely bronchitis.  Took first dose of doxycycline and tolerating well without side effects.  Answered all questions.

## 2024-05-04 NOTE — TELEPHONE ENCOUNTER
Received call from patient and reporting worsening upper respiratory symptoms with increased cough and wheezing, nasal drainage of yellow discharge, and feeling poorly.  Denies any fever or dyspnea.  Did perform a COVID-19 test earlier in the week which was negative.  Advised evaluation in urgent care and patient states that she will present to [facility].  Answered all questions.    Also reported increasing neck pain related to coughing.  Reports that she has an appointment with Dr. Power on 5/7/2024.

## 2024-05-09 ENCOUNTER — HOSPITAL ENCOUNTER (OUTPATIENT)
Facility: HOSPITAL | Age: 79
Discharge: HOME | End: 2024-05-09
Payer: MEDICARE

## 2024-05-09 LAB
CA-I BLD-SCNC: 1.25 MMOL/L (ref 1.12–1.32)
CREAT SERPL-MCNC: 0.8 MG/DL (ref 0.6–1.3)
MAGNESIUM SERPL-MCNC: 1.9 MG/DL (ref 1.6–2.6)
PHOSPHATE SERPL-MCNC: 4.3 MG/DL (ref 2.5–4.9)

## 2024-05-09 PROCEDURE — 36415 COLL VENOUS BLD VENIPUNCTURE: CPT

## 2024-05-09 PROCEDURE — 82330 ASSAY OF CALCIUM: CPT

## 2024-05-09 PROCEDURE — 83735 ASSAY OF MAGNESIUM: CPT

## 2024-05-09 PROCEDURE — 84100 ASSAY OF PHOSPHORUS: CPT

## 2024-05-13 ENCOUNTER — PATIENT MESSAGE (OUTPATIENT)
Age: 79
End: 2024-05-13

## 2024-05-13 ENCOUNTER — TELEPHONE (OUTPATIENT)
Age: 79
End: 2024-05-13

## 2024-05-13 NOTE — TELEPHONE ENCOUNTER
----- Message from Janie Griffin sent at 5/13/2024  2:48 PM EDT -----  Regarding: virus  Contact: [phone]  Hi Dr. Darling.  Just wanting your advice as I try to recover from 2 weeks of the virus and bronchitis.  Symptoms are improved but having an exhausting time with my energy and stamina.  I feel i am barely making it with that part of the recovery.... today particularly bad. I am eating and drinking and resting, but didn't' know if there is any supplement that would help this.  Perhaps it will just take more time?  Thank you for your advice,  Janie Griffin

## 2024-05-21 ENCOUNTER — TELEPHONE (OUTPATIENT)
Age: 79
End: 2024-05-21

## 2024-05-21 NOTE — TELEPHONE ENCOUNTER
Called patient, states she was feeling better but now feels fatigue, some lightheadedness at night. Recommended to increase fluid intake, rest and recover, also to check blood pressure when she feels light headed. Recommended if symptoms worsen to be evaluated in the ED.

## 2024-05-21 NOTE — TELEPHONE ENCOUNTER
Forwarded [employer] message to provider:    \"Dr Darling, I apologize for yet another message but I am very concerned as this is the beginning of week 4 since the virus and bronchitis hit me. At this point I am barely functioning….each day seems to worsen and I go downhill a bit more. I have no strength, very weak and have  symptoms such as pains in my head and a pressure or tightness there and lightheaded. I have also had skipping of beats with my heart that I can actually feel. I basically don’t feel well at all! It’s all I can do to get through the day during the last several. I just don’t feel right and I hate sounding like a hypochondriac. Something is just not right. I am having a CT scan for oncology on Friday of this week, the 24th. And my Ibrance was d/c’d 8 1/2 weeks ago but I continue the Letrozole.   Perhaps you have an answer. I would appreciate your advice. Thank you so much,  Janie Griffin\"

## 2024-05-21 NOTE — TELEPHONE ENCOUNTER
Pt calling checking on the status of her recent Mychart message she sent  on 05/20 - pt is having a lot of concerns she states she is feeling lethargic this morning asking if she should go to the ER please advise

## 2024-05-23 ENCOUNTER — TELEPHONE (OUTPATIENT)
Age: 79
End: 2024-05-23

## 2024-05-23 DIAGNOSIS — G47.00 INSOMNIA, UNSPECIFIED TYPE: Primary | ICD-10-CM

## 2024-05-23 DIAGNOSIS — F41.9 ANXIETY: ICD-10-CM

## 2024-05-23 RX ORDER — DIAZEPAM 5 MG/1
5 TABLET ORAL NIGHTLY PRN
Qty: 30 TABLET | Refills: 0 | Status: SHIPPED | OUTPATIENT
Start: 2024-05-23 | End: 2024-06-22

## 2024-05-23 NOTE — TELEPHONE ENCOUNTER
[facility]/23/2024    The last fill date for Diazepam 5 Mg Tablet  was 11/27/2023 for a 30 d/s qty 30      Last UDS: completed     CSA Last signed: not on file         PCP: Michelle Darling MD    Last appt:  4/4/2024  Future Appointments   Date Time Provider Department Center   5/24/2024  2:00 PM HBV CT RM 1 [facility]

## 2024-05-24 ENCOUNTER — HOSPITAL ENCOUNTER (OUTPATIENT)
Facility: HOSPITAL | Age: 79
End: 2024-05-24
Payer: MEDICARE

## 2024-05-24 DIAGNOSIS — C50.912 MALIGNANT NEOPLASM OF LEFT FEMALE BREAST, UNSPECIFIED ESTROGEN RECEPTOR STATUS, UNSPECIFIED SITE OF BREAST (HCC): ICD-10-CM

## 2024-05-24 DIAGNOSIS — C78.00 MALIGNANT NEOPLASM METASTATIC TO LUNG, UNSPECIFIED LATERALITY (HCC): ICD-10-CM

## 2024-05-24 DIAGNOSIS — C79.31 METASTASIS TO BRAIN (HCC): ICD-10-CM

## 2024-05-24 LAB — CREAT UR-MCNC: 0.9 MG/DL (ref 0.6–1.3)

## 2024-05-24 PROCEDURE — 82565 ASSAY OF CREATININE: CPT

## 2024-05-24 PROCEDURE — 74177 CT ABD & PELVIS W/CONTRAST: CPT

## 2024-05-24 PROCEDURE — 6360000004 HC RX CONTRAST MEDICATION: Performed by: NURSE PRACTITIONER

## 2024-05-24 RX ADMIN — IOPAMIDOL 100 ML: 612 INJECTION, SOLUTION INTRAVENOUS at 14:56

## 2024-06-20 ENCOUNTER — TRANSCRIBE ORDERS (OUTPATIENT)
Facility: HOSPITAL | Age: 79
End: 2024-06-20

## 2024-06-20 DIAGNOSIS — R51.9 ACUTE NONINTRACTABLE HEADACHE, UNSPECIFIED HEADACHE TYPE: ICD-10-CM

## 2024-06-20 DIAGNOSIS — R51.9 FACIAL PAIN: Primary | ICD-10-CM

## 2024-06-25 ENCOUNTER — HOSPITAL ENCOUNTER (OUTPATIENT)
Facility: HOSPITAL | Age: 79
Discharge: HOME | End: 2024-06-28
Attending: INTERNAL MEDICINE
Payer: MEDICARE

## 2024-06-25 DIAGNOSIS — G44.52 NEW DAILY PERSISTENT HEADACHE: ICD-10-CM

## 2024-06-25 PROCEDURE — 6360000004 HC RX CONTRAST MEDICATION: Performed by: INTERNAL MEDICINE

## 2024-06-25 PROCEDURE — 70553 MRI BRAIN STEM W/O & W/DYE: CPT

## 2024-06-25 PROCEDURE — A9577 INJ MULTIHANCE: HCPCS | Performed by: INTERNAL MEDICINE

## 2024-06-25 RX ADMIN — GADOBENATE DIMEGLUMINE 13 ML: 529 INJECTION, SOLUTION INTRAVENOUS at 16:13

## 2024-06-26 ENCOUNTER — HOSPITAL ENCOUNTER (OUTPATIENT)
Facility: HOSPITAL | Age: 79
Setting detail: SPECIMEN
Discharge: HOME | End: 2024-06-29
Payer: MEDICARE

## 2024-06-26 DIAGNOSIS — I10 ESSENTIAL HYPERTENSION: ICD-10-CM

## 2024-06-26 LAB
ALBUMIN SERPL-MCNC: 3.9 G/DL (ref 3.4–5)
ANION GAP SERPL CALC-SCNC: 6 MMOL/L (ref 3–18)
BUN SERPL-MCNC: 19 MG/DL (ref 7–18)
BUN/CREAT SERPL: 23 (ref 12–20)
CALCIUM SERPL-MCNC: 9.7 MG/DL (ref 8.5–10.1)
CHLORIDE SERPL-SCNC: 95 MMOL/L (ref 100–111)
CO2 SERPL-SCNC: 31 MMOL/L (ref 21–32)
CREAT SERPL-MCNC: 0.83 MG/DL (ref 0.6–1.3)
GLUCOSE SERPL-MCNC: 91 MG/DL (ref 74–99)
MAGNESIUM SERPL-MCNC: 1.8 MG/DL (ref 1.6–2.6)
PHOSPHATE SERPL-MCNC: 3.5 MG/DL (ref 2.5–4.9)
POTASSIUM SERPL-SCNC: 4.1 MMOL/L (ref 3.5–5.5)
SODIUM SERPL-SCNC: 132 MMOL/L (ref 136–145)

## 2024-06-26 PROCEDURE — 83735 ASSAY OF MAGNESIUM: CPT

## 2024-06-26 PROCEDURE — 36415 COLL VENOUS BLD VENIPUNCTURE: CPT

## 2024-06-26 PROCEDURE — 80069 RENAL FUNCTION PANEL: CPT

## 2024-07-01 ENCOUNTER — TELEPHONE (OUTPATIENT)
Facility: CLINIC | Age: 79
End: 2024-07-01

## 2024-07-01 NOTE — TELEPHONE ENCOUNTER
Called and spoke with patient. Reports progressive fatigue. Had episode of chest discomfort on Saturday, but improved after taking an additional dose of Protonix.  Reports blood pressure control significantly improved since beginning hydrochlorothiazide 12.5 mg daily.  Reviewed lab results and brain MRI results will reassess in the office on 7/3/2024 at her previously scheduled appointment.  Answered all questions.

## 2024-07-01 NOTE — TELEPHONE ENCOUNTER
Patient states she is not feeling well, weak, lightheaded, \"spasms in chest\" for about an hour, patient states she has had this before and it is not cardiac.  Patient said she had IBS issues yesterday.  Patient does not have any upper respiratory or GI symptoms.    She has an appointment on Wednesday with Dr. Darling but is wanting to know if Dr. Darling thinks she should be checked out prior to that somewhere.  She prefers to talk to Dr. Darling but she is aware that DR. Darling is not working today and may not get her message.  Patient voiced understanding.

## 2024-07-03 ENCOUNTER — OFFICE VISIT (OUTPATIENT)
Facility: CLINIC | Age: 79
End: 2024-07-03

## 2024-07-03 DIAGNOSIS — Z71.89 ACP (ADVANCE CARE PLANNING): ICD-10-CM

## 2024-07-03 DIAGNOSIS — D84.821 IMMUNOSUPPRESSED DUE TO CHEMOTHERAPY (HCC): ICD-10-CM

## 2024-07-03 DIAGNOSIS — J84.10 PULMONARY FIBROSIS (HCC): ICD-10-CM

## 2024-07-03 DIAGNOSIS — Z79.899 IMMUNOSUPPRESSED DUE TO CHEMOTHERAPY (HCC): ICD-10-CM

## 2024-07-03 DIAGNOSIS — C78.02 MALIGNANT NEOPLASM METASTATIC TO BOTH LUNGS (HCC): ICD-10-CM

## 2024-07-03 DIAGNOSIS — M48.02 SPINAL STENOSIS OF CERVICAL REGION: ICD-10-CM

## 2024-07-03 DIAGNOSIS — I10 ESSENTIAL HYPERTENSION: Primary | ICD-10-CM

## 2024-07-03 DIAGNOSIS — Z00.00 MEDICARE ANNUAL WELLNESS VISIT, SUBSEQUENT: ICD-10-CM

## 2024-07-03 DIAGNOSIS — T45.1X5A IMMUNOSUPPRESSED DUE TO CHEMOTHERAPY (HCC): ICD-10-CM

## 2024-07-03 DIAGNOSIS — E66.09 CLASS 1 OBESITY DUE TO EXCESS CALORIES WITH SERIOUS COMORBIDITY AND BODY MASS INDEX (BMI) OF 30.0 TO 30.9 IN ADULT: ICD-10-CM

## 2024-07-03 DIAGNOSIS — F41.9 ANXIETY: ICD-10-CM

## 2024-07-03 DIAGNOSIS — Z17.0 MALIGNANT NEOPLASM OF LEFT BREAST IN FEMALE, ESTROGEN RECEPTOR POSITIVE, UNSPECIFIED SITE OF BREAST (HCC): ICD-10-CM

## 2024-07-03 DIAGNOSIS — G89.29 CHRONIC BILATERAL LOW BACK PAIN WITH RIGHT-SIDED SCIATICA: ICD-10-CM

## 2024-07-03 DIAGNOSIS — M54.41 CHRONIC BILATERAL LOW BACK PAIN WITH RIGHT-SIDED SCIATICA: ICD-10-CM

## 2024-07-03 DIAGNOSIS — C50.912 MALIGNANT NEOPLASM OF LEFT BREAST IN FEMALE, ESTROGEN RECEPTOR POSITIVE, UNSPECIFIED SITE OF BREAST (HCC): ICD-10-CM

## 2024-07-03 DIAGNOSIS — G47.00 INSOMNIA, UNSPECIFIED TYPE: ICD-10-CM

## 2024-07-03 DIAGNOSIS — R53.82 CHRONIC FATIGUE: ICD-10-CM

## 2024-07-03 DIAGNOSIS — I51.9 DIASTOLIC DYSFUNCTION, LEFT VENTRICLE: ICD-10-CM

## 2024-07-03 DIAGNOSIS — C78.01 MALIGNANT NEOPLASM METASTATIC TO BOTH LUNGS (HCC): ICD-10-CM

## 2024-07-03 RX ORDER — VENLAFAXINE HYDROCHLORIDE 37.5 MG/1
37.5 CAPSULE, EXTENDED RELEASE ORAL DAILY
Qty: 90 CAPSULE | Refills: 1 | Status: SHIPPED | OUTPATIENT
Start: 2024-07-03

## 2024-07-03 ASSESSMENT — LIFESTYLE VARIABLES
HOW OFTEN DO YOU HAVE A DRINK CONTAINING ALCOHOL: NEVER
HOW MANY STANDARD DRINKS CONTAINING ALCOHOL DO YOU HAVE ON A TYPICAL DAY: PATIENT DOES NOT DRINK

## 2024-07-03 ASSESSMENT — PATIENT HEALTH QUESTIONNAIRE - PHQ9
2. FEELING DOWN, DEPRESSED OR HOPELESS: NOT AT ALL
SUM OF ALL RESPONSES TO PHQ QUESTIONS 1-9: 0
SUM OF ALL RESPONSES TO PHQ9 QUESTIONS 1 & 2: 0
1. LITTLE INTEREST OR PLEASURE IN DOING THINGS: NOT AT ALL

## 2024-07-03 NOTE — PROGRESS NOTES
Janie Griffin presents today for   Chief Complaint   Patient presents with    Medicare AWV                 1. \"Have you been to the ER, urgent care clinic since your last visit?  Hospitalized since your last visit?\" no    2. \"Have you seen or consulted any other health care providers outside of the [facility] since your last visit?\" no     3. For patients aged 45-75: Has the patient had a colonoscopy / FIT/ Cologuard? NA - based on age      If the patient is female:    4. For patients aged 40-74: Has the patient had a mammogram within the past 2 years? NA - based on age or sex      5. For patients aged 21-65: Has the patient had a pap smear? NA - based on age or sex

## 2024-07-03 NOTE — PROGRESS NOTES
Medicare Annual Wellness Visit    Janie BEBO GonzalezGriffin is here for Medicare AWV    Assessment & Plan   Essential hypertension  Diastolic dysfunction, left ventricle  Anxiety  -     venlafaxine (EFFEXOR XR) 37.5 MG extended release capsule; Take 1 capsule by mouth daily, Disp-90 capsule, R-1Normal  Chronic fatigue  -     venlafaxine (EFFEXOR XR) 37.5 MG extended release capsule; Take 1 capsule by mouth daily, Disp-90 capsule, R-1Normal  Malignant neoplasm of left breast in female, estrogen receptor positive, unspecified site of breast (HCC)  Malignant neoplasm metastatic to both lungs (HCC)  Immunosuppressed due to chemotherapy (HCC)  Pulmonary fibrosis (HCC)  Insomnia, unspecified type  Class 1 obesity due to excess calories with serious comorbidity and body mass index (BMI) of 30.0 to 30.9 in adult  Chronic bilateral low back pain with right-sided sciatica  Spinal stenosis of cervical region  Medicare annual wellness visit, subsequent  ACP (advance care planning)  Recommendations for Preventive Services Due: see orders and patient instructions/AVS.  Recommended screening schedule for the next 5-10 years is provided to the patient in written form: see Patient Instructions/AVS.     Return in about 8 weeks (around 8/28/2024).     Subjective   See office progress note for details.      Patient's complete Health Risk Assessment and screening values have been reviewed and are found in Flowsheets. The following problems were reviewed today and where indicated follow up appointments were made and/or referrals ordered.    Positive Risk Factor Screenings with Interventions:                Activity, Diet, and Weight:  On average, how many days per week do you engage in moderate to strenuous exercise (like a brisk walk)?: 0 days  On average, how many minutes do you engage in exercise at this level?: 0 min    Do you eat balanced/healthy meals regularly?: Yes    Body mass index is 30.09 kg/m². (!) Abnormal      Inactivity

## 2024-07-03 NOTE — PATIENT INSTRUCTIONS
to avoid saturated and trans fats. They increase your risk of heart disease.  Use olive or canola oil when you cook.  Bake, broil, grill, or steam foods instead of frying them.  Choose lean meats instead of high-fat meats such as hot dogs and sausages. Cut off all visible fat when you prepare meat.  Eat fish, skinless poultry, and meat alternatives such as soy products instead of high-fat meats. Soy products, such as tofu, may be especially good for your heart.  Choose low-fat or fat-free milk and dairy products.  Eat foods high in fiber  Eat a variety of grain products every day. Include whole-grain foods that have lots of fiber and nutrients. Examples of whole-grain foods include oats, whole wheat bread, and brown rice.  Buy whole-grain breads and cereals, instead of white bread or pastries.  Limit salt and sodium  Limit how much salt and sodium you eat to help lower your blood pressure.  Taste food before you salt it. Add only a little salt when you think you need it. With time, your taste buds will adjust to less salt.  Eat fewer snack items, fast foods, and other high-salt, processed foods. Check food labels for the amount of sodium in packaged foods.  Choose low-sodium versions of canned goods (such as soups, vegetables, and beans).  Limit sugar  Limit drinks and foods with added sugar. These include candy, desserts, and soda pop.  Limit alcohol  Limit alcohol to no more than 2 drinks a day for men and 1 drink a day for women. Too much alcohol can cause health problems.  When should you call for help?  Watch closely for changes in your health, and be sure to contact your doctor if:    You would like help planning heart-healthy meals.   Where can you learn more?  Go to https://www.healthGigaPan.net/GoodHelpConnections  Enter V137 in the search box to learn more about \"Heart-Healthy Diet: Care Instructions.\"  Current as of: August 22, 2019               Content Version: 12.6  © 5126-2587 [employer].

## 2024-07-03 NOTE — PROGRESS NOTES
HPI:   Janie Griffin is a 78 y.o. year old female who presents today for a follow-up visit.  She has a history of hypertension, dyslipidemia,  metastatic breast cancer, GERD, diverticulosis with recurrent diverticulitis, osteoarthritis s/p right knee replacement (2012), lumbar degenerative disease, osteopenia, compression fracture, and Tourette's syndrome.   She reports that she is doing reasonably well.  She presents accompanied by her .    She completed surveillance imaging with Dr. Luke on 5/24/2024 and chest CT scan showed that the main pulmonary artery measures 3.2 cm transversely. Prior echocardiogram (8/16/2023) showed moderately increased LV wall thickness but only trace TR so RVSP could not be assessed.  She was contacted and it was discussed that if any pulmonary hypertension existed, it was likely due to diastolic dysfunction related to her thickened LV.  Decision made to proceed with additional therapy for blood pressure control and she was started on hydrochlorothiazide 12.5 mg daily.  She returns today for follow-up and reports that she has been tolerating the addition of hydrochlorothiazide well and her blood pressure control has improved with readings generally 121-130/77-87.    She reports today that she is continuing to have ongoing fatigue and poor stamina which is limiting her her ADLs and other activities with friends and family.  She reports that she has not noted any improvement since discontinuing Ibrance 3 months ago, and reports that she is likely going to restart therapy since it appears not to be causing her fatigue.  She expresses concern that it may be related to treatment with letrozole as she had difficulty in the past with AI therapy. She has been attempting to attend family outings, dinner with friends, and take care of finances and other chores at home.  However, she becomes quite fatigued and requires much rest after a day of activities.  She also states that she is

## 2024-07-05 VITALS
BODY MASS INDEX: 30.04 KG/M2 | DIASTOLIC BLOOD PRESSURE: 78 MMHG | WEIGHT: 149 LBS | OXYGEN SATURATION: 96 % | TEMPERATURE: 98.5 F | SYSTOLIC BLOOD PRESSURE: 122 MMHG | HEART RATE: 82 BPM | HEIGHT: 59 IN

## 2024-07-05 PROBLEM — E66.09 CLASS 1 OBESITY DUE TO EXCESS CALORIES IN ADULT: Status: ACTIVE | Noted: 2024-04-07

## 2024-07-05 PROBLEM — E66.811 CLASS 1 OBESITY DUE TO EXCESS CALORIES IN ADULT: Status: ACTIVE | Noted: 2024-04-07

## 2024-07-05 PROBLEM — F41.9 ANXIETY: Status: ACTIVE | Noted: 2024-07-05

## 2024-07-05 PROBLEM — I51.9 DIASTOLIC DYSFUNCTION, LEFT VENTRICLE: Status: ACTIVE | Noted: 2024-07-05

## 2024-07-25 ENCOUNTER — HOSPITAL ENCOUNTER (EMERGENCY)
Facility: HOSPITAL | Age: 79
Discharge: HOME | End: 2024-07-25
Attending: STUDENT IN AN ORGANIZED HEALTH CARE EDUCATION/TRAINING PROGRAM
Payer: MEDICARE

## 2024-07-25 ENCOUNTER — APPOINTMENT (OUTPATIENT)
Facility: HOSPITAL | Age: 79
End: 2024-07-25
Attending: STUDENT IN AN ORGANIZED HEALTH CARE EDUCATION/TRAINING PROGRAM
Payer: MEDICARE

## 2024-07-25 VITALS
WEIGHT: 138 LBS | HEART RATE: 70 BPM | SYSTOLIC BLOOD PRESSURE: 155 MMHG | BODY MASS INDEX: 27.82 KG/M2 | TEMPERATURE: 97.6 F | DIASTOLIC BLOOD PRESSURE: 80 MMHG | OXYGEN SATURATION: 98 % | HEIGHT: 59 IN | RESPIRATION RATE: 16 BRPM

## 2024-07-25 DIAGNOSIS — E87.1 HYPONATREMIA: Primary | ICD-10-CM

## 2024-07-25 DIAGNOSIS — R42 LIGHTHEADED: ICD-10-CM

## 2024-07-25 LAB
ALBUMIN SERPL-MCNC: 3.7 G/DL (ref 3.4–5)
ALBUMIN/GLOB SERPL: 1.2 (ref 0.8–1.7)
ALP SERPL-CCNC: 56 U/L (ref 45–117)
ALT SERPL-CCNC: 25 U/L (ref 13–56)
ANION GAP SERPL CALC-SCNC: 7 MMOL/L (ref 3–18)
ANION GAP SERPL CALC-SCNC: 8 MMOL/L (ref 3–18)
APPEARANCE UR: CLEAR
AST SERPL-CCNC: 17 U/L (ref 10–38)
BASOPHILS # BLD: 0.1 K/UL (ref 0–0.1)
BASOPHILS NFR BLD: 1 % (ref 0–2)
BILIRUB SERPL-MCNC: 0.5 MG/DL (ref 0.2–1)
BILIRUB UR QL: NEGATIVE
BUN SERPL-MCNC: 16 MG/DL (ref 7–18)
BUN SERPL-MCNC: 18 MG/DL (ref 7–18)
BUN/CREAT SERPL: 23 (ref 12–20)
BUN/CREAT SERPL: 23 (ref 12–20)
CALCIUM SERPL-MCNC: 8.6 MG/DL (ref 8.5–10.1)
CALCIUM SERPL-MCNC: 8.9 MG/DL (ref 8.5–10.1)
CHLORIDE SERPL-SCNC: 95 MMOL/L (ref 100–111)
CHLORIDE SERPL-SCNC: 99 MMOL/L (ref 100–111)
CO2 SERPL-SCNC: 26 MMOL/L (ref 21–32)
CO2 SERPL-SCNC: 26 MMOL/L (ref 21–32)
COLOR UR: YELLOW
CREAT SERPL-MCNC: 0.69 MG/DL (ref 0.6–1.3)
CREAT SERPL-MCNC: 0.79 MG/DL (ref 0.6–1.3)
D DIMER PPP FEU-MCNC: 0.44 UG/ML(FEU)
DIFFERENTIAL METHOD BLD: ABNORMAL
EKG ATRIAL RATE: 86 BPM
EKG DIAGNOSIS: NORMAL
EKG P AXIS: 140 DEGREES
EKG P-R INTERVAL: 170 MS
EKG Q-T INTERVAL: 364 MS
EKG QRS DURATION: 94 MS
EKG QTC CALCULATION (BAZETT): 435 MS
EKG R AXIS: 215 DEGREES
EKG T AXIS: 87 DEGREES
EKG VENTRICULAR RATE: 86 BPM
EOSINOPHIL # BLD: 0.1 K/UL (ref 0–0.4)
EOSINOPHIL NFR BLD: 1 % (ref 0–5)
ERYTHROCYTE [DISTWIDTH] IN BLOOD BY AUTOMATED COUNT: 12.6 % (ref 11.6–14.5)
GLOBULIN SER CALC-MCNC: 3.2 G/DL (ref 2–4)
GLUCOSE SERPL-MCNC: 120 MG/DL (ref 74–99)
GLUCOSE SERPL-MCNC: 97 MG/DL (ref 74–99)
GLUCOSE UR STRIP.AUTO-MCNC: NEGATIVE MG/DL
HCT VFR BLD AUTO: 41.8 % (ref 35–45)
HGB BLD-MCNC: 14.7 G/DL (ref 12–16)
HGB UR QL STRIP: NEGATIVE
IMM GRANULOCYTES # BLD AUTO: 0 K/UL (ref 0–0.04)
IMM GRANULOCYTES NFR BLD AUTO: 0 % (ref 0–0.5)
KETONES UR QL STRIP.AUTO: NEGATIVE MG/DL
LEUKOCYTE ESTERASE UR QL STRIP.AUTO: NEGATIVE
LYMPHOCYTES # BLD: 2.2 K/UL (ref 0.9–3.6)
LYMPHOCYTES NFR BLD: 26 % (ref 21–52)
MAGNESIUM SERPL-MCNC: 1.7 MG/DL (ref 1.6–2.6)
MCH RBC QN AUTO: 32.2 PG (ref 24–34)
MCHC RBC AUTO-ENTMCNC: 35.2 G/DL (ref 31–37)
MCV RBC AUTO: 91.7 FL (ref 78–100)
MONOCYTES # BLD: 0.8 K/UL (ref 0.05–1.2)
MONOCYTES NFR BLD: 9 % (ref 3–10)
NEUTS SEG # BLD: 5.2 K/UL (ref 1.8–8)
NEUTS SEG NFR BLD: 62 % (ref 40–73)
NITRITE UR QL STRIP.AUTO: NEGATIVE
NRBC # BLD: 0 K/UL (ref 0–0.01)
NRBC BLD-RTO: 0 PER 100 WBC
NT PRO BNP: 46 PG/ML (ref 0–1800)
PH UR STRIP: 6 (ref 5–8)
PLATELET # BLD AUTO: 240 K/UL (ref 135–420)
PMV BLD AUTO: 8.4 FL (ref 9.2–11.8)
POTASSIUM SERPL-SCNC: 3.9 MMOL/L (ref 3.5–5.5)
POTASSIUM SERPL-SCNC: 4.1 MMOL/L (ref 3.5–5.5)
PROT SERPL-MCNC: 6.9 G/DL (ref 6.4–8.2)
PROT UR STRIP-MCNC: NEGATIVE MG/DL
RBC # BLD AUTO: 4.56 M/UL (ref 4.2–5.3)
SODIUM SERPL-SCNC: 129 MMOL/L (ref 136–145)
SODIUM SERPL-SCNC: 132 MMOL/L (ref 136–145)
SP GR UR REFRACTOMETRY: 1 (ref 1–1.03)
TROPONIN I SERPL HS-MCNC: 7 NG/L (ref 0–54)
UROBILINOGEN UR QL STRIP.AUTO: 0.2 EU/DL (ref 0.2–1)
WBC # BLD AUTO: 8.3 K/UL (ref 4.6–13.2)

## 2024-07-25 PROCEDURE — 84484 ASSAY OF TROPONIN QUANT: CPT

## 2024-07-25 PROCEDURE — 6360000002 HC RX W HCPCS: Performed by: STUDENT IN AN ORGANIZED HEALTH CARE EDUCATION/TRAINING PROGRAM

## 2024-07-25 PROCEDURE — 85025 COMPLETE CBC W/AUTO DIFF WBC: CPT

## 2024-07-25 PROCEDURE — 81003 URINALYSIS AUTO W/O SCOPE: CPT

## 2024-07-25 PROCEDURE — 99285 EMERGENCY DEPT VISIT HI MDM: CPT

## 2024-07-25 PROCEDURE — 80053 COMPREHEN METABOLIC PANEL: CPT

## 2024-07-25 PROCEDURE — 93005 ELECTROCARDIOGRAM TRACING: CPT | Performed by: STUDENT IN AN ORGANIZED HEALTH CARE EDUCATION/TRAINING PROGRAM

## 2024-07-25 PROCEDURE — 93010 ELECTROCARDIOGRAM REPORT: CPT | Performed by: INTERNAL MEDICINE

## 2024-07-25 PROCEDURE — 2580000003 HC RX 258: Performed by: STUDENT IN AN ORGANIZED HEALTH CARE EDUCATION/TRAINING PROGRAM

## 2024-07-25 PROCEDURE — 83735 ASSAY OF MAGNESIUM: CPT

## 2024-07-25 PROCEDURE — 83880 ASSAY OF NATRIURETIC PEPTIDE: CPT

## 2024-07-25 PROCEDURE — 71045 X-RAY EXAM CHEST 1 VIEW: CPT

## 2024-07-25 PROCEDURE — 85379 FIBRIN DEGRADATION QUANT: CPT

## 2024-07-25 RX ORDER — 0.9 % SODIUM CHLORIDE 0.9 %
1000 INTRAVENOUS SOLUTION INTRAVENOUS ONCE
Status: COMPLETED | OUTPATIENT
Start: 2024-07-25 | End: 2024-07-25

## 2024-07-25 RX ORDER — MAGNESIUM SULFATE IN WATER 40 MG/ML
2000 INJECTION, SOLUTION INTRAVENOUS ONCE
Status: COMPLETED | OUTPATIENT
Start: 2024-07-25 | End: 2024-07-25

## 2024-07-25 RX ADMIN — MAGNESIUM SULFATE HEPTAHYDRATE 2000 MG: 40 INJECTION, SOLUTION INTRAVENOUS at 17:49

## 2024-07-25 RX ADMIN — SODIUM CHLORIDE 1000 ML: 900 INJECTION, SOLUTION INTRAVENOUS at 17:04

## 2024-07-25 ASSESSMENT — PAIN - FUNCTIONAL ASSESSMENT
PAIN_FUNCTIONAL_ASSESSMENT: NONE - DENIES PAIN
PAIN_FUNCTIONAL_ASSESSMENT: NONE - DENIES PAIN

## 2024-07-25 NOTE — ED TRIAGE NOTES
Patient arrived to ER multiple complaints. C/O shortness of breath, low O2 sats 2 days ago, blood pressure readings have been high at home. Patient messaged her PCP and he referred her to come to ER for evaluation. O2 sats % in triage, states she has Hx CA and has bot been feeling herself for a while.

## 2024-07-25 NOTE — ED PROVIDER NOTES
[facility] EMERGENCY DEPT  EMERGENCY DEPARTMENT ENCOUNTER      Pt Name: Janie Griffin  MRN: [id number]  Birthdate 1945  Date of evaluation: 7/25/2024  Provider: Laila Russell MD    CHIEF COMPLAINT       Chief Complaint   Patient presents with    Shortness of Breath         HISTORY OF PRESENT ILLNESS   (Location/Symptom, Timing/Onset, Context/Setting, Quality, Duration, Modifying Factors, Severity)  Note limiting factors.   Janie Griffin is a 78 y.o. female who presents to the emergency department for lightheadedness and shortness of breath.  He states that these have been going on for months and she has been following regularly with her PCP.  States that last night she was checking her oxygen levels and they were low in the high 80s.  At that time she was not having any acute worsening of her symptoms.  Denies any associated chest pain.  Lightheadedness comes and goes but is worse with standing or moving around.  States she is still having good oral intake and believes her diarrhea is at her baseline.  Denies any changes in her medications.  Her shortness of breath also comes and goes and is worse with exertion.  Denies any recent runny nose, sore throat or cough.  Denies any fever, sweats or chills.  States that she has not had any significant change in her symptoms however because she had a low oxygen reading last night she talk to her doctor who advised her to come to the emergency department.  Denies any known history of blood clots new swelling in her lower extremities or hemoptysis     Nursing Notes were reviewed.    REVIEW OF SYSTEMS    (2-9 systems for level 4, 10 or more for level 5)     Constitutional: No fever  HENT: No ear pain  Eyes: No change in vision  Respiratory: Positive for shortness of breath  Cardio: No chest pain  GI: No blood in stool  : No hematuria  MSK: No back pain  Skin: No rashes  Neuro: No headache    Except as noted above the remainder of the review of systems was reviewed and  07/25/24 1931   BP: (!) 145/87 124/79 (!) 159/82 (!) 155/80   Pulse: 85 77 74 70   Resp: 24 19 21 16   Temp:    97.6 °F (36.4 °C)   TempSrc:    Oral   SpO2: 99% 100% 95% 98%   Weight:       Height:           Medical Decision Making  Amount and/or Complexity of Data Reviewed  Labs: ordered.  Radiology: ordered.  ECG/medicine tests: ordered.    Risk  Prescription drug management.    Patient is a 78-year-old female who presents with chronic lightheadedness and shortness of breath however states she had low oxygen reading last night's BiPAP as she did not have any significant worsening of her symptoms.  Currently she is hemodynamically stable and has a oxygen rate of 100% on room air.  She does not appear to be in any respiratory distress.  However considering her age and risk factors she will be worked up for reasons for her lightheadedness and shortness of breath.  She is low risk for PE so we will send off a dimer.  Will do a cardiac workup.  She does not appear grossly fluid overloaded.  She will be given fluids.    CMP, BMP, troponin, CBC and D-dimer are all within normal limits, with the exception of a low sodium.  This can exclude PE in this low risk patient.  Symptoms been going on for months I feel that 1 troponin is sufficient to rule out ACS.  Magnesium is on the low end of normal and we replaced by IV.  Will recheck her sodium after fluid administration to make sure that it is improving.  Has been low before I do not think she is symptomatic enough for admission at this time.    Urine unremarkable for any significant process.    On reexamination states she feels much better after some fluids and magnesium.  She does not feel lightheaded or short of breath at this time.    Repeat BMP shows improvement in sodium.  Still low but up to 132 which is what it was a month ago.  Discussed increasing salt intake and decreasing free water.  Recommend close follow-up with her PCP for management of her sodium

## 2024-07-26 ENCOUNTER — TELEPHONE (OUTPATIENT)
Facility: CLINIC | Age: 79
End: 2024-07-26

## 2024-07-26 DIAGNOSIS — E87.1 HYPONATREMIA: Primary | ICD-10-CM

## 2024-07-26 DIAGNOSIS — I10 ESSENTIAL HYPERTENSION: ICD-10-CM

## 2024-07-26 NOTE — TELEPHONE ENCOUNTER
Spoke with patient.  Reviewed ED visit.  Discussed the followin.  Discontinue hydrochlorothiazide and continue monitoring blood pressure at home.  2.  Maintain good electrolyte intake and fluid intake.  3.  Obtain labs prior to her next visit on 2024-they should be NON fasting.    Discussed follow-up with pulmonary and cardiology, but patient not wishing to.  She requested whether a repeat echocardiogram could be performed and advised that we will reassess at her visit today decide if it is needed.  She discusses that letrozole has been discontinued by Dr. Luke 2 weeks ago and need to see if her fatigue improves following this change.    Also discussed initiating venlafaxine as prescribed.    Please schedule her for a nonfasting lab appointment prior to her visit on 2024.

## 2024-07-26 NOTE — TELEPHONE ENCOUNTER
Pt called in stating Dr. Darling sent her to the ER on yesterday and she was there until 8pm last night. Due to this she states she knows Dr. Darling will be calling soon and would like for her to use her cell number.    Please advise.  CB#: [phone]

## 2024-07-31 ENCOUNTER — HOSPITAL ENCOUNTER (OUTPATIENT)
Facility: HOSPITAL | Age: 79
Setting detail: SPECIMEN
Discharge: HOME | End: 2024-08-03
Payer: MEDICARE

## 2024-07-31 ENCOUNTER — OFFICE VISIT (OUTPATIENT)
Facility: CLINIC | Age: 79
End: 2024-07-31

## 2024-07-31 ENCOUNTER — TELEPHONE (OUTPATIENT)
Facility: CLINIC | Age: 79
End: 2024-07-31

## 2024-07-31 VITALS
TEMPERATURE: 98.5 F | WEIGHT: 144 LBS | HEIGHT: 59 IN | RESPIRATION RATE: 16 BRPM | BODY MASS INDEX: 29.03 KG/M2 | HEART RATE: 78 BPM | SYSTOLIC BLOOD PRESSURE: 158 MMHG | OXYGEN SATURATION: 95 % | DIASTOLIC BLOOD PRESSURE: 86 MMHG

## 2024-07-31 DIAGNOSIS — C78.01 MALIGNANT NEOPLASM METASTATIC TO BOTH LUNGS (HCC): ICD-10-CM

## 2024-07-31 DIAGNOSIS — E87.1 HYPONATREMIA: ICD-10-CM

## 2024-07-31 DIAGNOSIS — C50.912 MALIGNANT NEOPLASM OF LEFT BREAST IN FEMALE, ESTROGEN RECEPTOR POSITIVE, UNSPECIFIED SITE OF BREAST (HCC): ICD-10-CM

## 2024-07-31 DIAGNOSIS — I51.9 DIASTOLIC DYSFUNCTION, LEFT VENTRICLE: ICD-10-CM

## 2024-07-31 DIAGNOSIS — Z09 HOSPITAL DISCHARGE FOLLOW-UP: Primary | ICD-10-CM

## 2024-07-31 DIAGNOSIS — R53.82 CHRONIC FATIGUE: ICD-10-CM

## 2024-07-31 DIAGNOSIS — F41.9 ANXIETY: ICD-10-CM

## 2024-07-31 DIAGNOSIS — R06.09 DYSPNEA ON EXERTION: ICD-10-CM

## 2024-07-31 DIAGNOSIS — I10 UNCONTROLLED HYPERTENSION: ICD-10-CM

## 2024-07-31 DIAGNOSIS — C78.02 MALIGNANT NEOPLASM METASTATIC TO BOTH LUNGS (HCC): ICD-10-CM

## 2024-07-31 DIAGNOSIS — Z17.0 MALIGNANT NEOPLASM OF LEFT BREAST IN FEMALE, ESTROGEN RECEPTOR POSITIVE, UNSPECIFIED SITE OF BREAST (HCC): ICD-10-CM

## 2024-07-31 LAB
ALBUMIN SERPL-MCNC: 3.8 G/DL (ref 3.4–5)
ANION GAP SERPL CALC-SCNC: 5 MMOL/L (ref 3–18)
BUN SERPL-MCNC: 16 MG/DL (ref 7–18)
BUN/CREAT SERPL: 17 (ref 12–20)
CALCIUM SERPL-MCNC: 9.1 MG/DL (ref 8.5–10.1)
CHLORIDE SERPL-SCNC: 98 MMOL/L (ref 100–111)
CO2 SERPL-SCNC: 29 MMOL/L (ref 21–32)
CREAT SERPL-MCNC: 0.96 MG/DL (ref 0.6–1.3)
GLUCOSE SERPL-MCNC: 104 MG/DL (ref 74–99)
MAGNESIUM SERPL-MCNC: 1.8 MG/DL (ref 1.6–2.6)
PHOSPHATE SERPL-MCNC: 3.7 MG/DL (ref 2.5–4.9)
POTASSIUM SERPL-SCNC: 4.4 MMOL/L (ref 3.5–5.5)
SODIUM SERPL-SCNC: 132 MMOL/L (ref 136–145)

## 2024-07-31 PROCEDURE — 36415 COLL VENOUS BLD VENIPUNCTURE: CPT

## 2024-07-31 PROCEDURE — 80069 RENAL FUNCTION PANEL: CPT

## 2024-07-31 PROCEDURE — 83735 ASSAY OF MAGNESIUM: CPT

## 2024-07-31 RX ORDER — METOPROLOL SUCCINATE 25 MG/1
25 TABLET, EXTENDED RELEASE ORAL DAILY
Qty: 90 TABLET | Refills: 3 | Status: SHIPPED | OUTPATIENT
Start: 2024-07-31

## 2024-07-31 NOTE — PROGRESS NOTES
HPI:   Janie Griffin is a 78 y.o. year old female who presents today for post ED follow-up.  She has a history of hypertension, dyslipidemia,  metastatic breast cancer, GERD, diverticulosis with recurrent diverticulitis, osteoarthritis s/p right knee replacement (2012), lumbar degenerative disease, osteopenia, compression fracture, and Tourette's syndrome.   She reports that she is doing only fairly well.     She completed surveillance imaging with Dr. Luke on 5/24/2024 and chest CT scan showed that the main pulmonary artery measures 3.2 cm transversely. Prior echocardiogram (8/16/2023) showed moderately increased LV wall thickness but only trace TR so RVSP could not be assessed.  She was contacted and it was discussed that if any pulmonary hypertension existed, it was likely due to diastolic dysfunction related to her thickened LV.  Decision made to proceed with additional therapy for blood pressure control and she was started on hydrochlorothiazide 12.5 mg daily.  She was seen in follow-up on 7/3/2024 and was noted to have significant improvement in her blood pressure control with readings generally 121-130/77-87.  She had a follow-up visit with Dr. Luke on 7/12/2024 and letrozole was discontinued to see if that would help with her ongoing fatigue and poor stamina.    On 7/25/2024, she contacted the office and reported worsening fatigue, lightheadedness, and shortness of breath.  She reported that she had a pulse ox reading when she was feeling poorly registering at only 88%.  She was advised to present to the ED and was seen at [facility] ED.  Evaluation showed chest x-ray slightly increased bibasilar markings most likely representing atelectasis; EKG sinus rhythm at 86 bpm WBC 8.3, Hb 14.7/HCT 41.8, platelets 240, Na 129, creatinine 0.79/eGFR 77, normal LFTs, Mg 1.7, K 4.1, urinalysis normal, NT proBNP 46, D-dimer 0.44, and troponin 7.  She received 1 L normal saline and 2000 mg IV magnesium.  Repeat BMP

## 2024-07-31 NOTE — PATIENT INSTRUCTIONS
more about \"A Healthy Lifestyle: Care Instructions.\"  Current as of: August 6, 2023               Content Version: 14.0  © 6644-0735 [employer].   Care instructions adapted under license by [employer]. If you have questions about a medical condition or this instruction, always ask your healthcare professional. [employer] disclaims any warranty or liability for your use of this information.

## 2024-07-31 NOTE — PROGRESS NOTES
Janie Griffin presents today for   Chief Complaint   Patient presents with    Follow-Up from Hospital       \"Have you been to the ER, urgent care clinic since your last visit?  Hospitalized since your last visit?\"    Yes  07/25/2024  [facility]  hyponatremia    “Have you seen or consulted any other health care providers outside of [facility] since your last visit?”    NO

## 2024-08-01 ENCOUNTER — TELEPHONE (OUTPATIENT)
Facility: CLINIC | Age: 79
End: 2024-08-01

## 2024-08-01 DIAGNOSIS — I51.9 DIASTOLIC DYSFUNCTION, LEFT VENTRICLE: ICD-10-CM

## 2024-08-01 RX ORDER — DAPAGLIFLOZIN 5 MG/1
TABLET, FILM COATED ORAL
Refills: 0 | OUTPATIENT
Start: 2024-08-01

## 2024-08-01 NOTE — TELEPHONE ENCOUNTER
Pt called in states the empagliflozin (JARDIANCE) 10 MG tablet  is too expensive and wants to  know if there is something else that can be called in.     Please advise.    Pharmacy   [facility]/PHARMACY #[id number] - [place], [place] - [address] - P [phone] - F [phone] [[id number]]

## 2024-08-01 NOTE — TELEPHONE ENCOUNTER
Called patient, informed her there is no other medication options. She will go ahead and fill one month at [facility]. She will work on contacting her insurance to find out her mail order pharmacy and seeing if a 90 day supply through there would be cheaper.

## 2024-08-01 NOTE — TELEPHONE ENCOUNTER
According to the computer, Farxiga is not covered at all so likely more expensive than Jardiance.  Please ask her to inquire with her mail-order company since it is often less expensive that way.  She looks like she would be eligible for Cytherismark

## 2024-08-01 NOTE — TELEPHONE ENCOUNTER
PCP: Michelle Darling MD     Called pharmacy Jardiance is 145$ a 30 day supply    LAST REFILL PER CHART:  Medication:empagliflozin (JARDIANCE) 10 MG tablet   Ordered On:07/31/2024  Instructions:Take 1 tablet by mouth daily   Dispense:90  Refills:3      Future Appointments   Date Time Provider Department Center   9/6/2024  1:15 PM [facility] LAB VISIT [facility]   9/11/2024  2:30 PM Michelle Darling MD [facility]

## 2024-08-01 NOTE — TELEPHONE ENCOUNTER
Pt stated that the medication is too expensive     Pt stated that if she has to take it, she will go ahead and pay for it    Pt requesting a call back please    [phone]

## 2024-08-02 ENCOUNTER — TELEPHONE (OUTPATIENT)
Facility: CLINIC | Age: 79
End: 2024-08-02

## 2024-08-02 NOTE — TELEPHONE ENCOUNTER
Pt stated that the supplier does not have the JARDIANCE    Pt asking should she go back on hctz    Pt asking should she take Metoprolol succinate 25 mg extended release tablet, although she does not have the JARDIANCE yet    Pharmacy not sure when the JARDIANCE will be in stock    Pt requesting a call back please    [facility]  [address]    [phone]

## 2024-08-05 NOTE — TELEPHONE ENCOUNTER
Called patient, gave information. She picked up Jardiance on Saturday. She stated at her last office visit that  said she had fluid on her lungs, she wants to know how she knows that and what exactly that means. Please advise.

## 2024-08-05 NOTE — TELEPHONE ENCOUNTER
Please advise that she does not have fluid in her lungs.  She has diastolic dysfunction which means that her left ventricle is stiffened due to thickened muscle from high blood pressure. This is likely contributing to her shortness of breath and will be helped by use of Jardiance.

## 2024-08-05 NOTE — TELEPHONE ENCOUNTER
Please advise that she should begin taking metoprolol succinate to help with her blood pressure control.  I would not recommend restarting hydrochlorothiazide.  Please let her find out if another pharmacy has Jardiance in stock and will send new prescription.

## 2024-08-06 ENCOUNTER — HOSPITAL ENCOUNTER (OUTPATIENT)
Facility: HOSPITAL | Age: 79
Setting detail: SPECIMEN
Discharge: HOME | End: 2024-08-09

## 2024-08-12 ENCOUNTER — PATIENT MESSAGE (OUTPATIENT)
Facility: CLINIC | Age: 79
End: 2024-08-12

## 2024-08-12 RX ORDER — METOPROLOL SUCCINATE 25 MG/1
50 TABLET, EXTENDED RELEASE ORAL DAILY
Qty: 90 TABLET | Refills: 3 | Status: SHIPPED | OUTPATIENT
Start: 2024-08-12

## 2024-08-12 NOTE — TELEPHONE ENCOUNTER
Called and spoke with patient. Discussed that blood pressure continues to remain elevated, ranging 140-150/80-85 after adding metoprolol succinate 25 mg daily and Jardiance 10 mg daily. Advised to increase dose of metoprolol succinate to 50 mg daily and continue monitoring.  Also answered questions regarding LV diastolic dysfunction and explained effect on lungs.

## 2024-08-19 ENCOUNTER — TELEPHONE (OUTPATIENT)
Facility: CLINIC | Age: 79
End: 2024-08-19

## 2024-08-19 DIAGNOSIS — G47.00 INSOMNIA, UNSPECIFIED TYPE: Primary | ICD-10-CM

## 2024-08-19 RX ORDER — TEMAZEPAM 15 MG/1
15 CAPSULE ORAL NIGHTLY PRN
Qty: 30 CAPSULE | Refills: 0 | Status: SHIPPED | OUTPATIENT
Start: 2024-08-19 | End: 2024-09-18

## 2024-08-19 RX ORDER — METOPROLOL SUCCINATE 50 MG/1
50 TABLET, EXTENDED RELEASE ORAL DAILY
Qty: 90 TABLET | Refills: 3 | Status: SHIPPED | OUTPATIENT
Start: 2024-08-19

## 2024-08-19 NOTE — TELEPHONE ENCOUNTER
Called and spoke with patient.  Reports having bilateral thigh pain with standing and ambulation.  Also noting an recurrence of the right anterior lower leg pain which had disappeared after starting on pregabalin 50 mg twice daily.  Advised that would not expect symptoms to be related to metoprolol or Jardiance, and likely a flare of her lumbar stenosis with radiculopathy.  Advised that would recommend increasing dose of pregabalin to 50 mg every morning and 100 mg every evening.  Advised that if tolerates and helpful, would also try to titrate dose of pregabalin to 100 mg twice daily.    Reports blood pressure better controlled since increasing dose of metoprolol succinate to 50 mg daily and continuing on Jardiance 10 mg daily.  Reports has blood work this week with Dr. Luke.  New prescription for higher dose of metoprolol succinate sent to [facility].    Also requesting a refill of temazepam.   reviewed.    Patient has upcoming appointment on 9/11/2024 with labs prior.  Will follow-up at that time.

## 2024-08-19 NOTE — TELEPHONE ENCOUNTER
Two days after beginning new medication of   metoprolol succinate and   empagliflozin (JARDIANCE) 10 MG table     Pt states she has upper leg pain when getting up from a sitting position. Pt states that it is dificult when standing from a chair or the toilet. It doesn't matter what time of day, the pain is consistent.   Pt is also experiencing some lightheadedness throughout the day.     Medication is taken in the morning.     Please advise   Phone: [phone]

## 2024-09-03 ENCOUNTER — PATIENT MESSAGE (OUTPATIENT)
Facility: CLINIC | Age: 79
End: 2024-09-03

## 2024-09-06 ENCOUNTER — HOSPITAL ENCOUNTER (OUTPATIENT)
Facility: HOSPITAL | Age: 79
Setting detail: SPECIMEN
Discharge: HOME | End: 2024-09-09
Payer: MEDICARE

## 2024-09-06 DIAGNOSIS — E87.1 HYPONATREMIA: ICD-10-CM

## 2024-09-06 DIAGNOSIS — I10 ESSENTIAL HYPERTENSION: ICD-10-CM

## 2024-09-06 LAB
ALBUMIN SERPL-MCNC: 4 G/DL (ref 3.4–5)
ANION GAP SERPL CALC-SCNC: 5 MMOL/L (ref 3–18)
BUN SERPL-MCNC: 17 MG/DL (ref 7–18)
BUN/CREAT SERPL: 20 (ref 12–20)
CALCIUM SERPL-MCNC: 9.2 MG/DL (ref 8.5–10.1)
CHLORIDE SERPL-SCNC: 99 MMOL/L (ref 100–111)
CO2 SERPL-SCNC: 28 MMOL/L (ref 21–32)
CREAT SERPL-MCNC: 0.85 MG/DL (ref 0.6–1.3)
GLUCOSE SERPL-MCNC: 81 MG/DL (ref 74–99)
MAGNESIUM SERPL-MCNC: 1.9 MG/DL (ref 1.6–2.6)
OSMOLALITY UR: 382 MOSM/KG H2O
PHOSPHATE SERPL-MCNC: 2.8 MG/DL (ref 2.5–4.9)
POTASSIUM SERPL-SCNC: 5.1 MMOL/L (ref 3.5–5.5)
SODIUM SERPL-SCNC: 132 MMOL/L (ref 136–145)
SODIUM UR-SCNC: 30 MMOL/L (ref 20–110)

## 2024-09-06 PROCEDURE — 83735 ASSAY OF MAGNESIUM: CPT

## 2024-09-06 PROCEDURE — 83935 ASSAY OF URINE OSMOLALITY: CPT

## 2024-09-06 PROCEDURE — 36415 COLL VENOUS BLD VENIPUNCTURE: CPT

## 2024-09-06 PROCEDURE — 80069 RENAL FUNCTION PANEL: CPT

## 2024-09-06 PROCEDURE — 84300 ASSAY OF URINE SODIUM: CPT

## 2024-09-09 DIAGNOSIS — M47.816 LUMBAR FACET ARTHROPATHY: ICD-10-CM

## 2024-09-09 RX ORDER — PREGABALIN 50 MG/1
100 CAPSULE ORAL 2 TIMES DAILY
Qty: 360 CAPSULE | Refills: 0 | Status: SHIPPED | OUTPATIENT
Start: 2024-09-09 | End: 2024-12-08

## 2024-09-11 ENCOUNTER — OFFICE VISIT (OUTPATIENT)
Facility: CLINIC | Age: 79
End: 2024-09-11

## 2024-09-11 VITALS
RESPIRATION RATE: 14 BRPM | HEIGHT: 59 IN | HEART RATE: 76 BPM | OXYGEN SATURATION: 98 % | BODY MASS INDEX: 28.83 KG/M2 | WEIGHT: 143 LBS | DIASTOLIC BLOOD PRESSURE: 78 MMHG | SYSTOLIC BLOOD PRESSURE: 132 MMHG | TEMPERATURE: 98.4 F

## 2024-09-11 DIAGNOSIS — C78.02 MALIGNANT NEOPLASM METASTATIC TO BOTH LUNGS (HCC): ICD-10-CM

## 2024-09-11 DIAGNOSIS — I51.9 DIASTOLIC DYSFUNCTION, LEFT VENTRICLE: ICD-10-CM

## 2024-09-11 DIAGNOSIS — I10 ESSENTIAL HYPERTENSION: Primary | ICD-10-CM

## 2024-09-11 DIAGNOSIS — R53.82 CHRONIC FATIGUE: ICD-10-CM

## 2024-09-11 DIAGNOSIS — M54.41 CHRONIC BILATERAL LOW BACK PAIN WITH BILATERAL SCIATICA: ICD-10-CM

## 2024-09-11 DIAGNOSIS — J84.10 PULMONARY FIBROSIS (HCC): ICD-10-CM

## 2024-09-11 DIAGNOSIS — M79.652 BILATERAL THIGH PAIN: ICD-10-CM

## 2024-09-11 DIAGNOSIS — R06.09 DYSPNEA ON EXERTION: ICD-10-CM

## 2024-09-11 DIAGNOSIS — E87.1 HYPONATREMIA: ICD-10-CM

## 2024-09-11 DIAGNOSIS — M47.816 LUMBAR FACET ARTHROPATHY: ICD-10-CM

## 2024-09-11 DIAGNOSIS — M54.42 CHRONIC BILATERAL LOW BACK PAIN WITH BILATERAL SCIATICA: ICD-10-CM

## 2024-09-11 DIAGNOSIS — C50.912 MALIGNANT NEOPLASM OF LEFT BREAST IN FEMALE, ESTROGEN RECEPTOR POSITIVE, UNSPECIFIED SITE OF BREAST (HCC): ICD-10-CM

## 2024-09-11 DIAGNOSIS — C78.01 MALIGNANT NEOPLASM METASTATIC TO BOTH LUNGS (HCC): ICD-10-CM

## 2024-09-11 DIAGNOSIS — G89.29 CHRONIC BILATERAL LOW BACK PAIN WITH BILATERAL SCIATICA: ICD-10-CM

## 2024-09-11 DIAGNOSIS — M79.651 BILATERAL THIGH PAIN: ICD-10-CM

## 2024-09-11 DIAGNOSIS — M48.062 SPINAL STENOSIS OF LUMBAR REGION WITH NEUROGENIC CLAUDICATION: ICD-10-CM

## 2024-09-11 DIAGNOSIS — Z17.0 MALIGNANT NEOPLASM OF LEFT BREAST IN FEMALE, ESTROGEN RECEPTOR POSITIVE, UNSPECIFIED SITE OF BREAST (HCC): ICD-10-CM

## 2024-09-11 DIAGNOSIS — E66.3 OVERWEIGHT (BMI 25.0-29.9): ICD-10-CM

## 2024-09-11 RX ORDER — METOPROLOL SUCCINATE 25 MG/1
25 TABLET, EXTENDED RELEASE ORAL DAILY
Qty: 90 TABLET | Refills: 3 | Status: SHIPPED | OUTPATIENT
Start: 2024-09-11

## 2024-09-15 PROBLEM — N83.209 CYST OF OVARY: Status: RESOLVED | Noted: 2024-02-06 | Resolved: 2024-09-15

## 2024-09-15 PROBLEM — T45.1X5A IMMUNOSUPPRESSED DUE TO CHEMOTHERAPY (HCC): Status: RESOLVED | Noted: 2021-04-09 | Resolved: 2024-09-15

## 2024-09-15 PROBLEM — D75.89 MACROCYTOSIS: Status: RESOLVED | Noted: 2024-02-06 | Resolved: 2024-09-15

## 2024-09-15 PROBLEM — Z79.899 IMMUNOSUPPRESSED DUE TO CHEMOTHERAPY (HCC): Status: RESOLVED | Noted: 2021-04-09 | Resolved: 2024-09-15

## 2024-09-15 PROBLEM — Z79.69 IMMUNOSUPPRESSED DUE TO CHEMOTHERAPY: Status: RESOLVED | Noted: 2021-04-09 | Resolved: 2024-09-15

## 2024-09-15 PROBLEM — D84.821 IMMUNOSUPPRESSED DUE TO CHEMOTHERAPY (HCC): Status: RESOLVED | Noted: 2021-04-09 | Resolved: 2024-09-15

## 2024-09-30 ENCOUNTER — OFFICE VISIT (OUTPATIENT)
Age: 79
End: 2024-09-30
Payer: MEDICARE

## 2024-09-30 VITALS
SYSTOLIC BLOOD PRESSURE: 160 MMHG | HEART RATE: 62 BPM | HEIGHT: 59 IN | BODY MASS INDEX: 29.43 KG/M2 | DIASTOLIC BLOOD PRESSURE: 90 MMHG | WEIGHT: 146 LBS | OXYGEN SATURATION: 97 %

## 2024-09-30 DIAGNOSIS — R06.02 SHORTNESS OF BREATH: ICD-10-CM

## 2024-09-30 DIAGNOSIS — R53.83 OTHER FATIGUE: Primary | ICD-10-CM

## 2024-09-30 DIAGNOSIS — R42 LIGHTHEADEDNESS: ICD-10-CM

## 2024-09-30 PROCEDURE — G8428 CUR MEDS NOT DOCUMENT: HCPCS | Performed by: NURSE PRACTITIONER

## 2024-09-30 PROCEDURE — 1123F ACP DISCUSS/DSCN MKR DOCD: CPT | Performed by: NURSE PRACTITIONER

## 2024-09-30 PROCEDURE — 3080F DIAST BP >= 90 MM HG: CPT | Performed by: NURSE PRACTITIONER

## 2024-09-30 PROCEDURE — 1090F PRES/ABSN URINE INCON ASSESS: CPT | Performed by: NURSE PRACTITIONER

## 2024-09-30 PROCEDURE — 3077F SYST BP >= 140 MM HG: CPT | Performed by: NURSE PRACTITIONER

## 2024-09-30 PROCEDURE — 1036F TOBACCO NON-USER: CPT | Performed by: NURSE PRACTITIONER

## 2024-09-30 PROCEDURE — G8399 PT W/DXA RESULTS DOCUMENT: HCPCS | Performed by: NURSE PRACTITIONER

## 2024-09-30 PROCEDURE — G8417 CALC BMI ABV UP PARAM F/U: HCPCS | Performed by: NURSE PRACTITIONER

## 2024-09-30 PROCEDURE — 99214 OFFICE O/P EST MOD 30 MIN: CPT | Performed by: NURSE PRACTITIONER

## 2024-09-30 RX ORDER — LOSARTAN POTASSIUM 50 MG/1
50 TABLET ORAL 2 TIMES DAILY
Qty: 60 TABLET | Refills: 3 | Status: SHIPPED | OUTPATIENT
Start: 2024-09-30

## 2024-09-30 ASSESSMENT — ENCOUNTER SYMPTOMS
VOMITING: 0
BLOOD IN STOOL: 0
WHEEZING: 0
NAUSEA: 0
SHORTNESS OF BREATH: 1
ABDOMINAL DISTENTION: 0
COUGH: 0
DIARRHEA: 0
CONSTIPATION: 0

## 2024-09-30 ASSESSMENT — PATIENT HEALTH QUESTIONNAIRE - PHQ9
SUM OF ALL RESPONSES TO PHQ QUESTIONS 1-9: 0
SUM OF ALL RESPONSES TO PHQ QUESTIONS 1-9: 0
2. FEELING DOWN, DEPRESSED OR HOPELESS: NOT AT ALL
SUM OF ALL RESPONSES TO PHQ9 QUESTIONS 1 & 2: 0
SUM OF ALL RESPONSES TO PHQ QUESTIONS 1-9: 0
1. LITTLE INTEREST OR PLEASURE IN DOING THINGS: NOT AT ALL
SUM OF ALL RESPONSES TO PHQ QUESTIONS 1-9: 0

## 2024-09-30 NOTE — PROGRESS NOTES
Janie Griffin presents today for   Chief Complaint   Patient presents with    Follow-up     Add on due to SOB per pcp     Chest Pain     Left chest aching at times     Shortness of Breath     SOB with exertion/rest     Dizziness     Dizzy spells where feels off-balance        Janie Griffin preferred language for health care discussion is english/other.    Is someone accompanying this pt?  yes    Is the patient using any DME equipment during OV? yes    Depression Screening:  Depression: Not at risk (9/30/2024)    PHQ-2     PHQ-2 Score: 0        Learning Assessment:  Who is the primary learner? Patient    What is the preferred language for health care of the primary learner? ENGLISH    How does the primary learner prefer to learn new concepts? DEMONSTRATION    Answered By patient    Relationship to Learner SELF           Pt currently taking Anticoagulant therapy? no    Pt currently taking Antiplatelet therapy ? no      Coordination of Care:  1. Have you been to the ER, urgent care clinic since your last visit? Hospitalized since your last visit? yes    2. Have you seen or consulted any other health care providers outside of the [facility] since your last visit? Include any pap smears or colon screening. no    
  Cardiovascular:  Positive for chest pain (chest aching). Negative for palpitations and leg swelling.   Gastrointestinal:  Negative for abdominal distention, blood in stool, constipation, diarrhea, nausea and vomiting.   Musculoskeletal:  Negative for arthralgias and myalgias.   Skin:  Negative for pallor.   Neurological:  Positive for light-headedness. Negative for dizziness, syncope, speech difficulty, weakness and numbness.   Psychiatric/Behavioral:  Negative for confusion.        Physical:  Vitals:  BP (!) 160/90 (Site: Right Upper Arm) Comment: rechecked by NP  Pulse 62   Ht 1.499 m (4' 11\")   Wt 66.2 kg (146 lb)   SpO2 97%   BMI 29.49 kg/m²     Exam:  Physical Exam  Constitutional:       Appearance: Normal appearance.   HENT:      Head: Normocephalic and atraumatic.   Neck:      Vascular: No carotid bruit.   Cardiovascular:      Rate and Rhythm: Normal rate and regular rhythm.      Heart sounds: Normal heart sounds. No murmur heard.  Pulmonary:      Effort: Pulmonary effort is normal.      Breath sounds: Normal breath sounds.   Abdominal:      General: Bowel sounds are normal.      Palpations: Abdomen is soft.   Musculoskeletal:      Cervical back: Normal range of motion and neck supple.      Right lower leg: No edema.      Left lower leg: No edema.   Skin:     General: Skin is warm and dry.   Neurological:      General: No focal deficit present.      Mental Status: She is alert and oriented to person, place, and time.   Psychiatric:         Mood and Affect: Mood normal.         Behavior: Behavior normal.         Thought Content: Thought content normal.          Data:  EKG:    LABS:  Lab Results   Component Value Date/Time     09/06/2024 11:14 AM    K 5.1 09/06/2024 11:14 AM    CL 99 09/06/2024 11:14 AM    CO2 28 09/06/2024 11:14 AM    BUN 17 09/06/2024 11:14 AM     Lab Results   Component Value Date/Time    CHOL 208 12/20/2023 09:09 AM    HDL 67 12/20/2023 09:09 AM    .6 12/20/2023 09:09 AM

## 2024-09-30 NOTE — PATIENT INSTRUCTIONS
Pharmacologic nuclear stress test; Dx: fatigue, lightheadedness, SOB  Echocardiogram; Dx: fatigue, lightheadedness, SOB  Increase losartan to 50mg twice a day  Follow-up with Dr. Alexis as scheduled and as needed

## 2024-10-18 ENCOUNTER — PATIENT MESSAGE (OUTPATIENT)
Facility: CLINIC | Age: 79
End: 2024-10-18

## 2024-10-18 DIAGNOSIS — R06.09 DYSPNEA ON EXERTION: Primary | ICD-10-CM

## 2024-10-18 DIAGNOSIS — R53.82 CHRONIC FATIGUE: ICD-10-CM

## 2024-10-18 DIAGNOSIS — J84.10 PULMONARY FIBROSIS (HCC): ICD-10-CM

## 2024-10-25 ENCOUNTER — PROCEDURE VISIT (OUTPATIENT)
Age: 79
End: 2024-10-25

## 2024-10-25 VITALS — HEIGHT: 59 IN | BODY MASS INDEX: 28.63 KG/M2 | WEIGHT: 142 LBS

## 2024-10-25 DIAGNOSIS — R06.02 SOB (SHORTNESS OF BREATH) ON EXERTION: Primary | ICD-10-CM

## 2024-10-26 ENCOUNTER — PATIENT MESSAGE (OUTPATIENT)
Facility: CLINIC | Age: 79
End: 2024-10-26

## 2024-10-28 ENCOUNTER — TELEPHONE (OUTPATIENT)
Age: 79
End: 2024-10-28

## 2024-10-28 NOTE — TELEPHONE ENCOUNTER
Pt called and wanted to know how she can get the results of her PFT prior to 1/7/25 appt. Please advise

## 2024-11-05 ENCOUNTER — TELEPHONE (OUTPATIENT)
Age: 79
End: 2024-11-05

## 2024-11-05 ENCOUNTER — HOSPITAL ENCOUNTER (OUTPATIENT)
Facility: HOSPITAL | Age: 79
Discharge: HOME | End: 2024-11-08
Payer: MEDICARE

## 2024-11-05 DIAGNOSIS — C78.00 MALIGNANT NEOPLASM METASTATIC TO LUNG, UNSPECIFIED LATERALITY (HCC): ICD-10-CM

## 2024-11-05 DIAGNOSIS — C50.912 MALIGNANT NEOPLASM OF LEFT FEMALE BREAST, UNSPECIFIED ESTROGEN RECEPTOR STATUS, UNSPECIFIED SITE OF BREAST (HCC): ICD-10-CM

## 2024-11-05 LAB — CREAT UR-MCNC: 0.8 MG/DL (ref 0.6–1.3)

## 2024-11-05 PROCEDURE — 82565 ASSAY OF CREATININE: CPT

## 2024-11-05 PROCEDURE — 2500000003 HC RX 250 WO HCPCS: Performed by: NURSE PRACTITIONER

## 2024-11-05 PROCEDURE — 74177 CT ABD & PELVIS W/CONTRAST: CPT

## 2024-11-05 PROCEDURE — 6360000004 HC RX CONTRAST MEDICATION: Performed by: NURSE PRACTITIONER

## 2024-11-05 RX ORDER — IOPAMIDOL 612 MG/ML
100 INJECTION, SOLUTION INTRAVASCULAR
Status: COMPLETED | OUTPATIENT
Start: 2024-11-05 | End: 2024-11-05

## 2024-11-05 RX ADMIN — IOPAMIDOL 100 ML: 612 INJECTION, SOLUTION INTRAVENOUS at 13:44

## 2024-11-05 RX ADMIN — BARIUM SULFATE 900 ML: 20 SUSPENSION ORAL at 13:44

## 2024-11-07 ENCOUNTER — OFFICE VISIT (OUTPATIENT)
Facility: CLINIC | Age: 79
End: 2024-11-07

## 2024-11-07 VITALS
BODY MASS INDEX: 29.84 KG/M2 | RESPIRATION RATE: 18 BRPM | HEART RATE: 83 BPM | TEMPERATURE: 98 F | WEIGHT: 148 LBS | DIASTOLIC BLOOD PRESSURE: 86 MMHG | HEIGHT: 59 IN | OXYGEN SATURATION: 99 % | SYSTOLIC BLOOD PRESSURE: 152 MMHG

## 2024-11-07 DIAGNOSIS — C50.912 MALIGNANT NEOPLASM OF LEFT BREAST IN FEMALE, ESTROGEN RECEPTOR POSITIVE, UNSPECIFIED SITE OF BREAST (HCC): ICD-10-CM

## 2024-11-07 DIAGNOSIS — E66.3 OVERWEIGHT (BMI 25.0-29.9): ICD-10-CM

## 2024-11-07 DIAGNOSIS — C78.02 MALIGNANT NEOPLASM METASTATIC TO BOTH LUNGS (HCC): ICD-10-CM

## 2024-11-07 DIAGNOSIS — G89.29 CHRONIC BILATERAL LOW BACK PAIN WITH BILATERAL SCIATICA: ICD-10-CM

## 2024-11-07 DIAGNOSIS — I10 ESSENTIAL HYPERTENSION: Primary | ICD-10-CM

## 2024-11-07 DIAGNOSIS — M54.41 CHRONIC BILATERAL LOW BACK PAIN WITH BILATERAL SCIATICA: ICD-10-CM

## 2024-11-07 DIAGNOSIS — F41.9 ANXIETY: ICD-10-CM

## 2024-11-07 DIAGNOSIS — M54.42 CHRONIC BILATERAL LOW BACK PAIN WITH BILATERAL SCIATICA: ICD-10-CM

## 2024-11-07 DIAGNOSIS — C78.01 MALIGNANT NEOPLASM METASTATIC TO BOTH LUNGS (HCC): ICD-10-CM

## 2024-11-07 DIAGNOSIS — M48.062 SPINAL STENOSIS OF LUMBAR REGION WITH NEUROGENIC CLAUDICATION: ICD-10-CM

## 2024-11-07 DIAGNOSIS — G47.00 INSOMNIA, UNSPECIFIED TYPE: ICD-10-CM

## 2024-11-07 DIAGNOSIS — R06.09 DYSPNEA ON EXERTION: ICD-10-CM

## 2024-11-07 DIAGNOSIS — R53.83 LACK OF STAMINA: ICD-10-CM

## 2024-11-07 DIAGNOSIS — Z17.0 MALIGNANT NEOPLASM OF LEFT BREAST IN FEMALE, ESTROGEN RECEPTOR POSITIVE, UNSPECIFIED SITE OF BREAST (HCC): ICD-10-CM

## 2024-11-07 DIAGNOSIS — I51.9 DIASTOLIC DYSFUNCTION, LEFT VENTRICLE: ICD-10-CM

## 2024-11-07 DIAGNOSIS — J84.10 PULMONARY FIBROSIS (HCC): ICD-10-CM

## 2024-11-07 RX ORDER — LOSARTAN POTASSIUM 50 MG/1
50 TABLET ORAL DAILY
Qty: 60 TABLET | Refills: 3 | Status: SHIPPED | OUTPATIENT
Start: 2024-11-07

## 2024-11-07 RX ORDER — AMLODIPINE BESYLATE 5 MG/1
5 TABLET ORAL DAILY
Qty: 90 TABLET | Refills: 1 | Status: SHIPPED | OUTPATIENT
Start: 2024-11-07

## 2024-11-07 ASSESSMENT — PATIENT HEALTH QUESTIONNAIRE - PHQ9
2. FEELING DOWN, DEPRESSED OR HOPELESS: NOT AT ALL
SUM OF ALL RESPONSES TO PHQ QUESTIONS 1-9: 0
SUM OF ALL RESPONSES TO PHQ QUESTIONS 1-9: 0
1. LITTLE INTEREST OR PLEASURE IN DOING THINGS: NOT AT ALL
SUM OF ALL RESPONSES TO PHQ QUESTIONS 1-9: 0
SUM OF ALL RESPONSES TO PHQ9 QUESTIONS 1 & 2: 0
SUM OF ALL RESPONSES TO PHQ QUESTIONS 1-9: 0

## 2024-11-07 NOTE — PATIENT INSTRUCTIONS
to avoid saturated and trans fats. They increase your risk of heart disease.  Use olive or canola oil when you cook.  Bake, broil, grill, or steam foods instead of frying them.  Choose lean meats instead of high-fat meats such as hot dogs and sausages. Cut off all visible fat when you prepare meat.  Eat fish, skinless poultry, and meat alternatives such as soy products instead of high-fat meats. Soy products, such as tofu, may be especially good for your heart.  Choose low-fat or fat-free milk and dairy products.  Eat foods high in fiber  Eat a variety of grain products every day. Include whole-grain foods that have lots of fiber and nutrients. Examples of whole-grain foods include oats, whole wheat bread, and brown rice.  Buy whole-grain breads and cereals, instead of white bread or pastries.  Limit salt and sodium  Limit how much salt and sodium you eat to help lower your blood pressure.  Taste food before you salt it. Add only a little salt when you think you need it. With time, your taste buds will adjust to less salt.  Eat fewer snack items, fast foods, and other high-salt, processed foods. Check food labels for the amount of sodium in packaged foods.  Choose low-sodium versions of canned goods (such as soups, vegetables, and beans).  Limit sugar  Limit drinks and foods with added sugar. These include candy, desserts, and soda pop.  Limit alcohol  Limit alcohol to no more than 2 drinks a day for men and 1 drink a day for women. Too much alcohol can cause health problems.  When should you call for help?  Watch closely for changes in your health, and be sure to contact your doctor if:    You would like help planning heart-healthy meals.   Where can you learn more?  Go to https://www.healthHYGIEIA.net/GoodHelpConnections  Enter V137 in the search box to learn more about \"Heart-Healthy Diet: Care Instructions.\"  Current as of: August 22, 2019               Content Version: 12.6  © 2902-1146 [employer].

## 2024-11-07 NOTE — PROGRESS NOTES
Janie Griffin presents today for No chief complaint on file.          \"Have you been to the ER, urgent care clinic since your last visit?  Hospitalized since your last visit?\"    NO    “Have you seen or consulted any other health care providers outside of [facility] since your last visit?”    NO            
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  Had follow-up visit with Dr. Power and received medial branch nerve blocks at right C2/3 and C3/4 facet joints without improvement.  Referred to physical therapy and now noting gradual improvement.  C3/4 transforaminal DANIEL injection was scheduled for 6/9/2023, but canceled given improvement.  Still experiencing occipital neuralgia discomfort and on Tylenol ES and gabapentin 300 mg every morning and midday and 600 mg nightly.  Evaluated by Dr. Garcia on 7/3/2023 and not felt to be a surgical candidate.  Referred back to Dr. Power and received a ultrasound-guided greater occipital nerve block on 8/2/2023 with significant improvement.  Continuing to follow with Dr. Power as needed.  Reports good control of symptoms with pregabalin.  11. Tourette's syndrome. Followed by Dr. Ramsey. On Haldol with current dose of 2 mg twice daily previously very effective in controlling symptoms.  However, noting worsening Tourette's symptoms and advised that may take an additional 2 mg as needed to control.  Also noting some increasing tremor in the evening when fatigued and thought to be a side effect of Haldol.  Will continue to monitor.   12. History of recurrent diverticulitis. Colonoscopy (2/2016) with moderately severe diverticulosis in the ascending and descending colon.  Questionable at times whether her abdominal symptoms are due to diverticulitis or irritable bowel syndrome. Had recurrent episode in 7/2018, and abdominal CT scan confirmed diagnosis of acute diverticulitis. Treated with Augmentin. Subsequently evaluated by Dr. Dickerson and instructed to continue Probiotics and use hyoscyamine as needed when develop abdominal complaints as concerned that recurrent symptoms related to IBS.  Diagnosed with recurrent proximal sigmoid diverticulitis on CT abdomen/pelvis in [facility] ED on 8/9/2022 and treated with Augmentin with improvement.  Contacted the office on 2/3/2023 with left sided

## 2024-11-18 ENCOUNTER — HOSPITAL ENCOUNTER (OUTPATIENT)
Facility: HOSPITAL | Age: 79
Discharge: HOME | End: 2024-11-21
Payer: MEDICARE

## 2024-11-18 LAB
25(OH)D3 SERPL-MCNC: 49.9 NG/ML (ref 30–100)
CA-I BLD-SCNC: 1.31 MMOL/L (ref 1.12–1.32)
CREAT SERPL-MCNC: 0.92 MG/DL (ref 0.6–1.3)
MAGNESIUM SERPL-MCNC: 1.6 MG/DL (ref 1.6–2.6)
PHOSPHATE SERPL-MCNC: 3.9 MG/DL (ref 2.5–4.9)

## 2024-11-18 PROCEDURE — 82330 ASSAY OF CALCIUM: CPT

## 2024-11-18 PROCEDURE — 83735 ASSAY OF MAGNESIUM: CPT

## 2024-11-18 PROCEDURE — 36415 COLL VENOUS BLD VENIPUNCTURE: CPT

## 2024-11-18 PROCEDURE — 82306 VITAMIN D 25 HYDROXY: CPT

## 2024-11-18 PROCEDURE — 84100 ASSAY OF PHOSPHORUS: CPT

## 2024-11-26 ENCOUNTER — TELEPHONE (OUTPATIENT)
Age: 79
End: 2024-11-26

## 2024-11-26 NOTE — TELEPHONE ENCOUNTER
----- Message from BELLE England NP sent at 11/22/2024  3:27 PM EST -----  Please call and let her know that her nuclear stress test was normal with no evidence of inducible ischemia.    Thanks,  Smiley  ----- Message -----  From: Raymond Donald MD  Sent: 11/20/2024  12:36 PM EST  To: BELLE Friend NP

## 2024-11-26 NOTE — TELEPHONE ENCOUNTER
Verbal order and read back per BELLE Friend - NP  Please call and let her know that her nuclear stress test was normal with no evidence of inducible ischemia.     Thanks,   Smiley       This has been fully explained to the patient, who indicates understanding.

## 2024-12-10 ENCOUNTER — OFFICE VISIT (OUTPATIENT)
Facility: CLINIC | Age: 79
End: 2024-12-10

## 2024-12-10 VITALS
BODY MASS INDEX: 29.64 KG/M2 | RESPIRATION RATE: 16 BRPM | SYSTOLIC BLOOD PRESSURE: 124 MMHG | OXYGEN SATURATION: 97 % | DIASTOLIC BLOOD PRESSURE: 64 MMHG | TEMPERATURE: 98.4 F | WEIGHT: 147 LBS | HEART RATE: 71 BPM | HEIGHT: 59 IN

## 2024-12-10 DIAGNOSIS — F41.9 ANXIETY: ICD-10-CM

## 2024-12-10 DIAGNOSIS — Z17.0 MALIGNANT NEOPLASM OF LEFT BREAST IN FEMALE, ESTROGEN RECEPTOR POSITIVE, UNSPECIFIED SITE OF BREAST (HCC): ICD-10-CM

## 2024-12-10 DIAGNOSIS — C50.912 MALIGNANT NEOPLASM OF LEFT BREAST IN FEMALE, ESTROGEN RECEPTOR POSITIVE, UNSPECIFIED SITE OF BREAST (HCC): ICD-10-CM

## 2024-12-10 DIAGNOSIS — R06.09 DYSPNEA ON EXERTION: ICD-10-CM

## 2024-12-10 DIAGNOSIS — G89.29 CHRONIC BILATERAL LOW BACK PAIN WITH BILATERAL SCIATICA: ICD-10-CM

## 2024-12-10 DIAGNOSIS — M15.0 PRIMARY OSTEOARTHRITIS INVOLVING MULTIPLE JOINTS: ICD-10-CM

## 2024-12-10 DIAGNOSIS — I10 ESSENTIAL HYPERTENSION: Primary | ICD-10-CM

## 2024-12-10 DIAGNOSIS — E66.3 OVERWEIGHT (BMI 25.0-29.9): ICD-10-CM

## 2024-12-10 DIAGNOSIS — C78.02 MALIGNANT NEOPLASM METASTATIC TO BOTH LUNGS (HCC): ICD-10-CM

## 2024-12-10 DIAGNOSIS — M54.41 CHRONIC BILATERAL LOW BACK PAIN WITH BILATERAL SCIATICA: ICD-10-CM

## 2024-12-10 DIAGNOSIS — R53.82 CHRONIC FATIGUE: ICD-10-CM

## 2024-12-10 DIAGNOSIS — M54.42 CHRONIC BILATERAL LOW BACK PAIN WITH BILATERAL SCIATICA: ICD-10-CM

## 2024-12-10 DIAGNOSIS — C78.01 MALIGNANT NEOPLASM METASTATIC TO BOTH LUNGS (HCC): ICD-10-CM

## 2024-12-10 DIAGNOSIS — M48.062 SPINAL STENOSIS OF LUMBAR REGION WITH NEUROGENIC CLAUDICATION: ICD-10-CM

## 2024-12-10 DIAGNOSIS — M48.02 SPINAL STENOSIS OF CERVICAL REGION: ICD-10-CM

## 2024-12-10 DIAGNOSIS — R53.83 LACK OF STAMINA: ICD-10-CM

## 2024-12-10 DIAGNOSIS — G47.00 INSOMNIA, UNSPECIFIED TYPE: ICD-10-CM

## 2024-12-10 NOTE — PROGRESS NOTES
Janie Griffin presents today for   Chief Complaint   Patient presents with    Follow-up       \"Have you been to the ER, urgent care clinic since your last visit?  Hospitalized since your last visit?\"    NO    “Have you seen or consulted any other health care providers outside of [facility] since your last visit?”    NO            
follow-up appointment with Dr. LENA Mariano on 2/6/2024, and it was felt that her symptoms were most likely related to deconditioning and body habitus, and she recommended gradual reintroduction of physical activity and conditioning.  She was released from care to follow-up as needed.    On 9/16/2023, she reported that she had tested positive for COVID-19 by home rapid antigen testing.  She was experiencing symptoms of fever, headache, nasal congestion, postnasal drainage, and cough.  She was treated with Paxlovid with gradual improvement and reports that she has no sequela today.     On 2/1/2023, she reported continued fatigue while on Ibrance and letrozole.  She described the fatigue as impacting her quality of life by limiting her activities of daily living. She reported not sleeping well at night due to difficulty falling asleep and she felt that her lack of sleep was also contributing to her fatigue.  She reported taking temazepam 15 mg intermittently when she was unable to fall asleep.  Due to ongoing neck and back pain, her dose of gabapentin was increased to 600 mg nightly, and she reported noting improvement in her restless leg symptoms at night on the higher dose of gabapentin, but did not feel that it helped with her sleep onset insomnia. She was prescribed Ambien 5 mg on 2/28/2023 and reported taking it intermittently and noting improvement when using.  However, she reported that she will fall asleep on her own without medication on occasion. She discusses that her difficulty falling asleep seems to be related to anxiety and ruminating thoughts at bedtime.  On 10/12/2023, she was prescribed Cymbalta 20 mg daily, but reports that she was unable to tolerate due to diarrhea.    On 3/30/2023, she contacted the office and reported worsening right-sided neck pain with occipital neuralgia.  She was treated with a Medrol Dosepak without significant improvement.  She underwent a cervical spine MRI (4/7/2023) which

## 2024-12-10 NOTE — PATIENT INSTRUCTIONS
more about \"A Healthy Lifestyle: Care Instructions.\"  Current as of: August 6, 2023               Content Version: 14.0  © 2006-2024 [employer].   Care instructions adapted under license by [employer]. If you have questions about a medical condition or this instruction, always ask your healthcare professional. [employer] disclaims any warranty or liability for your use of this information. Learning About High-Protein Foods  What foods are high in protein?    The foods you eat contain nutrients, such as vitamins and minerals. Protein is a nutrient. Your body needs the right amount to stay healthy and work as it should. You can use the list below to help you make choices about which foods to eat.  Here are some examples of foods that are high in protein.  Dairy and dairy alternatives  Cheese  Milk  Soy milk  Yogurt (especially Greek)  Meat  Beef  Chicken  Ham  Lamb  Lunch meat  Pork  Sausage  Turkey  Other protein foods  Beans (black, garbanzo, kidney, lima)  Eggs  Hummus  Lentils  Nuts  Peanut butter and other nut butters  Peas  Soybeans  Tofu  Veggie or soy linda (Check the nutrition label for the amount of protein in each serving.)  Seafood  Anchovies  Cod  Crab  Halibut  [place]  Sardines  Shrimp  Tilapia  [place]  Tuna  Protein supplements  Bars (Check the nutrition label for the amount of protein in each serving.)  Drinks  Powders  Work with your doctor to find out how much of this nutrient you need. Depending on your health, you may need more or less of it in your diet.  Where can you learn more?  Go to https://www.healthAdAdapted.net/GoodHelpConnections  Enter P335 in the search box to learn more about \"Learning About High-Protein Foods.\"  Current as of: August 22, 2019               Content Version: 12.6  © 9960-0609 [employer].   Care instructions adapted under license by [employer] (which disclaims liability or warranty for this information). If you have

## 2024-12-11 DIAGNOSIS — G47.00 INSOMNIA, UNSPECIFIED TYPE: ICD-10-CM

## 2024-12-11 DIAGNOSIS — Z51.81 MEDICATION MONITORING ENCOUNTER: Primary | ICD-10-CM

## 2024-12-11 DIAGNOSIS — E78.00 PURE HYPERCHOLESTEROLEMIA: ICD-10-CM

## 2024-12-11 DIAGNOSIS — I10 ESSENTIAL HYPERTENSION: ICD-10-CM

## 2024-12-11 DIAGNOSIS — E55.9 VITAMIN D DEFICIENCY: ICD-10-CM

## 2024-12-11 RX ORDER — DIAZEPAM 5 MG/1
5 TABLET ORAL NIGHTLY PRN
Qty: 30 TABLET | Refills: 0 | Status: SHIPPED | OUTPATIENT
Start: 2024-12-11 | End: 2025-01-10

## 2024-12-11 NOTE — TELEPHONE ENCOUNTER
Patient requesting refill of Valium.    Reviewed report generated by the [place] Prescription Monitoring Program. Does not demonstrate aberrancies or inconsistencies with regard to the prescribing of controlled medications to this patient by other providers.    Last filled 5/26/2024 per .     Last UDS 12/20/2023   CSA signed 4/4/2024

## 2024-12-16 DIAGNOSIS — M47.816 LUMBAR FACET ARTHROPATHY: ICD-10-CM

## 2024-12-16 RX ORDER — PREGABALIN 50 MG/1
100 CAPSULE ORAL 2 TIMES DAILY
Qty: 360 CAPSULE | Refills: 0 | Status: SHIPPED | OUTPATIENT
Start: 2024-12-16 | End: 2025-03-16

## 2024-12-16 NOTE — TELEPHONE ENCOUNTER
PCP: Michelle Darling MD    LAST OFFICE VISIT: 12/10/2024    LAST REFILL PER CHART:  Medication:pregabalin (LYRICA) 50 MG capsule   Ordered On:09/09/2024  Instructions:miah 2 capsules by mouth 2 times daily for 90 days. Max Daily Amount: 200 mg   Dispense:360 capsules  Refills:0      Future Appointments   Date Time Provider Department Center   1/16/2025 10:00 AM C LAB VISIT [facility] DEP   1/21/2025  2:00 PM Michelle Darling MD [facility] DEP

## 2024-12-16 NOTE — TELEPHONE ENCOUNTER
Reviewed report generated by the [place] Prescription Monitoring Program. Does not demonstrate aberrancies or inconsistencies with regard to the prescribing of controlled medications to this patient by other providers.    Last filled 11/20/2024 per .     Last UDS 12/20/2023   CSA signed 4/4/2024

## 2024-12-16 NOTE — TELEPHONE ENCOUNTER
Patient requesting a refill on Lyrica.    [facility]/pharmacy #[id number] - [place], [place] - [address] - P [phone] - F [phone] [address] [address]  Phone: [phone]  Fax: [phone]

## 2024-12-17 ENCOUNTER — HOSPITAL ENCOUNTER (OUTPATIENT)
Facility: HOSPITAL | Age: 79
Discharge: HOME | End: 2024-12-20
Payer: MEDICARE

## 2024-12-17 LAB
CA-I BLD-SCNC: 1.17 MMOL/L (ref 1.12–1.32)
CREAT SERPL-MCNC: 0.85 MG/DL (ref 0.6–1.3)
MAGNESIUM SERPL-MCNC: 1.8 MG/DL (ref 1.6–2.6)
PHOSPHATE SERPL-MCNC: 3.5 MG/DL (ref 2.5–4.9)

## 2024-12-17 PROCEDURE — 82330 ASSAY OF CALCIUM: CPT

## 2024-12-17 PROCEDURE — 83735 ASSAY OF MAGNESIUM: CPT

## 2024-12-17 PROCEDURE — 84100 ASSAY OF PHOSPHORUS: CPT

## 2024-12-17 PROCEDURE — 36415 COLL VENOUS BLD VENIPUNCTURE: CPT

## 2024-12-27 ENCOUNTER — TRANSCRIBE ORDERS (OUTPATIENT)
Facility: HOSPITAL | Age: 79
End: 2024-12-27

## 2024-12-27 DIAGNOSIS — Z12.31 VISIT FOR SCREENING MAMMOGRAM: Primary | ICD-10-CM

## 2025-01-06 RX ORDER — PANTOPRAZOLE SODIUM 40 MG/1
40 TABLET, DELAYED RELEASE ORAL DAILY
Qty: 90 TABLET | Refills: 3 | Status: SHIPPED | OUTPATIENT
Start: 2025-01-06 | End: 2025-01-09 | Stop reason: SDUPTHER

## 2025-01-09 RX ORDER — PANTOPRAZOLE SODIUM 40 MG/1
40 TABLET, DELAYED RELEASE ORAL DAILY
Qty: 90 TABLET | Refills: 3 | Status: SHIPPED | OUTPATIENT
Start: 2025-01-09

## 2025-01-09 NOTE — TELEPHONE ENCOUNTER
Pt called requesting a new script to be sent to a different pharmacy       pantoprazole (PROTONIX) 40 MG tablet       [facility] [id number] - [place], [place] - [address] - P [phone] - F [phone] [[id number]]

## 2025-01-09 NOTE — TELEPHONE ENCOUNTER
PCP: Michelle Darling MD    LAST OFFICE VISIT: 12/10/2024    LAST REFILL PER CHART:  Medication:pantoprazole (PROTONIX) 40 MG tablet   Ordered On:01/06/2025  Instructions:TAKE 1 TABLET BY MOUTH EVERY DAY   Dispense:90 tablets  Refills:3      Future Appointments   Date Time Provider Department Center   1/16/2025 10:00 AM IOC LAB VISIT [facility] DEP   1/21/2025  2:00 PM Michelle Darling MD [facility] [address] [facility] DEP   1/24/2025  3:40 PM HBV JAD RM 3 3D [facility]

## 2025-01-16 ENCOUNTER — HOSPITAL ENCOUNTER (OUTPATIENT)
Facility: HOSPITAL | Age: 80
Setting detail: SPECIMEN
Discharge: HOME | End: 2025-01-19
Payer: MEDICARE

## 2025-01-16 DIAGNOSIS — I10 ESSENTIAL HYPERTENSION: ICD-10-CM

## 2025-01-16 DIAGNOSIS — E78.00 PURE HYPERCHOLESTEROLEMIA: ICD-10-CM

## 2025-01-16 DIAGNOSIS — E55.9 VITAMIN D DEFICIENCY: ICD-10-CM

## 2025-01-16 DIAGNOSIS — Z51.81 MEDICATION MONITORING ENCOUNTER: ICD-10-CM

## 2025-01-16 LAB
25(OH)D3 SERPL-MCNC: 56.2 NG/ML (ref 30–100)
ALBUMIN SERPL-MCNC: 3.7 G/DL (ref 3.4–5)
ALBUMIN/GLOB SERPL: 1.3 (ref 0.8–1.7)
ALP SERPL-CCNC: 58 U/L (ref 45–117)
ALT SERPL-CCNC: 17 U/L (ref 13–56)
ANION GAP SERPL CALC-SCNC: 5 MMOL/L (ref 3–18)
APPEARANCE UR: CLEAR
AST SERPL-CCNC: 14 U/L (ref 10–38)
BACTERIA URNS QL MICRO: NEGATIVE /HPF
BILIRUB SERPL-MCNC: 0.8 MG/DL (ref 0.2–1)
BILIRUB UR QL: NEGATIVE
BUN SERPL-MCNC: 14 MG/DL (ref 7–18)
BUN/CREAT SERPL: 17 (ref 12–20)
CALCIUM SERPL-MCNC: 8.9 MG/DL (ref 8.5–10.1)
CHLORIDE SERPL-SCNC: 102 MMOL/L (ref 100–111)
CHOLEST SERPL-MCNC: 209 MG/DL
CO2 SERPL-SCNC: 28 MMOL/L (ref 21–32)
COLOR UR: YELLOW
CREAT SERPL-MCNC: 0.83 MG/DL (ref 0.6–1.3)
CREAT UR-MCNC: 89 MG/DL (ref 30–125)
EPITH CASTS URNS QL MICRO: NEGATIVE /LPF (ref 0–5)
GLOBULIN SER CALC-MCNC: 2.9 G/DL (ref 2–4)
GLUCOSE SERPL-MCNC: 93 MG/DL (ref 74–99)
GLUCOSE UR STRIP.AUTO-MCNC: NEGATIVE MG/DL
HDLC SERPL-MCNC: 60 MG/DL (ref 40–60)
HDLC SERPL: 3.5 (ref 0–5)
HGB UR QL STRIP: NEGATIVE
KETONES UR QL STRIP.AUTO: NEGATIVE MG/DL
LDLC SERPL CALC-MCNC: 127 MG/DL (ref 0–100)
LEUKOCYTE ESTERASE UR QL STRIP.AUTO: ABNORMAL
LIPID PANEL: ABNORMAL
MICROALBUMIN UR-MCNC: 0.54 MG/DL (ref 0–3)
MICROALBUMIN/CREAT UR-RTO: 6 MG/G (ref 0–30)
NITRITE UR QL STRIP.AUTO: NEGATIVE
PH UR STRIP: 6.5 (ref 5–8)
POTASSIUM SERPL-SCNC: 4.6 MMOL/L (ref 3.5–5.5)
PROT SERPL-MCNC: 6.6 G/DL (ref 6.4–8.2)
PROT UR STRIP-MCNC: NEGATIVE MG/DL
RBC #/AREA URNS HPF: NEGATIVE /HPF (ref 0–5)
SODIUM SERPL-SCNC: 135 MMOL/L (ref 136–145)
SP GR UR REFRACTOMETRY: 1.01 (ref 1–1.03)
TRIGL SERPL-MCNC: 110 MG/DL
TSH SERPL DL<=0.05 MIU/L-ACNC: 0.89 UIU/ML (ref 0.36–3.74)
UROBILINOGEN UR QL STRIP.AUTO: 0.2 EU/DL (ref 0.2–1)
VLDLC SERPL CALC-MCNC: 22 MG/DL
WBC URNS QL MICRO: ABNORMAL /HPF (ref 0–4)

## 2025-01-16 PROCEDURE — 82306 VITAMIN D 25 HYDROXY: CPT

## 2025-01-16 PROCEDURE — 80053 COMPREHEN METABOLIC PANEL: CPT

## 2025-01-16 PROCEDURE — 81001 URINALYSIS AUTO W/SCOPE: CPT

## 2025-01-16 PROCEDURE — 84443 ASSAY THYROID STIM HORMONE: CPT

## 2025-01-16 PROCEDURE — 82570 ASSAY OF URINE CREATININE: CPT

## 2025-01-16 PROCEDURE — 82043 UR ALBUMIN QUANTITATIVE: CPT

## 2025-01-16 PROCEDURE — 80307 DRUG TEST PRSMV CHEM ANLYZR: CPT

## 2025-01-16 PROCEDURE — 36415 COLL VENOUS BLD VENIPUNCTURE: CPT

## 2025-01-16 PROCEDURE — 80061 LIPID PANEL: CPT

## 2025-01-21 ENCOUNTER — OFFICE VISIT (OUTPATIENT)
Facility: CLINIC | Age: 80
End: 2025-01-21

## 2025-01-21 VITALS
SYSTOLIC BLOOD PRESSURE: 134 MMHG | DIASTOLIC BLOOD PRESSURE: 72 MMHG | TEMPERATURE: 98.3 F | BODY MASS INDEX: 29.84 KG/M2 | HEART RATE: 65 BPM | HEIGHT: 59 IN | WEIGHT: 148 LBS | RESPIRATION RATE: 16 BRPM | OXYGEN SATURATION: 96 %

## 2025-01-21 DIAGNOSIS — I10 ESSENTIAL HYPERTENSION: Primary | ICD-10-CM

## 2025-01-21 DIAGNOSIS — Z17.0 MALIGNANT NEOPLASM OF LEFT BREAST IN FEMALE, ESTROGEN RECEPTOR POSITIVE, UNSPECIFIED SITE OF BREAST (HCC): ICD-10-CM

## 2025-01-21 DIAGNOSIS — G89.29 CHRONIC BILATERAL LOW BACK PAIN WITH BILATERAL SCIATICA: ICD-10-CM

## 2025-01-21 DIAGNOSIS — E78.00 PURE HYPERCHOLESTEROLEMIA: ICD-10-CM

## 2025-01-21 DIAGNOSIS — C50.912 MALIGNANT NEOPLASM OF LEFT BREAST IN FEMALE, ESTROGEN RECEPTOR POSITIVE, UNSPECIFIED SITE OF BREAST (HCC): ICD-10-CM

## 2025-01-21 DIAGNOSIS — M54.42 CHRONIC BILATERAL LOW BACK PAIN WITH BILATERAL SCIATICA: ICD-10-CM

## 2025-01-21 DIAGNOSIS — G47.00 INSOMNIA, UNSPECIFIED TYPE: ICD-10-CM

## 2025-01-21 DIAGNOSIS — M48.062 SPINAL STENOSIS OF LUMBAR REGION WITH NEUROGENIC CLAUDICATION: ICD-10-CM

## 2025-01-21 DIAGNOSIS — M54.41 CHRONIC BILATERAL LOW BACK PAIN WITH BILATERAL SCIATICA: ICD-10-CM

## 2025-01-21 DIAGNOSIS — C78.02 MALIGNANT NEOPLASM METASTATIC TO BOTH LUNGS (HCC): ICD-10-CM

## 2025-01-21 DIAGNOSIS — J84.10 PULMONARY FIBROSIS (HCC): ICD-10-CM

## 2025-01-21 DIAGNOSIS — M15.0 PRIMARY OSTEOARTHRITIS INVOLVING MULTIPLE JOINTS: ICD-10-CM

## 2025-01-21 DIAGNOSIS — F41.9 ANXIETY: ICD-10-CM

## 2025-01-21 DIAGNOSIS — C78.01 MALIGNANT NEOPLASM METASTATIC TO BOTH LUNGS (HCC): ICD-10-CM

## 2025-01-21 DIAGNOSIS — E66.3 OVERWEIGHT (BMI 25.0-29.9): ICD-10-CM

## 2025-01-21 LAB — DRUGS UR: NORMAL

## 2025-01-21 RX ORDER — AMLODIPINE BESYLATE 5 MG/1
5 TABLET ORAL DAILY
Qty: 90 TABLET | Refills: 3 | Status: SHIPPED | OUTPATIENT
Start: 2025-01-21

## 2025-01-21 RX ORDER — DULOXETIN HYDROCHLORIDE 20 MG/1
20 CAPSULE, DELAYED RELEASE ORAL DAILY
Qty: 90 CAPSULE | Refills: 3 | Status: SHIPPED | OUTPATIENT
Start: 2025-01-21

## 2025-01-21 RX ORDER — MELOXICAM 15 MG/1
15 TABLET ORAL DAILY
Qty: 90 TABLET | Refills: 3 | Status: SHIPPED | OUTPATIENT
Start: 2025-01-21

## 2025-01-21 RX ORDER — METOPROLOL SUCCINATE 25 MG/1
25 TABLET, EXTENDED RELEASE ORAL DAILY
Qty: 90 TABLET | Refills: 3 | Status: SHIPPED | OUTPATIENT
Start: 2025-01-21

## 2025-01-21 RX ORDER — LOSARTAN POTASSIUM 50 MG/1
50 TABLET ORAL DAILY
Qty: 60 TABLET | Refills: 3 | Status: SHIPPED | OUTPATIENT
Start: 2025-01-21

## 2025-01-21 SDOH — ECONOMIC STABILITY: FOOD INSECURITY: WITHIN THE PAST 12 MONTHS, THE FOOD YOU BOUGHT JUST DIDN'T LAST AND YOU DIDN'T HAVE MONEY TO GET MORE.: NEVER TRUE

## 2025-01-21 SDOH — ECONOMIC STABILITY: FOOD INSECURITY: WITHIN THE PAST 12 MONTHS, YOU WORRIED THAT YOUR FOOD WOULD RUN OUT BEFORE YOU GOT MONEY TO BUY MORE.: NEVER TRUE

## 2025-01-21 ASSESSMENT — PATIENT HEALTH QUESTIONNAIRE - PHQ9
SUM OF ALL RESPONSES TO PHQ QUESTIONS 1-9: 0
SUM OF ALL RESPONSES TO PHQ QUESTIONS 1-9: 0
SUM OF ALL RESPONSES TO PHQ9 QUESTIONS 1 & 2: 0
1. LITTLE INTEREST OR PLEASURE IN DOING THINGS: NOT AT ALL
2. FEELING DOWN, DEPRESSED OR HOPELESS: NOT AT ALL
SUM OF ALL RESPONSES TO PHQ QUESTIONS 1-9: 0
SUM OF ALL RESPONSES TO PHQ QUESTIONS 1-9: 0

## 2025-01-21 NOTE — PROGRESS NOTES
Janie Griffin presents today for   Chief Complaint   Patient presents with    Follow-up       \"Have you been to the ER, urgent care clinic since your last visit?  Hospitalized since your last visit?\"    NO    “Have you seen or consulted any other health care providers outside of [facility] since your last visit?”    NO            
capsule Take 1 capsule by mouth daily    amLODIPine (NORVASC) 5 MG tablet Take 1 tablet by mouth daily    losartan (COZAAR) 50 MG tablet Take 1 tablet by mouth daily    meloxicam (MOBIC) 15 MG tablet Take 1 tablet by mouth daily with food    metoprolol succinate (TOPROL XL) 25 MG extended release tablet Take 1 tablet by mouth daily    pantoprazole (PROTONIX) 40 MG tablet Take 1 tablet by mouth daily    pregabalin (LYRICA) 50 MG capsule Take 2 capsules by mouth 2 times daily for 90 days. Max Daily Amount: 200 mg    dicyclomine (BENTYL) 20 MG tablet Take 1 tablet by mouth every 6 hours as needed (Abdominal cramping)    hyoscyamine (LEVSIN/SL) 125 MCG sublingual tablet Place 1 tablet under the tongue every 4 hours as needed for Cramping or Diarrhea    acetaminophen (TYLENOL) 500 MG tablet Take 1 tablet by mouth every 6 hours as needed    Calcium Carbonate-Vitamin D (OYSTER SHELL CALCIUM/D) 500-5 MG-MCG TABS Take 1 tablet by mouth 2 times daily (with meals)    vitamin D (CHOLECALCIFEROL) 25 MCG (1000 UT) TABS tablet Take 4 tablets by mouth daily    haloperidol (HALDOL) 2 MG tablet With a 3rd dose PRN     No current facility-administered medications for this visit.     Allergies and Intolerances:   Allergies   Allergen Reactions    Cephalexin Rash    Metronidazole Rash    Shellfish Allergy Nausea And Vomiting     More of just eating the actual shellfish.    Sulfa Antibiotics Rash    Tramadol Nausea And Vomiting     Patient states she is allergic to most Narcotics    Vancomycin Rash     Family History:   Family History   Problem Relation Age of Onset    Osteoporosis Mother     Stroke Father     Hypertension Father     Osteoporosis Sister     No Known Problems Brother     No Known Problems Maternal Grandmother     No Known Problems Maternal Grandfather     No Known Problems Paternal Grandmother     No Known Problems Paternal Grandfather     Cancer Paternal Aunt         breast    No Known Problems Other      Social History:

## 2025-01-21 NOTE — PATIENT INSTRUCTIONS
bicarbonate (baking soda). MSG is often added to Asian food. When you eat out, you can sometimes ask for food without MSG or added salt.  Buy low-sodium foods  Buy foods that are labeled \"unsalted\" (no salt added), \"sodium-free\" (less than 5 mg of sodium per serving), or \"low-sodium\" (less than 140 mg of sodium per serving). Foods labeled \"reduced-sodium\" and \"light sodium\" may still have too much sodium. Be sure to read the label to see how much sodium you are getting.  Buy fresh vegetables, or frozen vegetables without added sauces. Buy low-sodium versions of canned vegetables, soups, and other canned goods.  Prepare low-sodium meals  Cut back on the amount of salt you use in cooking. This will help you adjust to the taste. Do not add salt after cooking. One teaspoon of salt has about 2,300 mg of sodium.  Take the salt shaker off the table.  Flavor your food with garlic, lemon juice, onion, vinegar, herbs, and spices. Do not use soy sauce, lite soy sauce, steak sauce, onion salt, garlic salt, celery salt, mustard, or ketchup on your food.  Use low-sodium salad dressings, sauces, and ketchup. Or make your own salad dressings and sauces without adding salt.  Use less salt (or none) when recipes call for it. You can often use half the salt a recipe calls for without losing flavor. Other foods such as rice, pasta, and grains do not need added salt.  Rinse canned vegetables, and cook them in fresh water. This removes some--but not all--of the salt.  Avoid water that is naturally high in sodium or that has been treated with water softeners, which add sodium. Call your local water company to find out the sodium content of your water supply. If you buy bottled water, read the label and choose a sodium-free brand.  Avoid high-sodium foods  Avoid eating:  Smoked, cured, salted, and canned meat, fish, and poultry.  Ham, gauthier, hot dogs, and luncheon meats.  Regular, hard, and processed cheese and regular peanut

## 2025-01-24 ENCOUNTER — HOSPITAL ENCOUNTER (OUTPATIENT)
Facility: HOSPITAL | Age: 80
Discharge: HOME | End: 2025-01-27
Attending: INTERNAL MEDICINE
Payer: MEDICARE

## 2025-01-24 VITALS — HEIGHT: 59 IN | WEIGHT: 145 LBS | BODY MASS INDEX: 29.23 KG/M2

## 2025-01-24 DIAGNOSIS — Z12.31 VISIT FOR SCREENING MAMMOGRAM: ICD-10-CM

## 2025-01-24 PROCEDURE — 77063 BREAST TOMOSYNTHESIS BI: CPT

## 2025-01-28 ENCOUNTER — TELEPHONE (OUTPATIENT)
Facility: CLINIC | Age: 80
End: 2025-01-28

## 2025-01-28 DIAGNOSIS — M47.816 LUMBAR FACET ARTHROPATHY: ICD-10-CM

## 2025-01-28 NOTE — TELEPHONE ENCOUNTER
Reviewed report generated by the [place] Prescription Monitoring Program. Does not demonstrate aberrancies or inconsistencies with regard to the prescribing of controlled medications to this patient by other providers.    Last filled 12/17/2024 per  for 45 days.     CSA: 4/4/2024   UDS: 01/16/2025        Please clarify her dose and see if she is taking 100 mg twice daily of pregabalin.  If so, insurance will require us to prescribe the 100 mg capsule.

## 2025-01-28 NOTE — TELEPHONE ENCOUNTER
Refill request via fax     Medication: pregabalin (LYRICA) 50 MG capsule [   Quantity: 360  Pharmacy: [facility] [id number] - [place] [address]   Last Fill: 12/16/2024    PCP: Michelle Darling MD    LAST OFFICE VISIT: 1/21/2025      Future Appointments   Date Time Provider Department Center   5/28/2025  2:00 PM Michelle Darling MD [facility] DEP     Patient only got partial of her last fill. She now needs a new script.

## 2025-01-29 RX ORDER — PREGABALIN 100 MG/1
100 CAPSULE ORAL 2 TIMES DAILY
Qty: 180 CAPSULE | Refills: 1 | Status: SHIPPED | OUTPATIENT
Start: 2025-01-29 | End: 2025-07-28

## 2025-01-29 NOTE — TELEPHONE ENCOUNTER
Called patient, she is taking 2 capsules of 50mg twice a day, I let her know per insurance we will be sending in 100mg capsules therefore take one capsule twice a day.

## 2025-01-31 ENCOUNTER — TELEPHONE (OUTPATIENT)
Facility: CLINIC | Age: 80
End: 2025-01-31

## 2025-01-31 NOTE — TELEPHONE ENCOUNTER
Patient contacted the office, she was recently put on   DULoxetine (CYMBALTA) 20 MG extended release capsule    And said that it has helped her a lot with her pain, but it is making her very drowsy. She would like to know if she could take them at night instead of the morning. Please advise       Patient stated she will be out of the house this afternoon and asked that we leave a message or send her a [employer] message if she can not answer.

## 2025-02-12 ENCOUNTER — TELEPHONE (OUTPATIENT)
Facility: CLINIC | Age: 80
End: 2025-02-12

## 2025-02-12 NOTE — TELEPHONE ENCOUNTER
Patient contacted the office, she is taking lyrica and DULoxetine at night. But she wants to know if she has a hard time sleeping if can she also take her temazepam with the other prescriptions. Please advise

## 2025-03-27 ENCOUNTER — TELEPHONE (OUTPATIENT)
Facility: CLINIC | Age: 80
End: 2025-03-27

## 2025-03-27 DIAGNOSIS — G47.00 INSOMNIA, UNSPECIFIED TYPE: Primary | ICD-10-CM

## 2025-03-27 RX ORDER — TEMAZEPAM 15 MG/1
15 CAPSULE ORAL NIGHTLY PRN
Qty: 30 CAPSULE | Refills: 0 | Status: SHIPPED | OUTPATIENT
Start: 2025-03-27 | End: 2025-04-26

## 2025-03-27 NOTE — TELEPHONE ENCOUNTER
PCP: Michelle Darling MD    Refill Request     LAST OFFICE VISIT: 01/21/25      Medication: temazepam     Dispense:       Pharmacy [facility] [id number] - [place] [address] - P [phone] - F [phone] [[id number]]       Future Appointments   Date Time Provider Department Center   5/5/2025  1:00 PM HBV CT RM 1 [facility]   5/5/2025  1:30 PM HBV CT RM 1 [facility]   5/28/2025  2:00 PM Michelle Darling MD [facility] DEP

## 2025-03-27 NOTE — TELEPHONE ENCOUNTER
CSA: 4/4/2024   UDS: 01/16/2025     [place]  The last fill date was 08/19/2024 for a 30 d/s qty 30

## 2025-05-05 ENCOUNTER — HOSPITAL ENCOUNTER (OUTPATIENT)
Facility: HOSPITAL | Age: 80
End: 2025-05-05
Payer: MEDICARE

## 2025-05-05 ENCOUNTER — HOSPITAL ENCOUNTER (OUTPATIENT)
Facility: HOSPITAL | Age: 80
Discharge: HOME | End: 2025-05-08
Payer: MEDICARE

## 2025-05-05 DIAGNOSIS — C50.912 MALIGNANT NEOPLASM OF LEFT FEMALE BREAST, UNSPECIFIED ESTROGEN RECEPTOR STATUS, UNSPECIFIED SITE OF BREAST (HCC): ICD-10-CM

## 2025-05-05 LAB — CREAT UR-MCNC: 0.9 MG/DL (ref 0.6–1.3)

## 2025-05-05 PROCEDURE — 74177 CT ABD & PELVIS W/CONTRAST: CPT

## 2025-05-05 PROCEDURE — 2500000003 HC RX 250 WO HCPCS: Performed by: NURSE PRACTITIONER

## 2025-05-05 PROCEDURE — 6360000004 HC RX CONTRAST MEDICATION: Performed by: NURSE PRACTITIONER

## 2025-05-05 PROCEDURE — 82565 ASSAY OF CREATININE: CPT

## 2025-05-05 RX ORDER — IOPAMIDOL 612 MG/ML
100 INJECTION, SOLUTION INTRAVASCULAR
Status: COMPLETED | OUTPATIENT
Start: 2025-05-05 | End: 2025-05-05

## 2025-05-05 RX ADMIN — IOPAMIDOL 100 ML: 612 INJECTION, SOLUTION INTRAVENOUS at 13:48

## 2025-05-05 RX ADMIN — BARIUM SULFATE 900 ML: 20 SUSPENSION ORAL at 12:00

## 2025-05-20 ENCOUNTER — TRANSCRIBE ORDERS (OUTPATIENT)
Facility: HOSPITAL | Age: 80
End: 2025-05-20

## 2025-05-20 DIAGNOSIS — M54.50 LUMBAR PAIN: Primary | ICD-10-CM

## 2025-05-22 ENCOUNTER — PATIENT MESSAGE (OUTPATIENT)
Facility: CLINIC | Age: 80
End: 2025-05-22

## 2025-05-23 ENCOUNTER — HOSPITAL ENCOUNTER (OUTPATIENT)
Facility: HOSPITAL | Age: 80
Discharge: HOME | End: 2025-05-26
Payer: MEDICARE

## 2025-05-23 DIAGNOSIS — M54.50 LUMBAR PAIN: ICD-10-CM

## 2025-05-23 PROCEDURE — 72148 MRI LUMBAR SPINE W/O DYE: CPT

## 2025-05-23 NOTE — TELEPHONE ENCOUNTER
Pt called regarding the message that she sent yesterday. Pt requested a c/b today at 3pm if possible.       Please advise

## 2025-05-23 NOTE — TELEPHONE ENCOUNTER
Called and spoke with patient.  Discussed episode yesterday when experiencing \"skipped beats\" with heart rate registering on pulse oximeter at 49.  Advised that PVCs are not perfused and would not be able to be picked up by pulse oximeter so heart rate would falsely appear low.  Reports ongoing fatigue.  Expressed concern regarding SpO2 at 90% and advised that will assess with upcoming visit next week.  Advised to continue to monitor.  Answered all questions.

## 2025-05-28 ENCOUNTER — OFFICE VISIT (OUTPATIENT)
Facility: CLINIC | Age: 80
End: 2025-05-28

## 2025-05-28 VITALS
HEIGHT: 59 IN | BODY MASS INDEX: 29.84 KG/M2 | DIASTOLIC BLOOD PRESSURE: 71 MMHG | SYSTOLIC BLOOD PRESSURE: 130 MMHG | WEIGHT: 148 LBS | RESPIRATION RATE: 14 BRPM | OXYGEN SATURATION: 96 % | TEMPERATURE: 98.5 F | HEART RATE: 67 BPM

## 2025-05-28 DIAGNOSIS — R53.82 CHRONIC FATIGUE: ICD-10-CM

## 2025-05-28 DIAGNOSIS — I10 ESSENTIAL HYPERTENSION: Primary | ICD-10-CM

## 2025-05-28 DIAGNOSIS — E66.3 OVERWEIGHT (BMI 25.0-29.9): ICD-10-CM

## 2025-05-28 DIAGNOSIS — M48.062 SPINAL STENOSIS OF LUMBAR REGION WITH NEUROGENIC CLAUDICATION: ICD-10-CM

## 2025-05-28 DIAGNOSIS — E78.00 PURE HYPERCHOLESTEROLEMIA: ICD-10-CM

## 2025-05-28 DIAGNOSIS — M54.42 CHRONIC BILATERAL LOW BACK PAIN WITH BILATERAL SCIATICA: ICD-10-CM

## 2025-05-28 DIAGNOSIS — G47.00 INSOMNIA, UNSPECIFIED TYPE: ICD-10-CM

## 2025-05-28 DIAGNOSIS — C50.912 MALIGNANT NEOPLASM OF LEFT BREAST IN FEMALE, ESTROGEN RECEPTOR POSITIVE, UNSPECIFIED SITE OF BREAST (HCC): ICD-10-CM

## 2025-05-28 DIAGNOSIS — J84.10 PULMONARY FIBROSIS (HCC): ICD-10-CM

## 2025-05-28 DIAGNOSIS — M15.0 PRIMARY OSTEOARTHRITIS INVOLVING MULTIPLE JOINTS: ICD-10-CM

## 2025-05-28 DIAGNOSIS — Z17.0 MALIGNANT NEOPLASM OF LEFT BREAST IN FEMALE, ESTROGEN RECEPTOR POSITIVE, UNSPECIFIED SITE OF BREAST (HCC): ICD-10-CM

## 2025-05-28 DIAGNOSIS — G89.29 CHRONIC BILATERAL LOW BACK PAIN WITH BILATERAL SCIATICA: ICD-10-CM

## 2025-05-28 DIAGNOSIS — F41.9 ANXIETY: ICD-10-CM

## 2025-05-28 DIAGNOSIS — C78.02 MALIGNANT NEOPLASM METASTATIC TO BOTH LUNGS (HCC): ICD-10-CM

## 2025-05-28 DIAGNOSIS — M54.41 CHRONIC BILATERAL LOW BACK PAIN WITH BILATERAL SCIATICA: ICD-10-CM

## 2025-05-28 DIAGNOSIS — C78.01 MALIGNANT NEOPLASM METASTATIC TO BOTH LUNGS (HCC): ICD-10-CM

## 2025-05-28 ASSESSMENT — PATIENT HEALTH QUESTIONNAIRE - PHQ9
SUM OF ALL RESPONSES TO PHQ QUESTIONS 1-9: 0
SUM OF ALL RESPONSES TO PHQ QUESTIONS 1-9: 0
1. LITTLE INTEREST OR PLEASURE IN DOING THINGS: NOT AT ALL
SUM OF ALL RESPONSES TO PHQ QUESTIONS 1-9: 0
2. FEELING DOWN, DEPRESSED OR HOPELESS: NOT AT ALL
SUM OF ALL RESPONSES TO PHQ QUESTIONS 1-9: 0

## 2025-05-28 NOTE — PROGRESS NOTES
Janie Griffin presents today for   Chief Complaint   Patient presents with    Follow-up       \"Have you been to the ER, urgent care clinic since your last visit?  Hospitalized since your last visit?\"    NO    “Have you seen or consulted any other health care providers outside of [facility] since your last visit?”    NO            
underwent an upper endoscopy, which revealed a Schatzki's ring at the gastroesophageal junction (dilated) with mild distal esophagitis and a small hiatal hernia. A screening colonoscopy was also performed showing moderately severe diverticulosis of the ascending and descending colon and a 2 mm sessile sigmoid polyp (pathology: hyperplastic). Recommendations for follow-up colonoscopy in 10 years. She denies any abdominal pain, nausea, vomiting, melena, hematochezia, or change in bowel movements. She was evaluated in 8/2017 by DAVIDSON Hughes with complaints of abdominal pain and was treated with Cipro for presumed diverticulitis. She contacted the office again in 10/2017 and 1/2018 with a recurrence of symptoms, and was treated for a 10 day course with Cipro with improvement. Abdominal CT scan in 2/6/2018 for staging for her breast cancer did show evidence of moderate to severe diverticulosis, but no evidence of diverticulitis. She did present with similar symptoms in 3/2018 and was evaluated by DAVIDSON Hughes and prescribed Cipro. However, her symptoms resolved prior to taking the antibiotics. In 7/2018, she again presented with left lower quadrant pain, and an abdominal CT scan (7/13/2018) which showed evidence of uncomplicated diverticulitis involving the lower left colon and upper sigmoid. She was treated with Cipro and Flagyl initially, but developed a rash due to Flagyl, and was subsequently changed to Augmentin. She had a follow-up visit with Dr. Dickerson and it was his opinion that she also had IBS which mimicked her episodes of diverticulitis. He recommended continuing on a probiotic and prescribed hyoscyamine to use as needed. She has noted improvement since doing so. She underwent evaluation for iron deficiency when she was found to have a ferritin of 13 in 6/2021 which was performed to evaluate her fatigue.  On 8/11/2021 she underwent upper endoscopy and colonoscopy by Dr. Dickerson.  Upper endoscopy showed normal

## 2025-05-30 DIAGNOSIS — M47.816 LUMBAR FACET ARTHROPATHY: ICD-10-CM

## 2025-05-30 RX ORDER — PREGABALIN 100 MG/1
CAPSULE ORAL
Qty: 180 CAPSULE | OUTPATIENT
Start: 2025-05-30

## 2025-05-30 NOTE — TELEPHONE ENCOUNTER
This was just filled on 5/1/2025 for 90 days.  Refill is being requested too soon.  Please clarify if patient requested and reason.

## 2025-05-30 NOTE — TELEPHONE ENCOUNTER
[facility]  [place] 5/30/2025    The last fill date for Pregabalin 100 Mg Capsule   was 5/1/2025 for a 90 d/s qty 180      Last UDS: completed     CSA Last signed: not on file         PCP: Michelle Darling MD    Last appt:  5/28/2025  Future Appointments   Date Time Provider Department Center   9/23/2025 10:45 AM IOC LAB VISIT [facility] ECC DEP   9/30/2025  2:30 PM Michelle Darling MD [facility] DEP       Requested Prescriptions     Pending Prescriptions Disp Refills    pregabalin (LYRICA) 100 MG capsule [Pharmacy Med Name: PREGABALIN 100 MG CAPSULE] 180 capsule      Sig: TAKE 1 CAPSULE BY MOUTH 2 TIMES A DAY MAXIMUM 200MG/DAY

## 2025-06-26 ENCOUNTER — TRANSCRIBE ORDERS (OUTPATIENT)
Facility: HOSPITAL | Age: 80
End: 2025-06-26

## 2025-06-26 DIAGNOSIS — C79.52 SECONDARY MALIGNANT NEOPLASM OF BONE AND BONE MARROW (HCC): Primary | ICD-10-CM

## 2025-06-26 DIAGNOSIS — C79.51 SECONDARY MALIGNANT NEOPLASM OF BONE AND BONE MARROW (HCC): Primary | ICD-10-CM

## 2025-06-26 DIAGNOSIS — C78.00 MALIGNANT NEOPLASM METASTATIC TO LUNG, UNSPECIFIED LATERALITY (HCC): ICD-10-CM

## 2025-06-26 DIAGNOSIS — C50.912 MALIGNANT NEOPLASM OF LEFT FEMALE BREAST, UNSPECIFIED ESTROGEN RECEPTOR STATUS, UNSPECIFIED SITE OF BREAST (HCC): ICD-10-CM

## 2025-07-02 ENCOUNTER — APPOINTMENT (OUTPATIENT)
Age: 80
End: 2025-07-02
Payer: MEDICARE

## 2025-07-02 ENCOUNTER — HOSPITAL ENCOUNTER (EMERGENCY)
Age: 80
Discharge: HOME | End: 2025-07-02
Attending: EMERGENCY MEDICINE
Payer: MEDICARE

## 2025-07-02 VITALS
RESPIRATION RATE: 23 BRPM | HEIGHT: 59 IN | BODY MASS INDEX: 31.03 KG/M2 | OXYGEN SATURATION: 92 % | SYSTOLIC BLOOD PRESSURE: 147 MMHG | DIASTOLIC BLOOD PRESSURE: 88 MMHG | HEART RATE: 91 BPM | TEMPERATURE: 97.8 F | WEIGHT: 153.9 LBS

## 2025-07-02 DIAGNOSIS — S09.90XA CLOSED HEAD INJURY, INITIAL ENCOUNTER: Primary | ICD-10-CM

## 2025-07-02 LAB
ALBUMIN SERPL-MCNC: 3.4 G/DL (ref 3.4–5)
ALBUMIN/GLOB SERPL: 1.3 (ref 1–1.9)
ALP SERPL-CCNC: 57 U/L (ref 45–117)
ALT SERPL-CCNC: 15 U/L (ref 10–35)
ANION GAP SERPL CALC-SCNC: 13 MMOL/L (ref 7–16)
AST SERPL-CCNC: 20 U/L (ref 10–38)
BILIRUB SERPL-MCNC: 0.3 MG/DL (ref 0.2–1)
BUN SERPL-MCNC: 16 MG/DL (ref 6–23)
BUN/CREAT SERPL: 23
CALCIUM SERPL-MCNC: 8.4 MG/DL (ref 8.5–10.1)
CHLORIDE SERPL-SCNC: 101 MMOL/L (ref 98–107)
CO2 SERPL-SCNC: 20 MMOL/L (ref 21–32)
CREAT SERPL-MCNC: 0.68 MG/DL (ref 0.6–1.3)
ERYTHROCYTE [DISTWIDTH] IN BLOOD BY AUTOMATED COUNT: 13.8 % (ref 11.6–14.5)
GLOBULIN SER CALC-MCNC: 2.6 G/DL (ref 2.3–3.5)
GLUCOSE SERPL-MCNC: 95 MG/DL (ref 74–108)
HCT VFR BLD AUTO: 43.1 % (ref 35–45)
HGB BLD-MCNC: 14.8 G/DL (ref 12–16)
MCH RBC QN AUTO: 31.6 PG (ref 24–34)
MCHC RBC AUTO-ENTMCNC: 34.3 G/DL (ref 31–37)
MCV RBC AUTO: 91.9 FL (ref 78–100)
NRBC # BLD: 0 K/UL (ref 0–0.01)
NRBC BLD-RTO: 0 PER 100 WBC
PLATELET # BLD AUTO: 225 K/UL (ref 135–420)
PMV BLD AUTO: 8.6 FL (ref 9.2–11.8)
POTASSIUM SERPL-SCNC: 4.2 MMOL/L (ref 3.5–5.5)
PROT SERPL-MCNC: 6 G/DL (ref 6.4–8.2)
RBC # BLD AUTO: 4.69 M/UL (ref 4.2–5.3)
SODIUM SERPL-SCNC: 134 MMOL/L (ref 136–145)
WBC # BLD AUTO: 11.3 K/UL (ref 4.6–13.2)

## 2025-07-02 PROCEDURE — 85027 COMPLETE CBC AUTOMATED: CPT

## 2025-07-02 PROCEDURE — 96374 THER/PROPH/DIAG INJ IV PUSH: CPT

## 2025-07-02 PROCEDURE — 70450 CT HEAD/BRAIN W/O DYE: CPT

## 2025-07-02 PROCEDURE — 99285 EMERGENCY DEPT VISIT HI MDM: CPT

## 2025-07-02 PROCEDURE — 93005 ELECTROCARDIOGRAM TRACING: CPT

## 2025-07-02 PROCEDURE — 6360000002 HC RX W HCPCS: Performed by: EMERGENCY MEDICINE

## 2025-07-02 PROCEDURE — 72125 CT NECK SPINE W/O DYE: CPT

## 2025-07-02 PROCEDURE — 6370000000 HC RX 637 (ALT 250 FOR IP): Performed by: EMERGENCY MEDICINE

## 2025-07-02 PROCEDURE — 80053 COMPREHEN METABOLIC PANEL: CPT

## 2025-07-02 PROCEDURE — 71045 X-RAY EXAM CHEST 1 VIEW: CPT

## 2025-07-02 RX ORDER — ONDANSETRON 2 MG/ML
4 INJECTION INTRAMUSCULAR; INTRAVENOUS
Status: COMPLETED | OUTPATIENT
Start: 2025-07-02 | End: 2025-07-02

## 2025-07-02 RX ORDER — ACETAMINOPHEN 500 MG
1000 TABLET ORAL
Status: COMPLETED | OUTPATIENT
Start: 2025-07-02 | End: 2025-07-02

## 2025-07-02 RX ADMIN — ONDANSETRON 4 MG: 2 INJECTION, SOLUTION INTRAMUSCULAR; INTRAVENOUS at 19:54

## 2025-07-02 RX ADMIN — ACETAMINOPHEN 1000 MG: 500 TABLET ORAL at 19:57

## 2025-07-02 ASSESSMENT — PAIN SCALES - GENERAL: PAINLEVEL_OUTOF10: 3

## 2025-07-02 ASSESSMENT — PAIN DESCRIPTION - LOCATION: LOCATION: HEAD;NECK

## 2025-07-02 ASSESSMENT — PAIN - FUNCTIONAL ASSESSMENT: PAIN_FUNCTIONAL_ASSESSMENT: 0-10

## 2025-07-02 NOTE — ED PROVIDER NOTES
[facility] EMERGENCY DEPARTMENT  eMERGENCY dEPARTMENT eNCOUnter      Pt Name: Janie Griffin  MRN: [id number]  Birthdate 1945 of evaluation: 7/2/2025  Provider:Aman Jamison MD    CHIEF COMPLAINT       HPI    Janie Griffin is a 79 y.o. female  presents to ER via [address] via stretcher with C-collar inplace after tripping going up steps, hitting head and having a brief syncopal episode this evening.  Patient presents with a hematoma to back of head and neck/back pain. Patient recalls all of the incident    ROS    Review of Systems   Constitutional:  Positive for activity change.   HENT: Negative.     Respiratory: Negative.     Cardiovascular: Negative.    Gastrointestinal: Negative.    Genitourinary: Negative.    Musculoskeletal:  Positive for neck pain. Negative for arthralgias (neck).   Skin: Negative.    Neurological:  Positive for headaches. Negative for dizziness and light-headedness.   Psychiatric/Behavioral:  Negative for behavioral problems.        Except as noted above the remainder of the review of systems was reviewed and negative.       PAST MEDICAL HISTORY     Past Medical History:   Diagnosis Date    Arthritis     Breast cancer (HCC)     Breast cancer metastasized to lung (HCC)     Left Breast    Chronic pain     Colon polyps 2/22/16    distal sigmoid, Dr. Daniels    DDD (degenerative disc disease)     Diverticulitis of colon     Diverticulosis 2/22/16    mild in the ascedning and sigmoid colon, Dr. daniels    Gastroesophageal reflux disease without esophagitis 07/17/2023    HLD (hyperlipidemia)     Hypertension     Osteopenia     Reactive depression 3/4/2022    Tourette syndrome     Vitamin D deficiency      SURGICAL HISTORY       Past Surgical History:   Procedure Laterality Date    APPENDECTOMY      BREAST BIOPSY  7-30-10    Left Breast w/Nipple Duct exploration and excisional biopsy-left    DILATION AND CURETTAGE OF UTERUS      x3    HYSTEROSCOPY

## 2025-07-02 NOTE — ED TRIAGE NOTES
Patient presents to ER via [facility] 12 via stretcher with C-collar inplace after tripping up the steps, hitting head and having a brief syncopal episode.  Patient presents with a hematoma to back of head and with neck/back pain.

## 2025-07-03 ENCOUNTER — TELEMEDICINE (OUTPATIENT)
Facility: CLINIC | Age: 80
End: 2025-07-03

## 2025-07-03 ENCOUNTER — HOSPITAL ENCOUNTER (OUTPATIENT)
Facility: HOSPITAL | Age: 80
Discharge: HOME | End: 2025-07-03
Attending: INTERNAL MEDICINE
Payer: MEDICARE

## 2025-07-03 ENCOUNTER — RESULTS FOLLOW-UP (OUTPATIENT)
Facility: CLINIC | Age: 80
End: 2025-07-03

## 2025-07-03 DIAGNOSIS — S09.90XD CLOSED HEAD INJURY, SUBSEQUENT ENCOUNTER: ICD-10-CM

## 2025-07-03 DIAGNOSIS — M85.89 OSTEOPENIA OF MULTIPLE SITES: ICD-10-CM
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
## 2025-07-03 DIAGNOSIS — R93.7 ABNORMAL CT SCAN, CERVICAL SPINE: ICD-10-CM

## 2025-07-03 DIAGNOSIS — G44.319 ACUTE POST-TRAUMATIC HEADACHE, NOT INTRACTABLE: ICD-10-CM

## 2025-07-03 DIAGNOSIS — C78.01 MALIGNANT NEOPLASM METASTATIC TO BOTH LUNGS (HCC): ICD-10-CM

## 2025-07-03 DIAGNOSIS — R11.0 NAUSEA: ICD-10-CM

## 2025-07-03 DIAGNOSIS — M54.2 NECK PAIN: ICD-10-CM

## 2025-07-03 DIAGNOSIS — C78.02 MALIGNANT NEOPLASM METASTATIC TO BOTH LUNGS (HCC): ICD-10-CM
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
## 2025-07-03 DIAGNOSIS — I10 ESSENTIAL HYPERTENSION: ICD-10-CM

## 2025-07-03 LAB
EKG ATRIAL RATE: 74 BPM
EKG DIAGNOSIS: NORMAL
EKG P AXIS: 36 DEGREES
EKG P-R INTERVAL: 170 MS
EKG Q-T INTERVAL: 366 MS
EKG QRS DURATION: 94 MS
EKG QTC CALCULATION (BAZETT): 406 MS
EKG R AXIS: -25 DEGREES
EKG T AXIS: 53 DEGREES
EKG VENTRICULAR RATE: 74 BPM

## 2025-07-03 PROCEDURE — 72141 MRI NECK SPINE W/O DYE: CPT

## 2025-07-03 RX ORDER — ONDANSETRON 4 MG/1
4 TABLET, ORALLY DISINTEGRATING ORAL 3 TIMES DAILY PRN
Qty: 30 TABLET | Refills: 0 | Status: SHIPPED | OUTPATIENT
Start: 2025-07-03

## 2025-07-03 NOTE — PROGRESS NOTES
7/3/2025    TELEHEALTH EVALUATION -- Audio/Visual    HPI:    Janie Griffin (:  1945) has requested an audio/video evaluation for the following concern(s):  Post ED follow-up.    HPI:   Janie Griffin is a 79 y.o. year old female who presents today for post ED follow-up visit.  She has a history of hypertension, dyslipidemia,  metastatic breast cancer, GERD, diverticulosis with recurrent diverticulitis, osteoarthritis s/p right knee replacement (), lumbar degenerative disease, osteopenia, compression fracture, and Tourette's syndrome. She reports that she is doing only fairly well.     On 2025, she reports that she was climbing the three step staircase in her garage leading into her kitchen when she lost her balance and fell backward.  She states that she landed on her back striking her posterior head on the concrete floor.  She states that she saw white for 1-2 seconds and initially could not speak or move her arms for several seconds.  When this resolved, she called her  and he helped her up off the floor and subsequently called EMS who transported her to the [facility] ED.  Evaluation showed EKG sinus rhythm at 74 bpm WBC 11.3, Hb 14.8/HCT 43.1, platelets 225, sodium 134, K4.2, creatinine 0.68/eGFR 90, calcium 8.4, total protein 6.0, albumin 3.4, CT head showing chronic microvascular ischemic changes without evidence of acute intracranial abnormality, left posterior scalp hematoma; CT cervical spine showing a linear lucency through the left posterior lateral C1 ring with well-corticated margins, unclear if acute and recommended considering cervical MRI to rule out bony edema which would indicate an acute injury.  No further evaluation was performed and she was given ondansetron for nausea and acetaminophen for pain, and subsequently discharged.    She presents today for follow-up and reports that she is continuing to experience neck, upper back, and anterior chest wall pain.  She describes

## 2025-07-03 NOTE — PROGRESS NOTES
Janie Griffin presents today for   Chief Complaint   Patient presents with    Follow-up     ED/; [facility]; 07/02; Closed head injury due to fall           \"Have you been to the ER, urgent care clinic since your last visit?  Hospitalized since your last visit?\"    YES - When: approximately 1 days ago.  Where and Why: [facility]; Closed head injury.    “Have you seen or consulted any other health care providers outside of [facility] since your last visit?”    YES - When: approximately 2  weeks ago.  Where and Why: Rheumatology-[facility].

## 2025-07-05 ASSESSMENT — ENCOUNTER SYMPTOMS
GASTROINTESTINAL NEGATIVE: 1
RESPIRATORY NEGATIVE: 1

## 2025-07-14 ENCOUNTER — PATIENT MESSAGE (OUTPATIENT)
Facility: CLINIC | Age: 80
End: 2025-07-14

## 2025-07-24 ENCOUNTER — TELEPHONE (OUTPATIENT)
Facility: CLINIC | Age: 80
End: 2025-07-24

## 2025-07-24 NOTE — TELEPHONE ENCOUNTER
Please advise I would recommend continuing on duloxetine 20 mg until at least evaluated by Dr. Lyn.  Will also address at her upcoming visit in 9/2025 to determine if dose should be adjusted or treatment continued.

## 2025-07-24 NOTE — TELEPHONE ENCOUNTER
Pt wanted to know if she should continue taking   DULoxetine (CYMBALTA) 20 MG extended release capsule because it's not doing much for her. Pt has an appointment with Dr. Lyn on 8/13 and she wanted to know if pcp wants her to wait until her appointment to see Dr. Lyn will do or continue to take the medication. Pt requested a follow up call. Please advise.

## 2025-07-28 ENCOUNTER — TELEPHONE (OUTPATIENT)
Facility: CLINIC | Age: 80
End: 2025-07-28

## 2025-07-28 DIAGNOSIS — M47.816 LUMBAR FACET ARTHROPATHY: ICD-10-CM

## 2025-07-28 RX ORDER — PREGABALIN 100 MG/1
100 CAPSULE ORAL 2 TIMES DAILY
Qty: 180 CAPSULE | Refills: 1 | Status: SHIPPED | OUTPATIENT
Start: 2025-07-28 | End: 2026-01-24

## 2025-07-28 NOTE — TELEPHONE ENCOUNTER
[facility]  [place]  The last fill date was 05/01/2025 for a 90 d/s qty 180    CSA: 05/28/2025   UDS: 01/16/2025

## 2025-07-28 NOTE — TELEPHONE ENCOUNTER
PCP: Michelle Darling MD    Refill Request     LAST OFFICE VISIT: 05/28/25      Medication:   pregabalin (LYRICA) 100 MG capsule     Dispense: 180      Pharmacy [facility] [id number] [place] [address] - P [phone] - F [phone] [[id number]]       Future Appointments   Date Time Provider Department Center   8/1/2025  1:00 PM HBV CT RM 1 [facility]   9/23/2025 10:45 AM IOC LAB VISIT [facility] E[facility] DEP   9/30/2025  2:30 PM Michelle Darling MD [facility] E[facility] DEP

## 2025-07-29 ENCOUNTER — TELEPHONE (OUTPATIENT)
Facility: CLINIC | Age: 80
End: 2025-07-29

## 2025-07-29 NOTE — TELEPHONE ENCOUNTER
Pt called in states she is having Occipital Pain in head  onset a week and a half ago. Pt reports tried acupuncture, tylenol, heat and Ice.  Pt states she feell about three weeks ago and got a concussion but has spoken with provider about that.     Pt states she is not having trouble sleeping.     Please advise. Pt not sure what to do and req a call back.      DISPLAY PLAN FREE TEXT

## 2025-07-29 NOTE — TELEPHONE ENCOUNTER
Patient called back and reported that she also has two sores that showed up around her waist that are itchy and sore.   I offered patient an opened appointment time 7/30 at 2:30 and she accepted.   Patient will discuss all issues at upcoming appointment.

## 2025-07-30 ENCOUNTER — OFFICE VISIT (OUTPATIENT)
Facility: CLINIC | Age: 80
End: 2025-07-30

## 2025-07-30 VITALS
BODY MASS INDEX: 29.64 KG/M2 | HEART RATE: 62 BPM | RESPIRATION RATE: 16 BRPM | TEMPERATURE: 98.1 F | DIASTOLIC BLOOD PRESSURE: 71 MMHG | OXYGEN SATURATION: 95 % | SYSTOLIC BLOOD PRESSURE: 135 MMHG | HEIGHT: 59 IN | WEIGHT: 147 LBS

## 2025-07-30 DIAGNOSIS — C78.02 MALIGNANT NEOPLASM METASTATIC TO BOTH LUNGS (HCC): ICD-10-CM

## 2025-07-30 DIAGNOSIS — M47.812 CERVICAL SPONDYLOSIS: ICD-10-CM

## 2025-07-30 DIAGNOSIS — C78.01 MALIGNANT NEOPLASM METASTATIC TO BOTH LUNGS (HCC): ICD-10-CM

## 2025-07-30 DIAGNOSIS — Z17.0 MALIGNANT NEOPLASM OF LEFT BREAST IN FEMALE, ESTROGEN RECEPTOR POSITIVE, UNSPECIFIED SITE OF BREAST (HCC): ICD-10-CM

## 2025-07-30 DIAGNOSIS — E66.3 OVERWEIGHT (BMI 25.0-29.9): ICD-10-CM

## 2025-07-30 DIAGNOSIS — G47.00 INSOMNIA, UNSPECIFIED TYPE: ICD-10-CM

## 2025-07-30 DIAGNOSIS — M54.81 OCCIPITAL NEURALGIA OF RIGHT SIDE: ICD-10-CM

## 2025-07-30 DIAGNOSIS — I10 ESSENTIAL HYPERTENSION: ICD-10-CM

## 2025-07-30 DIAGNOSIS — C50.912 MALIGNANT NEOPLASM OF LEFT BREAST IN FEMALE, ESTROGEN RECEPTOR POSITIVE, UNSPECIFIED SITE OF BREAST (HCC): ICD-10-CM

## 2025-07-30 DIAGNOSIS — M54.2 NECK PAIN ON RIGHT SIDE: ICD-10-CM

## 2025-07-30 DIAGNOSIS — R53.82 CHRONIC FATIGUE: ICD-10-CM

## 2025-07-30 DIAGNOSIS — G47.33 OBSTRUCTIVE SLEEP APNEA SYNDROME, MODERATE: Primary | ICD-10-CM

## 2025-07-30 NOTE — PROGRESS NOTES
Janie Griffin presents today for   Chief Complaint   Patient presents with    Rash       \"Have you been to the ER, urgent care clinic since your last visit?  Hospitalized since your last visit?\"    NO    “Have you seen or consulted any other health care providers outside of [facility] since your last visit?”    NO               
surgery. He referred her back to Dr. Power at [facility] for a right C1/C2 DANIEL and greater occipital lobe steroid injection.  On 8/2/2023, a greater occipital nerve block was performed under ultrasound guidance and patient reported good relief in follow-up with JOSE Turpin 9/5/2023.  Recommendation was to hold off on these C1/C2 DANIEL given that it is a rather intense injection and given her response to the greater occipital nerve block.  She has a follow-up visit with Dr. Power on 11/7/2023.    She reported difficulty with left leg pain and weakness.  She reports today that she is having difficulty ambulating due to concern that her leg will \"give out\".  She states that she notes pain in the area of her left knee as well as involving her upper leg.  She has been under the care of of JOSE Turpin at [facility] and received bilateral SI joint injections in 9/2022 with some relief, but due to persistent symptoms she underwent a repeat lumbar MRI (11/2022) which showed an acute/subacute fracture of the L2 inferior endplate and subacute fracture of L4 superior endplate; and multilevel degenerative changes without change.  She completed bilateral SI transforaminal DANIEL on 11/14/2022 with minimal improvement.  It was not felt that the pain from the compression fractures was significant enough to proceed with kyphoplasty since she had not been experiencing a significant increase in her pain. She discontinued physical therapy due to the new compression fractures and lack of efficacy.  She had a follow-up visit with JOSE Turpin on 1/24/2023 and no further interventions were recommended due to lack of benefit.    On 3/3/2022, she presented for evaluation of worsening fatigue.  She was evaluated in 11/2020 when she described dyspnea in association with fatigue when performing exertional activities.  EKG (11/24/2020) was unremarkable, chest x-ray (11/24/2020) was negative, and echocardiogram (12/1/2020) showed normal LV function (EF

## 2025-07-30 NOTE — PATIENT INSTRUCTIONS
more about \"A Healthy Lifestyle: Care Instructions.\"  Current as of: August 6, 2023               Content Version: 14.0  © 2006-2024 [employer].   Care instructions adapted under license by [employer]. If you have questions about a medical condition or this instruction, always ask your healthcare professional. [employer] disclaims any warranty or liability for your use of this information. Preventing Falls: Care Instructions  Overview     Getting around your home safely can be a challenge if you have injuries or health problems that make it easy for you to fall. Loose rugs and furniture in walkways are among the dangers for many older people who have problems walking or who have poor eyesight. People who have conditions such as arthritis, osteoporosis, or dementia also have to be careful not to fall.  You can make your home safer with a few simple measures.  Follow-up care is a key part of your treatment and safety. Be sure to make and go to all appointments, and call your doctor if you are having problems. It's also a good idea to know your test results and keep a list of the medicines you take.  How can you care for yourself at home?  Taking care of yourself  Exercise regularly to improve your strength, muscle tone, and balance. Walk if you can. Swimming may be a good choice if you cannot walk easily.  Have your vision and hearing checked each year or any time you notice a change. If you have trouble seeing and hearing, you might not be able to avoid objects and could lose your balance.  Know the side effects of the medicines you take. Ask your doctor or pharmacist whether the medicines you take can affect your balance. Sleeping pills or sedatives can affect your balance.  Limit the amount of alcohol you drink. Alcohol can impair your balance and other senses.  Ask your doctor whether calluses or corns on your feet need to be removed. If you wear loose-fitting shoes because of

## 2025-08-01 ENCOUNTER — HOSPITAL ENCOUNTER (OUTPATIENT)
Age: 80
Discharge: HOME | End: 2025-08-01
Payer: MEDICARE

## 2025-08-01 DIAGNOSIS — C78.00 MALIGNANT NEOPLASM METASTATIC TO LUNG, UNSPECIFIED LATERALITY (HCC): ICD-10-CM

## 2025-08-01 DIAGNOSIS — C50.912 MALIGNANT NEOPLASM OF LEFT FEMALE BREAST, UNSPECIFIED ESTROGEN RECEPTOR STATUS, UNSPECIFIED SITE OF BREAST (HCC): ICD-10-CM

## 2025-08-01 DIAGNOSIS — C79.52 SECONDARY MALIGNANT NEOPLASM OF BONE AND BONE MARROW (HCC): ICD-10-CM

## 2025-08-01 DIAGNOSIS — C79.51 SECONDARY MALIGNANT NEOPLASM OF BONE AND BONE MARROW (HCC): ICD-10-CM

## 2025-08-01 PROCEDURE — 71260 CT THORAX DX C+: CPT

## 2025-08-01 PROCEDURE — 6360000004 HC RX CONTRAST MEDICATION: Performed by: NURSE PRACTITIONER

## 2025-08-01 RX ORDER — IOPAMIDOL 612 MG/ML
100 INJECTION, SOLUTION INTRAVASCULAR
Status: COMPLETED | OUTPATIENT
Start: 2025-08-01 | End: 2025-08-01

## 2025-08-01 RX ADMIN — IOPAMIDOL 100 ML: 612 INJECTION, SOLUTION INTRAVENOUS at 13:14

## 2025-08-03 PROBLEM — G47.33 OBSTRUCTIVE SLEEP APNEA SYNDROME, MODERATE: Status: ACTIVE | Noted: 2025-08-03

## 2025-08-04 ENCOUNTER — PATIENT MESSAGE (OUTPATIENT)
Facility: CLINIC | Age: 80
End: 2025-08-04

## 2025-08-04 RX ORDER — DULOXETIN HYDROCHLORIDE 20 MG/1
40 CAPSULE, DELAYED RELEASE ORAL DAILY
Qty: 90 CAPSULE | Refills: 3 | Status: SHIPPED | OUTPATIENT
Start: 2025-08-04

## 2025-08-29 ENCOUNTER — PATIENT MESSAGE (OUTPATIENT)
Facility: CLINIC | Age: 80
End: 2025-08-29

## (undated) DEVICE — SYR 20ML LL STRL LF --

## (undated) DEVICE — FORCEPS BX L240CM JAW DIA2.8MM L CAP W/ NDL MIC MESH TOOTH

## (undated) DEVICE — AIRLIFE™ NASAL OXYGEN CANNULA CURVED, FLARED TIP WITH 14 FOOT (4.3 M) CRUSH-RESISTANT TUBING, OVER-THE-EAR STYLE: Brand: AIRLIFE™

## (undated) DEVICE — ENDOSCOPY PUMP TUBING/ CAP SET: Brand: ERBE

## (undated) DEVICE — SYRINGE MED 25GA 3ML L5/8IN SUBQ PLAS W/ DETACH NDL SFTY

## (undated) DEVICE — BITE BLOCK ENDOSCP UNIV AD 6 TO 9.4 MM

## (undated) DEVICE — FLUFF AND POLYMER UNDERPAD,EXTRA HEAVY: Brand: WINGS

## (undated) DEVICE — SNARE POLYP M W27MMXL240CM OVL STIFF DISP CAPTIVATOR

## (undated) DEVICE — CATHETER SUCT TR FL TIP 14FR W/ O CTRL

## (undated) DEVICE — GAUZE,SPONGE,4"X4",16PLY,STRL,LF,10/TRAY: Brand: MEDLINE

## (undated) DEVICE — GOWN ISOL IMPERV UNIV, DISP, OPEN BACK, BLUE --

## (undated) DEVICE — BASIN EMESIS 500CC ROSE 250/CS 60/PLT: Brand: MEDEGEN MEDICAL PRODUCTS, LLC

## (undated) DEVICE — FLEX ADVANTAGE 3000CC: Brand: FLEX ADVANTAGE

## (undated) DEVICE — CANNULA ORIG TL CLR W FOAM CUSHIONS AND 14FT SUPL TB 3 CHN

## (undated) DEVICE — STERILE POLYISOPRENE POWDER-FREE SURGICAL GLOVES: Brand: PROTEXIS

## (undated) DEVICE — FCPS RAD JAW 4LC 240CM W/NDL -- BX/20 RADIAL JAW 4

## (undated) DEVICE — MEDI-VAC NON-CONDUCTIVE SUCTION TUBING: Brand: CARDINAL HEALTH

## (undated) DEVICE — AIRLIFE™ NASAL OXYGEN CANNULA CURVED, NONFLARED TIP WITH 14 FOOT (4.3 M) CRUSH-RESISTANT TUBING, OVER-THE-EAR STYLE: Brand: AIRLIFE™

## (undated) DEVICE — SOLUTION IRRIG 1000ML H2O STRL BLT

## (undated) DEVICE — MEDI-VAC SUCTION HIGH CAPACITY: Brand: CARDINAL HEALTH

## (undated) DEVICE — SYR 10ML LUER LOK 1/5ML GRAD --

## (undated) DEVICE — SYR 50ML SLIP TIP NSAF LF STRL --